# Patient Record
Sex: FEMALE | Race: WHITE | NOT HISPANIC OR LATINO | Employment: OTHER | URBAN - METROPOLITAN AREA
[De-identification: names, ages, dates, MRNs, and addresses within clinical notes are randomized per-mention and may not be internally consistent; named-entity substitution may affect disease eponyms.]

---

## 2017-01-21 PROBLEM — R31.9 URINARY TRACT INFECTION WITH HEMATURIA: Status: RESOLVED | Noted: 2017-01-20 | Resolved: 2017-01-21

## 2017-05-30 PROBLEM — D64.9 CHRONIC ANEMIA: Status: ACTIVE | Noted: 2017-05-30

## 2017-06-11 PROBLEM — R19.7 NAUSEA, VOMITING AND DIARRHEA: Status: ACTIVE | Noted: 2017-06-11

## 2017-08-31 PROBLEM — E86.0 DEHYDRATION: Status: ACTIVE | Noted: 2017-08-31

## 2017-09-16 PROBLEM — N39.0 UTI (URINARY TRACT INFECTION): Status: ACTIVE | Noted: 2017-09-16

## 2017-10-12 PROBLEM — E11.42 TYPE 2 DIABETES MELLITUS WITH DIABETIC POLYNEUROPATHY, WITHOUT LONG-TERM CURRENT USE OF INSULIN: Chronic | Status: ACTIVE | Noted: 2017-10-12

## 2018-01-11 PROBLEM — E11.22 CONTROLLED TYPE 2 DIABETES MELLITUS WITH STAGE 3 CHRONIC KIDNEY DISEASE, WITHOUT LONG-TERM CURRENT USE OF INSULIN: Status: ACTIVE | Noted: 2018-01-11

## 2022-10-02 PROBLEM — R78.81 BACTEREMIA: Status: ACTIVE | Noted: 2022-10-02

## 2022-12-05 PROBLEM — R00.1 SYMPTOMATIC BRADYCARDIA: Status: RESOLVED | Noted: 2022-09-29 | Resolved: 2022-12-05

## 2023-07-19 PROBLEM — A49.8 E COLI INFECTION: Status: ACTIVE | Noted: 2023-07-19

## 2023-08-27 PROBLEM — N18.30 TYPE 2 DIABETES MELLITUS WITH STAGE 3 CHRONIC KIDNEY DISEASE AND HYPERTENSION: Chronic | Status: ACTIVE | Noted: 2017-10-12

## 2023-08-27 PROBLEM — I48.91 ATRIAL FIBRILLATION: Status: ACTIVE | Noted: 2023-08-27

## 2023-09-09 PROBLEM — R07.9 CHEST PAIN: Status: ACTIVE | Noted: 2023-09-09

## 2023-12-12 PROCEDURE — G0180 MD CERTIFICATION HHA PATIENT: HCPCS | Mod: ,,, | Performed by: INTERNAL MEDICINE

## 2023-12-21 PROBLEM — J96.11 CHRONIC HYPOXEMIC RESPIRATORY FAILURE: Status: RESOLVED | Noted: 2023-12-09 | Resolved: 2023-12-21

## 2023-12-21 PROBLEM — J96.21 ACUTE ON CHRONIC HYPOXIC RESPIRATORY FAILURE: Status: RESOLVED | Noted: 2023-10-20 | Resolved: 2023-12-21

## 2023-12-21 PROBLEM — N30.00 ACUTE CYSTITIS WITHOUT HEMATURIA: Status: RESOLVED | Noted: 2017-09-16 | Resolved: 2023-12-21

## 2023-12-27 ENCOUNTER — HOSPITAL ENCOUNTER (INPATIENT)
Facility: HOSPITAL | Age: 88
LOS: 2 days | Discharge: HOME HEALTH | DRG: 178 | End: 2023-12-30
Attending: EMERGENCY MEDICINE | Admitting: STUDENT IN AN ORGANIZED HEALTH CARE EDUCATION/TRAINING PROGRAM
Payer: MEDICARE

## 2023-12-27 DIAGNOSIS — R10.13 EPIGASTRIC PAIN: ICD-10-CM

## 2023-12-27 DIAGNOSIS — R55 SYNCOPE: ICD-10-CM

## 2023-12-27 DIAGNOSIS — U07.1 COVID-19 VIRUS INFECTION: Primary | ICD-10-CM

## 2023-12-27 DIAGNOSIS — R07.9 CHEST PAIN: ICD-10-CM

## 2023-12-27 DIAGNOSIS — R29.898 WEAKNESS OF RIGHT LOWER EXTREMITY: ICD-10-CM

## 2023-12-27 DIAGNOSIS — I50.32 CHRONIC DIASTOLIC HEART FAILURE: ICD-10-CM

## 2023-12-27 LAB
ALBUMIN SERPL BCP-MCNC: 2.7 G/DL (ref 3.5–5.2)
ALP SERPL-CCNC: 49 U/L (ref 55–135)
ALT SERPL W/O P-5'-P-CCNC: 9 U/L (ref 10–44)
ANION GAP SERPL CALC-SCNC: 9 MMOL/L (ref 8–16)
AST SERPL-CCNC: 17 U/L (ref 10–40)
BACTERIA #/AREA URNS AUTO: ABNORMAL /HPF
BASOPHILS # BLD AUTO: 0.02 K/UL (ref 0–0.2)
BASOPHILS NFR BLD: 0.3 % (ref 0–1.9)
BILIRUB SERPL-MCNC: 0.6 MG/DL (ref 0.1–1)
BILIRUB UR QL STRIP: NEGATIVE
BNP SERPL-MCNC: 342 PG/ML (ref 0–99)
BUN SERPL-MCNC: 15 MG/DL (ref 10–30)
CALCIUM SERPL-MCNC: 8.8 MG/DL (ref 8.7–10.5)
CHLORIDE SERPL-SCNC: 107 MMOL/L (ref 95–110)
CLARITY UR REFRACT.AUTO: ABNORMAL
CO2 SERPL-SCNC: 22 MMOL/L (ref 23–29)
COLOR UR AUTO: YELLOW
CREAT SERPL-MCNC: 1.1 MG/DL (ref 0.5–1.4)
DIFFERENTIAL METHOD BLD: ABNORMAL
EOSINOPHIL # BLD AUTO: 0.1 K/UL (ref 0–0.5)
EOSINOPHIL NFR BLD: 1.7 % (ref 0–8)
ERYTHROCYTE [DISTWIDTH] IN BLOOD BY AUTOMATED COUNT: 15.4 % (ref 11.5–14.5)
EST. GFR  (NO RACE VARIABLE): 47.4 ML/MIN/1.73 M^2
GLUCOSE SERPL-MCNC: 151 MG/DL (ref 70–110)
GLUCOSE UR QL STRIP: NEGATIVE
HCT VFR BLD AUTO: 33.2 % (ref 37–48.5)
HCV AB SERPL QL IA: NORMAL
HGB BLD-MCNC: 10.9 G/DL (ref 12–16)
HGB UR QL STRIP: ABNORMAL
HYALINE CASTS UR QL AUTO: 0 /LPF
IMM GRANULOCYTES # BLD AUTO: 0.03 K/UL (ref 0–0.04)
IMM GRANULOCYTES NFR BLD AUTO: 0.5 % (ref 0–0.5)
INFLUENZA A, MOLECULAR: NEGATIVE
INFLUENZA B, MOLECULAR: NEGATIVE
KETONES UR QL STRIP: NEGATIVE
LEUKOCYTE ESTERASE UR QL STRIP: NEGATIVE
LYMPHOCYTES # BLD AUTO: 1.7 K/UL (ref 1–4.8)
LYMPHOCYTES NFR BLD: 26.7 % (ref 18–48)
MAGNESIUM SERPL-MCNC: 1.5 MG/DL (ref 1.6–2.6)
MCH RBC QN AUTO: 29.2 PG (ref 27–31)
MCHC RBC AUTO-ENTMCNC: 32.8 G/DL (ref 32–36)
MCV RBC AUTO: 89 FL (ref 82–98)
MICROSCOPIC COMMENT: ABNORMAL
MONOCYTES # BLD AUTO: 0.6 K/UL (ref 0.3–1)
MONOCYTES NFR BLD: 8.9 % (ref 4–15)
NEUTROPHILS # BLD AUTO: 4 K/UL (ref 1.8–7.7)
NEUTROPHILS NFR BLD: 61.9 % (ref 38–73)
NITRITE UR QL STRIP: NEGATIVE
NRBC BLD-RTO: 0 /100 WBC
PH UR STRIP: 6 [PH] (ref 5–8)
PLATELET # BLD AUTO: 159 K/UL (ref 150–450)
PMV BLD AUTO: 10 FL (ref 9.2–12.9)
POCT GLUCOSE: 67 MG/DL (ref 70–110)
POCT GLUCOSE: 87 MG/DL (ref 70–110)
POTASSIUM SERPL-SCNC: 3.9 MMOL/L (ref 3.5–5.1)
PROT SERPL-MCNC: 6.1 G/DL (ref 6–8.4)
PROT UR QL STRIP: ABNORMAL
RBC # BLD AUTO: 3.73 M/UL (ref 4–5.4)
RBC #/AREA URNS AUTO: 2 /HPF (ref 0–4)
SARS-COV-2 RDRP RESP QL NAA+PROBE: POSITIVE
SODIUM SERPL-SCNC: 138 MMOL/L (ref 136–145)
SP GR UR STRIP: 1.02 (ref 1–1.03)
SPECIMEN SOURCE: NORMAL
SQUAMOUS #/AREA URNS AUTO: 4 /HPF
TROPONIN I SERPL DL<=0.01 NG/ML-MCNC: 0.02 NG/ML (ref 0–0.03)
URN SPEC COLLECT METH UR: ABNORMAL
WBC # BLD AUTO: 6.52 K/UL (ref 3.9–12.7)
WBC #/AREA URNS AUTO: 7 /HPF (ref 0–5)

## 2023-12-27 PROCEDURE — 93010 ELECTROCARDIOGRAM REPORT: CPT | Mod: 76,,, | Performed by: INTERNAL MEDICINE

## 2023-12-27 PROCEDURE — XW033E5 INTRODUCTION OF REMDESIVIR ANTI-INFECTIVE INTO PERIPHERAL VEIN, PERCUTANEOUS APPROACH, NEW TECHNOLOGY GROUP 5: ICD-10-PCS | Performed by: EMERGENCY MEDICINE

## 2023-12-27 PROCEDURE — 96367 TX/PROPH/DG ADDL SEQ IV INF: CPT

## 2023-12-27 PROCEDURE — 82962 GLUCOSE BLOOD TEST: CPT

## 2023-12-27 PROCEDURE — 63600175 PHARM REV CODE 636 W HCPCS

## 2023-12-27 PROCEDURE — 81001 URINALYSIS AUTO W/SCOPE: CPT

## 2023-12-27 PROCEDURE — 99285 EMERGENCY DEPT VISIT HI MDM: CPT | Mod: 25

## 2023-12-27 PROCEDURE — 80053 COMPREHEN METABOLIC PANEL: CPT

## 2023-12-27 PROCEDURE — 25000003 PHARM REV CODE 250

## 2023-12-27 PROCEDURE — G0378 HOSPITAL OBSERVATION PER HR: HCPCS

## 2023-12-27 PROCEDURE — 84484 ASSAY OF TROPONIN QUANT: CPT

## 2023-12-27 PROCEDURE — 96365 THER/PROPH/DIAG IV INF INIT: CPT

## 2023-12-27 PROCEDURE — 85025 COMPLETE CBC W/AUTO DIFF WBC: CPT

## 2023-12-27 PROCEDURE — 83880 ASSAY OF NATRIURETIC PEPTIDE: CPT

## 2023-12-27 PROCEDURE — 93005 ELECTROCARDIOGRAM TRACING: CPT

## 2023-12-27 PROCEDURE — 83735 ASSAY OF MAGNESIUM: CPT

## 2023-12-27 PROCEDURE — 86803 HEPATITIS C AB TEST: CPT | Performed by: PHYSICIAN ASSISTANT

## 2023-12-27 PROCEDURE — 87502 INFLUENZA DNA AMP PROBE: CPT

## 2023-12-27 PROCEDURE — U0002 COVID-19 LAB TEST NON-CDC: HCPCS

## 2023-12-27 RX ORDER — QUETIAPINE FUMARATE 25 MG/1
75 TABLET, FILM COATED ORAL NIGHTLY
Status: DISCONTINUED | OUTPATIENT
Start: 2023-12-27 | End: 2023-12-30 | Stop reason: HOSPADM

## 2023-12-27 RX ORDER — GUAIFENESIN 100 MG/5ML
81 LIQUID (ML) ORAL DAILY
Status: DISCONTINUED | OUTPATIENT
Start: 2023-12-28 | End: 2023-12-30 | Stop reason: HOSPADM

## 2023-12-27 RX ORDER — IBUPROFEN 200 MG
16 TABLET ORAL
Status: DISCONTINUED | OUTPATIENT
Start: 2023-12-27 | End: 2023-12-30 | Stop reason: HOSPADM

## 2023-12-27 RX ORDER — POLYETHYLENE GLYCOL 3350 17 G/17G
17 POWDER, FOR SOLUTION ORAL DAILY
Status: DISCONTINUED | OUTPATIENT
Start: 2023-12-28 | End: 2023-12-30 | Stop reason: HOSPADM

## 2023-12-27 RX ORDER — GLUCAGON 1 MG
1 KIT INJECTION
Status: DISCONTINUED | OUTPATIENT
Start: 2023-12-27 | End: 2023-12-30 | Stop reason: HOSPADM

## 2023-12-27 RX ORDER — AMLODIPINE BESYLATE 10 MG/1
10 TABLET ORAL DAILY
Status: DISCONTINUED | OUTPATIENT
Start: 2023-12-28 | End: 2023-12-30 | Stop reason: HOSPADM

## 2023-12-27 RX ORDER — LEVOTHYROXINE SODIUM 75 UG/1
75 TABLET ORAL
Status: DISCONTINUED | OUTPATIENT
Start: 2023-12-28 | End: 2023-12-30 | Stop reason: HOSPADM

## 2023-12-27 RX ORDER — ATORVASTATIN CALCIUM 40 MG/1
80 TABLET, FILM COATED ORAL DAILY
Status: DISCONTINUED | OUTPATIENT
Start: 2023-12-28 | End: 2023-12-30 | Stop reason: HOSPADM

## 2023-12-27 RX ORDER — FUROSEMIDE 20 MG/1
40 TABLET ORAL DAILY
Status: DISCONTINUED | OUTPATIENT
Start: 2023-12-28 | End: 2023-12-30 | Stop reason: HOSPADM

## 2023-12-27 RX ORDER — INSULIN ASPART 100 [IU]/ML
0-5 INJECTION, SOLUTION INTRAVENOUS; SUBCUTANEOUS
Status: DISCONTINUED | OUTPATIENT
Start: 2023-12-27 | End: 2023-12-30 | Stop reason: HOSPADM

## 2023-12-27 RX ORDER — SODIUM CHLORIDE 0.9 % (FLUSH) 0.9 %
10 SYRINGE (ML) INJECTION EVERY 12 HOURS PRN
Status: DISCONTINUED | OUTPATIENT
Start: 2023-12-27 | End: 2023-12-30 | Stop reason: HOSPADM

## 2023-12-27 RX ORDER — IBUPROFEN 200 MG
24 TABLET ORAL
Status: DISCONTINUED | OUTPATIENT
Start: 2023-12-27 | End: 2023-12-30 | Stop reason: HOSPADM

## 2023-12-27 RX ORDER — IPRATROPIUM BROMIDE AND ALBUTEROL SULFATE 2.5; .5 MG/3ML; MG/3ML
3 SOLUTION RESPIRATORY (INHALATION) EVERY 6 HOURS PRN
Status: DISCONTINUED | OUTPATIENT
Start: 2023-12-27 | End: 2023-12-30 | Stop reason: HOSPADM

## 2023-12-27 RX ORDER — ONDANSETRON 4 MG/1
8 TABLET, ORALLY DISINTEGRATING ORAL EVERY 8 HOURS PRN
Status: DISCONTINUED | OUTPATIENT
Start: 2023-12-27 | End: 2023-12-30 | Stop reason: HOSPADM

## 2023-12-27 RX ORDER — CARVEDILOL 3.12 MG/1
6.25 TABLET ORAL 2 TIMES DAILY
Status: DISCONTINUED | OUTPATIENT
Start: 2023-12-27 | End: 2023-12-27

## 2023-12-27 RX ORDER — ENOXAPARIN SODIUM 100 MG/ML
40 INJECTION SUBCUTANEOUS EVERY 24 HOURS
Status: DISCONTINUED | OUTPATIENT
Start: 2023-12-27 | End: 2023-12-27

## 2023-12-27 RX ORDER — NALOXONE HCL 0.4 MG/ML
0.02 VIAL (ML) INJECTION
Status: DISCONTINUED | OUTPATIENT
Start: 2023-12-27 | End: 2023-12-30 | Stop reason: HOSPADM

## 2023-12-27 RX ORDER — MAGNESIUM SULFATE HEPTAHYDRATE 40 MG/ML
2 INJECTION, SOLUTION INTRAVENOUS ONCE
Status: COMPLETED | OUTPATIENT
Start: 2023-12-27 | End: 2023-12-28

## 2023-12-27 RX ORDER — GABAPENTIN 300 MG/1
300 CAPSULE ORAL 2 TIMES DAILY
Status: DISCONTINUED | OUTPATIENT
Start: 2023-12-27 | End: 2023-12-30 | Stop reason: HOSPADM

## 2023-12-27 RX ADMIN — MAGNESIUM SULFATE HEPTAHYDRATE 2 G: 40 INJECTION, SOLUTION INTRAVENOUS at 10:12

## 2023-12-27 RX ADMIN — APIXABAN 2.5 MG: 2.5 TABLET, FILM COATED ORAL at 10:12

## 2023-12-27 RX ADMIN — QUETIAPINE FUMARATE 75 MG: 25 TABLET ORAL at 10:12

## 2023-12-27 RX ADMIN — REMDESIVIR 200 MG: 100 INJECTION, POWDER, LYOPHILIZED, FOR SOLUTION INTRAVENOUS at 09:12

## 2023-12-27 RX ADMIN — GABAPENTIN 300 MG: 300 CAPSULE ORAL at 10:12

## 2023-12-27 NOTE — Clinical Note
Diagnosis: Syncope [206001]   Future Attending Provider: JARROD ROMANO [[id number]]   Admitting Provider:: JARROD ROMANO [[id number]]

## 2023-12-27 NOTE — ED PROVIDER NOTES
Encounter Date: 12/27/2023       History     Chief Complaint   Patient presents with    Episode of Unresponsiveness     Patient to ER  from home for unresponsive episode of toilet. Ems found pt 'chin to chest' with shallow respirations. 8 RR. EMS used BVM @ 10 L, pt became responsive and arrived to ER awake and alert. Dementia. On 2L NC. Denies pain      Quin Linn is a 89 y.o. female with a PMH of CHF, Alzheimer's, HLD, HTN, hyperthyroidism, CVA, pneumonia, T2DM presenting to [facility] Department via EMS following an episode of unresponsiveness.  Patient's family member found the patient on the toilet unresponsive, with her chin to her chest and shallow respirations.  EMS use bpm at 10 L on their arrival and patient became responsive.  On arrival to the ED she was awake on 2 L nasal cannula.  She denies any pain             The history is provided by the patient, medical records and the EMS personnel. No  was used.     Review of patient's allergies indicates:   Allergen Reactions    Tramadol Rash and Edema     Developed facial rash and edema.    Metformin Diarrhea     Past Medical History:   Diagnosis Date    Acute hypoxemic respiratory failure 9/2/2023    Acute on chronic diastolic congestive heart failure 10/19/2023    Alzheimer's disease     Anemia     Arthritis     lumbar    Back pain     Cancer     colon    Cataract     Colon cancer     Diabetes mellitus type II     Glaucoma     Hyperlipidemia     Hypertension     Hyperthyroidism     Hypothyroidism following radioiodine therapy     Pacemaker     Pneumonia     Pneumonia due to other staphylococcus     Renal manifestation of secondary diabetes mellitus     Squamous cell carcinoma     Stroke     TIA    Type 2 diabetes mellitus with stage 3 chronic kidney disease and hypertension 10/12/2017    Type 2 diabetes with peripheral circulatory disorder, controlled      Past Surgical History:   Procedure Laterality Date    CATARACT  EXTRACTION W/  INTRAOCULAR LENS IMPLANT Left 09/13/2017        CHOLECYSTECTOMY      COLON SURGERY  2001    Colon CA    EYE SURGERY      cataract removal left side    IMPLANTATION OF LEADLESS PACEMAKER N/A 10/21/2022    Procedure: [id number];  Surgeon: JOSE Burgos MD;  Location: [facility];  Service: Cardiology;  Laterality: N/A;  SB, NOLANA, MDT, ANES, EH,     squamous cell ca of face       Family History   Problem Relation Age of Onset    Heart disease Mother     Arthritis Mother     Kidney disease Father     Alzheimer's disease Sister     Hypertension Sister     Cancer Sister         ovarian    No Known Problems Daughter     Ankylosing spondylitis Daughter     Lupus Daughter     Kidney disease Daughter     Heart disease Daughter     Atrial fibrillation Daughter     Supraventricular tachycardia Daughter     Hypertension Son     Hyperlipidemia Son     Asthma Son     Cancer Paternal Uncle     Diabetes Maternal Grandmother     Amblyopia Neg Hx     Blindness Neg Hx     Cataracts Neg Hx     Glaucoma Neg Hx     Macular degeneration Neg Hx     Retinal detachment Neg Hx     Strabismus Neg Hx     Stroke Neg Hx     Thyroid disease Neg Hx      Social History     Tobacco Use    Smoking status: Never    Smokeless tobacco: Never    Tobacco comments:     smoked for about 4 years in her twenties (socially)   Substance Use Topics    Alcohol use: No    Drug use: No     Review of Systems    Physical Exam     Initial Vitals [12/27/23 1136]   BP Pulse Resp Temp SpO2   121/62 64 (!) 24 (!) 94.8 °F (34.9 °C) 99 %      MAP       --         Physical Exam    Nursing note and vitals reviewed.  Constitutional: She appears well-developed and well-nourished. No distress.   Tired appearing    HENT:   Mouth/Throat: Oropharynx is clear and moist.   3 cm hematoma to posterior occiput    Eyes: Pupils are equal, round, and reactive to light. No scleral icterus.   Neck: Neck supple.   Cardiovascular:  Normal  rate and regular rhythm.           Pulmonary/Chest: Breath sounds normal. No stridor. No respiratory distress.   Abdominal: Abdomen is soft. She exhibits no distension. There is no abdominal tenderness.   Musculoskeletal:         General: No edema.      Cervical back: Neck supple.     Neurological: She is alert. GCS score is 15. GCS eye subscore is 4. GCS verbal subscore is 5. GCS motor subscore is 6.   Skin: Skin is warm and dry. Capillary refill takes less than 2 seconds.         ED Course   Procedures  Labs Reviewed   CBC W/ AUTO DIFFERENTIAL - Abnormal; Notable for the following components:       Result Value    RBC 3.73 (*)     Hemoglobin 10.9 (*)     Hematocrit 33.2 (*)     RDW 15.4 (*)     All other components within normal limits    Narrative:     Release to patient->Immediate   COMPREHENSIVE METABOLIC PANEL - Abnormal; Notable for the following components:    CO2 22 (*)     Glucose 151 (*)     Albumin 2.7 (*)     Alkaline Phosphatase 49 (*)     ALT 9 (*)     eGFR 47.4 (*)     All other components within normal limits    Narrative:     Release to patient->Immediate   B-TYPE NATRIURETIC PEPTIDE - Abnormal; Notable for the following components:     (*)     All other components within normal limits    Narrative:     Release to patient->Immediate   MAGNESIUM - Abnormal; Notable for the following components:    Magnesium 1.5 (*)     All other components within normal limits    Narrative:     Release to patient->Immediate   URINALYSIS, REFLEX TO URINE CULTURE - Abnormal; Notable for the following components:    Appearance, UA Hazy (*)     Protein, UA 2+ (*)     Occult Blood UA 1+ (*)     All other components within normal limits    Narrative:     Specimen Source->Urine   SARS-COV-2 RNA AMPLIFICATION, QUAL - Abnormal; Notable for the following components:    SARS-CoV-2 RNA, Amplification, Qual Positive (*)     All other components within normal limits   URINALYSIS MICROSCOPIC - Abnormal; Notable for the  following components:    WBC, UA 7 (*)     All other components within normal limits    Narrative:     Specimen Source->Urine   POCT GLUCOSE - Abnormal; Notable for the following components:    POCT Glucose 67 (*)     All other components within normal limits   INFLUENZA A & B BY MOLECULAR   HEPATITIS C ANTIBODY    Narrative:     Release to patient->Immediate   TROPONIN I    Narrative:     Release to patient->Immediate   POCT GLUCOSE MONITORING CONTINUOUS     EKG Readings: (Independently Interpreted)   Initial Reading: No STEMI. Previous EKG: Compared with most recent EKG Heart Rate: 66. Conduction: LBBB.   NSR with sinus arrhythmia        Imaging Results              CT Head Without Contrast (Final result)  Result time 12/27/23 14:06:55      Final result by Kd Berman MD (12/27/23 14:06:55)                   Impression:      1. No acute intracranial findings.  2. Posterior scalp hematoma without definite skull fracture.      Electronically signed by: Kd Berman  Date:    12/27/2023  Time:    14:06               Narrative:    EXAMINATION:  CT HEAD WITHOUT CONTRAST    CLINICAL HISTORY:  Syncope, recurrent;    TECHNIQUE:  Low dose axial images were obtained through the head.  Coronal and sagittal reformations were also performed. Contrast was not administered.    COMPARISON:  CT head 12/08/2023.    FINDINGS:  Blood: No acute intracranial hemorrhage.    Parenchyma: No definite loss of gray-white differentiation to suggest acute or subacute transcortical infarct. Parenchymal volume loss, with notable atrophy of the mesial temporal lobe structures.  Nonspecific white matter hypoattenuation.    Ventricles/Extra-axial spaces: No abnormal extra-axial fluid collection. Basal cisterns are patent.    Vessels: Heavy atherosclerotic calcification.    Orbits: Status post left lens replacement.    Scalp: Left posterior scalp soft tissue contusion/hematoma.    Skull: There are no depressed skull fractures or  destructive bone lesions.    Sinuses and mastoids: Scattered paranasal sinus mucosal thickening and retention cysts.  Areas of frothy debris would be in keeping with acute mucosal inflammation, in the appropriate clinical context.    Other findings: None                                       X-Ray Chest AP Portable (Final result)  Result time 12/27/23 12:22:14      Final result by Jered Olson MD (12/27/23 12:22:14)                   Impression:      Suspected visual small left pleural effusion and probable left basilar atelectasis/infiltrate, noting improved aeration from 12/08/2023 radiograph.      Electronically signed by: Jered Olson MD  Date:    12/27/2023  Time:    12:22               Narrative:    EXAMINATION:  XR CHEST AP PORTABLE    CLINICAL HISTORY:  syncope;    TECHNIQUE:  Single frontal view of the chest was performed.    COMPARISON:  Chest radiograph 12/08/2023, CT thorax 09/04/2023    FINDINGS:  Monitoring leads overlie the chest.    Cardiomediastinal silhouette is midline and prominent with similar calcification and tortuosity of the aorta and enlarged heart.  Small device again projects over the left lung base.  Pulmonary vasculature and hilar contours are within normal limits.  Continued hazy opacification of the left lung base obscuring the diaphragm and costophrenic angle.  Interval improved aeration of the remaining lung fields.  No sizable pleural effusion or consolidation on the right.  No definite pneumothorax.  Mild biapical pleuroparenchymal scarring unchanged.  No acute osseous process seen.  PA and lateral views can be obtained.                                    X-Rays:   Independently Interpreted Readings:   Chest X-Ray: Normal heart size. small left pleural effusion   left basilar atelectasis/infiltrate     Medications   sodium chloride 0.9% flush 10 mL (has no administration in time range)   naloxone 0.4 mg/mL injection 0.02 mg (has no administration in time range)   glucose  chewable tablet 16 g (has no administration in time range)   glucose chewable tablet 24 g (has no administration in time range)   glucagon (human recombinant) injection 1 mg (has no administration in time range)   ondansetron disintegrating tablet 8 mg (has no administration in time range)   albuterol-ipratropium 2.5 mg-0.5 mg/3 mL nebulizer solution 3 mL (has no administration in time range)   insulin aspart U-100 pen 0-5 Units (has no administration in time range)   dextrose 10% bolus 125 mL 125 mL (has no administration in time range)   dextrose 10% bolus 250 mL 250 mL (has no administration in time range)   remdesivir 200 mg in sodium chloride 0.9% 250 mL infusion (200 mg Intravenous New Bag 12/27/23 2106)     Followed by   remdesivir 100 mg in sodium chloride 0.9% 250 mL infusion (has no administration in time range)   QUEtiapine tablet 75 mg (has no administration in time range)   amLODIPine tablet 10 mg (has no administration in time range)   apixaban tablet 2.5 mg (has no administration in time range)   aspirin chewable tablet 81 mg (has no administration in time range)   atorvastatin tablet 80 mg (has no administration in time range)   furosemide tablet 40 mg (has no administration in time range)   gabapentin capsule 300 mg (has no administration in time range)   levothyroxine tablet 75 mcg (has no administration in time range)   polyethylene glycol packet 17 g (has no administration in time range)   magnesium sulfate 2g in water 50mL IVPB (premix) (has no administration in time range)     Medical Decision Making  EKG without dangerous arrhythmia   Electrolytes normal   COVID +   CT head with posterior scalp hematoma, no intracranial findings      Normal trop    Given patient's age, co morbidities, COVID+, and unclear reason for syncope, will admit to hospital medicine for cardiac monitoring and further management.   Discussed findings and plan with family who expressed understanding and agreement.    I discussed admission with hospital medicine who agreed to admit the patient for further evaluation and management.       Amount and/or Complexity of Data Reviewed  Labs: ordered. Decision-making details documented in ED Course.  Radiology: ordered.    Risk  Prescription drug management.  Decision regarding hospitalization.               ED Course as of 12/27/23 2115   Wed Dec 27, 2023   1311 Hemoglobin(!): 10.9  At her baseline [GK]      ED Course User Index  [GK] Liliana Smith MD                           Clinical Impression:  Final diagnoses:  [R55] Syncope  [U07.1] COVID-19 virus infection (Primary)          ED Disposition Condition    Observation Stable                Latonia Franco MD  Resident  12/27/23 2115

## 2023-12-28 LAB
BASOPHILS # BLD AUTO: 0.02 K/UL (ref 0–0.2)
BASOPHILS NFR BLD: 0.4 % (ref 0–1.9)
CK SERPL-CCNC: 15 U/L (ref 20–180)
DIFFERENTIAL METHOD BLD: ABNORMAL
EOSINOPHIL # BLD AUTO: 0.1 K/UL (ref 0–0.5)
EOSINOPHIL NFR BLD: 3 % (ref 0–8)
ERYTHROCYTE [DISTWIDTH] IN BLOOD BY AUTOMATED COUNT: 16 % (ref 11.5–14.5)
HCT VFR BLD AUTO: 29.8 % (ref 37–48.5)
HGB BLD-MCNC: 9.9 G/DL (ref 12–16)
IMM GRANULOCYTES # BLD AUTO: 0.01 K/UL (ref 0–0.04)
IMM GRANULOCYTES NFR BLD AUTO: 0.2 % (ref 0–0.5)
LYMPHOCYTES # BLD AUTO: 2 K/UL (ref 1–4.8)
LYMPHOCYTES NFR BLD: 42.2 % (ref 18–48)
MCH RBC QN AUTO: 28.8 PG (ref 27–31)
MCHC RBC AUTO-ENTMCNC: 33.2 G/DL (ref 32–36)
MCV RBC AUTO: 87 FL (ref 82–98)
MONOCYTES # BLD AUTO: 0.4 K/UL (ref 0.3–1)
MONOCYTES NFR BLD: 7.8 % (ref 4–15)
NEUTROPHILS # BLD AUTO: 2.2 K/UL (ref 1.8–7.7)
NEUTROPHILS NFR BLD: 46.4 % (ref 38–73)
NRBC BLD-RTO: 0 /100 WBC
PLATELET # BLD AUTO: 173 K/UL (ref 150–450)
PMV BLD AUTO: 9.7 FL (ref 9.2–12.9)
POCT GLUCOSE: 61 MG/DL (ref 70–110)
POCT GLUCOSE: 71 MG/DL (ref 70–110)
POCT GLUCOSE: 80 MG/DL (ref 70–110)
RBC # BLD AUTO: 3.44 M/UL (ref 4–5.4)
TSH SERPL DL<=0.005 MIU/L-ACNC: 3.04 UIU/ML (ref 0.4–4)
WBC # BLD AUTO: 4.72 K/UL (ref 3.9–12.7)

## 2023-12-28 PROCEDURE — 97165 OT EVAL LOW COMPLEX 30 MIN: CPT

## 2023-12-28 PROCEDURE — 97110 THERAPEUTIC EXERCISES: CPT

## 2023-12-28 PROCEDURE — 36415 COLL VENOUS BLD VENIPUNCTURE: CPT

## 2023-12-28 PROCEDURE — 27000207 HC ISOLATION

## 2023-12-28 PROCEDURE — 85025 COMPLETE CBC W/AUTO DIFF WBC: CPT

## 2023-12-28 PROCEDURE — 97530 THERAPEUTIC ACTIVITIES: CPT

## 2023-12-28 PROCEDURE — 82550 ASSAY OF CK (CPK): CPT

## 2023-12-28 PROCEDURE — 63600175 PHARM REV CODE 636 W HCPCS: Mod: JZ,TB

## 2023-12-28 PROCEDURE — 25000003 PHARM REV CODE 250

## 2023-12-28 PROCEDURE — 21400001 HC TELEMETRY ROOM

## 2023-12-28 PROCEDURE — 83735 ASSAY OF MAGNESIUM: CPT

## 2023-12-28 PROCEDURE — 51798 US URINE CAPACITY MEASURE: CPT

## 2023-12-28 PROCEDURE — 84100 ASSAY OF PHOSPHORUS: CPT

## 2023-12-28 PROCEDURE — 84443 ASSAY THYROID STIM HORMONE: CPT

## 2023-12-28 PROCEDURE — 97162 PT EVAL MOD COMPLEX 30 MIN: CPT

## 2023-12-28 PROCEDURE — 80053 COMPREHEN METABOLIC PANEL: CPT

## 2023-12-28 PROCEDURE — 0T9B70Z DRAINAGE OF BLADDER WITH DRAINAGE DEVICE, VIA NATURAL OR ARTIFICIAL OPENING: ICD-10-PCS | Performed by: STUDENT IN AN ORGANIZED HEALTH CARE EDUCATION/TRAINING PROGRAM

## 2023-12-28 RX ORDER — CARVEDILOL 6.25 MG/1
6.25 TABLET ORAL 2 TIMES DAILY
Status: DISCONTINUED | OUTPATIENT
Start: 2023-12-28 | End: 2023-12-29

## 2023-12-28 RX ORDER — HYDROCODONE BITARTRATE AND ACETAMINOPHEN 5; 325 MG/1; MG/1
1 TABLET ORAL EVERY 8 HOURS PRN
Qty: 90 TABLET | Refills: 0
Start: 2023-12-28

## 2023-12-28 RX ADMIN — ASPIRIN 81 MG CHEWABLE TABLET 81 MG: 81 TABLET CHEWABLE at 11:12

## 2023-12-28 RX ADMIN — QUETIAPINE FUMARATE 75 MG: 25 TABLET ORAL at 08:12

## 2023-12-28 RX ADMIN — FUROSEMIDE 40 MG: 20 TABLET ORAL at 11:12

## 2023-12-28 RX ADMIN — GABAPENTIN 300 MG: 300 CAPSULE ORAL at 11:12

## 2023-12-28 RX ADMIN — POLYETHYLENE GLYCOL 3350 17 G: 17 POWDER, FOR SOLUTION ORAL at 11:12

## 2023-12-28 RX ADMIN — AMLODIPINE BESYLATE 10 MG: 10 TABLET ORAL at 11:12

## 2023-12-28 RX ADMIN — APIXABAN 2.5 MG: 2.5 TABLET, FILM COATED ORAL at 11:12

## 2023-12-28 RX ADMIN — APIXABAN 2.5 MG: 2.5 TABLET, FILM COATED ORAL at 08:12

## 2023-12-28 RX ADMIN — CARVEDILOL 6.25 MG: 6.25 TABLET, FILM COATED ORAL at 08:12

## 2023-12-28 RX ADMIN — REMDESIVIR 100 MG: 100 INJECTION, POWDER, LYOPHILIZED, FOR SOLUTION INTRAVENOUS at 12:12

## 2023-12-28 RX ADMIN — ATORVASTATIN CALCIUM 80 MG: 40 TABLET, FILM COATED ORAL at 11:12

## 2023-12-28 RX ADMIN — CARVEDILOL 6.25 MG: 6.25 TABLET, FILM COATED ORAL at 11:12

## 2023-12-28 RX ADMIN — GABAPENTIN 300 MG: 300 CAPSULE ORAL at 08:12

## 2023-12-28 NOTE — SUBJECTIVE & OBJECTIVE
Interval History: NAEON and no new complaints. Pt is breathing comfortably on room air. She denies being in any pain today. Carotid ultrasound was unrevealing.     Review of Systems   Constitutional:  Negative for chills and fever.   Respiratory:  Negative for shortness of breath.    Cardiovascular:  Negative for chest pain and leg swelling.   Gastrointestinal:  Negative for abdominal pain, diarrhea, nausea and vomiting.   Genitourinary:  Negative for dysuria.   Neurological:  Negative for light-headedness.     Objective:     Vital Signs (Most Recent):  Temp: 99 °F (37.2 °C) (12/28/23 0745)  Pulse: 62 (12/28/23 1109)  Resp: 17 (12/28/23 0745)  BP: (!) 177/75 (12/28/23 0745)  SpO2: 99 % (12/28/23 1100) Vital Signs (24h Range):  Temp:  [97.5 °F (36.4 °C)-99 °F (37.2 °C)] 99 °F (37.2 °C)  Pulse:  [54-74] 62  Resp:  [13-20] 17  SpO2:  [96 %-100 %] 99 %  BP: (132-197)/(60-88) 177/75     Weight: 65.8 kg (145 lb 1 oz)  Body mass index is 22.71 kg/m².    Intake/Output Summary (Last 24 hours) at 12/28/2023 1211  Last data filed at 12/28/2023 0634  Gross per 24 hour   Intake 250 ml   Output --   Net 250 ml         Physical Exam  Constitutional:       General: She is not in acute distress.  HENT:      Head: Normocephalic and atraumatic.      Mouth/Throat:      Mouth: Mucous membranes are moist.   Eyes:      General: No scleral icterus.     Extraocular Movements: Extraocular movements intact.   Cardiovascular:      Rate and Rhythm: Normal rate and regular rhythm.      Pulses: Normal pulses.      Heart sounds: Normal heart sounds. No murmur heard.  Pulmonary:      Effort: Pulmonary effort is normal. No respiratory distress.      Breath sounds: Normal breath sounds. No wheezing or rales.   Abdominal:      Palpations: Abdomen is soft.      Tenderness: There is no abdominal tenderness.   Musculoskeletal:      Right lower leg: No edema.      Left lower leg: No edema.   Skin:     General: Skin is warm and dry.      Coloration: Skin  is not jaundiced.   Neurological:      Mental Status: She is alert. Mental status is at baseline.   Psychiatric:         Mood and Affect: Mood normal.         Behavior: Behavior normal.             Significant Labs: All pertinent labs within the past 24 hours have been reviewed.  CBC:   Recent Labs   Lab 12/27/23  1206   WBC 6.52   HGB 10.9*   HCT 33.2*        CMP:   Recent Labs   Lab 12/27/23  1206      K 3.9      CO2 22*   *   BUN 15   CREATININE 1.1   CALCIUM 8.8   PROT 6.1   ALBUMIN 2.7*   BILITOT 0.6   ALKPHOS 49*   AST 17   ALT 9*   ANIONGAP 9       Significant Imaging: I have reviewed all pertinent imaging results/findings within the past 24 hours.

## 2023-12-28 NOTE — ASSESSMENT & PLAN NOTE
Pt tested positive in the ED. SpO2 was >95. CXR was without any new consolidation or interstitial prominence. She has been having mild URI sx since Sunday, but has remained afebrile and HDS.     -Remdesevir therapy as she has co morbidities that place her at risk of severe infection  -Monitor SpO2   -Telemetry   -Ariborne, contact, and droplet precautions

## 2023-12-28 NOTE — ASSESSMENT & PLAN NOTE
During her last admission, a interrogation of her pacemaker was attempted but it does not record for prolonged periods of time so little data was available. At that time, there was no record of any arhythmia.     -S/p pacemaker implantation

## 2023-12-28 NOTE — PROGRESS NOTES
[facility] - [facility] ([address] Medicine  Progress Note    Patient Name: Quin Linn  MRN: [id number]  Patient Class: IP- Inpatient   Admission Date: 12/27/2023  Length of Stay: 0 days  Attending Physician: Oksana Lynch MD  Primary Care Provider: Mary Ellen Nesbitt MD        Subjective:     Principal Problem:Syncope        HPI:  89 yo F with AD, hx of CVA, 2nd degree AV block s/p-ppm, pAF on eliquis, HFpEF, CKD3, HTN, DM, hypothyroidism who presents following syncopal episode. She was found unresponsive at home on the toilet after a loose BM. The pt's caregiver found her and immediately called for the pt's daughter. The daughter came and helped secure her mother then called 911. In total, the pt was unresponsive for roughly 20 minutes before coming back to. It took her a few moments, but she returned to her baseline. Of note, pt began having upper respiratory sx this past Sunday, but was never febrile or hypoxic at home.    Earlier this month, the pt was admitted for an almost identical scenario while using the bathroom. At that time, she was treated for UTI and the event was attributed to vasovagal response with autonomic dysfunction.    In the ED, she was afebrile and HDS. Her CT head did not show any acute findings, but CXR did show a small left sided pleural effusion that was present during her previous admission, otherwise no consolidation. BNP was mildly elevated at 342 but she does not appear to be fluid overloaded on exam. She was found to be covid positive but she was satting well on room air.     Overview/Hospital Course:  89 yo F with AD, hx of CVA, 2nd degree AV block s/p-ppm, pAF on eliquis, HFpEF, CKD3, HTN, DM, hypothyroidism who presents following syncopal episode. Her metabolic workup has been unrevealing. We are treating her Covid 19 with remdesivir; she remains stable on room air. Will initiate GOC conversation with the patient's daughter.    Interval History: SUN and  no new complaints. Pt is breathing comfortably on room air. She denies being in any pain today. Carotid ultrasound was unrevealing.     Review of Systems   Constitutional:  Negative for chills and fever.   Respiratory:  Negative for shortness of breath.    Cardiovascular:  Negative for chest pain and leg swelling.   Gastrointestinal:  Negative for abdominal pain, diarrhea, nausea and vomiting.   Genitourinary:  Negative for dysuria.   Neurological:  Negative for light-headedness.     Objective:     Vital Signs (Most Recent):  Temp: 99 °F (37.2 °C) (12/28/23 0745)  Pulse: 62 (12/28/23 1109)  Resp: 17 (12/28/23 0745)  BP: (!) 177/75 (12/28/23 0745)  SpO2: 99 % (12/28/23 1100) Vital Signs (24h Range):  Temp:  [97.5 °F (36.4 °C)-99 °F (37.2 °C)] 99 °F (37.2 °C)  Pulse:  [54-74] 62  Resp:  [13-20] 17  SpO2:  [96 %-100 %] 99 %  BP: (132-197)/(60-88) 177/75     Weight: 65.8 kg (145 lb 1 oz)  Body mass index is 22.71 kg/m².    Intake/Output Summary (Last 24 hours) at 12/28/2023 1211  Last data filed at 12/28/2023 0634  Gross per 24 hour   Intake 250 ml   Output --   Net 250 ml         Physical Exam  Constitutional:       General: She is not in acute distress.  HENT:      Head: Normocephalic and atraumatic.      Mouth/Throat:      Mouth: Mucous membranes are moist.   Eyes:      General: No scleral icterus.     Extraocular Movements: Extraocular movements intact.   Cardiovascular:      Rate and Rhythm: Normal rate and regular rhythm.      Pulses: Normal pulses.      Heart sounds: Normal heart sounds. No murmur heard.  Pulmonary:      Effort: Pulmonary effort is normal. No respiratory distress.      Breath sounds: Normal breath sounds. No wheezing or rales.   Abdominal:      Palpations: Abdomen is soft.      Tenderness: There is no abdominal tenderness.   Musculoskeletal:      Right lower leg: No edema.      Left lower leg: No edema.   Skin:     General: Skin is warm and dry.      Coloration: Skin is not jaundiced.    Neurological:      Mental Status: She is alert. Mental status is at baseline.   Psychiatric:         Mood and Affect: Mood normal.         Behavior: Behavior normal.             Significant Labs: All pertinent labs within the past 24 hours have been reviewed.  CBC:   Recent Labs   Lab 12/27/23  1206   WBC 6.52   HGB 10.9*   HCT 33.2*        CMP:   Recent Labs   Lab 12/27/23  1206      K 3.9      CO2 22*   *   BUN 15   CREATININE 1.1   CALCIUM 8.8   PROT 6.1   ALBUMIN 2.7*   BILITOT 0.6   ALKPHOS 49*   AST 17   ALT 9*   ANIONGAP 9       Significant Imaging: I have reviewed all pertinent imaging results/findings within the past 24 hours.    Assessment/Plan:      * Syncope  Pt syncopized while on the toilet after, from what the daughter reports, having loose stools. She remained unresponsive for roughly 20 minutes. EMS was called and she was brought in. This almost exact scenario occurred earlier this month and it was eventually attributed to autonomic dysfunction. CT head was unremarkable, CXR showed a small pleural effusion that has been present for some time, CBC and CMP were baseline for her, but she did test positive for covid-19.    -Telemetry  -Carotid US: No evidence of a hemodynamically significant carotid bifurcation stenosis   -Treat covid  -orthostatic vital signs; pt has refused wearing a BP cuff; will re-attempt this at a later time    COVID-19  Pt tested positive in the ED. SpO2 was >95. CXR was without any new consolidation or interstitial prominence. She has been having mild URI sx since Sunday, but has remained afebrile and HDS.     -Remdesevir therapy as she has co morbidities that place her at risk of severe infection  -Monitor SpO2   -Telemetry   -Ariborne, contact, and droplet precautions     Chronic diastolic heart failure  Echo    Result Date: 9/5/2023    Left Ventricle: The left ventricle is normal in size. Normal wall   thickness. Normal wall motion. There is normal  diastolic function.    Left Atrium: Left atrium is moderately dilated.    Right Ventricle: Normal right ventricular cavity size. Wall thickness   is normal. Right ventricle wall motion  is normal. Systolic function is   normal.    Right Atrium: Right atrium is mildly dilated.    Aortic Valve: There is mild aortic valve sclerosis.    Pulmonary Artery: No pulmonary hypertension. The estimated pulmonary   artery systolic pressure is 39 mmHg.    IVC/SVC: Elevated venous pressure at 15 mmHg.    Pericardium: There is a small circumferential effusion with   small-moderate posteriorly and apical- laterally. No indication of cardiac   tamponade. Evidence includes no respiratory transvalvular variation.   Bilateral pleural effusions.        Echo    Result Date: 9/2/2023    Left Ventricle: The left ventricle is mildly dilated. Increased wall   thickness. Normal wall motion. There is normal systolic function with a   visually estimated ejection fraction of 55 - 60%. Grade II diastolic   dysfunction.    Right Ventricle: Normal right ventricular cavity size. Right ventricle   wall motion  is normal. Systolic function is normal.    Left Atrium: Left atrium is severely dilated.    Right Atrium: Right atrium is dilated.    Mitral Valve: There is mild regurgitation.    IVC/SVC: Patient is ventilated, cannot use IVC diameter to estimate   right atrial pressure.    Pericardium: There is a small circumferential effusion. Prominent   echogenic material is adjacent or adherent to the visceral pericardium   which carries a broad differential including but not limited to   pericardial fat, fibrous material and malignancy. Clinical correlation is   advised. No convincing findings to suggest tamponade. Left pleural   effusion.    Aorta: Aortic root is normal in size measuring 3.34 cm. Ascending aorta   is mildly dilated measuring 3.81 cm.       -Continue home lasix   -Telemetry  -Maintain K>4 and Mag >2      Constipation  -bowel regimen        Paroxysmal A-fib  EKG was NSR on admission with HR of 66.    -Continue eliquis 2.5 mg BID  -Coreg 6.25 mg BID    Wenckebach second degree AV block  During her last admission, a interrogation of her pacemaker was attempted but it does not record for prolonged periods of time so little data was available. At that time, there was no record of any arhythmia.     -S/p pacemaker implantation         Chronic kidney disease, stage IV (severe)  -Daily CMP  -Avoid nephrotoxic agents     Diabetic polyneuropathy associated with type 2 diabetes mellitus  -Continue home gabapentin; will consider lowering her dose given age and dementia     Controlled type 2 diabetes mellitus with stage 3 chronic kidney disease, without long-term current use of insulin  Pt not currently taking any diabetic medications at home    -SSI  -POCT BG      Anemia of chronic disease    Lab Results   Component Value Date    HGB 10.9 (L) 12/27/2023    HCT 33.2 (L) 12/27/2023     Monitor serial CBC and transfuse if patient becomes hemodynamically unstable, symptomatic or H/H drops below 7/21.    Debility  -PT/OT consulted     Essential hypertension    Hypertension Medications               amLODIPine (NORVASC) 10 MG tablet Take 1 tablet (10 mg total) by mouth once daily.    carvediloL (COREG) 6.25 MG tablet Take 1 tablet (6.25 mg total) by mouth 2 (two) times daily.          -Restaritng BB  -Pt has had several elevated BP readings with SBPs in the 180s. We will continue to closely monitor and titrate meds as indicated     Late onset Alzheimer's disease with behavioral disturbance  Spoke with daughter at bedside on admission and states her mother was at baseline.    -Seroquel 75 mg QHS    Hyperlipidemia LDL goal <70  -continue home statin       Hypothyroidism following radioiodine therapy  -continue home synthroid        VTE Risk Mitigation (From admission, onward)           Ordered     apixaban tablet 2.5 mg  2 times daily         12/27/23 1823     IP  VTE HIGH RISK PATIENT  Once         12/27/23 1758     Place sequential compression device  Until discontinued         12/27/23 1758                    Discharge Planning   LILI: 12/29/2023     Code Status: DNR   Is the patient medically ready for discharge?:     Reason for patient still in hospital (select all that apply): Patient trending condition  Discharge Plan A: Home Health                  Frank Laws DO  Department of Hospital Medicine   [facility] - Intensive Care ([address] [place])

## 2023-12-28 NOTE — HOSPITAL COURSE
89 yo F with AD, hx of CVA, 2nd degree AV block s/p-ppm, pAF on eliquis, HFpEF, CKD3, HTN, DM, hypothyroidism who presents following syncopal episode. Her metabolic workup has been unrevealing, and no concerning arrhythmias were seen on telemetry aside from intermittent Afib which was previously known. We are treating her Covid 19 with remdesivir; she remains stable on room air. Adjusted her coreg to 3.125 mg BID and dc glipizide. Will discharge her with home health and requested a transfer bench for bath.     Physical Exam  Constitutional:       General: She is not in acute distress.  HENT:      Head: Normocephalic and atraumatic.      Mouth/Throat:      Mouth: Mucous membranes are moist.   Eyes:      General: No scleral icterus.     Extraocular Movements: Extraocular movements intact.   Cardiovascular:      Rate and Rhythm: Normal rate and regular rhythm.      Pulses: Normal pulses.      Heart sounds: Normal heart sounds. No murmur heard.  Pulmonary:      Effort: Pulmonary effort is normal. No respiratory distress.      Breath sounds: Normal breath sounds. No wheezing or rales.   Abdominal:      Palpations: Abdomen is soft.      Tenderness: There is no abdominal tenderness.   Musculoskeletal:      Right lower leg: No edema.      Left lower leg: No edema.   Skin:     General: Skin is warm and dry.      Coloration: Skin is not jaundiced.   Neurological:      Mental Status: She is alert. Mental status is at baseline.   Psychiatric:         Mood and Affect: Mood normal.         Behavior: Behavior normal.

## 2023-12-28 NOTE — H&P
[facility] Dept  Hospital Medicine  History & Physical    Patient Name: Quin Linn  MRN: [id number]  Patient Class: OP- Observation  Admission Date: 12/27/2023  Attending Physician: Oksana Lynch MD   Primary Care Provider: Mary Ellen Nesbitt MD         Patient information was obtained from patient, relative(s), past medical records, and ER records.     Subjective:     Principal Problem:Syncope    Chief Complaint:   Chief Complaint   Patient presents with    Episode of Unresponsiveness     Patient to ER  from home for unresponsive episode of toilet. Ems found pt 'chin to chest' with shallow respirations. 8 RR. EMS used BVM @ 10 L, pt became responsive and arrived to ER awake and alert. Dementia. On 2L NC. Denies pain         HPI: 89 yo F with AD, hx of CVA, 2nd degree AV block s/p-ppm, pAF on eliquis, HFpEF, CKD3, HTN, DM, hypothyroidism who presents following syncopal episode. She was found unresponsive at home on the toilet after a loose BM. The pt's caregiver found her and immediately called for the pt's daughter. The daughter came and helped secure her mother then called 911. In total, the pt was unresponsive for roughly 20 minutes before coming back to. It took her a few moments, but she returned to her baseline. Of note, pt began having upper respiratory sx this past Sunday, but was never febrile or hypoxic at home.    Earlier this month, the pt was admitted for an almost identical scenario while using the bathroom. At that time, she was treated for UTI and the event was attributed to vasovagal response with autonomic dysfunction.    In the ED, she was afebrile and HDS. Her CT head did not show any acute findings, but CXR did show a small left sided pleural effusion that was present during her previous admission, otherwise no consolidation. BNP was mildly elevated at 342 but she does not appear to be fluid overloaded on exam. She was found to be covid positive but she was satting well  on room air.     Past Medical History:   Diagnosis Date    Acute hypoxemic respiratory failure 9/2/2023    Acute on chronic diastolic congestive heart failure 10/19/2023    Alzheimer's disease     Anemia     Arthritis     lumbar    Back pain     Cancer     colon    Cataract     Colon cancer     Diabetes mellitus type II     Glaucoma     Hyperlipidemia     Hypertension     Hyperthyroidism     Hypothyroidism following radioiodine therapy     Pacemaker     Pneumonia     Pneumonia due to other staphylococcus     Renal manifestation of secondary diabetes mellitus     Squamous cell carcinoma     Stroke     TIA    Type 2 diabetes mellitus with stage 3 chronic kidney disease and hypertension 10/12/2017    Type 2 diabetes with peripheral circulatory disorder, controlled        Past Surgical History:   Procedure Laterality Date    CATARACT EXTRACTION W/  INTRAOCULAR LENS IMPLANT Left 09/13/2017        CHOLECYSTECTOMY      COLON SURGERY  2001    Colon CA    EYE SURGERY      cataract removal left side    IMPLANTATION OF LEADLESS PACEMAKER N/A 10/21/2022    Procedure: [id number];  Surgeon: JOSE Burgos MD;  Location: [facility];  Service: Cardiology;  Laterality: N/A;  SB, JOHNNA, MDT, ANES, EH,     squamous cell ca of face         Review of patient's allergies indicates:   Allergen Reactions    Tramadol Rash and Edema     Developed facial rash and edema.    Metformin Diarrhea       No current facility-administered medications on file prior to encounter.     Current Outpatient Medications on File Prior to Encounter   Medication Sig    ACCU-CHEK FASTCLIX LANCET DRUM Misc CHECK BLOOD GLUCOSE FOUR TIMES DAILY    ACCU-CHEK GUIDE TEST STRIPS Strp TEST BLOOD GLUCOSE FOUR TIMES DAILY OR AS DIRECTED    amLODIPine (NORVASC) 10 MG tablet Take 1 tablet (10 mg total) by mouth once daily.    aspirin (ECOTRIN) 81 MG EC tablet Take 1 tablet (81 mg total) by mouth once daily.    atorvastatin (LIPITOR)  80 MG tablet Take 1 tablet (80 mg total) by mouth once daily.    calcium-vitamin D3 (CALCIUM 500 + D) 500 mg-5 mcg (200 unit) per tablet Take 1 tablet by mouth 2 (two) times daily with meals.    carvediloL (COREG) 6.25 MG tablet Take 1 tablet (6.25 mg total) by mouth 2 (two) times daily.    gabapentin (NEURONTIN) 300 MG capsule Take 1 capsule (300 mg total) by mouth 2 (two) times daily.    glipiZIDE (GLUCOTROL) 5 MG tablet TAKE 1 TABLET BY MOUTH TWICE DAILY    HYDROcodone-acetaminophen (NORCO) 5-325 mg per tablet Take 1 tablet by mouth every 8 (eight) hours as needed for Pain.    levothyroxine (SYNTHROID) 75 MCG tablet Take 1 tablet (75 mcg total) by mouth before breakfast. Administer on an empty stomach at least 30 minutes before food or other medications.    polyethylene glycol (GLYCOLAX) 17 gram PwPk Take 17 g by mouth once daily.    QUEtiapine (SEROQUEL) 25 MG Tab Take 3 tablets (75 mg total) by mouth every evening.    senna-docusate 8.6-50 mg (PERICOLACE) 8.6-50 mg per tablet Take 1 tablet by mouth 2 (two) times daily as needed for Constipation.     Family History       Problem Relation (Age of Onset)    Alzheimer's disease Sister    Ankylosing spondylitis Daughter    Arthritis Mother    Asthma Son    Atrial fibrillation Daughter    Cancer Sister, Paternal Uncle    Diabetes Maternal Grandmother    Heart disease Mother, Daughter    Hyperlipidemia Son    Hypertension Sister, Son    Kidney disease Father, Daughter    Lupus Daughter    No Known Problems Daughter    Supraventricular tachycardia Daughter          Tobacco Use    Smoking status: Never    Smokeless tobacco: Never    Tobacco comments:     smoked for about 4 years in her twenties (socially)   Substance and Sexual Activity    Alcohol use: No    Drug use: No    Sexual activity: Never     Review of Systems   Constitutional:  Negative for chills and fever.   HENT:  Positive for rhinorrhea.    Eyes:  Negative for visual disturbance.   Respiratory:  Positive  for cough. Negative for shortness of breath and wheezing.    Cardiovascular:  Negative for chest pain and leg swelling.   Gastrointestinal:  Negative for abdominal pain, diarrhea, nausea and vomiting.   Genitourinary:  Negative for dysuria.   Neurological:  Negative for dizziness and headaches.     Objective:     Vital Signs (Most Recent):  Temp: 96.4 °F (35.8 °C) (12/27/23 1148)  Pulse: 66 (12/27/23 1532)  Resp: 20 (12/27/23 1532)  BP: (!) 180/75 (12/27/23 1532)  SpO2: 97 % (12/27/23 1532) Vital Signs (24h Range):  Temp:  [94.8 °F (34.9 °C)-96.4 °F (35.8 °C)] 96.4 °F (35.8 °C)  Pulse:  [64-66] 66  Resp:  [15-24] 20  SpO2:  [96 %-99 %] 97 %  BP: (121-180)/(59-75) 180/75        There is no height or weight on file to calculate BMI.     Physical Exam  Constitutional:       General: She is not in acute distress.  HENT:      Head: Normocephalic and atraumatic.      Mouth/Throat:      Mouth: Mucous membranes are dry.      Pharynx: Oropharynx is clear.   Eyes:      General: No scleral icterus.     Extraocular Movements: Extraocular movements intact.   Cardiovascular:      Rate and Rhythm: Normal rate and regular rhythm.      Pulses: Normal pulses.      Heart sounds: Normal heart sounds. No murmur heard.  Pulmonary:      Effort: Pulmonary effort is normal. No respiratory distress.      Breath sounds: Normal breath sounds. No wheezing, rhonchi or rales.   Abdominal:      General: There is no distension.      Palpations: Abdomen is soft.      Tenderness: There is no abdominal tenderness.   Musculoskeletal:      Right lower leg: No edema.      Left lower leg: No edema.   Lymphadenopathy:      Cervical: No cervical adenopathy.   Skin:     General: Skin is warm and dry.      Coloration: Skin is jaundiced.   Neurological:      General: No focal deficit present.      Mental Status: She is alert. Mental status is at baseline.   Psychiatric:         Mood and Affect: Mood normal.         Behavior: Behavior normal.                 Significant Labs: All pertinent labs within the past 24 hours have been reviewed.  CBC:   Recent Labs   Lab 12/27/23  1206   WBC 6.52   HGB 10.9*   HCT 33.2*        CMP:   Recent Labs   Lab 12/27/23  1206      K 3.9      CO2 22*   *   BUN 15   CREATININE 1.1   CALCIUM 8.8   PROT 6.1   ALBUMIN 2.7*   BILITOT 0.6   ALKPHOS 49*   AST 17   ALT 9*   ANIONGAP 9     Cardiac Markers:   Recent Labs   Lab 12/27/23  1206   *       Significant Imaging: I have reviewed all pertinent imaging results/findings within the past 24 hours.  Assessment/Plan:     * Syncope  Pt syncopized while on the toilet after, from what the daughter reports, having loose stools. She remained unresponsive for roughly 20 minutes. EMS was called and she was brought in. This almost exact scenario occurred earlier this month and it was eventually attributed to autonomic dysfunction. CT head was unremarkable, CXR showed a small pleural effusion that has been present for some time, CBC and CMP were baseline for her, but she did test positive for covid-19.    -Telemetry  -Carotid US  -Treat covid  -Holding beta blocker  -orthostatic vital signs    COVID-19  Pt tested positive in the ED. SpO2 was >95. CXR was without any new consolidation or interstitial prominence. She has been having mild URI sx since Sunday, but has remained afebrile and HDS.     -Remdesevir therapy as she has co morbidities that place her at risk of severe infection  -Monitor SpO2   -Telemetry   -Ariborne, contact, and droplet precautions     Chronic diastolic heart failure  Echo    Result Date: 9/5/2023    Left Ventricle: The left ventricle is normal in size. Normal wall   thickness. Normal wall motion. There is normal diastolic function.    Left Atrium: Left atrium is moderately dilated.    Right Ventricle: Normal right ventricular cavity size. Wall thickness   is normal. Right ventricle wall motion  is normal. Systolic function is   normal.    Right  Atrium: Right atrium is mildly dilated.    Aortic Valve: There is mild aortic valve sclerosis.    Pulmonary Artery: No pulmonary hypertension. The estimated pulmonary   artery systolic pressure is 39 mmHg.    IVC/SVC: Elevated venous pressure at 15 mmHg.    Pericardium: There is a small circumferential effusion with   small-moderate posteriorly and apical- laterally. No indication of cardiac   tamponade. Evidence includes no respiratory transvalvular variation.   Bilateral pleural effusions.        Echo    Result Date: 9/2/2023    Left Ventricle: The left ventricle is mildly dilated. Increased wall   thickness. Normal wall motion. There is normal systolic function with a   visually estimated ejection fraction of 55 - 60%. Grade II diastolic   dysfunction.    Right Ventricle: Normal right ventricular cavity size. Right ventricle   wall motion  is normal. Systolic function is normal.    Left Atrium: Left atrium is severely dilated.    Right Atrium: Right atrium is dilated.    Mitral Valve: There is mild regurgitation.    IVC/SVC: Patient is ventilated, cannot use IVC diameter to estimate   right atrial pressure.    Pericardium: There is a small circumferential effusion. Prominent   echogenic material is adjacent or adherent to the visceral pericardium   which carries a broad differential including but not limited to   pericardial fat, fibrous material and malignancy. Clinical correlation is   advised. No convincing findings to suggest tamponade. Left pleural   effusion.    Aorta: Aortic root is normal in size measuring 3.34 cm. Ascending aorta   is mildly dilated measuring 3.81 cm.       -Continue home lasix   -Telemetry  -Maintain K>4 and Mag >2      Constipation  -bowel regimen       Paroxysmal A-fib  EKG was NSR on admission with HR of 66.    -Continue eliquis 2.5 mg BID  -Holding BB in the setting of syncope, will resume if/when appropriate     Wenckebach second degree AV block  During her last admission, a  interrogation of her pacemaker was attempted but it does not record for prolonged periods of time so little data was available. At that time, there was no record of any arhythmia.     -S/p pacemaker implantation   -Holding beta blocker      Chronic kidney disease, stage IV (severe)  -Daily CMP  -Avoid nephrotoxic agents     Diabetic polyneuropathy associated with type 2 diabetes mellitus  -Continue home gabapentin     Controlled type 2 diabetes mellitus with stage 3 chronic kidney disease, without long-term current use of insulin  Pt not currently taking any diabetic medications at home    -SSI  -POCT BG      Anemia of chronic disease    Lab Results   Component Value Date    HGB 10.9 (L) 12/27/2023    HCT 33.2 (L) 12/27/2023     Monitor serial CBC and transfuse if patient becomes hemodynamically unstable, symptomatic or H/H drops below 7/21.    Debility  -PT/OT consulted     Essential hypertension    Hypertension Medications               amLODIPine (NORVASC) 10 MG tablet Take 1 tablet (10 mg total) by mouth once daily.    carvediloL (COREG) 6.25 MG tablet Take 1 tablet (6.25 mg total) by mouth 2 (two) times daily.          -Holding BB in the setting of syncope  -Pt has had several elevated BP readings with SBPs in the 180s. We will continue to closely monitor and titrate meds as indicated     Late onset Alzheimer's disease with behavioral disturbance  Spoke with daughter at bedside on admission and states her mother was at baseline.    -Seroquel 75 mg QHS    Hyperlipidemia LDL goal <70  -continue home statin       Hypothyroidism following radioiodine therapy  -continue home synthroid        VTE Risk Mitigation (From admission, onward)           Ordered     apixaban tablet 2.5 mg  2 times daily         12/27/23 1823     IP VTE HIGH RISK PATIENT  Once         12/27/23 1758     Place sequential compression device  Until discontinued         12/27/23 1758                           On 12/27/2023, patient should be  placed in hospital observation services under my care in collaboration with Dr. Lynch.         Frank Laws DO  Department of Hospital Medicine  [facility] - Emergency Dept

## 2023-12-28 NOTE — ASSESSMENT & PLAN NOTE
During her last admission, a interrogation of her pacemaker was attempted but it does not record for prolonged periods of time so little data was available. At that time, there was no record of any arhythmia.     -S/p pacemaker implantation   -Holding beta blocker

## 2023-12-28 NOTE — PLAN OF CARE
PT evaluation completed- see note for details. PT POC and goals established.    Problem: Physical Therapy  Goal: Physical Therapy Goal  Description: Goals to be met by: 24     Patient will increase functional independence with mobility by performin. Supine to sit with Stand-by Assistance  2. Rolling to Left and Right with Modified [place].  3. Sit to stand transfer with Stand-by Assistance  4. Bed to chair transfer with Contact Guard Assistance using LRAD  5. Gait  x 100 feet with Minimal Assistance using LRAD.   6. Ascend/descend 3 stair with Handrails Minimal Assistance using LRAD.   7. Lower extremity exercise program x30 reps per handout, with independence    Outcome: Ongoing, Progressing

## 2023-12-28 NOTE — PLAN OF CARE
[facility] ([place] [address])      HOME HEALTH ORDERS  FACE TO FACE ENCOUNTER    Patient Name: Quin Linn  YOB: 1932    PCP: Mary Ellen Nesbitt MD   PCP Address: [address] / [place] [address]  PCP Phone Number: [phone]  PCP Fax: [phone]    Encounter Date: 12/27/23    Admit to Home Health    Diagnoses:  Active Hospital Problems    Diagnosis  POA    *Syncope [R55]  Yes    COVID-19 [U07.1]  Yes    Chronic diastolic heart failure [I50.32]  Yes    Constipation [K59.00]  Yes    Paroxysmal A-fib [I48.0]  Yes    Wenckebach second degree AV block [I44.1]  Yes    Chronic kidney disease, stage IV (severe) [N18.4]  Yes    Diabetic polyneuropathy associated with type 2 diabetes mellitus [E11.42]  Yes    Controlled type 2 diabetes mellitus with stage 3 chronic kidney disease, without long-term current use of insulin [E11.22, N18.30]  Yes    Anemia of chronic disease [D63.8]  Yes    Debility [R53.81]  Yes    Late onset Alzheimer's disease with behavioral disturbance [G30.1, F02.818]  Yes     Chronic    Essential hypertension [I10]  Yes     Chronic    Hyperlipidemia LDL goal <70 [E78.5]  Yes     Chronic    Hypothyroidism following radioiodine therapy [E89.0]  Yes     Chronic      Resolved Hospital Problems   No resolved problems to display.       Follow Up Appointments:  Future Appointments   Date Time Provider Department Center   1/18/2024  1:00 PM Nehemias Mcgee MD [facility] NEUR[facility]       Allergies:  Review of patient's allergies indicates:   Allergen Reactions    Tramadol Rash and Edema     Developed facial rash and edema.    Metformin Diarrhea       Medications: Review discharge medications with patient and family and provide education.    Current Facility-Administered Medications   Medication Dose Route Frequency Provider Last Rate Last Admin    albuterol-ipratropium 2.5 mg-0.5 mg/3 mL nebulizer solution 3 mL  3 mL Nebulization Q6H PRN Amy Rao MD         amLODIPine tablet 10 mg  10 mg Oral Daily Tobal, Frank, DO   10 mg at 12/30/23 1015    apixaban tablet 2.5 mg  2.5 mg Oral BID Tobal, Frank, DO   2.5 mg at 12/30/23 1015    aspirin chewable tablet 81 mg  81 mg Oral Daily Tobal, Frank, DO   81 mg at 12/30/23 1015    atorvastatin tablet 80 mg  80 mg Oral Daily Tobal, Frank, DO   80 mg at 12/30/23 1015    carvediloL tablet 3.125 mg  3.125 mg Oral BID Oksana Lynch MD   3.125 mg at 12/30/23 1015    dextrose 10% bolus 125 mL 125 mL  12.5 g Intravenous PRN Amy Rao MD   Stopped at 12/29/23 1002    dextrose 10% bolus 250 mL 250 mL  25 g Intravenous PRN Amy Rao MD        furosemide tablet 40 mg  40 mg Oral Daily Tobal, Frank, DO   40 mg at 12/30/23 1015    gabapentin capsule 300 mg  300 mg Oral BID Tobal, Frank, DO   300 mg at 12/30/23 1015    glucagon (human recombinant) injection 1 mg  1 mg Intramuscular PRN Amy Rao MD        glucose chewable tablet 16 g  16 g Oral PRAmy Funk MD        glucose chewable tablet 24 g  24 g Oral PRN Amy Rao MD        insulin aspart U-100 pen 0-5 Units  0-5 Units Subcutaneous QID (AC + HS) PRAmy Funk MD        levothyroxine tablet 75 mcg  75 mcg Oral Before breakfast Tobal, Frank, DO   75 mcg at 12/30/23 0615    naloxone 0.4 mg/mL injection 0.02 mg  0.02 mg Intravenous PRAmy Funk MD        ondansetron disintegrating tablet 8 mg  8 mg Oral Q8H PRAmy Funk MD        polyethylene glycol packet 17 g  17 g Oral Daily Tobal, Frank, DO   17 g at 12/29/23 1001    QUEtiapine tablet 75 mg  75 mg Oral QHS Tobal, Frank, DO   75 mg at 12/29/23 7405    sodium chloride 0.9% flush 10 mL  10 mL Intravenous Q12H PRAmy Funk MD         Current Discharge Medication List        START taking these medications    Details   apixaban (ELIQUIS) 2.5 mg Tab Take 1 tablet (2.5 mg total) by mouth 2 (two) times daily.  Qty: 30 tablet, Refills: 0           CONTINUE these medications which have CHANGED    Details    carvediloL (COREG) 3.125 MG tablet Take 1 tablet (3.125 mg total) by mouth 2 (two) times daily.  Qty: 60 tablet, Refills: 3    Comments: .  Associated Diagnoses: Chronic diastolic heart failure      HYDROcodone-acetaminophen (NORCO) 5-325 mg per tablet Take 1 tablet by mouth every 8 (eight) hours as needed for Pain.  Qty: 90 tablet, Refills: 0    Comments: Quantity prescribed more than 7 day supply? Yes, quantity medically necessary           CONTINUE these medications which have NOT CHANGED    Details   ACCU-CHEK FASTCLIX LANCET DRUM Misc CHECK BLOOD GLUCOSE FOUR TIMES DAILY  Qty: 408 each, Refills: 3    Associated Diagnoses: Type 2 diabetes mellitus with stage 2 chronic kidney disease, without long-term current use of insulin      ACCU-CHEK GUIDE TEST STRIPS Strp TEST BLOOD GLUCOSE FOUR TIMES DAILY OR AS DIRECTED  Qty: 400 strip, Refills: 3    Associated Diagnoses: Type 2 diabetes mellitus with diabetic polyneuropathy, without long-term current use of insulin      amLODIPine (NORVASC) 10 MG tablet Take 1 tablet (10 mg total) by mouth once daily.  Qty: 90 tablet, Refills: 3    Comments: .      aspirin (ECOTRIN) 81 MG EC tablet Take 1 tablet (81 mg total) by mouth once daily.  Refills: 0    Comments: Hold until follow up with PCP      atorvastatin (LIPITOR) 80 MG tablet Take 1 tablet (80 mg total) by mouth once daily.  Qty: 90 tablet, Refills: 3      calcium-vitamin D3 (CALCIUM 500 + D) 500 mg-5 mcg (200 unit) per tablet Take 1 tablet by mouth 2 (two) times daily with meals.  Qty: 60 tablet, Refills: 11      gabapentin (NEURONTIN) 300 MG capsule Take 1 capsule (300 mg total) by mouth 2 (two) times daily.  Qty: 180 capsule, Refills: 3      levothyroxine (SYNTHROID) 75 MCG tablet Take 1 tablet (75 mcg total) by mouth before breakfast. Administer on an empty stomach at least 30 minutes before food or other medications.  Qty: 90 tablet, Refills: 2      polyethylene glycol (GLYCOLAX) 17 gram PwPk Take 17 g by mouth  once daily.  Refills: 0      QUEtiapine (SEROQUEL) 25 MG Tab Take 3 tablets (75 mg total) by mouth every evening.  Qty: 90 tablet, Refills: 11      senna-docusate 8.6-50 mg (PERICOLACE) 8.6-50 mg per tablet Take 1 tablet by mouth 2 (two) times daily as needed for Constipation.           STOP taking these medications       glipiZIDE (GLUCOTROL) 5 MG tablet Comments:   Reason for Stopping:                 I have seen and examined this patient within the last 30 days. My clinical findings that support the need for the home health skilled services and home bound status are the following:no   Weakness/numbness causing balance and gait disturbance due to Stroke making it taxing to leave home.     Diet:   cardiac diet    Labs:  Report Lab results to PCP.    Referrals/ Consults  Physical Therapy to evaluate and treat. Evaluate for home safety and equipment needs; Establish/upgrade home exercise program. Perform / instruct on therapeutic exercises, gait training, transfer training, and Range of Motion.  Occupational Therapy to evaluate and treat. Evaluate home environment for safety and equipment needs. Perform/Instruct on transfers, ADL training, ROM, and therapeutic exercises.  Aide to provide assistance with personal care, ADLs, and vital signs.    Activities:   activity as tolerated    Nursing:   Agency to admit patient within 24 hours of hospital discharge unless specified on physician order or at patient request    SN to complete comprehensive assessment including routine vital signs. Instruct on disease process and s/s of complications to report to MD. Review/verify medication list sent home with the patient at time of discharge  and instruct patient/caregiver as needed. Frequency may be adjusted depending on start of care date.     Skilled nurse to perform up to 3 visits PRN for symptoms related to diagnosis    Notify MD if SBP > 160 or < 90; DBP > 90 or < 50; HR > 120 or < 50; Temp > 101; O2 < 88%; Other:       Ok  to schedule additional visits based on staff availability and patient request on consecutive days within the home health episode.    When multiple disciplines ordered:    Start of Care occurs on Sunday - Wednesday schedule remaining discipline evaluations as ordered on separate consecutive days following the start of care.    Thursday SOC -schedule subsequent evaluations Friday and Monday the following week.     Friday - Saturday SOC - schedule subsequent discipline evaluations on consecutive days starting Monday of the following week.    For all post-discharge communication and subsequent orders please contact patient's primary care physician. If unable to reach primary care physician or do not receive response within 30 minutes, please contact PCP for clinical staff order clarification      Home Health Aide:  Nursing Daily, Physical Therapy Three times daily, Occupational Therapy Three times daily, and Home Health Aide Daily    Wound Care Orders  no    I certify that this patient is confined to her home and needs intermittent skilled nursing care, physical therapy, and occupational therapy.

## 2023-12-28 NOTE — ED NOTES
Bed: 01  Expected date:   Expected time:   Means of arrival:   Comments:  
Pt provided with orange juice.  
Telemetry Verification   Patient placed on Telemetry Box  Verified with War Room  Box # [id number]   Monitor Tech     Rate  69   Rhythm  Afib       
Statement Selected

## 2023-12-28 NOTE — ASSESSMENT & PLAN NOTE
Hypertension Medications               amLODIPine (NORVASC) 10 MG tablet Take 1 tablet (10 mg total) by mouth once daily.    carvediloL (COREG) 6.25 MG tablet Take 1 tablet (6.25 mg total) by mouth 2 (two) times daily.          -Holding BB in the setting of syncope  -Pt has had several elevated BP readings with SBPs in the 180s. We will continue to closely monitor and titrate meds as indicated

## 2023-12-28 NOTE — ASSESSMENT & PLAN NOTE
Pt syncopized while on the toilet after, from what the daughter reports, having loose stools. She remained unresponsive for roughly 20 minutes. EMS was called and she was brought in. This almost exact scenario occurred earlier this month and it was eventually attributed to autonomic dysfunction. CT head was unremarkable, CXR showed a small pleural effusion that has been present for some time, CBC and CMP were baseline for her, but she did test positive for covid-19.    -Telemetry  -Carotid US  -Treat covid  -Holding beta blocker  -orthostatic vital signs

## 2023-12-28 NOTE — PT/OT/SLP EVAL
Physical Therapy Co-Evaluation and Co-Treatment    Patient Name:  Quin Linn   MRN:  [id number]  Admit Date: 12/27/2023  Admitting Diagnosis:  Syncope   Length of Stay: 0 days  Recent Surgery: * No surgery found *      Recommendations:     Discharge Recommendations:    Moderate Intensity Therapy   Discharge Equipment Recommendations: walker, rolling, wheelchair, bedside commode  Barriers to discharge: Inaccessible home, Decreased caregiver support, and increased need for caregiver assistance    Plan:     During this hospitalization, patient to be seen 4 x/week to address the identified rehab impairments via gait training, therapeutic activities, therapeutic exercises, neuromuscular re-education and progress towards the established goals.  Plan of Care Expires:  01/28/24  Plan of Care Reviewed with: patient    Assessment:     Quin Linn is a 89 y.o. female admitted with a medical diagnosis of Syncope. Patient agreeable to therapy session. Patient oriented x2 during evaluation. Pt reports living alone and Ind with mobility/ADLs PTA. Pt presents with weakness, decreased endurance and activity tolerance resulting in decreased mobility and increased need for assistance this date. Patient requiring increased assistance for bed mobility, sit to stand transfers, and limited to taking side steps along EOB this session. Patient functioning below her baseline and is not safe to return home alone at this time. Patient would benefit from skilled therapy services to maximize safety and independence, increase activity tolerance, decrease fall risk, decrease caregiver burden, improve QOL, improve patient's functional mobility, and decrease risk of contractures and pressure sores. Patient currently demonstrates a need for moderate intensity therapy on a daily basis post acute secondary to a decline in functional status due to illness     Patient has a mobility limitation that significantly impairs their ability to participate in  one or more mobility related activities of daily living, including toileting. This deficit can be resolved by using a bedside commode. Patient demonstrates mobility limitations that will cause them to be confined to one room at home, and a bathroom will not be accessible. Using a bedside commode will greatly improve the patient's ability to participate in MRADLs.  Patient demonstrates a mobility limitation that significantly impairs their ability to participate in one or more mobility related activities of daily living. Patient's mobility limitation cannot be sufficiently resolved with the use of a cane, but can be sufficiently resolved with the use of a rolling walker.The use of a rolling walker will considerably improve their ability to participate in MRADLs. Patient will use the walker on a regular basis at home.   Patient has a mobility limitation that significantly impairs their ability to participate in one or more mobility related activities of daily living in customary locations in the home. The mobility limitation cannot be sufficiently resolved by the use of a cane or walker. The use of a manual wheelchair will greatly improve the patient's ability to participate in MRADLs. The patient will use the wheelchair on a regular basis at home. They have expressed their willingness to use a manual wheelchair in the home, and have a caregiver who is available and willing to assist with the wheelchair if needed.    Problem List: weakness, impaired endurance, impaired self care skills, impaired functional mobility, gait instability, impaired balance, impaired cognition, decreased coordination, decreased upper extremity function, decreased lower extremity function, decreased safety awareness.  Rehab Prognosis: Good; patient would benefit from acute skilled PT services to address these deficits and reach maximum level of function.      Goals:   Multidisciplinary Problems       Physical Therapy Goals          Problem:  "Physical Therapy    Goal Priority Disciplines Outcome Goal Variances Interventions   Physical Therapy Goal     PT, PT/OT Ongoing, Progressing     Description: Goals to be met by: 24     Patient will increase functional independence with mobility by performin. Supine to sit with Stand-by Assistance  2. Rolling to Left and Right with Modified Payne.  3. Sit to stand transfer with Stand-by Assistance  4. Bed to chair transfer with Contact Guard Assistance using LRAD  5. Gait  x 100 feet with Minimal Assistance using LRAD.   6. Ascend/descend 3 stair with Handrails Minimal Assistance using LRAD.   7. Lower extremity exercise program x30 reps per handout, with independence                         Subjective     RN notified prior to session. No one present upon PT entrance into room. Patient agreeable to PT evaluation.    Chief Complaint: "thanks for helping me"  Patient/Family Comments/goals: none stated  Pain/Comfort:  Pain Rating 1: 0/10  Pain Rating Post-Intervention 1: 0/10    Social History:  Residence: lives alone 1-story house with 3 MATILDE. Pt's bathroom has a tub shower. Pt reports her daughter lives next door and comes over everyday to help her.   Support available: family  Equipment Owned (not using): other (see comments) (pt reports no DME, will need to confirm)  Equipment Used: None  Prior level of function: independent (pt reports)      Patient reports they will have assistance from daughter upon discharge.    Objective:     Additional staff present: OT; OT for co-evaluation due to suspected patient need for two skilled therapists to appropriately assess patient's functional deficits as well as ensure patient safety, accommodate for limited activity tolerance, and provide appropriate, skilled assistance to maximize functional potential during evaluation.    Patient found supine with: telemetry, PureWick, pulse ox (continuous)     General Precautions: Standard, Cardiac fall, contact, " "airborne, droplet (COVD +)   Orthopedic Precautions:N/A   Braces: N/A   Body mass index is 22.71 kg/m².  Oxygen Device: Room Air  Vitals: BP (!) 177/75 (Patient Position: Lying)   Pulse 62   Temp 99 °F (37.2 °C) (Oral)   Resp 17   Ht 5' 7.01" (1.702 m)   Wt 65.8 kg (145 lb 1 oz)   LMP  (LMP Unknown)   SpO2 99%   BMI 22.71 kg/m²       Exams:    Cognition:  Alert, Distractible, and Cooperative  Command following: Follows two-step verbal commands  Communication: clear/fluent    Sensation:   Light touch sensation: Intact BLEs    Gross Motor Coordination: No deficits noted during functional mobility tasks     Edema/Skin Integrity: None noted; Visible skin intact    Postural examination/scapula alignment: Rounded shoulder, Head forward, and Slouched posture    Lower Extremity Range of Motion:  Right Lower Extremity: WFL  Left Lower Extremity: WFL    Lower Extremity Strength:  Right Lower Extremity: WFL except hip flexors 3/5   Left Lower Extremity: WFL except hip flexors 3/5       Functional Mobility:    Bed Mobility:  Scooting with maximal assistance and 2 persons  Supine > Sit with maximal assistance  Sit > Supine with maximal assistance and 2 persons    Transfers:   Sit <> Stand Transfer: Maximum Assistance and of 2 persons  x 2 trials from EOB with RW    Bed <> Chair: Not performed 2/2 pt safety    Gait:  Distance: 2 side steps along EOB   Assistance level: Moderate Assistance and of 2 persons  Assistive Device: rolling walker  Gait Deviation(s): unsteady gait, decreased step length, narrow base of support, decreased weight shift, decreased foot clearance, flexed posture, and decreased parrish  all lines remained intact throughout ambulation trial  Comments: Pt with limited clearance of BLE during side steps and required increased assistance to maintain stand 2/2 posterior lean. Pt reported dizziness after first stand attempt that resolved with seated rest break and dizziness after side steps so pt returned to " supine position.     Balance:  Sitting Balance  Static: contact guard assistance  Dynamic: minimum assistance  Comment: pt required increased assistance with dynamic balance as pt demo'd posterior lean with additional of dynamic tasks  Standing:  Static: moderate assistance and of 2 persons  Dynamic: moderate assistance and of 2 persons    Therapeutic exercise performed in the form of bed mobility, sitting balance, and transfers to increase patient's activity tolerance and postural control.     Outcome Measures:  AM-PAC 6 CLICK MOBILITY  Turning over in bed (including adjusting bedclothes, sheets and blankets)?: 3  Sitting down on and standing up from a chair with arms (e.g., wheelchair, bedside commode, etc.): 2  Moving from lying on back to sitting on the side of the bed?: 2  Moving to and from a bed to a chair (including a wheelchair)?: 2  Need to walk in hospital room?: 2  Climbing 3-5 steps with a railing?: 1  Basic Mobility Total Score: 12     Education:  Time provided for education, counseling and discussion of health disposition in regards to patient's current status  All questions answered within PT scope of practice and to patient's satisfaction  PT role in POC to address current functional deficits  Pt educated on proper body mechanics, safety techniques, and energy conservation with PT facilitation and cueing throughout session  Call nursing/pct to transfer to chair/use bathroom. Pt stated understanding.    Patient left supine with all lines intact and call button in reach.      History:     Past Medical History:   Diagnosis Date    Acute hypoxemic respiratory failure 9/2/2023    Acute on chronic diastolic congestive heart failure 10/19/2023    Alzheimer's disease     Anemia     Arthritis     lumbar    Back pain     Cancer     colon    Cataract     Colon cancer     Diabetes mellitus type II     Glaucoma     Hyperlipidemia     Hypertension     Hyperthyroidism     Hypothyroidism following radioiodine  therapy     Pacemaker     Pneumonia     Pneumonia due to other staphylococcus     Renal manifestation of secondary diabetes mellitus     Squamous cell carcinoma     Stroke     TIA    Type 2 diabetes mellitus with stage 3 chronic kidney disease and hypertension 10/12/2017    Type 2 diabetes with peripheral circulatory disorder, controlled        Past Surgical History:   Procedure Laterality Date    CATARACT EXTRACTION W/  INTRAOCULAR LENS IMPLANT Left 09/13/2017        CHOLECYSTECTOMY      COLON SURGERY  2001    Colon CA    EYE SURGERY      cataract removal left side    IMPLANTATION OF LEADLESS PACEMAKER N/A 10/21/2022    Procedure: [id number];  Surgeon: JOSE Burgos MD;  Location: [facility] EP LAB;  Service: Cardiology;  Laterality: N/A;  EMELYN, JOHNNA, MDT, ANES, EH,     squamous cell ca of face         Family History   Problem Relation Age of Onset    Heart disease Mother     Arthritis Mother     Kidney disease Father     Alzheimer's disease Sister     Hypertension Sister     Cancer Sister         ovarian    No Known Problems Daughter     Ankylosing spondylitis Daughter     Lupus Daughter     Kidney disease Daughter     Heart disease Daughter     Atrial fibrillation Daughter     Supraventricular tachycardia Daughter     Hypertension Son     Hyperlipidemia Son     Asthma Son     Cancer Paternal Uncle     Diabetes Maternal Grandmother     Amblyopia Neg Hx     Blindness Neg Hx     Cataracts Neg Hx     Glaucoma Neg Hx     Macular degeneration Neg Hx     Retinal detachment Neg Hx     Strabismus Neg Hx     Stroke Neg Hx     Thyroid disease Neg Hx        Social History     Socioeconomic History    Marital status:     Number of children: 4   Occupational History    Occupation: retired     Employer: RETIRED   Tobacco Use    Smoking status: Never    Smokeless tobacco: Never    Tobacco comments:     smoked for about 4 years in her twenties (socially)   Substance and Sexual Activity     Alcohol use: No    Drug use: No    Sexual activity: Never   Social History Narrative    Daughter - Janae Franklin - knitting, crocheting, sewing, reading     Social Determinants of Health     Financial Resource Strain: Low Risk  (12/28/2023)    Overall Financial Resource Strain (CARDIA)     Difficulty of Paying Living Expenses: Not hard at all   Food Insecurity: No Food Insecurity (12/28/2023)    Hunger Vital Sign     Worried About Running Out of Food in the Last Year: Never true     Ran Out of Food in the Last Year: Never true   Transportation Needs: No Transportation Needs (12/28/2023)    PRAPARE - Transportation     Lack of Transportation (Medical): No     Lack of Transportation (Non-Medical): No   Physical Activity: Inactive (12/28/2023)    Exercise Vital Sign     Days of Exercise per Week: 0 days     Minutes of Exercise per Session: 0 min   Stress: Patient Declined (12/28/2023)    [place] [facility] [employer] [facility] - Occupational Stress Questionnaire     Feeling of Stress : Patient declined   Recent Concern: Stress - Stress Concern Present (12/10/2023)    [place] [facility] [employer] [facility] - Occupational Stress Questionnaire     Feeling of Stress : To some extent   Social Connections: Socially Isolated (12/28/2023)    Social Connection and Isolation Panel [NHANES]     Frequency of Communication with Friends and Family: Never     Frequency of Social Gatherings with Friends and Family: More than three times a week     Attends Christianity Services: Never     Active Member of Clubs or Organizations: No     Attends Club or Organization Meetings: Never     Marital Status:    Housing Stability: Low Risk  (12/28/2023)    Housing Stability Vital Sign     Unable to Pay for Housing in the Last Year: No     Number of Places Lived in the Last Year: 1     Unstable Housing in the Last Year: No       Time Tracking:     PT Received On: 12/28/23  PT Start Time: 1118     PT Stop Time: 1135  PT Total Time  (min): 17 min     Billable Minutes: Evaluation 9 minutes and Therapeutic Exercise 8 minutes    12/28/2023

## 2023-12-28 NOTE — ASSESSMENT & PLAN NOTE
Echo    Result Date: 9/5/2023    Left Ventricle: The left ventricle is normal in size. Normal wall   thickness. Normal wall motion. There is normal diastolic function.    Left Atrium: Left atrium is moderately dilated.    Right Ventricle: Normal right ventricular cavity size. Wall thickness   is normal. Right ventricle wall motion  is normal. Systolic function is   normal.    Right Atrium: Right atrium is mildly dilated.    Aortic Valve: There is mild aortic valve sclerosis.    Pulmonary Artery: No pulmonary hypertension. The estimated pulmonary   artery systolic pressure is 39 mmHg.    IVC/SVC: Elevated venous pressure at 15 mmHg.    Pericardium: There is a small circumferential effusion with   small-moderate posteriorly and apical- laterally. No indication of cardiac   tamponade. Evidence includes no respiratory transvalvular variation.   Bilateral pleural effusions.        Echo    Result Date: 9/2/2023    Left Ventricle: The left ventricle is mildly dilated. Increased wall   thickness. Normal wall motion. There is normal systolic function with a   visually estimated ejection fraction of 55 - 60%. Grade II diastolic   dysfunction.    Right Ventricle: Normal right ventricular cavity size. Right ventricle   wall motion  is normal. Systolic function is normal.    Left Atrium: Left atrium is severely dilated.    Right Atrium: Right atrium is dilated.    Mitral Valve: There is mild regurgitation.    IVC/SVC: Patient is ventilated, cannot use IVC diameter to estimate   right atrial pressure.    Pericardium: There is a small circumferential effusion. Prominent   echogenic material is adjacent or adherent to the visceral pericardium   which carries a broad differential including but not limited to   pericardial fat, fibrous material and malignancy. Clinical correlation is   advised. No convincing findings to suggest tamponade. Left pleural   effusion.    Aorta: Aortic root is normal in size measuring 3.34 cm. Ascending  aorta   is mildly dilated measuring 3.81 cm.       -Continue home lasix   -Telemetry  -Maintain K>4 and Mag >2

## 2023-12-28 NOTE — ASSESSMENT & PLAN NOTE
Lab Results   Component Value Date    HGB 10.9 (L) 12/27/2023    HCT 33.2 (L) 12/27/2023     Monitor serial CBC and transfuse if patient becomes hemodynamically unstable, symptomatic or H/H drops below 7/21.

## 2023-12-28 NOTE — PLAN OF CARE
Problem: Occupational Therapy  Goal: Occupational Therapy Goal  Description: Goals to be met by: 01-09-24     Patient will increase functional independence with ADLs by performing:    UE Dressing with Minimal Assistance.  LE Dressing with Moderate Assistance.  Grooming while seated with Stand-by Assistance.  Toileting from bedside commode with Moderate Assistance for hygiene and clothing management.   Supine to sit with Minimal Assistance.  Stand pivot transfers with Minimal Assistance.  Toilet transfer to toilet with Minimal Assistance.  Pt. To Be Min A with HEP to improve level of endurance    Outcome: Ongoing, Progressing

## 2023-12-28 NOTE — ASSESSMENT & PLAN NOTE
Hypertension Medications               amLODIPine (NORVASC) 10 MG tablet Take 1 tablet (10 mg total) by mouth once daily.    carvediloL (COREG) 6.25 MG tablet Take 1 tablet (6.25 mg total) by mouth 2 (two) times daily.          -Restaritng BB  -Pt has had several elevated BP readings with SBPs in the 180s. We will continue to closely monitor and titrate meds as indicated

## 2023-12-28 NOTE — ASSESSMENT & PLAN NOTE
Spoke with daughter at bedside on admission and states her mother was at baseline.    -Seroquel 75 mg QHS

## 2023-12-28 NOTE — PLAN OF CARE
"New admit from ED with syncope. Pt. is disoriented to time and has been very agitated. Hypertensive and bradycardic. In & out catheter performed in ED due to urinary retention. Purewick in place with low output - bladder scan showed minimal urine in bladder. Pt. refused to drink water, take medication, and blood draw this AM. Pt. threatened "to kick me in the face" if I continued to bother her.    No adverse events noted during this shift.    Problem: Violence Risk or Actual  Goal: Anger and Impulse Control  Outcome: Ongoing, Progressing  Intervention: Minimize Safety Risk  Flowsheets (Taken 12/28/2023 0348)  Sensory Stimulation Regulation:   care clustered   lighting decreased   quiet environment promoted  Enhanced Safety Measures: bed alarm set     Problem: Adult Inpatient Plan of Care  Goal: Plan of Care Review  Outcome: Ongoing, Progressing  Flowsheets (Taken 12/28/2023 0348)  Plan of Care Reviewed With: patient  Goal: Patient-Specific Goal (Individualized)  Outcome: Ongoing, Progressing  Goal: Absence of Hospital-Acquired Illness or Injury  Outcome: Ongoing, Progressing  Intervention: Identify and Manage Fall Risk  Flowsheets (Taken 12/28/2023 0348)  Safety Promotion/Fall Prevention:   assistive device/personal item within reach   bed alarm set   Fall Risk reviewed with patient/family   medications reviewed   nonskid shoes/socks when out of bed   room near unit station   side rails raised x 3  Goal: Optimal Comfort and Wellbeing  Outcome: Ongoing, Progressing  Intervention: Provide Person-Centered Care  Flowsheets (Taken 12/28/2023 0348)  Trust Relationship/Rapport:   care explained   choices provided   thoughts/feelings acknowledged   questions answered   questions encouraged  Goal: Readiness for Transition of Care  Outcome: Ongoing, Progressing     Problem: Diabetes Comorbidity  Goal: Blood Glucose Level Within Targeted Range  Outcome: Ongoing, Progressing  Intervention: Monitor and Manage " Glycemia  Flowsheets (Taken 12/28/2023 0348)  Glycemic Management: blood glucose monitored     Problem: Skin Injury Risk Increased  Goal: Skin Health and Integrity  Outcome: Ongoing, Progressing  Intervention: Optimize Skin Protection  Flowsheets (Taken 12/28/2023 0348)  Pressure Reduction Techniques:   frequent weight shift encouraged   weight shift assistance provided  Pressure Reduction Devices:   heel offloading device utilized   positioning supports utilized  Head of Bed (HOB) Positioning: HOB at 20-30 degrees     Problem: Fall Injury Risk  Goal: Absence of Fall and Fall-Related Injury  Outcome: Ongoing, Progressing  Intervention: Identify and Manage Contributors  Flowsheets (Taken 12/28/2023 0348)  Self-Care Promotion: BADL personal objects within reach  Medication Review/Management: medications reviewed     Problem: Impaired Wound Healing  Goal: Optimal Wound Healing  Outcome: Ongoing, Progressing  Intervention: Promote Wound Healing  Flowsheets (Taken 12/28/2023 0348)  Sleep/Rest Enhancement:   awakenings minimized   noise level reduced   room darkened   regular sleep/rest pattern promoted

## 2023-12-28 NOTE — PT/OT/SLP EVAL
Occupational Therapy  Co- Evaluation/tx    Name: Quin Linn  MRN: [id number]  Admitting Diagnosis: Syncope/ covid +  Recent Surgery: * No surgery found *    Co-treatment performed due to patient's multiple deficits requiring two skilled therapists  to appropriately and safely assess patient's strength and endurance while facilitating functional tasks in addition to accommodating for patient's activity tolerance.     Recommendations:     Discharge Recommendations: Moderate Intensity Therapy  Discharge Equipment Recommendations:  walker, rolling, wheelchair, bedside commode  Barriers to discharge:  Decreased caregiver support, Other (Comment) (requires increased assist)    Assessment:     Quin Linn is a 89 y.o. female with a medical diagnosis of Syncope.  She presents with decreased ability to mobilize and perform self-care on this date. Pt. Required significant assist. Patient would benefit from continued OT services to maximize level of safety and independence with self-care tasks.   . Performance deficits affecting function: weakness, impaired endurance, impaired self care skills, impaired functional mobility, gait instability, decreased safety awareness, decreased lower extremity function, decreased upper extremity function, impaired cardiopulmonary response to activity.      Rehab Prognosis: Good; patient would benefit from acute skilled OT services to address these deficits and reach maximum level of function.       Plan:     Patient to be seen 4 x/week to address the above listed problems via self-care/home management, therapeutic activities, therapeutic exercises, neuromuscular re-education  Plan of Care Expires: 01/27/24  Plan of Care Reviewed with: patient    Subjective     Chief Complaint: pt. With no specific complaints on this date  Patient/Family Comments/goals: none stated    Occupational Profile: (questionable historian)  Living Environment: pt. Lives alone in  house with 3 steps to enter .  Daughter lives next door. Pt. Has a tub shower with no seat or grab bar.   Previous level of function: pt. Reports I with ADL tasks and mobility  Roles and Routines: caretaker of self, mother  Equipment Used at Home: none  Assistance upon Discharge: daughter lives next door     Pain/Comfort:  Pain Rating 1: 0/10  Pain Rating Post-Intervention 1: 0/10    Patients cultural, spiritual, Scientologist conflicts given the current situation: no    Objective:     Communicated with: nurse prior to session.  Patient found supine with telemetry, PureWick, pulse ox (continuous) upon OT entry to room.    General Precautions: Standard, fall, airborne, droplet, contact, hearing impaired  Orthopedic Precautions: N/A  Braces: N/A  Respiratory Status: Room air    Occupational Performance:    Bed Mobility:    Patient completed Scooting/Bridging with maximal assistance  Patient completed Supine to Sit with maximal assistance  Patient completed Sit to Supine with maximal assistance    Functional Mobility/Transfers:  Patient completed Sit <> Stand Transfer with maximal assistance  with  rolling walker  x 2 trials from EOB  Functional Mobility: Mod/max A x 2 to perform 2 side steps to HOB with use of RW    Activities of Daily Living:  Lower Body Dressing: total assistance to don shoes    Cognitive/Visual Perceptual:  Cognitive/Psychosocial Skills:     -       Oriented to: Person, Place, and Time   -       Follows Commands/attention:Follows one-step commands  -       Communication: clear/fluent  -       Memory: No Deficits noted  -       Safety awareness/insight to disability: impaired   -       Mood/Affect/Coping skills/emotional control: Appropriate to situation  Visual/Perceptual:      -not  tested    Physical Exam:  Balance: -       sit: CGA/Min A for dynamic; stand: poor  Postural examination/scapula alignment:    -       Rounded shoulders  -       Posterior pelvic tilt  Upper Extremity Range of Motion:     -       Right Upper Extremity:  AAROM WFL  -       Left Upper Extremity: AAROM WFL   Strength:    -       Right Upper Extremity: WFL  -       Left Upper Extremity: WFL    AMPAC 6 Click ADL:  AMPAC Total Score: 14    Treatment & Education:  Pt. Educated on role of OT and pOC      Patient left supine with all lines intact, call button in reach, and nurse notified    GOALS:   Multidisciplinary Problems       Occupational Therapy Goals          Problem: Occupational Therapy    Goal Priority Disciplines Outcome Interventions   Occupational Therapy Goal     OT, PT/OT Ongoing, Progressing    Description: Goals to be met by: 01-09-24     Patient will increase functional independence with ADLs by performing:    UE Dressing with Minimal Assistance.  LE Dressing with Moderate Assistance.  Grooming while seated with Stand-by Assistance.  Toileting from bedside commode with Moderate Assistance for hygiene and clothing management.   Supine to sit with Minimal Assistance.  Stand pivot transfers with Minimal Assistance.  Toilet transfer to toilet with Minimal Assistance.  Pt. To Be Min A with HEP to improve level of endurance                         History:     Past Medical History:   Diagnosis Date    Acute hypoxemic respiratory failure 9/2/2023    Acute on chronic diastolic congestive heart failure 10/19/2023    Alzheimer's disease     Anemia     Arthritis     lumbar    Back pain     Cancer     colon    Cataract     Colon cancer     Diabetes mellitus type II     Glaucoma     Hyperlipidemia     Hypertension     Hyperthyroidism     Hypothyroidism following radioiodine therapy     Pacemaker     Pneumonia     Pneumonia due to other staphylococcus     Renal manifestation of secondary diabetes mellitus     Squamous cell carcinoma     Stroke     TIA    Type 2 diabetes mellitus with stage 3 chronic kidney disease and hypertension 10/12/2017    Type 2 diabetes with peripheral circulatory disorder, controlled          Past Surgical History:   Procedure Laterality  Date    CATARACT EXTRACTION W/  INTRAOCULAR LENS IMPLANT Left 09/13/2017        CHOLECYSTECTOMY      COLON SURGERY  2001    Colon CA    EYE SURGERY      cataract removal left side    IMPLANTATION OF LEADLESS PACEMAKER N/A 10/21/2022    Procedure: [id number];  Surgeon: JOSE Burgos MD;  Location: [facility];  Service: Cardiology;  Laterality: N/A;  SB, JOHNNA, MDT, ANES, EH,     squamous cell ca of face         Time Tracking:     OT Date of Treatment: 12/28/23  OT Start Time: 1118  OT Stop Time: 1136  OT Total Time (min): 18 min    Billable Minutes:Evaluation 10  Therapeutic Activity 8    12/28/2023

## 2023-12-28 NOTE — ASSESSMENT & PLAN NOTE
Pt syncopized while on the toilet after, from what the daughter reports, having loose stools. She remained unresponsive for roughly 20 minutes. EMS was called and she was brought in. This almost exact scenario occurred earlier this month and it was eventually attributed to autonomic dysfunction. CT head was unremarkable, CXR showed a small pleural effusion that has been present for some time, CBC and CMP were baseline for her, but she did test positive for covid-19.    -Telemetry  -Carotid US: No evidence of a hemodynamically significant carotid bifurcation stenosis   -Treat covid  -orthostatic vital signs; pt has refused wearing a BP cuff; will re-attempt this at a later time

## 2023-12-28 NOTE — PLAN OF CARE
[facility] ([place] [address])  Initial Discharge Assessment       Primary Care Provider: Mary Ellen Nesbitt MD    Admission Diagnosis: Syncope [R55]  Chest pain [R07.9]  COVID-19 virus infection [U07.1]    Admission Date: 12/27/2023  Expected Discharge Date: 12/29/2023    Transition of Care Barriers: (P) None    Payor: [facility] MCARE / Plan: [employer] MEDICARE ADVANTAGE / Product Type: Medicare Advantage /     Extended Emergency Contact Information  Primary Emergency Contact: Janae Linn  Address: Unknown   [facility]  Home Phone: [phone]  Mobile Phone: [phone]  Relation: Daughter  Preferred language: English  Secondary Emergency Contact: BONIFACIOEvan   [place]  Mobile Phone: [phone]  Relation: Relative    Discharge Plan A: (P) Home Health  Discharge Plan B: (P) Home with family      [id number] - [place] - [facility] [place] [address] [facility] [place] [address] [place] [address]  Phone: [phone] Fax: [phone]      Initial Assessment (most recent)       Adult Discharge Assessment - 12/28/23 1052          Discharge Assessment    Assessment Type Discharge Planning Assessment (P)      Confirmed/corrected address, phone number and insurance Yes (P)      Confirmed Demographics Correct on Facesheet (P)      Source of Information family (P)      Does patient/caregiver understand observation status Yes (P)      Communicated LILI with patient/caregiver Date not available/Unable to determine (P)      Reason For Admission syncope (P)      People in Home child(jose), adult (P)      Facility Arrived From: [facility] (P)      Do you expect to return to your current living situation? Yes (P)      Do you have help at home or someone to help you manage your care at home? Yes (P)      Who are your caregiver(s) and their phone number(s)? Janae Linn dtr [phone] and paid CG's (P)      Prior to  hospitilization cognitive status: Unable to Assess (P)      Current cognitive status: Not Oriented to Person;Not Oriented to Place;Not Oriented to Time (P)      Walking or Climbing Stairs Difficulty yes (P)      Walking or Climbing Stairs ambulation difficulty, requires equipment (P)      Mobility Management Uses walker or w/c (P)      Dressing/Bathing Difficulty yes (P)      Dressing/Bathing bathing difficulty, assistance 1 person;dressing difficulty, assistance 1 person;bathing difficulty, requires equipment (P)      Dressing/Bathing Management Paid CG's (P)      Home Layout Able to live on 1st floor (P)      Equipment Currently Used at Home walker, rolling;wheelchair;bedside commode;bath bench (P)      Readmission within 30 days? Yes (P)      Do you currently have service(s) that help you manage your care at home? Yes (P)      Name and Contact number of agency [facility] (P)      Is the pt/caregiver preference to resume services with current agency Yes (P)      Do you take prescription medications? Yes (P)      Do you have prescription coverage? Yes (P)      Coverage [facility] (P)      Do you have any problems affording any of your prescribed medications? No (P)      Who is going to help you get home at discharge? Janae Linn dtr [phone] (P)      How do you get to doctors appointments? family or friend will provide (P)      Are you on dialysis? No (P)      Do you take coumadin? No (P)      Discharge Plan A Home Health (P)      Discharge Plan B Home with family (P)      DME Needed Upon Discharge  bath bench (P)      Discharge Plan discussed with: Adult children (P)      Transition of Care Barriers None (P)         Physical Activity    On average, how many days per week do you engage in moderate to strenuous exercise (like a brisk walk)? 0 days (P)      On average, how many minutes do you engage in exercise at this level? 0 min (P)         Financial Resource Strain    How hard is it for you to pay for the very basics like  food, housing, medical care, and heating? Not hard at all (P)         Housing Stability    In the last 12 months, was there a time when you were not able to pay the mortgage or rent on time? No (P)      In the last 12 months, how many places have you lived? 1 (P)      In the last 12 months, was there a time when you did not have a steady place to sleep or slept in a shelter (including now)? No (P)         Transportation Needs    In the past 12 months, has lack of transportation kept you from medical appointments or from getting medications? No (P)      In the past 12 months, has lack of transportation kept you from meetings, work, or from getting things needed for daily living? No (P)         Food Insecurity    Within the past 12 months, you worried that your food would run out before you got the money to buy more. Never true (P)      Within the past 12 months, the food you bought just didn't last and you didn't have money to get more. Never true (P)         Stress    Do you feel stress - tense, restless, nervous, or anxious, or unable to sleep at night because your mind is troubled all the time - these days? Patient declined (P)         Social Connections    In a typical week, how many times do you talk on the phone with family, friends, or neighbors? Never (P)      How often do you get together with friends or relatives? More than three times a week (P)      How often do you attend Restorationist or Jew services? Never (P)      Do you belong to any clubs or organizations such as Restorationist groups, unions, fraternal or athletic groups, or school groups? No (P)      How often do you attend meetings of the clubs or organizations you belong to? Never (P)      Are you , , , , never , or living with a partner?  (P)         Alcohol Use    Q1: How often do you have a drink containing alcohol? Never (P)      Q2: How many drinks containing alcohol do you have on a typical day when you  are drinking? Patient does not drink (P)      Q3: How often do you have six or more drinks on one occasion? Never (P)         OTHER    Name(s) of People in Home Janae Linn dtr [phone] and Janae's  (P)                  CM spoke with Janae Linn dtr [phone] via phone.  Pt lives in 1 story home with dtr and son in law.  Family will bring her home, and takes to MD appts.  30D readmission.  Pt gets HH with [facility] and has paid CG's to help with ADL's.  DME: walker, w/keshav, BSC, bath bench.  CG states pt needs a new bath bench.  Instructed that bath items not covered by insurance; CG states they were last time; CM contacted [employer].  No coumadin or HD.  Pt dependent for ADL's.  [facility]  CM explained OBS status; CG disagrees with this, wants a call from MD.  CM requested MD call CG.    11:21 AM  Per Imelda, pt ineligible for tub bench, not covered by insurance; the one received back in 2016 was OOP.    2:51 PM  Per MD, pt likely to be d/c'd tomorrow with HH.  CM called Meche with [facility], LVM that pt in hospital, likely to be d/c'd tomorrow.    YUVAL BurnsN, BS, RN, CCM

## 2023-12-28 NOTE — ASSESSMENT & PLAN NOTE
EKG was NSR on admission with HR of 66.    -Continue eliquis 2.5 mg BID  -Holding BB in the setting of syncope, will resume if/when appropriate

## 2023-12-28 NOTE — NURSING
Nurses Note -- 4 Eyes      12/28/2023   3:46 AM      Skin assessed during: Admit      [] No Altered Skin Integrity Present    []Prevention Measures Documented      [x] Yes- Altered Skin Integrity Present or Discovered   [x] LDA Added if Not in Epic (Describe Wound)   [x] New Altered Skin Integrity was Present on Admit and Documented in LDA   [] Wound Image Taken    Wound Care Consulted? No    Attending Nurse:  Angel Saunders RN     Second RN/Staff Member:   Talisha Lopez LPN

## 2023-12-28 NOTE — ASSESSMENT & PLAN NOTE
Echo    Result Date: 9/5/2023    Left Ventricle: The left ventricle is normal in size. Normal wall   thickness. Normal wall motion. There is normal diastolic function.    Left Atrium: Left atrium is moderately dilated.    Right Ventricle: Normal right ventricular cavity size. Wall thickness   is normal. Right ventricle wall motion  is normal. Systolic function is   normal.    Right Atrium: Right atrium is mildly dilated.    Aortic Valve: There is mild aortic valve sclerosis.    Pulmonary Artery: No pulmonary hypertension. The estimated pulmonary   artery systolic pressure is 39 mmHg.    IVC/SVC: Elevated venous pressure at 15 mmHg.    Pericardium: There is a small circumferential effusion with   small-moderate posteriorly and apical- laterally. No indication of cardiac   tamponade. Evidence includes no respiratory transvalvular variation.   Bilateral pleural effusions.        Echo    Result Date: 9/2/2023    Left Ventricle: The left ventricle is mildly dilated. Increased wall   thickness. Normal wall motion. There is normal systolic function with a   visually estimated ejection fraction of 55 - 60%. Grade II diastolic   dysfunction.    Right Ventricle: Normal right ventricular cavity size. Right ventricle   wall motion  is normal. Systolic function is normal.    Left Atrium: Left atrium is severely dilated.    Right Atrium: Right atrium is dilated.    Mitral Valve: There is mild regurgitation.    IVC/SVC: Patient is ventilated, cannot use IVC diameter to estimate   right atrial pressure.    Pericardium: There is a small circumferential effusion. Prominent   echogenic material is adjacent or adherent to the visceral pericardium   which carries a broad differential including but not limited to   pericardial fat, fibrous material and malignancy. Clinical correlation is   advised. No convincing findings to suggest tamponade. Left pleural   effusion.    Aorta: Aortic root is normal in size measuring 3.34  cm. Ascending aorta   is mildly dilated measuring 3.81 cm.       -Continue home lasix   -Telemetry  -Maintain K>4 and Mag >2

## 2023-12-28 NOTE — SUBJECTIVE & OBJECTIVE
Past Medical History:   Diagnosis Date    Acute hypoxemic respiratory failure 9/2/2023    Acute on chronic diastolic congestive heart failure 10/19/2023    Alzheimer's disease     Anemia     Arthritis     lumbar    Back pain     Cancer     colon    Cataract     Colon cancer     Diabetes mellitus type II     Glaucoma     Hyperlipidemia     Hypertension     Hyperthyroidism     Hypothyroidism following radioiodine therapy     Pacemaker     Pneumonia     Pneumonia due to other staphylococcus     Renal manifestation of secondary diabetes mellitus     Squamous cell carcinoma     Stroke     TIA    Type 2 diabetes mellitus with stage 3 chronic kidney disease and hypertension 10/12/2017    Type 2 diabetes with peripheral circulatory disorder, controlled        Past Surgical History:   Procedure Laterality Date    CATARACT EXTRACTION W/  INTRAOCULAR LENS IMPLANT Left 09/13/2017        CHOLECYSTECTOMY      COLON SURGERY  2001    Colon CA    EYE SURGERY      cataract removal left side    IMPLANTATION OF LEADLESS PACEMAKER N/A 10/21/2022    Procedure: [id number];  Surgeon: JOSE Burgos MD;  Location: [facility];  Service: Cardiology;  Laterality: N/A;  EMELYN, JOHNNA, MDT, ANES, EH,     squamous cell ca of face         Review of patient's allergies indicates:   Allergen Reactions    Tramadol Rash and Edema     Developed facial rash and edema.    Metformin Diarrhea       No current facility-administered medications on file prior to encounter.     Current Outpatient Medications on File Prior to Encounter   Medication Sig    ACCU-CHEK FASTCLIX LANCET DRUM Misc CHECK BLOOD GLUCOSE FOUR TIMES DAILY    ACCU-CHEK GUIDE TEST STRIPS Strp TEST BLOOD GLUCOSE FOUR TIMES DAILY OR AS DIRECTED    amLODIPine (NORVASC) 10 MG tablet Take 1 tablet (10 mg total) by mouth once daily.    aspirin (ECOTRIN) 81 MG EC tablet Take 1 tablet (81 mg total) by mouth once daily.    atorvastatin (LIPITOR) 80 MG tablet Take  1 tablet (80 mg total) by mouth once daily.    calcium-vitamin D3 (CALCIUM 500 + D) 500 mg-5 mcg (200 unit) per tablet Take 1 tablet by mouth 2 (two) times daily with meals.    carvediloL (COREG) 6.25 MG tablet Take 1 tablet (6.25 mg total) by mouth 2 (two) times daily.    gabapentin (NEURONTIN) 300 MG capsule Take 1 capsule (300 mg total) by mouth 2 (two) times daily.    glipiZIDE (GLUCOTROL) 5 MG tablet TAKE 1 TABLET BY MOUTH TWICE DAILY    HYDROcodone-acetaminophen (NORCO) 5-325 mg per tablet Take 1 tablet by mouth every 8 (eight) hours as needed for Pain.    levothyroxine (SYNTHROID) 75 MCG tablet Take 1 tablet (75 mcg total) by mouth before breakfast. Administer on an empty stomach at least 30 minutes before food or other medications.    polyethylene glycol (GLYCOLAX) 17 gram PwPk Take 17 g by mouth once daily.    QUEtiapine (SEROQUEL) 25 MG Tab Take 3 tablets (75 mg total) by mouth every evening.    senna-docusate 8.6-50 mg (PERICOLACE) 8.6-50 mg per tablet Take 1 tablet by mouth 2 (two) times daily as needed for Constipation.     Family History       Problem Relation (Age of Onset)    Alzheimer's disease Sister    Ankylosing spondylitis Daughter    Arthritis Mother    Asthma Son    Atrial fibrillation Daughter    Cancer Sister, Paternal Uncle    Diabetes Maternal Grandmother    Heart disease Mother, Daughter    Hyperlipidemia Son    Hypertension Sister, Son    Kidney disease Father, Daughter    Lupus Daughter    No Known Problems Daughter    Supraventricular tachycardia Daughter          Tobacco Use    Smoking status: Never    Smokeless tobacco: Never    Tobacco comments:     smoked for about 4 years in her twenties (socially)   Substance and Sexual Activity    Alcohol use: No    Drug use: No    Sexual activity: Never     Review of Systems   Constitutional:  Negative for chills and fever.   HENT:  Positive for rhinorrhea.    Eyes:  Negative for visual disturbance.   Respiratory:  Positive for cough. Negative  for shortness of breath and wheezing.    Cardiovascular:  Negative for chest pain and leg swelling.   Gastrointestinal:  Negative for abdominal pain, diarrhea, nausea and vomiting.   Genitourinary:  Negative for dysuria.   Neurological:  Negative for dizziness and headaches.     Objective:     Vital Signs (Most Recent):  Temp: 96.4 °F (35.8 °C) (12/27/23 1148)  Pulse: 66 (12/27/23 1532)  Resp: 20 (12/27/23 1532)  BP: (!) 180/75 (12/27/23 1532)  SpO2: 97 % (12/27/23 1532) Vital Signs (24h Range):  Temp:  [94.8 °F (34.9 °C)-96.4 °F (35.8 °C)] 96.4 °F (35.8 °C)  Pulse:  [64-66] 66  Resp:  [15-24] 20  SpO2:  [96 %-99 %] 97 %  BP: (121-180)/(59-75) 180/75        There is no height or weight on file to calculate BMI.     Physical Exam  Constitutional:       General: She is not in acute distress.  HENT:      Head: Normocephalic and atraumatic.      Mouth/Throat:      Mouth: Mucous membranes are dry.      Pharynx: Oropharynx is clear.   Eyes:      General: No scleral icterus.     Extraocular Movements: Extraocular movements intact.   Cardiovascular:      Rate and Rhythm: Normal rate and regular rhythm.      Pulses: Normal pulses.      Heart sounds: Normal heart sounds. No murmur heard.  Pulmonary:      Effort: Pulmonary effort is normal. No respiratory distress.      Breath sounds: Normal breath sounds. No wheezing, rhonchi or rales.   Abdominal:      General: There is no distension.      Palpations: Abdomen is soft.      Tenderness: There is no abdominal tenderness.   Musculoskeletal:      Right lower leg: No edema.      Left lower leg: No edema.   Lymphadenopathy:      Cervical: No cervical adenopathy.   Skin:     General: Skin is warm and dry.      Coloration: Skin is jaundiced.   Neurological:      General: No focal deficit present.      Mental Status: She is alert. Mental status is at baseline.   Psychiatric:         Mood and Affect: Mood normal.         Behavior: Behavior normal.                Significant Labs: All  pertinent labs within the past 24 hours have been reviewed.  CBC:   Recent Labs   Lab 12/27/23  1206   WBC 6.52   HGB 10.9*   HCT 33.2*        CMP:   Recent Labs   Lab 12/27/23  1206      K 3.9      CO2 22*   *   BUN 15   CREATININE 1.1   CALCIUM 8.8   PROT 6.1   ALBUMIN 2.7*   BILITOT 0.6   ALKPHOS 49*   AST 17   ALT 9*   ANIONGAP 9     Cardiac Markers:   Recent Labs   Lab 12/27/23  1206   *       Significant Imaging: I have reviewed all pertinent imaging results/findings within the past 24 hours.

## 2023-12-28 NOTE — HPI
89 yo F with AD, hx of CVA, 2nd degree AV block s/p-ppm, pAF on eliquis, HFpEF, CKD3, HTN, DM, hypothyroidism who presents following syncopal episode. She was found unresponsive at home on the toilet after a loose BM. The pt's caregiver found her and immediately called for the pt's daughter. The daughter came and helped secure her mother then called 911. In total, the pt was unresponsive for roughly 20 minutes before coming back to. It took her a few moments, but she returned to her baseline. Of note, pt began having upper respiratory sx this past Sunday, but was never febrile or hypoxic at home.    Earlier this month, the pt was admitted for an almost identical scenario while using the bathroom. At that time, she was treated for UTI and the event was attributed to vasovagal response with autonomic dysfunction.    In the ED, she was afebrile and HDS. Her CT head did not show any acute findings, but CXR did show a small left sided pleural effusion that was present during her previous admission, otherwise no consolidation. BNP was mildly elevated at 342 but she does not appear to be fluid overloaded on exam. She was found to be covid positive but she was satting well on room air.

## 2023-12-29 LAB
ALBUMIN SERPL BCP-MCNC: 2.4 G/DL (ref 3.5–5.2)
ALBUMIN SERPL BCP-MCNC: 2.5 G/DL (ref 3.5–5.2)
ALP SERPL-CCNC: 42 U/L (ref 55–135)
ALP SERPL-CCNC: 52 U/L (ref 55–135)
ALT SERPL W/O P-5'-P-CCNC: 11 U/L (ref 10–44)
ALT SERPL W/O P-5'-P-CCNC: 12 U/L (ref 10–44)
ANION GAP SERPL CALC-SCNC: 10 MMOL/L (ref 8–16)
ANION GAP SERPL CALC-SCNC: 12 MMOL/L (ref 8–16)
AST SERPL-CCNC: 19 U/L (ref 10–40)
AST SERPL-CCNC: 21 U/L (ref 10–40)
BASOPHILS # BLD AUTO: 0.03 K/UL (ref 0–0.2)
BASOPHILS NFR BLD: 0.5 % (ref 0–1.9)
BILIRUB SERPL-MCNC: 0.4 MG/DL (ref 0.1–1)
BILIRUB SERPL-MCNC: 0.4 MG/DL (ref 0.1–1)
BUN SERPL-MCNC: 22 MG/DL (ref 10–30)
BUN SERPL-MCNC: 23 MG/DL (ref 10–30)
CALCIUM SERPL-MCNC: 8.6 MG/DL (ref 8.7–10.5)
CALCIUM SERPL-MCNC: 9 MG/DL (ref 8.7–10.5)
CHLORIDE SERPL-SCNC: 106 MMOL/L (ref 95–110)
CHLORIDE SERPL-SCNC: 110 MMOL/L (ref 95–110)
CO2 SERPL-SCNC: 21 MMOL/L (ref 23–29)
CO2 SERPL-SCNC: 23 MMOL/L (ref 23–29)
CREAT SERPL-MCNC: 1.2 MG/DL (ref 0.5–1.4)
CREAT SERPL-MCNC: 1.2 MG/DL (ref 0.5–1.4)
DIFFERENTIAL METHOD BLD: ABNORMAL
EOSINOPHIL # BLD AUTO: 0.2 K/UL (ref 0–0.5)
EOSINOPHIL NFR BLD: 3.2 % (ref 0–8)
ERYTHROCYTE [DISTWIDTH] IN BLOOD BY AUTOMATED COUNT: 16 % (ref 11.5–14.5)
EST. GFR  (NO RACE VARIABLE): 42.7 ML/MIN/1.73 M^2
EST. GFR  (NO RACE VARIABLE): 42.7 ML/MIN/1.73 M^2
GLUCOSE SERPL-MCNC: 114 MG/DL (ref 70–110)
GLUCOSE SERPL-MCNC: 69 MG/DL (ref 70–110)
HCT VFR BLD AUTO: 34 % (ref 37–48.5)
HGB BLD-MCNC: 11.1 G/DL (ref 12–16)
IMM GRANULOCYTES # BLD AUTO: 0.02 K/UL (ref 0–0.04)
IMM GRANULOCYTES NFR BLD AUTO: 0.3 % (ref 0–0.5)
LYMPHOCYTES # BLD AUTO: 2.2 K/UL (ref 1–4.8)
LYMPHOCYTES NFR BLD: 35.4 % (ref 18–48)
MAGNESIUM SERPL-MCNC: 1.9 MG/DL (ref 1.6–2.6)
MAGNESIUM SERPL-MCNC: 2 MG/DL (ref 1.6–2.6)
MCH RBC QN AUTO: 28.5 PG (ref 27–31)
MCHC RBC AUTO-ENTMCNC: 32.6 G/DL (ref 32–36)
MCV RBC AUTO: 87 FL (ref 82–98)
MONOCYTES # BLD AUTO: 0.4 K/UL (ref 0.3–1)
MONOCYTES NFR BLD: 7 % (ref 4–15)
NEUTROPHILS # BLD AUTO: 3.4 K/UL (ref 1.8–7.7)
NEUTROPHILS NFR BLD: 53.6 % (ref 38–73)
NRBC BLD-RTO: 0 /100 WBC
PHOSPHATE SERPL-MCNC: 4.2 MG/DL (ref 2.7–4.5)
PHOSPHATE SERPL-MCNC: 4.8 MG/DL (ref 2.7–4.5)
PLATELET # BLD AUTO: 178 K/UL (ref 150–450)
PMV BLD AUTO: 9.8 FL (ref 9.2–12.9)
POCT GLUCOSE: 118 MG/DL (ref 70–110)
POCT GLUCOSE: 128 MG/DL (ref 70–110)
POCT GLUCOSE: 58 MG/DL (ref 70–110)
POCT GLUCOSE: 78 MG/DL (ref 70–110)
POCT GLUCOSE: 81 MG/DL (ref 70–110)
POCT GLUCOSE: 91 MG/DL (ref 70–110)
POTASSIUM SERPL-SCNC: 3.7 MMOL/L (ref 3.5–5.1)
POTASSIUM SERPL-SCNC: 4 MMOL/L (ref 3.5–5.1)
PROT SERPL-MCNC: 5.5 G/DL (ref 6–8.4)
PROT SERPL-MCNC: 6.1 G/DL (ref 6–8.4)
RBC # BLD AUTO: 3.89 M/UL (ref 4–5.4)
SODIUM SERPL-SCNC: 139 MMOL/L (ref 136–145)
SODIUM SERPL-SCNC: 143 MMOL/L (ref 136–145)
WBC # BLD AUTO: 6.28 K/UL (ref 3.9–12.7)

## 2023-12-29 PROCEDURE — 97116 GAIT TRAINING THERAPY: CPT | Mod: CQ

## 2023-12-29 PROCEDURE — 84100 ASSAY OF PHOSPHORUS: CPT

## 2023-12-29 PROCEDURE — 85025 COMPLETE CBC W/AUTO DIFF WBC: CPT

## 2023-12-29 PROCEDURE — 80053 COMPREHEN METABOLIC PANEL: CPT

## 2023-12-29 PROCEDURE — 25000003 PHARM REV CODE 250: Performed by: STUDENT IN AN ORGANIZED HEALTH CARE EDUCATION/TRAINING PROGRAM

## 2023-12-29 PROCEDURE — 97530 THERAPEUTIC ACTIVITIES: CPT | Mod: CO

## 2023-12-29 PROCEDURE — 97535 SELF CARE MNGMENT TRAINING: CPT | Mod: CO

## 2023-12-29 PROCEDURE — 97530 THERAPEUTIC ACTIVITIES: CPT | Mod: CQ

## 2023-12-29 PROCEDURE — 63600175 PHARM REV CODE 636 W HCPCS: Mod: JZ,TB

## 2023-12-29 PROCEDURE — 36415 COLL VENOUS BLD VENIPUNCTURE: CPT

## 2023-12-29 PROCEDURE — 21400001 HC TELEMETRY ROOM

## 2023-12-29 PROCEDURE — 25000003 PHARM REV CODE 250

## 2023-12-29 PROCEDURE — 27000207 HC ISOLATION

## 2023-12-29 PROCEDURE — 83735 ASSAY OF MAGNESIUM: CPT

## 2023-12-29 RX ORDER — CARVEDILOL 3.12 MG/1
3.12 TABLET ORAL 2 TIMES DAILY
Qty: 60 TABLET | Refills: 3 | Status: SHIPPED | OUTPATIENT
Start: 2023-12-29 | End: 2024-12-28

## 2023-12-29 RX ORDER — CARVEDILOL 3.12 MG/1
3.12 TABLET ORAL 2 TIMES DAILY
Status: DISCONTINUED | OUTPATIENT
Start: 2023-12-29 | End: 2023-12-30 | Stop reason: HOSPADM

## 2023-12-29 RX ADMIN — ATORVASTATIN CALCIUM 80 MG: 40 TABLET, FILM COATED ORAL at 10:12

## 2023-12-29 RX ADMIN — DEXTROSE MONOHYDRATE 125 ML: 100 INJECTION, SOLUTION INTRAVENOUS at 03:12

## 2023-12-29 RX ADMIN — DEXTROSE MONOHYDRATE 125 ML: 100 INJECTION, SOLUTION INTRAVENOUS at 09:12

## 2023-12-29 RX ADMIN — CARVEDILOL 3.12 MG: 3.12 TABLET, FILM COATED ORAL at 09:12

## 2023-12-29 RX ADMIN — CARVEDILOL 3.12 MG: 3.12 TABLET, FILM COATED ORAL at 10:12

## 2023-12-29 RX ADMIN — GABAPENTIN 300 MG: 300 CAPSULE ORAL at 09:12

## 2023-12-29 RX ADMIN — APIXABAN 2.5 MG: 2.5 TABLET, FILM COATED ORAL at 10:12

## 2023-12-29 RX ADMIN — POLYETHYLENE GLYCOL 3350 17 G: 17 POWDER, FOR SOLUTION ORAL at 10:12

## 2023-12-29 RX ADMIN — FUROSEMIDE 40 MG: 20 TABLET ORAL at 09:12

## 2023-12-29 RX ADMIN — ASPIRIN 81 MG CHEWABLE TABLET 81 MG: 81 TABLET CHEWABLE at 09:12

## 2023-12-29 RX ADMIN — QUETIAPINE FUMARATE 75 MG: 25 TABLET ORAL at 09:12

## 2023-12-29 RX ADMIN — REMDESIVIR 100 MG: 100 INJECTION, POWDER, LYOPHILIZED, FOR SOLUTION INTRAVENOUS at 10:12

## 2023-12-29 RX ADMIN — APIXABAN 2.5 MG: 2.5 TABLET, FILM COATED ORAL at 09:12

## 2023-12-29 RX ADMIN — AMLODIPINE BESYLATE 10 MG: 10 TABLET ORAL at 10:12

## 2023-12-29 NOTE — PLAN OF CARE
Problem: Adult Inpatient Plan of Care  Goal: Plan of Care Review  Outcome: Ongoing, Progressing  Goal: Readiness for Transition of Care  Outcome: Ongoing, Progressing     Problem: Diabetes Comorbidity  Goal: Blood Glucose Level Within Targeted Range  Outcome: Ongoing, Progressing     Problem: Skin Injury Risk Increased  Goal: Skin Health and Integrity  Outcome: Ongoing, Progressing

## 2023-12-29 NOTE — PT/OT/SLP PROGRESS
Occupational Therapy   Co-Treatment with PT  Co-treatment performed due to patient's multiple deficits requiring two skilled therapists to appropriately and safely assess patient's strength and endurance while facilitating functional tasks in addition to accommodating for patient's activity tolerance.     Name: Quin Linn  MRN: [id number]  Admitting Diagnosis:  Syncope       Recommendations:     Discharge Recommendations: Moderate Intensity Therapy  Discharge Equipment Recommendations:  walker, rolling, wheelchair, bedside commode  Barriers to discharge:       Assessment:     Quin Linn is a 89 y.o. female with a medical diagnosis of Syncope.  She presents with the following performance deficits affecting function: weakness, impaired functional mobility, gait instability, impaired endurance, impaired self care skills, decreased lower extremity function, decreased safety awareness, impaired balance, impaired cardiopulmonary response to activity, decreased upper extremity function, decreased coordination.     Pt agreeable to therapy and tolerated session well. Pt requiring signifcant assistance for transfers and ambulation. Pt performing bed mobility with increased time d/t weakness and pt being hard of hearing. Pt requires 2 person assistance for transfers, she performed 4 sit<>stand transfers. Pt able to take lateral steps with RW. Pt voiding with transfers and performing pericare with rolling while supine.     Rehab Prognosis:  Good; patient would benefit from acute skilled OT services to address these deficits and reach maximum level of function.       Plan:     Patient to be seen 4 x/week to address the above listed problems via self-care/home management, therapeutic activities, therapeutic exercises, neuromuscular re-education  Plan of Care Expires: 01/27/24  Plan of Care Reviewed with: patient    Subjective     Patient/Family Comments/goals: to improve function  Pain/Comfort:  Pain Rating 1: 0/10  Pain  Rating Post-Intervention 1: 0/10    Objective:     Communicated with: RN prior to session.  Patient found HOB elevated with telemetry, pulse ox (continuous), PureWick upon OT entry to room.  A client care conference was completed by the OTR and the THOMAS prior to treatment by the THOMAS to discuss the patient's POC and current status.    General Precautions: Standard, airborne, fall, contact, hearing impaired, droplet    Orthopedic Precautions:N/A  Braces: N/A  Respiratory Status: Room air     Occupational Performance:     Bed Mobility:    Patient completed Rolling/Turning to Left with  moderate assistance  Patient completed Rolling/Turning to Right with moderate assistance  Patient completed Scooting/Bridging with maximal assistance  Patient completed Supine to Sit with maximal assistance  Patient completed Sit to Supine with moderate assistance     Functional Mobility/Transfers:  Patient completed Sit <> Stand Transfer with moderate assistance and of 2 persons  with  rolling walker x 4 trials, B foot blocking   Functional Mobility: pt taking 3 side steps with Mod A x2 using RW. No LOB, mild fatigue noted. Pt voiding with transfers discouraging movement.    Activities of Daily Living:  Toileting: total assistance for pericare in supine while rolling R/L      [facility] 6 Click ADL: 14    Treatment & Education:  Pt educated on OT POC and frequency during hospital stay.   Pt educated on proper hand placement and techniques for RW mgmt to improve safety awareness.   Pt educated on importance of OOB activity to improve function and activity tolerance.  Addressed all patient questions/concerns within THOMAS scope of practice.     Patient left HOB elevated with all lines intact, call button in reach, and RN notified    GOALS:   Multidisciplinary Problems       Occupational Therapy Goals          Problem: Occupational Therapy    Goal Priority Disciplines Outcome Interventions   Occupational Therapy Goal     OT, PT/OT Ongoing,  Progressing    Description: Goals to be met by: 01-09-24     Patient will increase functional independence with ADLs by performing:    UE Dressing with Minimal Assistance.  LE Dressing with Moderate Assistance.  Grooming while seated with Stand-by Assistance.  Toileting from bedside commode with Moderate Assistance for hygiene and clothing management.   Supine to sit with Minimal Assistance.  Stand pivot transfers with Minimal Assistance.  Toilet transfer to toilet with Minimal Assistance.  Pt. To Be Min A with HEP to improve level of endurance                         Time Tracking:     OT Date of Treatment: 12/29/23  OT Start Time: 1056  OT Stop Time: 1128  OT Total Time (min): 32 min    Billable Minutes:Self Care/Home Management 15  Therapeutic Activity 17    OT/DANIEL: DANIEL     Number of DANIEL visits since last OT visit: 1 12/29/2023

## 2023-12-29 NOTE — DISCHARGE SUMMARY
[facility] - Int[facility] Care ([place] [address])  [facility] Medicine  Discharge Summary      Patient Name: Quin Linn  MRN: [id number]  APOLLO: [id number]  Patient Class: IP- Inpatient  Admission Date: 12/27/2023  Hospital Length of Stay: 1 days  Discharge Date and Time:  12/29/2023 11:42 AM  Attending Physician: Oksana Lynch MD   Discharging Provider: Amy Rao MD  Primary Care Provider: Mary Ellen Nesbitt MD  [facility] Medicine Team: [facility] Amy Rao MD  Primary Care Team: [facility]    HPI:   89 yo F with AD, hx of CVA, 2nd degree AV block s/p-ppm, pAF on eliquis, HFpEF, CKD3, HTN, DM, hypothyroidism who presents following syncopal episode. She was found unresponsive at home on the toilet after a loose BM. The pt's caregiver found her and immediately called for the pt's daughter. The daughter came and helped secure her mother then called 911. In total, the pt was unresponsive for roughly 20 minutes before coming back to. It took her a few moments, but she returned to her baseline. Of note, pt began having upper respiratory sx this past Sunday, but was never febrile or hypoxic at home.    Earlier this month, the pt was admitted for an almost identical scenario while using the bathroom. At that time, she was treated for UTI and the event was attributed to vasovagal response with autonomic dysfunction.    In the ED, she was afebrile and HDS. Her CT head did not show any acute findings, but CXR did show a small left sided pleural effusion that was present during her previous admission, otherwise no consolidation. BNP was mildly elevated at 342 but she does not appear to be fluid overloaded on exam. She was found to be covid positive but she was satting well on room air.     * No surgery found *      Hospital Course:   89 yo F with AD, hx of CVA, 2nd degree AV block s/p-ppm, pAF on eliquis, HFpEF, CKD3, HTN, DM, hypothyroidism who presents following syncopal episode. Her metabolic workup has been  unrevealing. We are treating her Covid 19 with remdesivir; she remains stable on room air. Adjusted her coreg to 3.125 mg BID and dc glipizide. Will discharge her with home health and requested a transfer bench for bath.     Physical Exam  Constitutional:       General: She is not in acute distress.  HENT:      Head: Normocephalic and atraumatic.      Mouth/Throat:      Mouth: Mucous membranes are moist.   Eyes:      General: No scleral icterus.     Extraocular Movements: Extraocular movements intact.   Cardiovascular:      Rate and Rhythm: Normal rate and regular rhythm.      Pulses: Normal pulses.      Heart sounds: Normal heart sounds. No murmur heard.  Pulmonary:      Effort: Pulmonary effort is normal. No respiratory distress.      Breath sounds: Normal breath sounds. No wheezing or rales.   Abdominal:      Palpations: Abdomen is soft.      Tenderness: There is no abdominal tenderness.   Musculoskeletal:      Right lower leg: No edema.      Left lower leg: No edema.   Skin:     General: Skin is warm and dry.      Coloration: Skin is not jaundiced.   Neurological:      Mental Status: She is alert. Mental status is at baseline.   Psychiatric:         Mood and Affect: Mood normal.         Behavior: Behavior normal.            Goals of Care Treatment Preferences:  Code Status: DNR    Living Will: Yes              Consults:     No new Assessment & Plan notes have been filed under this hospital service since the last note was generated.  Service: Hospital Medicine    Final Active Diagnoses:    Diagnosis Date Noted POA    PRINCIPAL PROBLEM:  Syncope [R55] 09/19/2015 Yes    COVID-19 [U07.1] 12/27/2023 Yes    Chronic diastolic heart failure [I50.32] 12/09/2023 Yes    Constipation [K59.00] 08/30/2023 Yes    Paroxysmal A-fib [I48.0] 08/27/2023 Yes    Wenckebach second degree AV block [I44.1] 10/02/2022 Yes    Chronic kidney disease, stage IV (severe) [N18.4] 01/10/2022 Yes    Diabetic polyneuropathy associated with type 2  "diabetes mellitus [E11.42] 02/15/2018 Yes    Controlled type 2 diabetes mellitus with stage 3 chronic kidney disease, without long-term current use of insulin [E11.22, N18.30] 01/11/2018 Yes    Anemia of chronic disease [D63.8] 05/30/2017 Yes    Debility [R53.81] 12/07/2016 Yes    Late onset Alzheimer's disease with behavioral disturbance [G30.1, F02.818]  Yes     Chronic    Essential hypertension [I10]  Yes     Chronic    Hyperlipidemia LDL goal <70 [E78.5] 06/22/2015 Yes     Chronic    Hypothyroidism following radioiodine therapy [E89.0] 09/26/2012 Yes     Chronic      Problems Resolved During this Admission:       Discharged Condition: stable    Disposition:     Follow Up:    Patient Instructions:      TRANSFER TUB BENCH FOR HOME USE     Order Specific Question Answer Comments   Type of Transfer Tub Bench: Unpadded    Height: 5' 7.01" (1.702 m)    Weight: 65.8 kg (145 lb 1 oz)    Does patient have medical equipment at home? none    Length of need (1-99 months): 99    Patient notified - Not covered by insurance considered a convenience item No    Discussed financial responsibility with responsible party No        Significant Diagnostic Studies: N/A    Pending Diagnostic Studies:       Procedure Component Value Units Date/Time    Comprehensive Metabolic Panel (CMP) [[id number]] Collected: 12/29/23 1041    Order Status: Sent Lab Status: In process Updated: 12/29/23 1122    Specimen: Blood     Narrative:      Collection has been rescheduled by NE1 at 12/29/2023 05:36 Reason:   Patient Refused notified nurse   Collection has been rescheduled by NE1 at 12/29/2023 05:41 Reason:   Nurse eagle    Magnesium [[id number]] Collected: 12/29/23 1041    Order Status: Sent Lab Status: In process Updated: 12/29/23 1122    Specimen: Blood     Narrative:      Collection has been rescheduled by NE1 at 12/29/2023 05:36 Reason:   Patient Refused notified nurse   Collection has been rescheduled by NE1 at 12/29/2023 05:41 Reason:   Nurse " eagle    Phosphorus [[id number]] Collected: 12/29/23 1041    Order Status: Sent Lab Status: In process Updated: 12/29/23 1122    Specimen: Blood     Narrative:      Collection has been rescheduled by NE1 at 12/29/2023 05:36 Reason:   Patient Refused notified nurse   Collection has been rescheduled by NE1 at 12/29/2023 05:41 Reason:   Nurse eagle           Medications:  Reconciled Home Medications:      Medication List        START taking these medications      apixaban 2.5 mg Tab  Commonly known as: ELIQUIS  Take 1 tablet (2.5 mg total) by mouth 2 (two) times daily.            CHANGE how you take these medications      carvediloL 3.125 MG tablet  Commonly known as: COREG  Take 1 tablet (3.125 mg total) by mouth 2 (two) times daily.  What changed:   medication strength  how much to take            CONTINUE taking these medications      ACCU-CHEK FASTCLIX LANCET DRUM Misc  Generic drug: lancets  CHECK BLOOD GLUCOSE FOUR TIMES DAILY     ACCU-CHEK GUIDE TEST STRIPS Strp  Generic drug: blood sugar diagnostic  TEST BLOOD GLUCOSE FOUR TIMES DAILY OR AS DIRECTED     amLODIPine 10 MG tablet  Commonly known as: NORVASC  Take 1 tablet (10 mg total) by mouth once daily.     aspirin 81 MG EC tablet  Commonly known as: ECOTRIN  Take 1 tablet (81 mg total) by mouth once daily.     atorvastatin 80 MG tablet  Commonly known as: LIPITOR  Take 1 tablet (80 mg total) by mouth once daily.     calcium-vitamin D3 500 mg-5 mcg (200 unit) per tablet  Commonly known as: CALCIUM 500 + D  Take 1 tablet by mouth 2 (two) times daily with meals.     gabapentin 300 MG capsule  Commonly known as: NEURONTIN  Take 1 capsule (300 mg total) by mouth 2 (two) times daily.     HYDROcodone-acetaminophen 5-325 mg per tablet  Commonly known as: NORCO  Take 1 tablet by mouth every 8 (eight) hours as needed for Pain.     levothyroxine 75 MCG tablet  Commonly known as: SYNTHROID  Take 1 tablet (75 mcg total) by mouth before breakfast. Administer on an empty  stomach at least 30 minutes before food or other medications.     polyethylene glycol 17 gram Pwpk  Commonly known as: GLYCOLAX  Take 17 g by mouth once daily.     QUEtiapine 25 MG Tab  Commonly known as: SEROQUEL  Take 3 tablets (75 mg total) by mouth every evening.     senna-docusate 8.6-50 mg 8.6-50 mg per tablet  Commonly known as: PERICOLACE  Take 1 tablet by mouth 2 (two) times daily as needed for Constipation.            STOP taking these medications      glipiZIDE 5 MG tablet  Commonly known as: GLUCOTROL              Indwelling Lines/Drains at time of discharge:   Lines/Drains/Airways       Drain  Duration             Female External Urinary Catheter w/ Suction 12/27/23 8300 1 day                    Time spent on the discharge of patient: 35 minutes         Amy Rao MD  Department of Hospital Medicine  [facility] - Intensive Care ([place]-16)

## 2023-12-29 NOTE — PLAN OF CARE
Pt. continues to be disoriented to time; she was disoriented to time and situation this AM. Delirium precautions are being followed. VS stable with occasional episodes of bradycardia. Bedtime blood glucose 71 mg/dL, after drinking orange juice it increased to 80 mg/dL. Midnight blood draw glucose was 69 mg/dL - 10% dextrose bolus given with increase to 78 mg/dL. Pt. became very agitated and combative this AM - she refused AM blood draw and morning medication. No adverse events noted during this shift.    Problem: Violence Risk or Actual  Goal: Anger and Impulse Control  Outcome: Ongoing, Progressing  Intervention: Promote Self-Control  Flowsheets (Taken 12/29/2023 0335)  Supportive Measures:   active listening utilized   verbalization of feelings encouraged  Environmental Support:   calm environment promoted   distractions minimized     Problem: Adult Inpatient Plan of Care  Goal: Plan of Care Review  Outcome: Ongoing, Progressing  Flowsheets (Taken 12/29/2023 0335)  Plan of Care Reviewed With: patient  Goal: Patient-Specific Goal (Individualized)  Outcome: Ongoing, Progressing  Goal: Absence of Hospital-Acquired Illness or Injury  Outcome: Ongoing, Progressing  Intervention: Prevent Skin Injury  Flowsheets (Taken 12/29/2023 0335)  Body Position:   position changed independently   left   side-lying  Goal: Optimal Comfort and Wellbeing  Outcome: Ongoing, Progressing  Goal: Readiness for Transition of Care  Outcome: Ongoing, Progressing     Problem: Diabetes Comorbidity  Goal: Blood Glucose Level Within Targeted Range  Outcome: Ongoing, Progressing  Intervention: Monitor and Manage Glycemia  Flowsheets (Taken 12/29/2023 0335)  Glycemic Management:   blood glucose monitored   carbohydrate replacement provided     Problem: Skin Injury Risk Increased  Goal: Skin Health and Integrity  Outcome: Ongoing, Progressing  Intervention: Optimize Skin Protection  Flowsheets (Taken 12/29/2023 0335)  Pressure Reduction Techniques:    frequent weight shift encouraged   heels elevated off bed     Problem: Fall Injury Risk  Goal: Absence of Fall and Fall-Related Injury  Outcome: Ongoing, Progressing  Intervention: Promote Injury-Free Environment  Flowsheets (Taken 12/29/2023 0335)  Safety Promotion/Fall Prevention:   assistive device/personal item within reach   bed alarm set   Fall Risk reviewed with patient/family   nonskid shoes/socks when out of bed   pulse ox   side rails raised x 3   room near unit station     Problem: Impaired Wound Healing  Goal: Optimal Wound Healing  Outcome: Ongoing, Progressing  Intervention: Promote Wound Healing  Flowsheets (Taken 12/29/2023 0335)  Sleep/Rest Enhancement:   awakenings minimized   noise level reduced   regular sleep/rest pattern promoted   room darkened

## 2023-12-29 NOTE — PT/OT/SLP PROGRESS
"Physical Therapy Treatment  Co Treatment with Occupational Therapy    Patient Name:  Quin Linn   MRN:  [id number]    Recommendations:     Discharge Recommendations: Moderate Intensity Therapy  Discharge Equipment Recommendations: wheelchair, walker, rolling, bedside commode  Barriers to discharge: Inaccessible home, Decreased caregiver support, and increased need for caregiver assistance     Assessment:     Quin Linn is a 89 y.o. female admitted with a medical diagnosis of Syncope.  She presents with the following impairments/functional limitations: weakness, impaired endurance, impaired self care skills, impaired functional mobility, gait instability, decreased safety awareness, decreased lower extremity function, decreased upper extremity function, impaired cardiopulmonary response to activity.    Pt agreeable to therapy at this time. Pt requires decreased assistance with bed mobility activities and taking increased steps at EOB. Further gait trials deferred 2/2 pt urinating upon standing. Pt making good progress towards therapy goals. Pt will continue to benefit from skilled therapy services.    Rehab Prognosis: Good; patient would benefit from acute skilled PT services to address these deficits and reach maximum level of function.    Recent Surgery: * No surgery found *      Plan:     During this hospitalization, patient to be seen 4 x/week to address the identified rehab impairments via gait training, therapeutic activities, therapeutic exercises, neuromuscular re-education and progress toward the following goals:    Plan of Care Expires:  01/28/24    Subjective     Chief Complaint: "I'm peeing"-during ambulation attempt.   Patient/Family Comments/goals: to try to walk  Pain/Comfort:  Pain Rating 1: 0/10      Objective:     Communicated with RN (Cathryn) prior to session.  Patient found HOB elevated with telemetry, PureWick, pulse ox (continuous) upon PTA entry to room.     General Precautions: Standard, " airborne, fall, droplet, contact, hearing impaired  Orthopedic Precautions: N/A  Braces: N/A  Respiratory Status: Room air     Functional Mobility:  Bed Mobility:     Rolling Right/Left: minimum assistance with use of bedrail   Scooting: anteriorly to EOB maximal assistance via tigist pads  Supine to Sit: maximal assistance for trunk/BLEs  Sit to Supine: moderate assistance for BLEs  Transfers:     Sit to Stand:  x4 trials from EOB moderate assistance x2 persons with rolling walker requiring BLE blocked.   Gait: Pt takes 3-4 R lateral steps at EOB with moderate assistance x2 persons and RW. Further trials deferred secondary to pt urinating during sit to stand trials.      AM-PAC 6 CLICK MOBILITY  Turning over in bed (including adjusting bedclothes, sheets and blankets)?: 3  Sitting down on and standing up from a chair with arms (e.g., wheelchair, bedside commode, etc.): 2  Moving from lying on back to sitting on the side of the bed?: 2  Moving to and from a bed to a chair (including a wheelchair)?: 2  Need to walk in hospital room?: 2  Climbing 3-5 steps with a railing?: 1  Basic Mobility Total Score: 12       Treatment & Education:  Patient provided with daily orientation and goals of this PT session.       Co tx performed with OT due to patient need for 2 skilled therapist to ensure patient and staff safety and to accommondate for activity tolerance.      Patient left HOB elevated with all lines intact, call button in reach, bed alarm on, and RN notified.    GOALS:   Multidisciplinary Problems       Physical Therapy Goals          Problem: Physical Therapy    Goal Priority Disciplines Outcome Goal Variances Interventions   Physical Therapy Goal     PT, PT/OT Ongoing, Progressing     Description: Goals to be met by: 24     Patient will increase functional independence with mobility by performin. Supine to sit with Stand-by Assistance  2. Rolling to Left and Right with Modified [place].  3. Sit to  stand transfer with Stand-by Assistance  4. Bed to chair transfer with Contact Guard Assistance using LRAD  5. Gait  x 100 feet with Minimal Assistance using LRAD.   6. Ascend/descend 3 stair with Handrails Minimal Assistance using LRAD.   7. Lower extremity exercise program x30 reps per handout, with independence                         Time Tracking:     PT Received On: 12/29/23  PT Start Time: 1056     PT Stop Time: 1128  PT Total Time (min): 32 min     Billable Minutes: Gait Training 17 and Therapeutic Activity 15    Treatment Type: Treatment  PT/PTA: PTA     Number of PTA visits since last PT visit: 1 12/29/2023

## 2023-12-29 NOTE — PLAN OF CARE
ABHI spoke with CG Janae Herce [phone], instructed in dc process.  She states her  will be by to  pt around 2:30-3.  Instructed that Transfer Tub Bench not covered by insurance; she states they will just purchase one from [employer].    ABHI spoke with Mary Anne from [facility], who states she is on their list to be seen Thursday d/t the holiday; they will move her up if possible.    1:58 PM  Per nurse Lockett, pt has needed max assist for transferring while in hospital.  CM called CG, who states her  is able to assist with transfer.    2:47 PM  CM called CG back, stressed that nurse is recommending stretcher transport.  CG declined, states her  has always been able to transfer her before.  Instructed that if he's unable to do this, bring pt back in and have other transport arranged.  CG v/u, states that she will tell her .    YUVAL BurnsN, BS, RN, CCM

## 2023-12-29 NOTE — CARE UPDATE
Cancelling discharge. When family arrived to  patient, she was more lethargic than usual, sleepy, slow to wake up. Of note, she is experiencing some hospital delirium and was agitated all night last night then worked with PT this morning. Ate breakfast well and was at mental baseline this morning.  Dr. Gabriel assessing patient to ensure no focal neuro deficits, but vitals have remained stable.  Per Dr. Andrews's assessment, no focal neuro deficits, patient back at mental baseline: sleepy but arousable and answering questions.  Suspect ongoing hospital delirium and expected lethargy from COVID in 89yo patient.   Per family request, will observe through the night and reassess for discharge in the AM. Family is undergoing serious hardship with daughter herself recovering from major surgery and son-in-law providing care for the both of them. Plan for discharge tomorrow.

## 2023-12-30 VITALS
HEART RATE: 78 BPM | HEIGHT: 67 IN | TEMPERATURE: 98 F | BODY MASS INDEX: 22.77 KG/M2 | RESPIRATION RATE: 18 BRPM | WEIGHT: 145.06 LBS | DIASTOLIC BLOOD PRESSURE: 55 MMHG | SYSTOLIC BLOOD PRESSURE: 125 MMHG | OXYGEN SATURATION: 100 %

## 2023-12-30 LAB
ALBUMIN SERPL BCP-MCNC: 2.4 G/DL (ref 3.5–5.2)
ALP SERPL-CCNC: 50 U/L (ref 55–135)
ALT SERPL W/O P-5'-P-CCNC: 10 U/L (ref 10–44)
ANION GAP SERPL CALC-SCNC: 12 MMOL/L (ref 8–16)
AST SERPL-CCNC: 18 U/L (ref 10–40)
BASOPHILS # BLD AUTO: 0.04 K/UL (ref 0–0.2)
BASOPHILS NFR BLD: 0.7 % (ref 0–1.9)
BILIRUB SERPL-MCNC: 0.4 MG/DL (ref 0.1–1)
BUN SERPL-MCNC: 30 MG/DL (ref 10–30)
CALCIUM SERPL-MCNC: 8.7 MG/DL (ref 8.7–10.5)
CHLORIDE SERPL-SCNC: 107 MMOL/L (ref 95–110)
CO2 SERPL-SCNC: 19 MMOL/L (ref 23–29)
CREAT SERPL-MCNC: 1.4 MG/DL (ref 0.5–1.4)
DIFFERENTIAL METHOD BLD: ABNORMAL
EOSINOPHIL # BLD AUTO: 0.2 K/UL (ref 0–0.5)
EOSINOPHIL NFR BLD: 3.5 % (ref 0–8)
ERYTHROCYTE [DISTWIDTH] IN BLOOD BY AUTOMATED COUNT: 16.5 % (ref 11.5–14.5)
EST. GFR  (NO RACE VARIABLE): 35.5 ML/MIN/1.73 M^2
GLUCOSE SERPL-MCNC: 66 MG/DL (ref 70–110)
HCT VFR BLD AUTO: 35.6 % (ref 37–48.5)
HGB BLD-MCNC: 11.1 G/DL (ref 12–16)
IMM GRANULOCYTES # BLD AUTO: 0.02 K/UL (ref 0–0.04)
IMM GRANULOCYTES NFR BLD AUTO: 0.3 % (ref 0–0.5)
LYMPHOCYTES # BLD AUTO: 2.2 K/UL (ref 1–4.8)
LYMPHOCYTES NFR BLD: 36.5 % (ref 18–48)
MAGNESIUM SERPL-MCNC: 1.9 MG/DL (ref 1.6–2.6)
MCH RBC QN AUTO: 29.4 PG (ref 27–31)
MCHC RBC AUTO-ENTMCNC: 31.2 G/DL (ref 32–36)
MCV RBC AUTO: 94 FL (ref 82–98)
MONOCYTES # BLD AUTO: 0.4 K/UL (ref 0.3–1)
MONOCYTES NFR BLD: 7.2 % (ref 4–15)
NEUTROPHILS # BLD AUTO: 3.1 K/UL (ref 1.8–7.7)
NEUTROPHILS NFR BLD: 51.8 % (ref 38–73)
NRBC BLD-RTO: 0 /100 WBC
PHOSPHATE SERPL-MCNC: 4.9 MG/DL (ref 2.7–4.5)
PLATELET # BLD AUTO: 193 K/UL (ref 150–450)
PMV BLD AUTO: 10.5 FL (ref 9.2–12.9)
POCT GLUCOSE: 117 MG/DL (ref 70–110)
POCT GLUCOSE: 80 MG/DL (ref 70–110)
POTASSIUM SERPL-SCNC: 3.8 MMOL/L (ref 3.5–5.1)
PROT SERPL-MCNC: 5.9 G/DL (ref 6–8.4)
RBC # BLD AUTO: 3.77 M/UL (ref 4–5.4)
SODIUM SERPL-SCNC: 138 MMOL/L (ref 136–145)
WBC # BLD AUTO: 5.94 K/UL (ref 3.9–12.7)

## 2023-12-30 PROCEDURE — 84100 ASSAY OF PHOSPHORUS: CPT

## 2023-12-30 PROCEDURE — 83735 ASSAY OF MAGNESIUM: CPT

## 2023-12-30 PROCEDURE — 25000003 PHARM REV CODE 250

## 2023-12-30 PROCEDURE — 36415 COLL VENOUS BLD VENIPUNCTURE: CPT

## 2023-12-30 PROCEDURE — 63600175 PHARM REV CODE 636 W HCPCS

## 2023-12-30 PROCEDURE — 80053 COMPREHEN METABOLIC PANEL: CPT

## 2023-12-30 PROCEDURE — 94761 N-INVAS EAR/PLS OXIMETRY MLT: CPT

## 2023-12-30 PROCEDURE — 85025 COMPLETE CBC W/AUTO DIFF WBC: CPT

## 2023-12-30 PROCEDURE — 25000003 PHARM REV CODE 250: Performed by: STUDENT IN AN ORGANIZED HEALTH CARE EDUCATION/TRAINING PROGRAM

## 2023-12-30 RX ADMIN — ATORVASTATIN CALCIUM 80 MG: 40 TABLET, FILM COATED ORAL at 10:12

## 2023-12-30 RX ADMIN — CARVEDILOL 3.12 MG: 3.12 TABLET, FILM COATED ORAL at 10:12

## 2023-12-30 RX ADMIN — LEVOTHYROXINE SODIUM 75 MCG: 75 TABLET ORAL at 06:12

## 2023-12-30 RX ADMIN — APIXABAN 2.5 MG: 2.5 TABLET, FILM COATED ORAL at 10:12

## 2023-12-30 RX ADMIN — AMLODIPINE BESYLATE 10 MG: 10 TABLET ORAL at 10:12

## 2023-12-30 RX ADMIN — ASPIRIN 81 MG CHEWABLE TABLET 81 MG: 81 TABLET CHEWABLE at 10:12

## 2023-12-30 RX ADMIN — FUROSEMIDE 40 MG: 20 TABLET ORAL at 10:12

## 2023-12-30 RX ADMIN — GABAPENTIN 300 MG: 300 CAPSULE ORAL at 10:12

## 2023-12-30 RX ADMIN — SODIUM CHLORIDE, POTASSIUM CHLORIDE, SODIUM LACTATE AND CALCIUM CHLORIDE 500 ML: 600; 310; 30; 20 INJECTION, SOLUTION INTRAVENOUS at 10:12

## 2023-12-30 NOTE — NURSING
Pt refusing to allow staff to check bed pad for incontinence care. Pt educated on the importance of allowing to check for urine/bowel movement for skin protection but still refuses.

## 2023-12-30 NOTE — PLAN OF CARE
12/30/23 1431   Post-Acute Status   Post-Acute Authorization Home Health   Home Health Status Referrals Sent   Coverage [employer] Medicare   Discharge Plan   Discharge Plan A Home Health   Discharge Plan B Home with family     Pt. Information sent via [facility] to [facility] as pt. is already established and planning to resume care with [facility]. SW spoke with pt's daughter, Janae Linn via phone regarding home health services.     Discharge Plan A and Plan B have been determined by review of patient's clinical status, future medical and therapeutic needs, and coverage/benefits for post-acute care in coordination with multidisciplinary team members.     Bryant Hood LMSW

## 2023-12-30 NOTE — PLAN OF CARE
[facility] ([facility])  Discharge Final Note    Primary Care Provider: Mary Ellen Nesbitt MD    Expected Discharge Date: 12/30/2023    Final Discharge Note (most recent)       Final Note - 12/30/23 1511          Final Note    Assessment Type Final Discharge Note (P)      Anticipated Discharge Disposition Home-Health Care Svc (P)      Hospital Resources/Appts/Education Provided Provided patient/caregiver with written discharge plan information;Provided education on problems/symptoms using teachback;Appointments scheduled and added to AVS (P)         Post-Acute Status    Post-Acute Authorization Home Health (P)      Home Health Status Set-up Complete/Auth obtained (P)    [facility] (Resume)    Coverage [employer] Medicare (P)      Discharge Delays Personal Transportation (P)                      Important Message from Medicare  Important Message from Medicare regarding Discharge Appeal Rights: Given to patient/caregiver, Signed/date by patient/caregiver, Explained to patient/caregiver, Other (comments) (verbal: spoke with daughter/Janae)     Date IMM was signed: 12/29/23  Time IMM was signed: 1410    Pt. discharging home with family. Family providing transportation. Pt to resume home healthcare with [facility]. SW noting transfer tub bench is an out-of-pocket expense. Pt. declining transfer tub bench per representative, Jacque GRIFFITH with [facility].     Bryant Hood LMSW

## 2023-12-30 NOTE — NURSING
DC instructions provided to patient and son-in-law at this time. Both voice understanding and voice no questions or concerns. IV to Right Upper Arm removed at this time, site clear, catheter intact. Patient wheeled down via wheelchair to private auto with personal belongings and prescriptions with son-in-law at side.

## 2023-12-30 NOTE — DISCHARGE SUMMARY
[facility] ([place] [address])  [facility] Medicine  Discharge Summary      Patient Name: Quin Linn  MRN: [id number]  APOLLO: [id number]  Patient Class: IP- Inpatient  Admission Date: 12/27/2023  Hospital Length of Stay: 2 days  Discharge Date and Time:  12/30/2023 11:43 AM  Attending Physician: Fred Ramos DO   Discharging Provider: Jaiden Taylor DO  Primary Care Provider: Mary Ellen Nesbitt MD  [facility] Medicine Team: [facility] HOSP MED 5 Jaiden Taylor DO  Primary Care Team: [facility] 5    HPI:   89 yo F with AD, hx of CVA, 2nd degree AV block s/p-ppm, pAF on eliquis, HFpEF, CKD3, HTN, DM, hypothyroidism who presents following syncopal episode. She was found unresponsive at home on the toilet after a loose BM. The pt's caregiver found her and immediately called for the pt's daughter. The daughter came and helped secure her mother then called 911. In total, the pt was unresponsive for roughly 20 minutes before coming back to. It took her a few moments, but she returned to her baseline. Of note, pt began having upper respiratory sx this past Sunday, but was never febrile or hypoxic at home.    Earlier this month, the pt was admitted for an almost identical scenario while using the bathroom. At that time, she was treated for UTI and the event was attributed to vasovagal response with autonomic dysfunction.    In the ED, she was afebrile and HDS. Her CT head did not show any acute findings, but CXR did show a small left sided pleural effusion that was present during her previous admission, otherwise no consolidation. BNP was mildly elevated at 342 but she does not appear to be fluid overloaded on exam. She was found to be covid positive but she was satting well on room air.     * No surgery found *      Hospital Course:   89 yo F with AD, hx of CVA, 2nd degree AV block s/p-ppm, pAF on eliquis, HFpEF, CKD3, HTN, DM, hypothyroidism who presents following syncopal episode. Her metabolic workup has been  unrevealing, and no concerning arrhythmias were seen on telemetry aside from intermittent Afib which was previously known. We are treating her Covid 19 with remdesivir; she remains stable on room air. Adjusted her coreg to 3.125 mg BID and dc glipizide. Will discharge her with home health and requested a transfer bench for bath.     Physical Exam  Constitutional:       General: She is not in acute distress.  HENT:      Head: Normocephalic and atraumatic.      Mouth/Throat:      Mouth: Mucous membranes are moist.   Eyes:      General: No scleral icterus.     Extraocular Movements: Extraocular movements intact.   Cardiovascular:      Rate and Rhythm: Normal rate and regular rhythm.      Pulses: Normal pulses.      Heart sounds: Normal heart sounds. No murmur heard.  Pulmonary:      Effort: Pulmonary effort is normal. No respiratory distress.      Breath sounds: Normal breath sounds. No wheezing or rales.   Abdominal:      Palpations: Abdomen is soft.      Tenderness: There is no abdominal tenderness.   Musculoskeletal:      Right lower leg: No edema.      Left lower leg: No edema.   Skin:     General: Skin is warm and dry.      Coloration: Skin is not jaundiced.   Neurological:      Mental Status: She is alert. Mental status is at baseline.   Psychiatric:         Mood and Affect: Mood normal.         Behavior: Behavior normal.            Goals of Care Treatment Preferences:  Code Status: DNR    Living Will: Yes              Consults:     No new Assessment & Plan notes have been filed under this hospital service since the last note was generated.  Service: Hospital Medicine    Final Active Diagnoses:    Diagnosis Date Noted POA    PRINCIPAL PROBLEM:  Syncope [R55] 09/19/2015 Yes    COVID-19 [U07.1] 12/27/2023 Yes    Chronic diastolic heart failure [I50.32] 12/09/2023 Yes    Constipation [K59.00] 08/30/2023 Yes    Paroxysmal A-fib [I48.0] 08/27/2023 Yes    Wenckebach second degree AV block [I44.1] 10/02/2022 Yes     "Chronic kidney disease, stage IV (severe) [N18.4] 01/10/2022 Yes    Diabetic polyneuropathy associated with type 2 diabetes mellitus [E11.42] 02/15/2018 Yes    Controlled type 2 diabetes mellitus with stage 3 chronic kidney disease, without long-term current use of insulin [E11.22, N18.30] 01/11/2018 Yes    Anemia of chronic disease [D63.8] 05/30/2017 Yes    Debility [R53.81] 12/07/2016 Yes    Late onset Alzheimer's disease with behavioral disturbance [G30.1, F02.818]  Yes     Chronic    Essential hypertension [I10]  Yes     Chronic    Hyperlipidemia LDL goal <70 [E78.5] 06/22/2015 Yes     Chronic    Hypothyroidism following radioiodine therapy [E89.0] 09/26/2012 Yes     Chronic      Problems Resolved During this Admission:       Discharged Condition: stable    [place] [facility]    Follow Up:    Patient Instructions:      TRANSFER TUB BENCH FOR HOME USE     Order Specific Question Answer Comments   Type of Transfer Tub Bench: Unpadded    Height: 5' 7.01" (1.702 m)    Weight: 65.8 kg (145 lb 1 oz)    Does patient have medical equipment at home? none    Length of need (1-99 months): 99    Patient notified - Not covered by insurance considered a convenience item No    Discussed financial responsibility with responsible party No      Ambulatory referral/consult to [facility]   Standing Status: Future   Referral Priority: Routine Referral Type: Consultation   Referral Reason: Specialty Services Required   Number of Visits Requested: 1       Significant Diagnostic Studies: N/A    Pending Diagnostic Studies:       None           Medications:  Reconciled Home Medications:      Medication List        START taking these medications      apixaban 2.5 mg Tab  Commonly known as: ELIQUIS  Take 1 tablet (2.5 mg total) by mouth 2 (two) times daily.            CHANGE how you take these medications      carvediloL 3.125 MG tablet  Commonly known as: COREG  Take 1 tablet (3.125 mg " total) by mouth 2 (two) times daily.  What changed:   medication strength  how much to take            CONTINUE taking these medications      ACCU-CHEK FASTCLIX LANCET DRUM Misc  Generic drug: lancets  CHECK BLOOD GLUCOSE FOUR TIMES DAILY     ACCU-CHEK GUIDE TEST STRIPS Strp  Generic drug: blood sugar diagnostic  TEST BLOOD GLUCOSE FOUR TIMES DAILY OR AS DIRECTED     amLODIPine 10 MG tablet  Commonly known as: NORVASC  Take 1 tablet (10 mg total) by mouth once daily.     aspirin 81 MG EC tablet  Commonly known as: ECOTRIN  Take 1 tablet (81 mg total) by mouth once daily.     atorvastatin 80 MG tablet  Commonly known as: LIPITOR  Take 1 tablet (80 mg total) by mouth once daily.     calcium-vitamin D3 500 mg-5 mcg (200 unit) per tablet  Commonly known as: CALCIUM 500 + D  Take 1 tablet by mouth 2 (two) times daily with meals.     gabapentin 300 MG capsule  Commonly known as: NEURONTIN  Take 1 capsule (300 mg total) by mouth 2 (two) times daily.     HYDROcodone-acetaminophen 5-325 mg per tablet  Commonly known as: NORCO  Take 1 tablet by mouth every 8 (eight) hours as needed for Pain.     levothyroxine 75 MCG tablet  Commonly known as: SYNTHROID  Take 1 tablet (75 mcg total) by mouth before breakfast. Administer on an empty stomach at least 30 minutes before food or other medications.     polyethylene glycol 17 gram Pwpk  Commonly known as: GLYCOLAX  Take 17 g by mouth once daily.     QUEtiapine 25 MG Tab  Commonly known as: SEROQUEL  Take 3 tablets (75 mg total) by mouth every evening.     senna-docusate 8.6-50 mg 8.6-50 mg per tablet  Commonly known as: PERICOLACE  Take 1 tablet by mouth 2 (two) times daily as needed for Constipation.            STOP taking these medications      glipiZIDE 5 MG tablet  Commonly known as: GLUCOTROL              Indwelling Lines/Drains at time of discharge:   Lines/Drains/Airways       Drain  Duration             Female External Urinary Catheter w/ Suction 12/27/23 2330 2 days                     Time spent on the discharge of patient: 35 minutes         Jaiden Taylor DO  Department of Hospital Medicine  [facility] - Intensive Care ([place])

## 2024-01-04 ENCOUNTER — PATIENT OUTREACH (OUTPATIENT)
Dept: ADMINISTRATIVE | Facility: CLINIC | Age: 89
End: 2024-01-04
Payer: MEDICARE

## 2024-01-04 NOTE — PROGRESS NOTES
Patient is also taking Glipizide PRN if BS is over 200       C3 nurse spoke with Quin Linn ( daughter)  for a TCC post hospital discharge follow up call. The patient has a scheduled HOSFU appointment with Tianna RAO on 01/15/2024 @ [facility].    Message sent to PCP staff

## 2024-01-04 NOTE — PROGRESS NOTES
C3 nurse attempted to contact Quin Linn  for a TCC post hospital discharge follow up call. No answer. Left voicemail with callback information. The patient does not have a scheduled HOSFU appointment. Message sent to PCP staff for assistance with scheduling visit with patient bravo 5-7 days post discharge date 12/30/2023.

## 2024-01-12 ENCOUNTER — DOCUMENT SCAN (OUTPATIENT)
Dept: HOME HEALTH SERVICES | Facility: HOSPITAL | Age: 89
End: 2024-01-12
Payer: MEDICARE

## 2024-01-19 ENCOUNTER — EXTERNAL HOME HEALTH (OUTPATIENT)
Dept: HOME HEALTH SERVICES | Facility: HOSPITAL | Age: 89
End: 2024-01-19
Payer: MEDICARE

## 2024-01-22 ENCOUNTER — EXTERNAL HOME HEALTH (OUTPATIENT)
Dept: HOME HEALTH SERVICES | Facility: HOSPITAL | Age: 89
End: 2024-01-22
Payer: MEDICARE

## 2024-01-25 PROCEDURE — 27000221 HC OXYGEN, UP TO 24 HOURS

## 2024-01-25 PROCEDURE — 93010 ELECTROCARDIOGRAM REPORT: CPT | Mod: ,,, | Performed by: INTERNAL MEDICINE

## 2024-01-25 PROCEDURE — 94761 N-INVAS EAR/PLS OXIMETRY MLT: CPT

## 2024-01-25 PROCEDURE — 93005 ELECTROCARDIOGRAM TRACING: CPT

## 2024-01-26 ENCOUNTER — HOSPITAL ENCOUNTER (INPATIENT)
Facility: HOSPITAL | Age: 89
LOS: 3 days | Discharge: HOME HEALTH | DRG: 092 | End: 2024-01-29
Attending: EMERGENCY MEDICINE | Admitting: HOSPITALIST
Payer: MEDICARE

## 2024-01-26 ENCOUNTER — DOCUMENTATION ONLY (OUTPATIENT)
Dept: CARDIOLOGY | Facility: HOSPITAL | Age: 89
End: 2024-01-26
Payer: MEDICARE

## 2024-01-26 DIAGNOSIS — R55 SYNCOPE: ICD-10-CM

## 2024-01-26 DIAGNOSIS — R29.90 EPISODE OF TRANSIENT NEUROLOGIC SYMPTOMS: Primary | ICD-10-CM

## 2024-01-26 DIAGNOSIS — G30.1 LATE ONSET ALZHEIMER'S DISEASE WITH BEHAVIORAL DISTURBANCE: Chronic | ICD-10-CM

## 2024-01-26 DIAGNOSIS — R07.9 CHEST PAIN: ICD-10-CM

## 2024-01-26 DIAGNOSIS — F02.818 LATE ONSET ALZHEIMER'S DISEASE WITH BEHAVIORAL DISTURBANCE: Chronic | ICD-10-CM

## 2024-01-26 DIAGNOSIS — I44.1 AV BLOCK, 2ND DEGREE: ICD-10-CM

## 2024-01-26 DIAGNOSIS — R40.20 LOSS OF CONSCIOUSNESS: ICD-10-CM

## 2024-01-26 LAB
ALBUMIN SERPL BCP-MCNC: 3.6 G/DL (ref 3.5–5.2)
ALP SERPL-CCNC: 88 U/L (ref 55–135)
ALT SERPL W/O P-5'-P-CCNC: 17 U/L (ref 10–44)
ANION GAP SERPL CALC-SCNC: 12 MMOL/L (ref 8–16)
AST SERPL-CCNC: 22 U/L (ref 10–40)
BACTERIA #/AREA URNS AUTO: ABNORMAL /HPF
BASOPHILS # BLD AUTO: 0.03 K/UL (ref 0–0.2)
BASOPHILS NFR BLD: 0.3 % (ref 0–1.9)
BILIRUB SERPL-MCNC: 0.8 MG/DL (ref 0.1–1)
BILIRUB UR QL STRIP: NEGATIVE
BUN SERPL-MCNC: 34 MG/DL (ref 10–30)
CALCIUM SERPL-MCNC: 10 MG/DL (ref 8.7–10.5)
CHLORIDE SERPL-SCNC: 106 MMOL/L (ref 95–110)
CLARITY UR REFRACT.AUTO: ABNORMAL
CO2 SERPL-SCNC: 23 MMOL/L (ref 23–29)
COLOR UR AUTO: YELLOW
CREAT SERPL-MCNC: 1.5 MG/DL (ref 0.5–1.4)
DIFFERENTIAL METHOD BLD: ABNORMAL
EOSINOPHIL # BLD AUTO: 0 K/UL (ref 0–0.5)
EOSINOPHIL NFR BLD: 0.2 % (ref 0–8)
ERYTHROCYTE [DISTWIDTH] IN BLOOD BY AUTOMATED COUNT: 16.1 % (ref 11.5–14.5)
EST. GFR  (NO RACE VARIABLE): 32.5 ML/MIN/1.73 M^2
GLUCOSE SERPL-MCNC: 171 MG/DL (ref 70–110)
GLUCOSE UR QL STRIP: NEGATIVE
HCT VFR BLD AUTO: 39.6 % (ref 37–48.5)
HGB BLD-MCNC: 13.2 G/DL (ref 12–16)
HGB UR QL STRIP: ABNORMAL
HIV 1+2 AB+HIV1 P24 AG SERPL QL IA: NORMAL
HYALINE CASTS UR QL AUTO: 9 /LPF
IMM GRANULOCYTES # BLD AUTO: 0.04 K/UL (ref 0–0.04)
IMM GRANULOCYTES NFR BLD AUTO: 0.3 % (ref 0–0.5)
INFLUENZA A, MOLECULAR: NEGATIVE
INFLUENZA B, MOLECULAR: NEGATIVE
KETONES UR QL STRIP: NEGATIVE
LEUKOCYTE ESTERASE UR QL STRIP: ABNORMAL
LYMPHOCYTES # BLD AUTO: 0.7 K/UL (ref 1–4.8)
LYMPHOCYTES NFR BLD: 5.9 % (ref 18–48)
MAGNESIUM SERPL-MCNC: 1.9 MG/DL (ref 1.6–2.6)
MCH RBC QN AUTO: 29.7 PG (ref 27–31)
MCHC RBC AUTO-ENTMCNC: 33.3 G/DL (ref 32–36)
MCV RBC AUTO: 89 FL (ref 82–98)
MICROSCOPIC COMMENT: ABNORMAL
MONOCYTES # BLD AUTO: 0.4 K/UL (ref 0.3–1)
MONOCYTES NFR BLD: 2.9 % (ref 4–15)
NEUTROPHILS # BLD AUTO: 10.8 K/UL (ref 1.8–7.7)
NEUTROPHILS NFR BLD: 90.4 % (ref 38–73)
NITRITE UR QL STRIP: POSITIVE
NRBC BLD-RTO: 0 /100 WBC
PH UR STRIP: 6 [PH] (ref 5–8)
PLATELET # BLD AUTO: 260 K/UL (ref 150–450)
PMV BLD AUTO: 10.5 FL (ref 9.2–12.9)
POCT GLUCOSE: 119 MG/DL (ref 70–110)
POCT GLUCOSE: 173 MG/DL (ref 70–110)
POTASSIUM SERPL-SCNC: 4 MMOL/L (ref 3.5–5.1)
PROCALCITONIN SERPL IA-MCNC: 0.08 NG/ML
PROT SERPL-MCNC: 7.8 G/DL (ref 6–8.4)
PROT UR QL STRIP: ABNORMAL
RBC # BLD AUTO: 4.45 M/UL (ref 4–5.4)
RBC #/AREA URNS AUTO: 2 /HPF (ref 0–4)
SARS-COV-2 RDRP RESP QL NAA+PROBE: NEGATIVE
SODIUM SERPL-SCNC: 141 MMOL/L (ref 136–145)
SP GR UR STRIP: 1.01 (ref 1–1.03)
SPECIMEN SOURCE: NORMAL
SQUAMOUS #/AREA URNS AUTO: 1 /HPF
TROPONIN I SERPL DL<=0.01 NG/ML-MCNC: 0.02 NG/ML (ref 0–0.03)
TSH SERPL DL<=0.005 MIU/L-ACNC: 3.96 UIU/ML (ref 0.4–4)
URN SPEC COLLECT METH UR: ABNORMAL
WBC # BLD AUTO: 11.99 K/UL (ref 3.9–12.7)
WBC #/AREA URNS AUTO: 69 /HPF (ref 0–5)
WBC CLUMPS UR QL AUTO: ABNORMAL

## 2024-01-26 PROCEDURE — 80053 COMPREHEN METABOLIC PANEL: CPT | Performed by: EMERGENCY MEDICINE

## 2024-01-26 PROCEDURE — 63600175 PHARM REV CODE 636 W HCPCS

## 2024-01-26 PROCEDURE — 63600175 PHARM REV CODE 636 W HCPCS: Performed by: HOSPITALIST

## 2024-01-26 PROCEDURE — 27000207 HC ISOLATION

## 2024-01-26 PROCEDURE — 94761 N-INVAS EAR/PLS OXIMETRY MLT: CPT

## 2024-01-26 PROCEDURE — 84145 PROCALCITONIN (PCT): CPT | Performed by: PHYSICIAN ASSISTANT

## 2024-01-26 PROCEDURE — 81001 URINALYSIS AUTO W/SCOPE: CPT | Performed by: EMERGENCY MEDICINE

## 2024-01-26 PROCEDURE — 87088 URINE BACTERIA CULTURE: CPT | Performed by: EMERGENCY MEDICINE

## 2024-01-26 PROCEDURE — 85025 COMPLETE CBC W/AUTO DIFF WBC: CPT | Performed by: EMERGENCY MEDICINE

## 2024-01-26 PROCEDURE — 25000003 PHARM REV CODE 250: Performed by: HOSPITALIST

## 2024-01-26 PROCEDURE — 87389 HIV-1 AG W/HIV-1&-2 AB AG IA: CPT | Performed by: PHYSICIAN ASSISTANT

## 2024-01-26 PROCEDURE — 96360 HYDRATION IV INFUSION INIT: CPT

## 2024-01-26 PROCEDURE — 25000003 PHARM REV CODE 250

## 2024-01-26 PROCEDURE — 87186 SC STD MICRODIL/AGAR DIL: CPT | Performed by: EMERGENCY MEDICINE

## 2024-01-26 PROCEDURE — 87502 INFLUENZA DNA AMP PROBE: CPT

## 2024-01-26 PROCEDURE — 84443 ASSAY THYROID STIM HORMONE: CPT | Performed by: EMERGENCY MEDICINE

## 2024-01-26 PROCEDURE — 87086 URINE CULTURE/COLONY COUNT: CPT | Performed by: EMERGENCY MEDICINE

## 2024-01-26 PROCEDURE — 83735 ASSAY OF MAGNESIUM: CPT | Performed by: EMERGENCY MEDICINE

## 2024-01-26 PROCEDURE — U0002 COVID-19 LAB TEST NON-CDC: HCPCS

## 2024-01-26 PROCEDURE — 21400001 HC TELEMETRY ROOM

## 2024-01-26 PROCEDURE — 84484 ASSAY OF TROPONIN QUANT: CPT | Performed by: EMERGENCY MEDICINE

## 2024-01-26 PROCEDURE — 11000001 HC ACUTE MED/SURG PRIVATE ROOM

## 2024-01-26 PROCEDURE — 99285 EMERGENCY DEPT VISIT HI MDM: CPT | Mod: 25

## 2024-01-26 PROCEDURE — 87077 CULTURE AEROBIC IDENTIFY: CPT | Performed by: EMERGENCY MEDICINE

## 2024-01-26 RX ORDER — GLUCAGON 1 MG
1 KIT INJECTION
Status: DISCONTINUED | OUTPATIENT
Start: 2024-01-26 | End: 2024-01-29 | Stop reason: HOSPADM

## 2024-01-26 RX ORDER — CARVEDILOL 3.12 MG/1
3.12 TABLET ORAL 2 TIMES DAILY
Status: DISCONTINUED | OUTPATIENT
Start: 2024-01-26 | End: 2024-01-29 | Stop reason: HOSPADM

## 2024-01-26 RX ORDER — ACETAMINOPHEN 325 MG/1
650 TABLET ORAL EVERY 4 HOURS PRN
Status: DISCONTINUED | OUTPATIENT
Start: 2024-01-26 | End: 2024-01-29 | Stop reason: HOSPADM

## 2024-01-26 RX ORDER — ACETAMINOPHEN 325 MG/1
650 TABLET ORAL EVERY 8 HOURS PRN
Status: DISCONTINUED | OUTPATIENT
Start: 2024-01-26 | End: 2024-01-29 | Stop reason: HOSPADM

## 2024-01-26 RX ORDER — ATORVASTATIN CALCIUM 40 MG/1
80 TABLET, FILM COATED ORAL DAILY
Status: DISCONTINUED | OUTPATIENT
Start: 2024-01-27 | End: 2024-01-29 | Stop reason: HOSPADM

## 2024-01-26 RX ORDER — ALUMINUM HYDROXIDE, MAGNESIUM HYDROXIDE, AND SIMETHICONE 1200; 120; 1200 MG/30ML; MG/30ML; MG/30ML
30 SUSPENSION ORAL 4 TIMES DAILY PRN
Status: DISCONTINUED | OUTPATIENT
Start: 2024-01-26 | End: 2024-01-29 | Stop reason: HOSPADM

## 2024-01-26 RX ORDER — TALC
6 POWDER (GRAM) TOPICAL NIGHTLY PRN
Status: DISCONTINUED | OUTPATIENT
Start: 2024-01-26 | End: 2024-01-29 | Stop reason: HOSPADM

## 2024-01-26 RX ORDER — AMLODIPINE BESYLATE 10 MG/1
10 TABLET ORAL DAILY
Status: DISCONTINUED | OUTPATIENT
Start: 2024-01-27 | End: 2024-01-29 | Stop reason: HOSPADM

## 2024-01-26 RX ORDER — CIPROFLOXACIN 2 MG/ML
400 INJECTION, SOLUTION INTRAVENOUS
Status: DISCONTINUED | OUTPATIENT
Start: 2024-01-26 | End: 2024-01-26

## 2024-01-26 RX ORDER — IBUPROFEN 200 MG
16 TABLET ORAL
Status: DISCONTINUED | OUTPATIENT
Start: 2024-01-26 | End: 2024-01-26

## 2024-01-26 RX ORDER — DEXTROSE 40 %
16 GEL (GRAM) ORAL
Status: DISCONTINUED | OUTPATIENT
Start: 2024-01-26 | End: 2024-01-29 | Stop reason: HOSPADM

## 2024-01-26 RX ORDER — HYDROCODONE BITARTRATE AND ACETAMINOPHEN 5; 325 MG/1; MG/1
1 TABLET ORAL EVERY 8 HOURS PRN
Status: DISCONTINUED | OUTPATIENT
Start: 2024-01-26 | End: 2024-01-29 | Stop reason: HOSPADM

## 2024-01-26 RX ORDER — GABAPENTIN 300 MG/1
300 CAPSULE ORAL 2 TIMES DAILY
Status: DISCONTINUED | OUTPATIENT
Start: 2024-01-26 | End: 2024-01-29 | Stop reason: HOSPADM

## 2024-01-26 RX ORDER — ONDANSETRON 8 MG/1
8 TABLET, ORALLY DISINTEGRATING ORAL EVERY 8 HOURS PRN
Status: DISCONTINUED | OUTPATIENT
Start: 2024-01-26 | End: 2024-01-29 | Stop reason: HOSPADM

## 2024-01-26 RX ORDER — IBUPROFEN 200 MG
24 TABLET ORAL
Status: DISCONTINUED | OUTPATIENT
Start: 2024-01-26 | End: 2024-01-26

## 2024-01-26 RX ORDER — ONDANSETRON HYDROCHLORIDE 2 MG/ML
4 INJECTION, SOLUTION INTRAVENOUS EVERY 8 HOURS PRN
Status: DISCONTINUED | OUTPATIENT
Start: 2024-01-26 | End: 2024-01-29 | Stop reason: HOSPADM

## 2024-01-26 RX ORDER — IPRATROPIUM BROMIDE AND ALBUTEROL SULFATE 2.5; .5 MG/3ML; MG/3ML
3 SOLUTION RESPIRATORY (INHALATION) EVERY 4 HOURS PRN
Status: DISCONTINUED | OUTPATIENT
Start: 2024-01-26 | End: 2024-01-29 | Stop reason: HOSPADM

## 2024-01-26 RX ORDER — NALOXONE HCL 0.4 MG/ML
0.02 VIAL (ML) INJECTION
Status: DISCONTINUED | OUTPATIENT
Start: 2024-01-26 | End: 2024-01-29 | Stop reason: HOSPADM

## 2024-01-26 RX ORDER — ASPIRIN 81 MG/1
81 TABLET ORAL DAILY
Status: DISCONTINUED | OUTPATIENT
Start: 2024-01-27 | End: 2024-01-29 | Stop reason: HOSPADM

## 2024-01-26 RX ORDER — DEXTROSE 40 %
24 GEL (GRAM) ORAL
Status: DISCONTINUED | OUTPATIENT
Start: 2024-01-26 | End: 2024-01-29 | Stop reason: HOSPADM

## 2024-01-26 RX ORDER — INSULIN ASPART 100 [IU]/ML
0-5 INJECTION, SOLUTION INTRAVENOUS; SUBCUTANEOUS
Status: DISCONTINUED | OUTPATIENT
Start: 2024-01-26 | End: 2024-01-29 | Stop reason: HOSPADM

## 2024-01-26 RX ORDER — SODIUM CHLORIDE 0.9 % (FLUSH) 0.9 %
10 SYRINGE (ML) INJECTION EVERY 12 HOURS PRN
Status: DISCONTINUED | OUTPATIENT
Start: 2024-01-26 | End: 2024-01-29 | Stop reason: HOSPADM

## 2024-01-26 RX ORDER — QUETIAPINE FUMARATE 25 MG/1
75 TABLET, FILM COATED ORAL NIGHTLY
Status: DISCONTINUED | OUTPATIENT
Start: 2024-01-26 | End: 2024-01-29 | Stop reason: HOSPADM

## 2024-01-26 RX ORDER — SENNOSIDES 8.6 MG/1
8.6 TABLET ORAL 2 TIMES DAILY
Status: DISCONTINUED | OUTPATIENT
Start: 2024-01-26 | End: 2024-01-27

## 2024-01-26 RX ADMIN — CEFEPIME 1 G: 1 INJECTION, POWDER, FOR SOLUTION INTRAMUSCULAR; INTRAVENOUS at 09:01

## 2024-01-26 RX ADMIN — SENNOSIDES 8.6 MG: 8.6 TABLET, FILM COATED ORAL at 09:01

## 2024-01-26 RX ADMIN — APIXABAN 2.5 MG: 2.5 TABLET, FILM COATED ORAL at 08:01

## 2024-01-26 RX ADMIN — CARVEDILOL 3.12 MG: 3.12 TABLET, FILM COATED ORAL at 08:01

## 2024-01-26 RX ADMIN — SODIUM CHLORIDE, POTASSIUM CHLORIDE, SODIUM LACTATE AND CALCIUM CHLORIDE 500 ML: 600; 310; 30; 20 INJECTION, SOLUTION INTRAVENOUS at 03:01

## 2024-01-26 RX ADMIN — SENNOSIDES 8.6 MG: 8.6 TABLET, FILM COATED ORAL at 08:01

## 2024-01-26 RX ADMIN — QUETIAPINE FUMARATE 75 MG: 25 TABLET ORAL at 08:01

## 2024-01-26 RX ADMIN — GABAPENTIN 300 MG: 300 CAPSULE ORAL at 08:01

## 2024-01-26 RX ADMIN — CEFEPIME 1 G: 1 INJECTION, POWDER, FOR SOLUTION INTRAMUSCULAR; INTRAVENOUS at 08:01

## 2024-01-26 RX ADMIN — SODIUM CHLORIDE, POTASSIUM CHLORIDE, SODIUM LACTATE AND CALCIUM CHLORIDE 500 ML: 600; 310; 30; 20 INJECTION, SOLUTION INTRAVENOUS at 09:01

## 2024-01-26 NOTE — ASSESSMENT & PLAN NOTE
Patient's anemia is currently controlled. Has not received any PRBCs to date. Etiology likely d/t chronic disease due to Chronic Kidney Disease/ESRD  Current CBC reviewed-   Lab Results   Component Value Date    HGB 13.2 01/26/2024    HCT 39.6 01/26/2024     Monitor serial CBC and transfuse if patient becomes hemodynamically unstable, symptomatic or H/H drops below 7/21.

## 2024-01-26 NOTE — ASSESSMENT & PLAN NOTE
Chronic, controlled. Latest blood pressure and vitals reviewed-     Temp:  [97.5 °F (36.4 °C)-97.8 °F (36.6 °C)]   Pulse:  [60-95]   Resp:  [16-18]   BP: (107-165)/()   SpO2:  [95 %-97 %] .   Home meds for hypertension were reviewed and noted below.   Hypertension Medications               amLODIPine (NORVASC) 10 MG tablet Take 1 tablet (10 mg total) by mouth once daily.    carvediloL (COREG) 3.125 MG tablet Take 1 tablet (3.125 mg total) by mouth 2 (two) times daily.            While in the hospital, will manage blood pressure as follows; Continue home antihypertensive regimen    Will utilize p.r.n. blood pressure medication only if patient's blood pressure greater than 180/110 and she develops symptoms such as worsening chest pain or shortness of breath.

## 2024-01-26 NOTE — ASSESSMENT & PLAN NOTE
"- 30-45min of "starting off" and non-responsiveness per daughter   - denies head trauma, CT negative  - EKG unremarkable  - recent admission for syncope, workup unremarkable at that time  - infectious workup notable for UTI  - TTE and pacemaker interrogation ordered  - EEG ordered   - monitor on tele    "

## 2024-01-26 NOTE — ED TRIAGE NOTES
"Quin Linn, a 89 y.o. female presents to the ED w/ complaint of LOC. Pt brought in by EMS. Per family pt was helped to restroom and started having large watery BM on self. Once on the toilet pt "passed out" for around 20-30 min per family at bedside. Pt poor historian, hx of alzheimers. Denies hitting head or falling. Pt awake alert and able to follow commands. Pt arrives to room covered in feces.     Triage note:  Chief Complaint   Patient presents with    Loss of Consciousness     Pt arrives via EMS from home. Pt had syncopal episode while on the toilet. Per EMS, pt was out for about 30 minutes. Pt was slumped on toilet, no fall, no head trauma.      Review of patient's allergies indicates:   Allergen Reactions    Tramadol Rash and Edema     Developed facial rash and edema.    Metformin Diarrhea     Past Medical History:   Diagnosis Date    Acute hypoxemic respiratory failure 9/2/2023    Acute on chronic diastolic congestive heart failure 10/19/2023    Alzheimer's disease     Anemia     Arthritis     lumbar    Back pain     Cancer     colon    Cataract     Colon cancer     Diabetes mellitus type II     Glaucoma     Hyperlipidemia     Hypertension     Hyperthyroidism     Hypothyroidism following radioiodine therapy     Pacemaker     Pneumonia     Pneumonia due to other staphylococcus     Renal manifestation of secondary diabetes mellitus     Squamous cell carcinoma     Stroke     TIA    Type 2 diabetes mellitus with stage 3 chronic kidney disease and hypertension 10/12/2017    Type 2 diabetes with peripheral circulatory disorder, controlled        "

## 2024-01-26 NOTE — ED PROVIDER NOTES
Encounter Date: 1/25/2024       History     Chief Complaint   Patient presents with    Loss of Consciousness     Pt arrives via EMS from home. Pt had syncopal episode while on the toilet. Per EMS, pt was out for about 30 minutes. Pt was slumped on toilet, no fall, no head trauma.      The history is provided by a relative.     Ms. Linn is a 89F with Alzheimer dementia, AVB s/p pacemaker, pAF on Eliquis, HFpEF, CKD3, HTN, DM2, hypothyroidism who presents s/p syncopal episode.  Her daughter states that she was sitting watching TV in her otherwise baseline state of health earlier this evening when she had an episode of diarrhea on herself.  Her  subsequently rushed her to the toilet, after which she had a syncopal episode that lasted approximately 30-45 minutes.  During this episode, she could not be aroused to verbal or physical stimulation. Her  subsequently called EMS, and while in transit to the hospital she regained consciousness.  She did not fall at any point during the syncopal episode and did not hit her head.  She takes Eliquis.  Although at baseline the patient has a somewhat altered mental status, the patient's daughter believes that she is currently more altered and less responsive than she was prior to the syncopal episode. The patient denies any symptoms/pain, however history/validity of this is limited due to the patient's known Alzheimer's.  Of note, the patient was recently discharged from the hospital last month due to a similar syncopal episode and was subsequently found to have COVID during her admission.    Review of patient's allergies indicates:   Allergen Reactions    Tramadol Rash and Edema     Developed facial rash and edema.    Metformin Diarrhea     Past Medical History:   Diagnosis Date    Acute hypoxemic respiratory failure 9/2/2023    Acute on chronic diastolic congestive heart failure 10/19/2023    Alzheimer's disease     Anemia     Arthritis     lumbar    Back pain      Cancer     colon    Cataract     Colon cancer     Diabetes mellitus type II     Glaucoma     Hyperlipidemia     Hypertension     Hyperthyroidism     Hypothyroidism following radioiodine therapy     Pacemaker     Pneumonia     Pneumonia due to other staphylococcus     Renal manifestation of secondary diabetes mellitus     Squamous cell carcinoma     Stroke     TIA    Type 2 diabetes mellitus with stage 3 chronic kidney disease and hypertension 10/12/2017    Type 2 diabetes with peripheral circulatory disorder, controlled      Past Surgical History:   Procedure Laterality Date    CATARACT EXTRACTION W/  INTRAOCULAR LENS IMPLANT Left 09/13/2017        CHOLECYSTECTOMY      COLON SURGERY  2001    Colon CA    EYE SURGERY      cataract removal left side    IMPLANTATION OF LEADLESS PACEMAKER N/A 10/21/2022    Procedure: [id number];  Surgeon: JOSE Burgos MD;  Location: [facility] EP LAB;  Service: Cardiology;  Laterality: N/A;  SB, JOHNNA, MDT, ANES, EH,     squamous cell ca of face       Family History   Problem Relation Age of Onset    Heart disease Mother     Arthritis Mother     Kidney disease Father     Alzheimer's disease Sister     Hypertension Sister     Cancer Sister         ovarian    No Known Problems Daughter     Ankylosing spondylitis Daughter     Lupus Daughter     Kidney disease Daughter     Heart disease Daughter     Atrial fibrillation Daughter     Supraventricular tachycardia Daughter     Hypertension Son     Hyperlipidemia Son     Asthma Son     Cancer Paternal Uncle     Diabetes Maternal Grandmother     Amblyopia Neg Hx     Blindness Neg Hx     Cataracts Neg Hx     Glaucoma Neg Hx     Macular degeneration Neg Hx     Retinal detachment Neg Hx     Strabismus Neg Hx     Stroke Neg Hx     Thyroid disease Neg Hx      Social History     Tobacco Use    Smoking status: Never    Smokeless tobacco: Never    Tobacco comments:     smoked for about 4 years in her twenties (socially)    Substance Use Topics    Alcohol use: No    Drug use: No       Physical Exam     Initial Vitals [01/25/24 2244]   BP Pulse Resp Temp SpO2   (!) 107/56 60 16 97.5 °F (36.4 °C) 97 %      MAP       --         Physical Exam    Nursing note and vitals reviewed.  Constitutional: She appears well-developed. No distress.   HENT:   Head: Normocephalic and atraumatic.   Mouth/Throat: Mucous membranes are normal.   Oropharynx noted to be dry   Eyes: EOM are normal. Pupils are equal, round, and reactive to light.   Neck:   Normal range of motion.  Cardiovascular:  Normal rate, regular rhythm and normal heart sounds.           No murmur heard.  Pulmonary/Chest: Breath sounds normal. No respiratory distress.   Abdominal: Abdomen is soft. She exhibits no distension. There is no abdominal tenderness. There is no rebound and no guarding.   Musculoskeletal:         General: No edema.      Cervical back: Normal range of motion.     Neurological: She is alert and oriented to person, place, and time.   Skin: Skin is warm.   Psychiatric:   Limited ability to assess as the patient has Alzheimer's         ED Course   Procedures  Labs Reviewed   CBC W/ AUTO DIFFERENTIAL - Abnormal; Notable for the following components:       Result Value    RDW 16.1 (*)     Gran # (ANC) 10.8 (*)     Lymph # 0.7 (*)     Gran % 90.4 (*)     Lymph % 5.9 (*)     Mono % 2.9 (*)     All other components within normal limits   COMPREHENSIVE METABOLIC PANEL - Abnormal; Notable for the following components:    Glucose 171 (*)     BUN 34 (*)     Creatinine 1.5 (*)     eGFR 32.5 (*)     All other components within normal limits   URINALYSIS, REFLEX TO URINE CULTURE - Abnormal; Notable for the following components:    Appearance, UA Hazy (*)     Protein, UA 2+ (*)     Occult Blood UA 1+ (*)     Nitrite, UA Positive (*)     Leukocytes, UA 3+ (*)     All other components within normal limits    Narrative:     Specimen Source->Urine   URINALYSIS MICROSCOPIC - Abnormal;  Notable for the following components:    WBC, UA 69 (*)     WBC Clumps, UA Few (*)     Bacteria Many (*)     Hyaline Casts, UA 9 (*)     All other components within normal limits    Narrative:     Specimen Source->Urine   INFLUENZA A & B BY MOLECULAR   CULTURE, URINE   HIV 1 / 2 ANTIBODY    Narrative:     Release to patient->Immediate   TROPONIN I   TSH   MAGNESIUM   SARS-COV-2 RNA AMPLIFICATION, QUAL   PROCALCITONIN   PROCALCITONIN     EKG Readings: (Independently Interpreted)   Patient noted to be in normal sinus rhythm, P waves before every QRS, QRS complex is wide, QT segment is within normal limits, there is a right bundle branch block, no STEMI.       Imaging Results              CT Head Without Contrast (Final result)  Result time 01/26/24 05:53:24      Final result by Alberto Curtis MD (01/26/24 05:53:24)                   Impression:      No acute intracranial hemorrhage or displaced calvarial fracture.    No evidence of recent major vascular territory infarct.    Electronically signed by resident: Blake Mohr  Date:    01/26/2024  Time:    03:53    Electronically signed by: Alberto Curtis  Date:    01/26/2024  Time:    05:53               Narrative:    EXAMINATION:  CT HEAD WITHOUT CONTRAST    CLINICAL HISTORY:  Syncope, recurrent;    TECHNIQUE:  Low dose axial CT images obtained throughout the head without intravenous contrast. Sagittal and coronal reconstructions were performed.    COMPARISON:  CT head 12/27/2023    FINDINGS:  Intracranial compartment:    Generalized cerebral volume loss.    Pre-existing prominence of the ventricles, cisterns, fissures, and sulci, thought to be in portion to the degree of pre-existing brain parenchymal volume loss.    No extra-axial blood or fluid collections.    Patchy and confluent supratentorial white matter hypoattenuation compatible with chronic microvascular ischemic change.  No parenchymal mass effect, hemorrhage, edema or recent major vascular distribution  infarct.    Skull/extracranial contents (limited evaluation): Extensive skull base atherosclerotic calcification.  No displaced calvarial fracture.  Mastoid air cells are essentially clear.  Mild paranasal sinus mucosal thickening.  Small of aerated secretions in the left sphenoid sinus.                                       X-Ray Chest AP Portable (In process)                   X-Rays:   Independently Interpreted Readings:   Other Readings:  Chest x-ray significant for nonspecific hyper densities that may be suggestive of either an infectious process, congestion or simply unremarkable.    Medications   lactated ringers bolus 500 mL (0 mLs Intravenous Stopped 1/26/24 8001)     Medical Decision Making  Ms. Coppola is a 89-year-old female who presents s/p syncopal episode for approximately 30-45 minutes with now an altered level of mental status from baseline.  Differential diagnosis includes but is not limited to urinary tract infection, ACS, arrhythmia, electrolyte abnormality, intracranial process, infectious process, viral URI, pneumonia, vasovagal, orthostatic hypotension.  Upon my initial evaluation of the patient, she is hemodynamically stable and able to respond to commands/A&O x3.  However, given that her daughter states that her mentation is worse than her normal baseline, we will perform an altered mental status workup including a urinalysis, CBC, CMP, chest x-ray, EKG, troponin, and a head CT.  Patient appears dehydrated on my physical exam and therefore we will give half a L of fluids.    CBC/CMP/EKG/troponin all shown to be unremarkable.  Patient is signed out pending CT head/chest x-ray read, urinalysis and COVID/influenza results.  Anticipate admission given the length of time the syncopal episode occurred.        Amount and/or Complexity of Data Reviewed  External Data Reviewed: notes.     Details: Previous medical records, specifically from her most recent hospital admission on 12/2023 was reviewed  to better understand the patient's current and past medical history.  Labs: ordered.  Radiology: ordered.    Risk  OTC drugs.  Prescription drug management.  Decision regarding hospitalization.              Attending Attestation:   Physician Attestation Statement for Resident:  As the supervising MD   Physician Attestation Statement: I have personally seen and examined this patient.   I agree with the above history.  -:   As the supervising MD I agree with the above PE.     As the supervising MD I agree with the above treatment, course, plan, and disposition.    I have reviewed and agree with the residents interpretation of the following: lab data and EKG.  I have reviewed the following: old records at this facility.                                       Clinical Impression:  Final diagnoses:  [R55] Syncope  [R40.20] Loss of consciousness          ED Disposition Condition    Observation Stable                Elian Burton MD  Resident  01/26/24 0700       Shira Salter MD  01/27/24 1146

## 2024-01-26 NOTE — ASSESSMENT & PLAN NOTE
Creatine stable for now. BMP reviewed- noted Estimated Creatinine Clearance: 21.5 mL/min (A) (based on SCr of 1.5 mg/dL (H)). according to latest data. Based on current GFR, CKD stage is stage 3 - GFR 30-59.  Monitor UOP and serial BMP and adjust therapy as needed. Renally dose meds. Avoid nephrotoxic medications and procedures.

## 2024-01-26 NOTE — PROGRESS NOTES
Cardiac device interrogation and/or reprogramming completed by industry representative, Maria Dolores with [employer]; please refer to report located in the Media tab.

## 2024-01-26 NOTE — ASSESSMENT & PLAN NOTE
Patient with dementia with likely etiology of alzheimer's dementia. Dementia is moderate. The patient does not have signs of behavioral disturbance. Home dementia medications are Held or Continued: held and replaced with melatonin PRN.. Continue non-pharmacologic interventions to prevent delirium (No VS between 11PM-5AM, activity during day, opening blinds, providing glasses/hearing aids, and up in chair during daytime). Will avoid narcotics and benzos unless absolutely necessary. PRN anti-psychotics are prescribed to avoid self harm behaviors.    - mental status alerted from baseline per daughter  - hold home quietapine

## 2024-01-26 NOTE — SUBJECTIVE & OBJECTIVE
Past Medical History:   Diagnosis Date    Acute hypoxemic respiratory failure 9/2/2023    Acute on chronic diastolic congestive heart failure 10/19/2023    Alzheimer's disease     Anemia     Arthritis     lumbar    Back pain     Cancer     colon    Cataract     Colon cancer     Diabetes mellitus type II     Glaucoma     Hyperlipidemia     Hypertension     Hyperthyroidism     Hypothyroidism following radioiodine therapy     Pacemaker     Pneumonia     Pneumonia due to other staphylococcus     Renal manifestation of secondary diabetes mellitus     Squamous cell carcinoma     Stroke     TIA    Type 2 diabetes mellitus with stage 3 chronic kidney disease and hypertension 10/12/2017    Type 2 diabetes with peripheral circulatory disorder, controlled        Past Surgical History:   Procedure Laterality Date    CATARACT EXTRACTION W/  INTRAOCULAR LENS IMPLANT Left 09/13/2017        CHOLECYSTECTOMY      COLON SURGERY  2001    Colon CA    EYE SURGERY      cataract removal left side    IMPLANTATION OF LEADLESS PACEMAKER N/A 10/21/2022    Procedure: [id number];  Surgeon: JOSE Burgos MD;  Location: [facility];  Service: Cardiology;  Laterality: N/A;  SB, JOHNNA, MDT, ANES, EH,     squamous cell ca of face         Review of patient's allergies indicates:   Allergen Reactions    Tramadol Rash and Edema     Developed facial rash and edema.    Metformin Diarrhea       No current facility-administered medications on file prior to encounter.     Current Outpatient Medications on File Prior to Encounter   Medication Sig    ACCU-CHEK FASTCLIX LANCET DRUM Misc CHECK BLOOD GLUCOSE FOUR TIMES DAILY    ACCU-CHEK GUIDE TEST STRIPS Strp TEST BLOOD GLUCOSE FOUR TIMES DAILY OR AS DIRECTED    amLODIPine (NORVASC) 10 MG tablet Take 1 tablet (10 mg total) by mouth once daily.    apixaban (ELIQUIS) 2.5 mg Tab Take 1 tablet (2.5 mg total) by mouth 2 (two) times daily.    aspirin (ECOTRIN) 81 MG EC tablet  Take 1 tablet (81 mg total) by mouth once daily.    atorvastatin (LIPITOR) 80 MG tablet Take 1 tablet (80 mg total) by mouth once daily.    calcium-vitamin D3 (CALCIUM 500 + D) 500 mg-5 mcg (200 unit) per tablet Take 1 tablet by mouth 2 (two) times daily with meals.    carvediloL (COREG) 3.125 MG tablet Take 1 tablet (3.125 mg total) by mouth 2 (two) times daily.    gabapentin (NEURONTIN) 300 MG capsule Take 1 capsule (300 mg total) by mouth 2 (two) times daily.    HYDROcodone-acetaminophen (NORCO) 5-325 mg per tablet Take 1 tablet by mouth every 8 (eight) hours as needed for Pain.    levothyroxine (SYNTHROID) 75 MCG tablet Take 1 tablet (75 mcg total) by mouth before breakfast. Administer on an empty stomach at least 30 minutes before food or other medications.    polyethylene glycol (GLYCOLAX) 17 gram PwPk Take 17 g by mouth once daily.    QUEtiapine (SEROQUEL) 25 MG Tab Take 3 tablets (75 mg total) by mouth every evening.    senna-docusate 8.6-50 mg (PERICOLACE) 8.6-50 mg per tablet Take 1 tablet by mouth 2 (two) times daily as needed for Constipation.     Family History       Problem Relation (Age of Onset)    Alzheimer's disease Sister    Ankylosing spondylitis Daughter    Arthritis Mother    Asthma Son    Atrial fibrillation Daughter    Cancer Sister, Paternal Uncle    Diabetes Maternal Grandmother    Heart disease Mother, Daughter    Hyperlipidemia Son    Hypertension Sister, Son    Kidney disease Father, Daughter    Lupus Daughter    No Known Problems Daughter    Supraventricular tachycardia Daughter          Tobacco Use    Smoking status: Never    Smokeless tobacco: Never    Tobacco comments:     smoked for about 4 years in her twenties (socially)   Substance and Sexual Activity    Alcohol use: No    Drug use: No    Sexual activity: Never     Review of Systems   Unable to perform ROS: Dementia     Objective:     Vital Signs (Most Recent):  Temp: 97.8 °F (36.6 °C) (01/26/24 0732)  Pulse: 73 (01/26/24  "0732)  Resp: 18 (01/26/24 0732)  BP: (!) 161/72 (01/26/24 0732)  SpO2: 96 % (01/26/24 0732) Vital Signs (24h Range):  Temp:  [97.5 °F (36.4 °C)-97.8 °F (36.6 °C)] 97.8 °F (36.6 °C)  Pulse:  [60-95] 73  Resp:  [16-18] 18  SpO2:  [95 %-97 %] 96 %  BP: (107-165)/() 161/72     Weight: 68 kg (150 lb)  Body mass index is 24.96 kg/m².     Physical Exam  Vitals and nursing note reviewed.   Constitutional:       General: She is not in acute distress.     Appearance: She is well-developed.   HENT:      Head: Normocephalic and atraumatic.   Eyes:      Extraocular Movements: Extraocular movements intact.   Cardiovascular:      Rate and Rhythm: Normal rate and regular rhythm.      Heart sounds: Normal heart sounds.   Pulmonary:      Effort: Pulmonary effort is normal. No respiratory distress.      Comments: Anterior chest CTA  Abdominal:      General: There is no distension.      Tenderness: There is no abdominal tenderness. There is no right CVA tenderness or left CVA tenderness.   Musculoskeletal:         General: No swelling or tenderness. Normal range of motion.   Skin:     General: Skin is warm and dry.      Findings: No rash.   Neurological:      Mental Status: She is alert. She is disoriented.      Comments: Oriented to self and location but not time or situation. altered from baseline per daughter report   Psychiatric:      Comments: Unable to obtain d/t dementia                Significant Labs: All pertinent labs within the past 24 hours have been reviewed.  BMP:   Recent Labs   Lab 01/26/24  0311   *      K 4.0      CO2 23   BUN 34*   CREATININE 1.5*   CALCIUM 10.0   MG 1.9     CBC:   Recent Labs   Lab 01/26/24  0311   WBC 11.99   HGB 13.2   HCT 39.6        Urine Culture: No results for input(s): "LABURIN" in the last 48 hours.  Urine Studies:   Recent Labs   Lab 01/26/24  0550   COLORU Yellow   APPEARANCEUA Hazy*   PHUR 6.0   SPECGRAV 1.015   PROTEINUA 2+*   GLUCUA Negative   KETONESU " Negative   BILIRUBINUA Negative   OCCULTUA 1+*   NITRITE Positive*   LEUKOCYTESUR 3+*   RBCUA 2   WBCUA 69*   BACTERIA Many*   SQUAMEPITHEL 1   HYALINECASTS 9*       Significant Imaging: I have reviewed all pertinent imaging results/findings within the past 24 hours.

## 2024-01-26 NOTE — ED NOTES
I assumed care of this patient at this time. Report received from MARY Sung. Pt is resting comfortably in ED stretcher w/ lights off. Pt is AAOx2 (disoriented to time and situation). RR is even, unlabored, and spontaneous. Skin is warm, dry and intact + 97% oxygen saturation on room air at this time. Pt refusing to remain on continuous cardiac monitoring. Pt denies pain or needs at this time. Bed low and locked; side rails up x2; call light within reach.

## 2024-01-26 NOTE — ASSESSMENT & PLAN NOTE
- intermittent in nature   - stool studies pending   - prn antidiarrheals as indicated  - consider GI consult if symptoms worsen

## 2024-01-26 NOTE — HPI
"Quin Linn is a 89 y.o. female with Alzheimer's disease, HTN, HLD, T2DM, CKD stage 3, chronic back pain, hypothyroidism being admitted to hospital medicine for management of syncope and acute cystitis. Pt is poor historian, history provider per ED HPI and daughter via phone. Patient's daughter reports she had a sudden episode of uncontrolled, watery diarrhea last night. She was moved to the bathroom but was not having a bowel movement when she had an episode of "starting off" and non-responsiveness lasting 30-45 minutes. Reports EMS arrives and was unable to arouse patient to verbal or tactile stimuli, she eventually began responding en route to the hospital. She reports familiarity of symptoms that started over the past 2 months, no notable associated symptoms, however the episodes have gotten longer in duration. No alleviating or aggravating factors. She is compliant with all outpatient medications.    Of note recent admission for syncope in December. Workup was completed and unremarkable at that time.       In ED: mildly hypotensive, vitals otherwise stable. CBC unremarkable. CMP with slight SPARKLE, creatinine 1.5 (baseline 1.2). troponin, TSH and procal WNL. UA infectious with 69 WBC and many bacteria, no antibiotics started. CT head negative for acute intracranial abnormalities. CXR negative for effusion or pneumonia. Flu/COVID/RSV negative. Admitted to the care of medicine for further management.   "

## 2024-01-26 NOTE — ASSESSMENT & PLAN NOTE
Patient with Paroxysmal (<7 days) atrial fibrillation which is controlled currently with Beta Blocker and Calcium Channel Blocker. Patient is currently in sinus rhythm.[id number] Score: 4. Anticoagulation indicated. Anticoagulation done with Eliquis .    - normal sinus rhythm currently  - cont home eliquis

## 2024-01-26 NOTE — ASSESSMENT & PLAN NOTE
- history of G2DD  - appears euvolemic vs dry   - off CHF pathway   - repeat TTE pending   - continue home carvedilol

## 2024-01-26 NOTE — ASSESSMENT & PLAN NOTE
"Patient's FSGs are controlled on current medication regimen.  Last A1c reviewed-   Lab Results   Component Value Date    HGBA1C 5.5 10/19/2023     Most recent fingerstick glucose reviewed- No results for input(s): "POCTGLUCOSE" in the last 24 hours.  Current correctional scale  Low  Maintain anti-hyperglycemic dose as follows-   Antihyperglycemics (From admission, onward)      Start     Stop Route Frequency Ordered    01/26/24 0900  insulin aspart U-100 pen 0-5 Units         -- SubQ Before meals & nightly PRN 01/26/24 0815          - Hold Oral hypoglycemics while patient is in the hospital.  - sliding scale insulin aspart  "

## 2024-01-26 NOTE — ASSESSMENT & PLAN NOTE
- Patient's FSGs are controlled on current hypoglycemics.   - Last A1c reviewed-   Lab Results   Component Value Date    HGBA1C 5.5 10/19/2023     - Most recent fingerstick glucose reviewed-   Recent Labs   Lab 01/26/24  1000   POCTGLUCOSE 173*     - Current correctional scale Low  Maintain anti-hyperglycemic dose as follows-   Antihyperglycemics (From admission, onward)      Start     Stop Route Frequency Ordered    01/26/24 0900  insulin aspart U-100 pen 0-5 Units         -- SubQ Before meals & nightly PRN 01/26/24 0815

## 2024-01-26 NOTE — PROGRESS NOTES
Pharmacist Renal Dose Adjustment Note    Quin Linn is a 89 y.o. female being treated with the medication Ciprofloxacin.    Patient Data:    Vital Signs (Most Recent):  Temp: 97.8 °F (36.6 °C) (01/26/24 0732)  Pulse: 73 (01/26/24 0732)  Resp: 18 (01/26/24 0732)  BP: (!) 161/72 (01/26/24 0732)  SpO2: 96 % (01/26/24 0732) Vital Signs (72h Range):  Temp:  [97.5 °F (36.4 °C)-97.8 °F (36.6 °C)]   Pulse:  [60-95]   Resp:  [16-18]   BP: (107-165)/()   SpO2:  [95 %-97 %]      Recent Labs   Lab 01/26/24 0311   CREATININE 1.5*     Serum creatinine: 1.5 mg/dL (H) 01/26/24 0311  Estimated creatinine clearance: 21.5 mL/min (A)    Medication:Ciprofloxacin 400 mg IV q12hrs will be changed to 400 mg IV q24hrs for crcl < 30 mL/min.    Pharmacist's Name: Antonieta Castaneda  Pharmacist's Extension: [phone]

## 2024-01-26 NOTE — Clinical Note
Diagnosis: Syncope [206001]   Future Attending Provider: DAYNE JOHNS [[id number]]   Special Needs:: No Special Needs [1]

## 2024-01-26 NOTE — ASSESSMENT & PLAN NOTE
CKD (chronic kidney disease), stage IV   Lab Results   Component Value Date    CREATININE 1.5 (H) 01/26/2024   - baseline ~1.2  - suspect dehydration   -  gentle IV rehydration, LR @ 100 mL/Hr  - monitor with daily BMP

## 2024-01-26 NOTE — ASSESSMENT & PLAN NOTE
- 69 WBCs , many bacteria and 9 hyaline casts   - history of MRO UTI   - empiric treatment with IV cefepime while cultures pending

## 2024-01-26 NOTE — H&P
"[facility] - Emergency Dept  [facility] Medicine  History & Physical    Patient Name: Quin Linn  MRN: [id number]  Patient Class: IP- Inpatient  Admission Date: 1/26/2024  Attending Physician: Rosita Galarza MD   Primary Care Provider: Mary Ellen Nesbitt MD         Patient information was obtained from relative(s), past medical records, and ER records.     Subjective:     Principal Problem:Episode of transient neurologic symptoms    Chief Complaint:   Chief Complaint   Patient presents with    Loss of Consciousness     Pt arrives via EMS from home. Pt had syncopal episode while on the toilet. Per EMS, pt was out for about 30 minutes. Pt was slumped on toilet, no fall, no head trauma.         HPI: Quin Linn is a 89 y.o. female with Alzheimer's disease, HTN, HLD, T2DM, CKD stage 3, chronic back pain, hypothyroidism being admitted to hospital medicine for management of syncope and acute cystitis. Pt is poor historian, history provider per ED HPI and daughter via phone. Patient's daughter reports she had a sudden episode of uncontrolled, watery diarrhea last night. She was moved to the bathroom but was not having a bowel movement when she had an episode of "starting off" and non-responsiveness lasting 30-45 minutes. Reports EMS arrives and was unable to arouse patient to verbal or tactile stimuli, she eventually began responding en route to the hospital. She reports familiarity of symptoms that started over the past 2 months, no notable associated symptoms, however the episodes have gotten longer in duration. No alleviating or aggravating factors. She is compliant with all outpatient medications.    Of note recent admission for syncope in December. Workup was completed and unremarkable at that time.       In ED: mildly hypotensive, vitals otherwise stable. CBC unremarkable. CMP with slight SPARKLE, creatinine 1.5 (baseline 1.2). troponin, TSH and procal WNL. UA infectious with 69 WBC and many bacteria, no antibiotics " started. CT head negative for acute intracranial abnormalities. CXR negative for effusion or pneumonia. Flu/COVID/RSV negative. Admitted to the care of medicine for further management.     Past Medical History:   Diagnosis Date    Acute hypoxemic respiratory failure 9/2/2023    Acute on chronic diastolic congestive heart failure 10/19/2023    Alzheimer's disease     Anemia     Arthritis     lumbar    Back pain     Cancer     colon    Cataract     Colon cancer     Diabetes mellitus type II     Glaucoma     Hyperlipidemia     Hypertension     Hyperthyroidism     Hypothyroidism following radioiodine therapy     Pacemaker     Pneumonia     Pneumonia due to other staphylococcus     Renal manifestation of secondary diabetes mellitus     Squamous cell carcinoma     Stroke     TIA    Type 2 diabetes mellitus with stage 3 chronic kidney disease and hypertension 10/12/2017    Type 2 diabetes with peripheral circulatory disorder, controlled        Past Surgical History:   Procedure Laterality Date    CATARACT EXTRACTION W/  INTRAOCULAR LENS IMPLANT Left 09/13/2017        CHOLECYSTECTOMY      COLON SURGERY  2001    Colon CA    EYE SURGERY      cataract removal left side    IMPLANTATION OF LEADLESS PACEMAKER N/A 10/21/2022    Procedure: [id number];  Surgeon: JOSE Burgos MD;  Location: [facility] EP LAB;  Service: Cardiology;  Laterality: N/A;  EMELYN, JOHNNA, MDT, ANES, EH,     squamous cell ca of face         Review of patient's allergies indicates:   Allergen Reactions    Tramadol Rash and Edema     Developed facial rash and edema.    Metformin Diarrhea       No current facility-administered medications on file prior to encounter.     Current Outpatient Medications on File Prior to Encounter   Medication Sig    ACCU-CHEK FASTCLIX LANCET DRUM Misc CHECK BLOOD GLUCOSE FOUR TIMES DAILY    ACCU-CHEK GUIDE TEST STRIPS Strp TEST BLOOD GLUCOSE FOUR TIMES DAILY OR AS DIRECTED    amLODIPine (NORVASC) 10  MG tablet Take 1 tablet (10 mg total) by mouth once daily.    apixaban (ELIQUIS) 2.5 mg Tab Take 1 tablet (2.5 mg total) by mouth 2 (two) times daily.    aspirin (ECOTRIN) 81 MG EC tablet Take 1 tablet (81 mg total) by mouth once daily.    atorvastatin (LIPITOR) 80 MG tablet Take 1 tablet (80 mg total) by mouth once daily.    calcium-vitamin D3 (CALCIUM 500 + D) 500 mg-5 mcg (200 unit) per tablet Take 1 tablet by mouth 2 (two) times daily with meals.    carvediloL (COREG) 3.125 MG tablet Take 1 tablet (3.125 mg total) by mouth 2 (two) times daily.    gabapentin (NEURONTIN) 300 MG capsule Take 1 capsule (300 mg total) by mouth 2 (two) times daily.    HYDROcodone-acetaminophen (NORCO) 5-325 mg per tablet Take 1 tablet by mouth every 8 (eight) hours as needed for Pain.    levothyroxine (SYNTHROID) 75 MCG tablet Take 1 tablet (75 mcg total) by mouth before breakfast. Administer on an empty stomach at least 30 minutes before food or other medications.    polyethylene glycol (GLYCOLAX) 17 gram PwPk Take 17 g by mouth once daily.    QUEtiapine (SEROQUEL) 25 MG Tab Take 3 tablets (75 mg total) by mouth every evening.    senna-docusate 8.6-50 mg (PERICOLACE) 8.6-50 mg per tablet Take 1 tablet by mouth 2 (two) times daily as needed for Constipation.     Family History       Problem Relation (Age of Onset)    Alzheimer's disease Sister    Ankylosing spondylitis Daughter    Arthritis Mother    Asthma Son    Atrial fibrillation Daughter    Cancer Sister, Paternal Uncle    Diabetes Maternal Grandmother    Heart disease Mother, Daughter    Hyperlipidemia Son    Hypertension Sister, Son    Kidney disease Father, Daughter    Lupus Daughter    No Known Problems Daughter    Supraventricular tachycardia Daughter          Tobacco Use    Smoking status: Never    Smokeless tobacco: Never    Tobacco comments:     smoked for about 4 years in her twenties (socially)   Substance and Sexual Activity    Alcohol use: No    Drug use: No    Sexual  "activity: Never     Review of Systems   Unable to perform ROS: Dementia     Objective:     Vital Signs (Most Recent):  Temp: 97.8 °F (36.6 °C) (01/26/24 0732)  Pulse: 73 (01/26/24 0732)  Resp: 18 (01/26/24 0732)  BP: (!) 161/72 (01/26/24 0732)  SpO2: 96 % (01/26/24 0732) Vital Signs (24h Range):  Temp:  [97.5 °F (36.4 °C)-97.8 °F (36.6 °C)] 97.8 °F (36.6 °C)  Pulse:  [60-95] 73  Resp:  [16-18] 18  SpO2:  [95 %-97 %] 96 %  BP: (107-165)/() 161/72     Weight: 68 kg (150 lb)  Body mass index is 24.96 kg/m².     Physical Exam  Vitals and nursing note reviewed.   Constitutional:       General: She is not in acute distress.     Appearance: She is well-developed.   HENT:      Head: Normocephalic and atraumatic.   Eyes:      Extraocular Movements: Extraocular movements intact.   Cardiovascular:      Rate and Rhythm: Normal rate and regular rhythm.      Heart sounds: Normal heart sounds.   Pulmonary:      Effort: Pulmonary effort is normal. No respiratory distress.      Comments: Anterior chest CTA  Abdominal:      General: There is no distension.      Tenderness: There is no abdominal tenderness. There is no right CVA tenderness or left CVA tenderness.   Musculoskeletal:         General: No swelling or tenderness. Normal range of motion.   Skin:     General: Skin is warm and dry.      Findings: No rash.   Neurological:      Mental Status: She is alert. She is disoriented.      Comments: Oriented to self and location but not time or situation. altered from baseline per daughter report   Psychiatric:      Comments: Unable to obtain d/t dementia                Significant Labs: All pertinent labs within the past 24 hours have been reviewed.  BMP:   Recent Labs   Lab 01/26/24 0311   *      K 4.0      CO2 23   BUN 34*   CREATININE 1.5*   CALCIUM 10.0   MG 1.9     CBC:   Recent Labs   Lab 01/26/24 0311   WBC 11.99   HGB 13.2   HCT 39.6        Urine Culture: No results for input(s): "LABURIN" in " "the last 48 hours.  Urine Studies:   Recent Labs   Lab 01/26/24  0550   COLORU Yellow   APPEARANCEUA Hazy*   PHUR 6.0   SPECGRAV 1.015   PROTEINUA 2+*   GLUCUA Negative   KETONESU Negative   BILIRUBINUA Negative   OCCULTUA 1+*   NITRITE Positive*   LEUKOCYTESUR 3+*   RBCUA 2   WBCUA 69*   BACTERIA Many*   SQUAMEPITHEL 1   HYALINECASTS 9*       Significant Imaging: I have reviewed all pertinent imaging results/findings within the past 24 hours.  Assessment/Plan:     * Episode of transient neurologic symptoms  - 30-45min of "starting off" and non-responsiveness per daughter   - denies head trauma, CT negative  - EKG unremarkable  - recent admission for syncope, workup unremarkable at that time  - infectious workup notable for UTI  - TTE and pacemaker interrogation ordered  - EEG ordered   - monitor on tele      Acute cystitis without hematuria  - 69 WBCs , many bacteria and 9 hyaline casts   - history of MRO UTI   - empiric treatment with IV cefepime while cultures pending     Chronic diastolic heart failure  - history of G2DD  - appears euvolemic vs dry   - off CHF pathway   - repeat TTE pending   - continue home carvedilol     History of CVA (cerebrovascular accident)  - cont home ASA, atorvastatin, eliquis    Acute renal failure superimposed on chronic kidney disease  CKD (chronic kidney disease), stage IV   Lab Results   Component Value Date    CREATININE 1.5 (H) 01/26/2024   - baseline ~1.2  - suspect dehydration   -  gentle IV rehydration, LR @ 100 mL/Hr  - monitor with daily BMP     Paroxysmal A-fib  Patient with Paroxysmal (<7 days) atrial fibrillation which is controlled currently with Beta Blocker and Calcium Channel Blocker. Patient is currently in sinus rhythm.[id number] Score: 4. Anticoagulation indicated. Anticoagulation done with Eliquis .    - normal sinus rhythm currently  - cont home eliquis    Wenckebach second degree AV block  - s/p pacemaker  - stable, controlled      Chronic diarrhea  - " intermittent in nature   - stool studies pending   - prn antidiarrheals as indicated  - consider GI consult if symptoms worsen     Controlled type 2 diabetes mellitus with stage 3 chronic kidney disease, without long-term current use of insulin  - Patient's FSGs are controlled on current hypoglycemics.   - Last A1c reviewed-   Lab Results   Component Value Date    HGBA1C 5.5 10/19/2023     - Most recent fingerstick glucose reviewed-   Recent Labs   Lab 01/26/24  1000   POCTGLUCOSE 173*     - Current correctional scale Low  Maintain anti-hyperglycemic dose as follows-   Antihyperglycemics (From admission, onward)      Start     Stop Route Frequency Ordered    01/26/24 0900  insulin aspart U-100 pen 0-5 Units         -- SubQ Before meals & nightly PRN 01/26/24 0815            Anemia of chronic disease  Patient's anemia is currently controlled. Has not received any PRBCs to date. Etiology likely d/t chronic disease due to Chronic Kidney Disease/ESRD  Current CBC reviewed-   Lab Results   Component Value Date    HGB 13.2 01/26/2024    HCT 39.6 01/26/2024     Monitor serial CBC and transfuse if patient becomes hemodynamically unstable, symptomatic or H/H drops below 7/21.    Debility  - chronic  - fall and delirium precautions  - lives with family at home     Late onset Alzheimer's disease with behavioral disturbance  Patient with dementia with likely etiology of alzheimer's dementia. Dementia is moderate. The patient does not have signs of behavioral disturbance. Home dementia medications are Held or Continued: held and replaced with melatonin PRN.. Continue non-pharmacologic interventions to prevent delirium (No VS between 11PM-5AM, activity during day, opening blinds, providing glasses/hearing aids, and up in chair during daytime). Will avoid narcotics and benzos unless absolutely necessary. PRN anti-psychotics are prescribed to avoid self harm behaviors.    - mental status alerted from baseline per daughter  - hold  home quietapine    Hyperlipidemia LDL goal <70  - cont home atorvastatin    Hypertension  Chronic, controlled. Latest blood pressure and vitals reviewed-     Temp:  [97.5 °F (36.4 °C)-97.8 °F (36.6 °C)]   Pulse:  [60-95]   Resp:  [16-18]   BP: (107-165)/()   SpO2:  [95 %-97 %] .   Home meds for hypertension were reviewed and noted below.   Hypertension Medications               amLODIPine (NORVASC) 10 MG tablet Take 1 tablet (10 mg total) by mouth once daily.    carvediloL (COREG) 3.125 MG tablet Take 1 tablet (3.125 mg total) by mouth 2 (two) times daily.            While in the hospital, will manage blood pressure as follows; Continue home antihypertensive regimen    Will utilize p.r.n. blood pressure medication only if patient's blood pressure greater than 180/110 and she develops symptoms such as worsening chest pain or shortness of breath.    Lumbar spondylosis  - chronic, stable  - cont home gabapentin and norco PRN      Chronic low back pain  Chronic narcotic use  - stable  - cont home gabapentin and norco  - fall precautions     Hearing loss  - chronic  - delirium precautions    Hypothyroidism following radioiodine therapy  - controlled with synthroid  - TSH 3.96  - T4 ordered  - cont home synthroid        VTE Risk Mitigation (From admission, onward)           Ordered     apixaban tablet 2.5 mg  2 times daily         01/26/24 1615     Reason for No Pharmacological VTE Prophylaxis  Once        Question:  Reasons:  Answer:  Already adequately anticoagulated on oral Anticoagulants    01/26/24 0815     IP VTE HIGH RISK PATIENT  Once         01/26/24 0815     Place sequential compression device  Until discontinued         01/26/24 0815                       Tangela Aguilar PA-C  Department of Hospital Medicine  [facility] - Emergency Dept

## 2024-01-27 ENCOUNTER — DOCUMENTATION ONLY (OUTPATIENT)
Dept: NEUROLOGY | Facility: HOSPITAL | Age: 89
End: 2024-01-27
Payer: MEDICARE

## 2024-01-27 ENCOUNTER — DOCUMENTATION ONLY (OUTPATIENT)
Dept: NEUROLOGY | Facility: CLINIC | Age: 89
End: 2024-01-27
Payer: MEDICARE

## 2024-01-27 LAB
ANION GAP SERPL CALC-SCNC: 9 MMOL/L (ref 8–16)
BASOPHILS # BLD AUTO: 0.04 K/UL (ref 0–0.2)
BASOPHILS NFR BLD: 0.7 % (ref 0–1.9)
BUN SERPL-MCNC: 24 MG/DL (ref 10–30)
CALCIUM SERPL-MCNC: 9 MG/DL (ref 8.7–10.5)
CHLORIDE SERPL-SCNC: 110 MMOL/L (ref 95–110)
CO2 SERPL-SCNC: 20 MMOL/L (ref 23–29)
CREAT SERPL-MCNC: 1.3 MG/DL (ref 0.5–1.4)
DIFFERENTIAL METHOD BLD: ABNORMAL
EOSINOPHIL # BLD AUTO: 0.1 K/UL (ref 0–0.5)
EOSINOPHIL NFR BLD: 2 % (ref 0–8)
ERYTHROCYTE [DISTWIDTH] IN BLOOD BY AUTOMATED COUNT: 16.5 % (ref 11.5–14.5)
EST. GFR  (NO RACE VARIABLE): 38.6 ML/MIN/1.73 M^2
GLUCOSE SERPL-MCNC: 88 MG/DL (ref 70–110)
GLUCOSE SERPL-MCNC: 98 MG/DL (ref 70–110)
HCT VFR BLD AUTO: 32.3 % (ref 37–48.5)
HGB BLD-MCNC: 11.3 G/DL (ref 12–16)
IMM GRANULOCYTES # BLD AUTO: 0.09 K/UL (ref 0–0.04)
IMM GRANULOCYTES NFR BLD AUTO: 1.5 % (ref 0–0.5)
LYMPHOCYTES # BLD AUTO: 2 K/UL (ref 1–4.8)
LYMPHOCYTES NFR BLD: 33.8 % (ref 18–48)
MAGNESIUM SERPL-MCNC: 1.6 MG/DL (ref 1.6–2.6)
MCH RBC QN AUTO: 29.7 PG (ref 27–31)
MCHC RBC AUTO-ENTMCNC: 35 G/DL (ref 32–36)
MCV RBC AUTO: 85 FL (ref 82–98)
MONOCYTES # BLD AUTO: 0.6 K/UL (ref 0.3–1)
MONOCYTES NFR BLD: 9.7 % (ref 4–15)
NEUTROPHILS # BLD AUTO: 3.1 K/UL (ref 1.8–7.7)
NEUTROPHILS NFR BLD: 52.3 % (ref 38–73)
NRBC BLD-RTO: 0 /100 WBC
PHOSPHATE SERPL-MCNC: 3.4 MG/DL (ref 2.7–4.5)
PLATELET # BLD AUTO: 189 K/UL (ref 150–450)
PMV BLD AUTO: 10.2 FL (ref 9.2–12.9)
POCT GLUCOSE: 113 MG/DL (ref 70–110)
POCT GLUCOSE: 93 MG/DL (ref 70–110)
POCT GLUCOSE: 98 MG/DL (ref 70–110)
POCT GLUCOSE: 99 MG/DL (ref 70–110)
POTASSIUM SERPL-SCNC: 3.9 MMOL/L (ref 3.5–5.1)
RBC # BLD AUTO: 3.81 M/UL (ref 4–5.4)
SODIUM SERPL-SCNC: 139 MMOL/L (ref 136–145)
T3 SERPL-MCNC: 46 NG/DL (ref 60–180)
T4 SERPL-MCNC: 6.7 UG/DL (ref 4.5–11.5)
WBC # BLD AUTO: 5.98 K/UL (ref 3.9–12.7)
WBC #/AREA STL HPF: NORMAL /[HPF]

## 2024-01-27 PROCEDURE — 87338 HPYLORI STOOL AG IA: CPT

## 2024-01-27 PROCEDURE — 27000207 HC ISOLATION

## 2024-01-27 PROCEDURE — 21400001 HC TELEMETRY ROOM

## 2024-01-27 PROCEDURE — 84100 ASSAY OF PHOSPHORUS: CPT

## 2024-01-27 PROCEDURE — 87329 GIARDIA AG IA: CPT

## 2024-01-27 PROCEDURE — 87449 NOS EACH ORGANISM AG IA: CPT

## 2024-01-27 PROCEDURE — 87425 ROTAVIRUS AG IA: CPT

## 2024-01-27 PROCEDURE — 63600175 PHARM REV CODE 636 W HCPCS: Performed by: HOSPITALIST

## 2024-01-27 PROCEDURE — 84436 ASSAY OF TOTAL THYROXINE: CPT

## 2024-01-27 PROCEDURE — 95718 EEG PHYS/QHP 2-12 HR W/VEEG: CPT | Mod: ,,, | Performed by: PSYCHIATRY & NEUROLOGY

## 2024-01-27 PROCEDURE — 25000003 PHARM REV CODE 250: Performed by: HOSPITALIST

## 2024-01-27 PROCEDURE — 36415 COLL VENOUS BLD VENIPUNCTURE: CPT

## 2024-01-27 PROCEDURE — 83993 ASSAY FOR CALPROTECTIN FECAL: CPT

## 2024-01-27 PROCEDURE — 87046 STOOL CULTR AEROBIC BACT EA: CPT

## 2024-01-27 PROCEDURE — 87045 FECES CULTURE AEROBIC BACT: CPT

## 2024-01-27 PROCEDURE — 25000003 PHARM REV CODE 250

## 2024-01-27 PROCEDURE — 83735 ASSAY OF MAGNESIUM: CPT

## 2024-01-27 PROCEDURE — 87427 SHIGA-LIKE TOXIN AG IA: CPT | Mod: 59

## 2024-01-27 PROCEDURE — 89055 LEUKOCYTE ASSESSMENT FECAL: CPT

## 2024-01-27 PROCEDURE — 95700 EEG CONT REC W/VID EEG TECH: CPT

## 2024-01-27 PROCEDURE — 85025 COMPLETE CBC W/AUTO DIFF WBC: CPT

## 2024-01-27 PROCEDURE — 84480 ASSAY TRIIODOTHYRONINE (T3): CPT

## 2024-01-27 PROCEDURE — 95711 VEEG 2-12 HR UNMONITORED: CPT

## 2024-01-27 PROCEDURE — 80048 BASIC METABOLIC PNL TOTAL CA: CPT

## 2024-01-27 RX ADMIN — LEVOTHYROXINE SODIUM 75 MCG: 25 TABLET ORAL at 06:01

## 2024-01-27 RX ADMIN — GABAPENTIN 300 MG: 300 CAPSULE ORAL at 09:01

## 2024-01-27 RX ADMIN — APIXABAN 2.5 MG: 2.5 TABLET, FILM COATED ORAL at 09:01

## 2024-01-27 RX ADMIN — CEFEPIME 1 G: 1 INJECTION, POWDER, FOR SOLUTION INTRAMUSCULAR; INTRAVENOUS at 09:01

## 2024-01-27 RX ADMIN — GABAPENTIN 300 MG: 300 CAPSULE ORAL at 08:01

## 2024-01-27 RX ADMIN — CARVEDILOL 3.12 MG: 3.12 TABLET, FILM COATED ORAL at 09:01

## 2024-01-27 RX ADMIN — AMLODIPINE BESYLATE 10 MG: 10 TABLET ORAL at 09:01

## 2024-01-27 RX ADMIN — SENNOSIDES 8.6 MG: 8.6 TABLET, FILM COATED ORAL at 08:01

## 2024-01-27 RX ADMIN — ASPIRIN 81 MG: 81 TABLET, COATED ORAL at 09:01

## 2024-01-27 RX ADMIN — ATORVASTATIN CALCIUM 80 MG: 40 TABLET, FILM COATED ORAL at 08:01

## 2024-01-27 RX ADMIN — QUETIAPINE FUMARATE 75 MG: 25 TABLET ORAL at 09:01

## 2024-01-27 NOTE — PROGRESS NOTES
EEG Hook up  AM Check Electrodes had to be fixed.Yes    Skin Integrity: Normal   No signs of skin breakdown seen during hook-p  [facility]   01/27/2024 10:03 AM

## 2024-01-27 NOTE — PROGRESS NOTES
EEG Disconnect   Electrodes had to be fixed.No      Patient pulled wires off     Skin Integrity: Normal     Umesh Yap   01/27/2024 12:30 PM

## 2024-01-27 NOTE — ASSESSMENT & PLAN NOTE
"- 30-45min of "starting off" and non-responsiveness per daughter   - denies head trauma, CT negative  - EKG unremarkable  - recent admission for syncope, workup unremarkable at that time  - infectious workup notable for UTI  - TTE and pacemaker interrogation ordered  - EEG ordered: mild generalized non-specific cerebral dysfunction with no electrographic seizures or indications of seizure tendency.   - monitor on tele  "

## 2024-01-27 NOTE — SUBJECTIVE & OBJECTIVE
Interval History: NAEON. AFVSS. Urine culture growing GNR, continue cefepime.  Patient evaluated at bedside, oriented to self and location not time or situation. Purewick at bedside noted to contain what appears to be watery stools.     Review of Systems   Unable to perform ROS: Dementia     Objective:     Vital Signs (Most Recent):  Temp: 97.7 °F (36.5 °C) (01/27/24 1115)  Pulse: 61 (01/27/24 1546)  Resp: 18 (01/27/24 1115)  BP: (!) 195/79 (01/27/24 1115)  SpO2: 99 % (01/27/24 1115) Vital Signs (24h Range):  Temp:  [97.1 °F (36.2 °C)-98.2 °F (36.8 °C)] 97.7 °F (36.5 °C)  Pulse:  [59-82] 61  Resp:  [14-18] 18  SpO2:  [96 %-100 %] 99 %  BP: (110-195)/(63-93) 195/79     Weight: 68 kg (150 lb)  Body mass index is 24.96 kg/m².    Intake/Output Summary (Last 24 hours) at 1/27/2024 1549  Last data filed at 1/27/2024 0511  Gross per 24 hour   Intake --   Output 400 ml   Net -400 ml         Physical Exam  Vitals and nursing note reviewed.   Constitutional:       General: She is not in acute distress.     Appearance: She is well-developed.   HENT:      Head: Normocephalic and atraumatic.   Eyes:      Extraocular Movements: Extraocular movements intact.   Cardiovascular:      Rate and Rhythm: Normal rate and regular rhythm.      Heart sounds: Normal heart sounds.   Pulmonary:      Effort: Pulmonary effort is normal. No respiratory distress.      Comments: Anterior chest CTA  Abdominal:      General: There is no distension.      Tenderness: There is no abdominal tenderness. There is no right CVA tenderness or left CVA tenderness.   Musculoskeletal:         General: No swelling or tenderness. Normal range of motion.   Skin:     General: Skin is warm and dry.      Findings: No rash.   Neurological:      Mental Status: She is alert. She is disoriented.      Comments: Oriented to self and location but not time or situation. altered from baseline per daughter report   Psychiatric:      Comments: Unable to obtain d/t dementia        Significant Labs: All pertinent labs within the past 24 hours have been reviewed.    Significant Imaging: I have reviewed all pertinent imaging results/findings within the past 24 hours.

## 2024-01-27 NOTE — ASSESSMENT & PLAN NOTE
Patient with dementia with likely etiology of alzheimer's dementia. Dementia is moderate. The patient does not have signs of behavioral disturbance. Home dementia medications are Held or Continued: held and replaced with melatonin PRN.. Continue non-pharmacologic interventions to prevent delirium (No VS between 11PM-5AM, activity during day, opening blinds, providing glasses/hearing aids, and up in chair during daytime). Will avoid narcotics and benzos unless absolutely necessary.

## 2024-01-27 NOTE — ASSESSMENT & PLAN NOTE
CKD (chronic kidney disease), stage IV   Lab Results   Component Value Date    CREATININE 1.3 01/27/2024   - 1.5>> 1.3; baseline ~1.2  - suspect dehydration   -  gentle IV rehydration, LR @ 100 mL/Hr x 10 hours with improvement  - monitor with daily BMP

## 2024-01-27 NOTE — ASSESSMENT & PLAN NOTE
Patient with Paroxysmal (<7 days) atrial fibrillation which is controlled currently with Beta Blocker and Calcium Channel Blocker. Patient is currently in sinus rhythm.[id number] Score: 4. Anticoagulation indicated. Anticoagulation done with Eliquis .    - normal sinus rhythm currently  - cont home eliquis

## 2024-01-27 NOTE — HOSPITAL COURSE
Quin Linn was admitted to hospital medicine for management of transient neurological episodes. EEG done, revealed mild generalized non-specific cerebral dysfunction with no electrographic seizures or indications of seizure tendency. Urinalysis infectious, started on IV cefepime due to history of resistant organisms. Urine culture growing pan-sensitive klebsiella, completed three days of antibiotics while inpatient. Noted to have profuse watery diarrhea prior to and during admission, stool studies collected and remained negative. Given referral to outpatient neurology for further workup. Home health orders updated prior to discharge.    On day of discharge patient's vital signs were stable and patient appeared clinically ready for discharge. Hospital course, discharge plan and return precautions discussed - patient expressed understanding. All questions answered at that time.

## 2024-01-27 NOTE — ASSESSMENT & PLAN NOTE
Chronic, controlled. Latest blood pressure and vitals reviewed-     Temp:  [97.1 °F (36.2 °C)-98.2 °F (36.8 °C)]   Pulse:  [59-82]   Resp:  [14-18]   BP: (110-195)/(63-93)   SpO2:  [96 %-100 %] .   Home meds for hypertension were reviewed and noted below.   Hypertension Medications               amLODIPine (NORVASC) 10 MG tablet Take 1 tablet (10 mg total) by mouth once daily.    carvediloL (COREG) 3.125 MG tablet Take 1 tablet (3.125 mg total) by mouth 2 (two) times daily.            While in the hospital, will manage blood pressure as follows; Continue home antihypertensive regimen    Will utilize p.r.n. blood pressure medication only if patient's blood pressure greater than 180/110 and she develops symptoms such as worsening chest pain or shortness of breath.

## 2024-01-27 NOTE — ASSESSMENT & PLAN NOTE
- Patient's FSGs are controlled on current hypoglycemics.   - Last A1c reviewed-   Lab Results   Component Value Date    HGBA1C 5.5 10/19/2023     - Most recent fingerstick glucose reviewed-   Recent Labs   Lab 01/26/24 2044 01/27/24  0920 01/27/24  1153   POCTGLUCOSE 119* 113* 99       - Current correctional scale Low  Maintain anti-hyperglycemic dose as follows-   Antihyperglycemics (From admission, onward)    Start     Stop Route Frequency Ordered    01/26/24 0900  insulin aspart U-100 pen 0-5 Units         -- SubQ Before meals & nightly PRN 01/26/24 0815

## 2024-01-27 NOTE — ED NOTES
Assumed care and report received - patient admitted with Hosp Med 2/2 possible syncopal episode and acute cystitis. Pending admit bed assignment. Patient denies new complaints at this time - resting comfortably in ED stretcher. Plan of care discussed with patient - verbalized understanding.

## 2024-01-27 NOTE — PROCEDURES
EEG REPORT      Quin Linn  [id number]  1/7/1932    DATE OF SERVICE: 1/27/2024         METHODOLOGY      Extended electroencephalographic recording is made while the patient is ambulatory and continuing normal daily activities.  Electrodes are placed according to the International 10-20 placement system and included T1 and T2 electrode placement.  Twenty four (24) channels of digital signal (sampling rate of 512/sec) was simultaneously recorded from the scalp including EKG and eye monitors.  Recording band pass was 0.1 to 100 hz and all data was stored digitally on the recorder.  The patient is instructed to press an event button when clinical symptoms occur and write the symptoms into a diary. Activation procedures which include photic stimulation, hyperventilation and instructing patients to perform simple task are done in selected patients.        The EEG is displayed on a monitor screen and can be reformatted into different montages for evaluation.  The entire recoding is submitted for computer assisted analysis to detect spike and electrographic seizure activity.  The entire recording is visually reviewed and the times identified by computer analysis as being spikes or seizures are reviewed again.  Compresses spectral analysis (CSA) is also performed on the activity recorded from each individual channel.  This is displayed as a power display of frequencies from 0 to 30 Hz over time.   The CSA analysis is done and displayed continuously.  This is reviewed for asymmetries in power between homologous areas of the scalp and for presence of changes in power which canbe seen when seizures occur.  Sections of suspected abnormalities on the CSA is then compared with the original EEG recording.  .     [employer] software was also utilized in the review of this study.  This software suite analyzes the EEG recording in multiple domains.  Coherence and rhythmicity is computed to identify EEG sections which may contain  organized seizures.  Each channel undergoes analysis to detect presence of spike and sharp waves which have special and morphological characteristic of epileptic activity.  The routine EEG recording is converted from spacial into frequency domain.  This is then displayed comparing homologous areas to identify areas of significant asymmetry.  Algorithm to identify non-cortically generated artifact is used to separate eye movement, EMG and other artifact from the EEG     Recording Times    A total of 2:37:11 hours of EEG was recorded.      EEG FINDINGS:  Background activity:   The background rhythm was characterized by partially organized theta activity with absent posterior dominant alpha rhythm.   Symmetry and continuity: the background was continuous and symmetric     Sleep:   No sleep transients although there is cycling of the background.    Activation procedures:   NA    Abnormal activity:   No epileptiform discharges, periodic discharges, lateralized rhythmic delta activity or electrographic seizures were seen.    IMPRESSION:   Abnormal EEG due to mild generalized non-specific cerebral dysfunction with no electrographic seizures or indications of seizure tendency.      Manuel Morris MD  Neurology-Epilepsy.  [facility].

## 2024-01-27 NOTE — PLAN OF CARE
Problem: Adult Inpatient Plan of Care  Goal: Plan of Care Review  Outcome: Ongoing, Progressing  Flowsheets (Taken 1/27/2024 0509)  Plan of Care Reviewed With:   patient   daughter     Problem: Fluid and Electrolyte Imbalance (Acute Kidney Injury/Impairment)  Goal: Fluid and Electrolyte Balance  Outcome: Ongoing, Progressing  Intervention: Monitor and Manage Fluid and Electrolyte Balance  Flowsheets (Taken 1/27/2024 0509)  Fluid/Electrolyte Management: fluids provided     Problem: Fall Injury Risk  Goal: Absence of Fall and Fall-Related Injury  Outcome: Ongoing, Progressing  Intervention: Identify and Manage Contributors  Flowsheets (Taken 1/27/2024 0509)  Self-Care Promotion: BADL personal objects within reach     Problem: Skin Injury Risk Increased  Goal: Skin Health and Integrity  Outcome: Ongoing, Progressing  Intervention: Optimize Skin Protection  Flowsheets (Taken 1/27/2024 0509)  Pressure Reduction Techniques:   frequent weight shift encouraged   weight shift assistance provided  Head of Bed (HOB) Positioning: HOB at 30-45 degrees     Problem: Infection  Goal: Absence of Infection Signs and Symptoms  Outcome: Ongoing, Progressing  Intervention: Prevent or Manage Infection  Flowsheets (Taken 1/27/2024 0509)  Isolation Precautions:   precautions maintained   contact

## 2024-01-27 NOTE — ASSESSMENT & PLAN NOTE
Patient's anemia is currently controlled. Has not received any PRBCs to date. Etiology likely d/t chronic disease due to Chronic Kidney Disease/ESRD  Current CBC reviewed-   Lab Results   Component Value Date    HGB 11.3 (L) 01/27/2024    HCT 32.3 (L) 01/27/2024     Monitor serial CBC and transfuse if patient becomes hemodynamically unstable, symptomatic or H/H drops below 7/21.

## 2024-01-27 NOTE — ED NOTES
Patient report given to receiving RN Gael. Pending telemetry monitor then patient to floor with all personal belongings.

## 2024-01-27 NOTE — PROGRESS NOTES
"[facility] ([place] [address])  [facility] Medicine  Progress Note    Patient Name: Quin Linn  MRN: [id number]  Patient Class: IP- Inpatient   Admission Date: 1/26/2024  Length of Stay: 1 days  Attending Physician: Rosita Galarza MD  Primary Care Provider: Mary Ellen Nesbitt MD        Subjective:     Principal Problem:Episode of transient neurologic symptoms        HPI:  Quin Linn is a 89 y.o. female with Alzheimer's disease, HTN, HLD, T2DM, CKD stage 3, chronic back pain, hypothyroidism being admitted to hospital medicine for management of syncope and acute cystitis. Pt is poor historian, history provider per ED HPI and daughter via phone. Patient's daughter reports she had a sudden episode of uncontrolled, watery diarrhea last night. She was moved to the bathroom but was not having a bowel movement when she had an episode of "starting off" and non-responsiveness lasting 30-45 minutes. Reports EMS arrives and was unable to arouse patient to verbal or tactile stimuli, she eventually began responding en route to the hospital. She reports familiarity of symptoms that started over the past 2 months, no notable associated symptoms, however the episodes have gotten longer in duration. No alleviating or aggravating factors. She is compliant with all outpatient medications.    Of note recent admission for syncope in December. Workup was completed and unremarkable at that time.       In ED: mildly hypotensive, vitals otherwise stable. CBC unremarkable. CMP with slight SPARKLE, creatinine 1.5 (baseline 1.2). troponin, TSH and procal WNL. UA infectious with 69 WBC and many bacteria, no antibiotics started. CT head negative for acute intracranial abnormalities. CXR negative for effusion or pneumonia. Flu/COVID/RSV negative. Admitted to the care of medicine for further management.     Overview/Hospital Course:  Quin Linn was admitted to hospital medicine for management of transient neurological episodes. EEG done, " revealed mild generalized non-specific cerebral dysfunction with no electrographic seizures or indications of seizure tendency. Urinalysis infectious, started on IV cefepime due to history of resistant organisms while urine cultures pending. Noted to have profuse watery diarrhea prior to and during admission, stool studies collected.     Interval History: NAEON. AFVSS. Urine culture growing GNR, continue cefepime.  Patient evaluated at bedside, oriented to self and location not time or situation. Purewick at bedside noted to contain what appears to be watery stools.     Review of Systems   Unable to perform ROS: Dementia     Objective:     Vital Signs (Most Recent):  Temp: 97.7 °F (36.5 °C) (01/27/24 1115)  Pulse: 61 (01/27/24 1546)  Resp: 18 (01/27/24 1115)  BP: (!) 195/79 (01/27/24 1115)  SpO2: 99 % (01/27/24 1115) Vital Signs (24h Range):  Temp:  [97.1 °F (36.2 °C)-98.2 °F (36.8 °C)] 97.7 °F (36.5 °C)  Pulse:  [59-82] 61  Resp:  [14-18] 18  SpO2:  [96 %-100 %] 99 %  BP: (110-195)/(63-93) 195/79     Weight: 68 kg (150 lb)  Body mass index is 24.96 kg/m².    Intake/Output Summary (Last 24 hours) at 1/27/2024 1549  Last data filed at 1/27/2024 0511  Gross per 24 hour   Intake --   Output 400 ml   Net -400 ml         Physical Exam  Vitals and nursing note reviewed.   Constitutional:       General: She is not in acute distress.     Appearance: She is well-developed.   HENT:      Head: Normocephalic and atraumatic.   Eyes:      Extraocular Movements: Extraocular movements intact.   Cardiovascular:      Rate and Rhythm: Normal rate and regular rhythm.      Heart sounds: Normal heart sounds.   Pulmonary:      Effort: Pulmonary effort is normal. No respiratory distress.      Comments: Anterior chest CTA  Abdominal:      General: There is no distension.      Tenderness: There is no abdominal tenderness. There is no right CVA tenderness or left CVA tenderness.   Musculoskeletal:         General: No swelling or tenderness.  "Normal range of motion.   Skin:     General: Skin is warm and dry.      Findings: No rash.   Neurological:      Mental Status: She is alert. She is disoriented.      Comments: Oriented to self and location but not time or situation. altered from baseline per daughter report   Psychiatric:      Comments: Unable to obtain d/t dementia       Significant Labs: All pertinent labs within the past 24 hours have been reviewed.    Significant Imaging: I have reviewed all pertinent imaging results/findings within the past 24 hours.    Assessment/Plan:      * Episode of transient neurologic symptoms  - 30-45min of "starting off" and non-responsiveness per daughter   - denies head trauma, CT negative  - EKG unremarkable  - recent admission for syncope, workup unremarkable at that time  - infectious workup notable for UTI  - TTE and pacemaker interrogation ordered  - EEG ordered: mild generalized non-specific cerebral dysfunction with no electrographic seizures or indications of seizure tendency.   - monitor on tele    Acute renal failure superimposed on chronic kidney disease  CKD (chronic kidney disease), stage IV   Lab Results   Component Value Date    CREATININE 1.3 01/27/2024   - 1.5>> 1.3; baseline ~1.2  - suspect dehydration   -  gentle IV rehydration, LR @ 100 mL/Hr x 10 hours with improvement  - monitor with daily BMP     Acute cystitis without hematuria  - 69 WBCs , many bacteria and 9 hyaline casts   - history of MRO UTI   - empiric treatment with IV cefepime while cultures pending   Microbiology Results (last 7 days)       Procedure Component Value Units Date/Time    Clostridium difficile EIA [[id number]]     Order Status: No result Specimen: Stool     Urine culture [[id number]]  (Abnormal) Collected: 01/26/24 0550    Order Status: Completed Specimen: Urine Updated: 01/27/24 0929     Urine Culture, Routine GRAM NEGATIVE LYDIA  > 100,000 cfu/ml  Identification and susceptibility pending      Narrative:      Specimen " Source->Urine    Clostridium difficile EIA [[id number]]     Order Status: Canceled Specimen: Stool     Stool culture [[id number]]     Order Status: No result Specimen: Stool     Influenza A & B by Molecular [[id number]] Collected: 01/26/24 0505    Order Status: Completed Specimen: Nasopharyngeal Swab Updated: 01/26/24 0605     Influenza A, Molecular NEGATIVE     Influenza B, Molecular NEGATIVE     Flu A & B Source NP            Chronic diastolic heart failure  - history of G2DD  - appears euvolemic vs dry   - off CHF pathway   - repeat TTE pending   - continue home carvedilol     History of CVA (cerebrovascular accident)  - cont home ASA, atorvastatin, eliquis    Paroxysmal A-fib  Patient with Paroxysmal (<7 days) atrial fibrillation which is controlled currently with Beta Blocker and Calcium Channel Blocker. Patient is currently in sinus rhythm.[id number] Score: 4. Anticoagulation indicated. Anticoagulation done with Eliquis .    - normal sinus rhythm currently  - cont home eliquis    [place] second degree AV block  - s/p pacemaker  - stable, controlled    Chronic diarrhea  - intermittent in nature   - stool studies pending   - prn antidiarrheals as indicated    Controlled type 2 diabetes mellitus with stage 3 chronic kidney disease, without long-term current use of insulin  - Patient's FSGs are controlled on current hypoglycemics.   - Last A1c reviewed-   Lab Results   Component Value Date    HGBA1C 5.5 10/19/2023     - Most recent fingerstick glucose reviewed-   Recent Labs   Lab 01/26/24  2044 01/27/24  0920 01/27/24  1153   POCTGLUCOSE 119* 113* 99       - Current correctional scale Low  Maintain anti-hyperglycemic dose as follows-   Antihyperglycemics (From admission, onward)      Start     Stop Route Frequency Ordered    01/26/24 0900  insulin aspart U-100 pen 0-5 Units         -- SubQ Before meals & nightly PRN 01/26/24 0815            Anemia of chronic disease  Patient's anemia is currently controlled.  Has not received any PRBCs to date. Etiology likely d/t chronic disease due to Chronic Kidney Disease/ESRD  Current CBC reviewed-   Lab Results   Component Value Date    HGB 11.3 (L) 01/27/2024    HCT 32.3 (L) 01/27/2024     Monitor serial CBC and transfuse if patient becomes hemodynamically unstable, symptomatic or H/H drops below 7/21.    Debility  - chronic  - fall and delirium precautions  - lives with family at home     Late onset Alzheimer's disease with behavioral disturbance  Patient with dementia with likely etiology of alzheimer's dementia. Dementia is moderate. The patient does not have signs of behavioral disturbance. Home dementia medications are Held or Continued: held and replaced with melatonin PRN.. Continue non-pharmacologic interventions to prevent delirium (No VS between 11PM-5AM, activity during day, opening blinds, providing glasses/hearing aids, and up in chair during daytime). Will avoid narcotics and benzos unless absolutely necessary.     Hyperlipidemia LDL goal <70  - cont home atorvastatin    Hypertension  Chronic, controlled. Latest blood pressure and vitals reviewed-     Temp:  [97.1 °F (36.2 °C)-98.2 °F (36.8 °C)]   Pulse:  [59-82]   Resp:  [14-18]   BP: (110-195)/(63-93)   SpO2:  [96 %-100 %] .   Home meds for hypertension were reviewed and noted below.   Hypertension Medications               amLODIPine (NORVASC) 10 MG tablet Take 1 tablet (10 mg total) by mouth once daily.    carvediloL (COREG) 3.125 MG tablet Take 1 tablet (3.125 mg total) by mouth 2 (two) times daily.            While in the hospital, will manage blood pressure as follows; Continue home antihypertensive regimen    Will utilize p.r.n. blood pressure medication only if patient's blood pressure greater than 180/110 and she develops symptoms such as worsening chest pain or shortness of breath.    Lumbar spondylosis  - chronic, stable  - cont home gabapentin and norco PRN    Chronic low back pain  Chronic narcotic  use  - stable  - cont home gabapentin and norco  - fall precautions     Hearing loss  - chronic  - delirium precautions    Hypothyroidism following radioiodine therapy  - controlled with synthroid  Lab Results   Component Value Date    TSH 3.962 01/26/2024    [id number] 46 (L) 01/27/2024    L2WDPUD 6.7 01/27/2024    FREET4 0.91 10/21/2022   - cont home synthroid        VTE Risk Mitigation (From admission, onward)           Ordered     apixaban tablet 2.5 mg  2 times daily         01/26/24 1615     Reason for No Pharmacological VTE Prophylaxis  Once        Question:  Reasons:  Answer:  Already adequately anticoagulated on oral Anticoagulants    01/26/24 0815     IP VTE HIGH RISK PATIENT  Once         01/26/24 0815     Place sequential compression device  Until discontinued         01/26/24 0815                    Discharge Planning   LILI: 1/28/2024     Code Status: Full Code   Is the patient medically ready for discharge?: No    Reason for patient still in hospital (select all that apply): Patient trending condition and Laboratory test           Tangela Aguilar PA-C  Department of Hospital Medicine   [facility] - Med Surg ([place])

## 2024-01-27 NOTE — PROGRESS NOTES
Pharmacist Renal Dose Adjustment Note    Quin Linn is a 89 y.o. female being treated with the medication Cefepime    Patient Data:    Vital Signs (Most Recent):  Temp: 97.1 °F (36.2 °C) (01/27/24 0755)  Pulse: 66 (01/27/24 0756)  Resp: 17 (01/27/24 0755)  BP: 110/68 (01/27/24 0755)  SpO2: 100 % (01/27/24 0755) Vital Signs (72h Range):  Temp:  [97.1 °F (36.2 °C)-98.2 °F (36.8 °C)]   Pulse:  [59-95]   Resp:  [14-18]   BP: (107-182)/()   SpO2:  [95 %-100 %]      Recent Labs   Lab 01/26/24  0311 01/27/24  0453   CREATININE 1.5* 1.3     Serum creatinine: 1.3 mg/dL 01/27/24 0453  Estimated creatinine clearance: 24.8 mL/min    Medication:cefepime dose: 1g frequency q12h will be changed to medication:cefepime dose:1g frequency:q24h    Pharmacist's Name: Rosita Peterson  Pharmacist's Extension: [phone]

## 2024-01-27 NOTE — ASSESSMENT & PLAN NOTE
- 69 WBCs , many bacteria and 9 hyaline casts   - history of MRO UTI   - empiric treatment with IV cefepime while cultures pending   Microbiology Results (last 7 days)       Procedure Component Value Units Date/Time    Clostridium difficile EIA [[id number]]     Order Status: No result Specimen: Stool     Urine culture [[id number]]  (Abnormal) Collected: 01/26/24 0550    Order Status: Completed Specimen: Urine Updated: 01/27/24 0929     Urine Culture, Routine GRAM NEGATIVE LYDIA  > 100,000 cfu/ml  Identification and susceptibility pending      Narrative:      Specimen Source->Urine    Clostridium difficile EIA [[id number]]     Order Status: Canceled Specimen: Stool     Stool culture [[id number]]     Order Status: No result Specimen: Stool     Influenza A & B by Molecular [[id number]] Collected: 01/26/24 0505    Order Status: Completed Specimen: Nasopharyngeal Swab Updated: 01/26/24 0605     Influenza A, Molecular NEGATIVE     Influenza B, Molecular NEGATIVE     Flu A & B Source NP           Clinical updates uploaded via [employer] to [employer].     KARYN Hinojosa, [facility]  Social Work   BOD:#[id number]

## 2024-01-27 NOTE — ASSESSMENT & PLAN NOTE
- controlled with synthroid  Lab Results   Component Value Date    TSH 3.962 01/26/2024    Y9GQIFN 46 (L) 01/27/2024    M1FYPBJ 6.7 01/27/2024    FREET4 0.91 10/21/2022   - cont home synthroid

## 2024-01-28 LAB
ANION GAP SERPL CALC-SCNC: 8 MMOL/L (ref 8–16)
BASOPHILS # BLD AUTO: 0.02 K/UL (ref 0–0.2)
BASOPHILS NFR BLD: 0.4 % (ref 0–1.9)
BUN SERPL-MCNC: 26 MG/DL (ref 10–30)
CALCIUM SERPL-MCNC: 9 MG/DL (ref 8.7–10.5)
CHLORIDE SERPL-SCNC: 110 MMOL/L (ref 95–110)
CO2 SERPL-SCNC: 20 MMOL/L (ref 23–29)
CREAT SERPL-MCNC: 1.3 MG/DL (ref 0.5–1.4)
CRYPTOSP AG STL QL IA: NEGATIVE
DIFFERENTIAL METHOD BLD: ABNORMAL
EOSINOPHIL # BLD AUTO: 0.2 K/UL (ref 0–0.5)
EOSINOPHIL NFR BLD: 4.3 % (ref 0–8)
ERYTHROCYTE [DISTWIDTH] IN BLOOD BY AUTOMATED COUNT: 16.6 % (ref 11.5–14.5)
EST. GFR  (NO RACE VARIABLE): 38.6 ML/MIN/1.73 M^2
G LAMBLIA AG STL QL IA: NEGATIVE
GLUCOSE SERPL-MCNC: 141 MG/DL (ref 70–110)
GLUCOSE SERPL-MCNC: 141 MG/DL (ref 70–110)
GLUCOSE SERPL-MCNC: 91 MG/DL (ref 70–110)
HCT VFR BLD AUTO: 33.8 % (ref 37–48.5)
HGB BLD-MCNC: 10.8 G/DL (ref 12–16)
IMM GRANULOCYTES # BLD AUTO: 0.01 K/UL (ref 0–0.04)
IMM GRANULOCYTES NFR BLD AUTO: 0.2 % (ref 0–0.5)
LYMPHOCYTES # BLD AUTO: 1.7 K/UL (ref 1–4.8)
LYMPHOCYTES NFR BLD: 31.8 % (ref 18–48)
MAGNESIUM SERPL-MCNC: 1.5 MG/DL (ref 1.6–2.6)
MCH RBC QN AUTO: 29 PG (ref 27–31)
MCHC RBC AUTO-ENTMCNC: 32 G/DL (ref 32–36)
MCV RBC AUTO: 91 FL (ref 82–98)
MONOCYTES # BLD AUTO: 0.5 K/UL (ref 0.3–1)
MONOCYTES NFR BLD: 9.8 % (ref 4–15)
NEUTROPHILS # BLD AUTO: 2.8 K/UL (ref 1.8–7.7)
NEUTROPHILS NFR BLD: 53.5 % (ref 38–73)
NRBC BLD-RTO: 0 /100 WBC
PHOSPHATE SERPL-MCNC: 3.5 MG/DL (ref 2.7–4.5)
PLATELET # BLD AUTO: 172 K/UL (ref 150–450)
PMV BLD AUTO: 9.7 FL (ref 9.2–12.9)
POCT GLUCOSE: 105 MG/DL (ref 70–110)
POCT GLUCOSE: 147 MG/DL (ref 70–110)
POCT GLUCOSE: 83 MG/DL (ref 70–110)
POCT GLUCOSE: 86 MG/DL (ref 70–110)
POTASSIUM SERPL-SCNC: 3.8 MMOL/L (ref 3.5–5.1)
RBC # BLD AUTO: 3.73 M/UL (ref 4–5.4)
SODIUM SERPL-SCNC: 138 MMOL/L (ref 136–145)
WBC # BLD AUTO: 5.29 K/UL (ref 3.9–12.7)

## 2024-01-28 PROCEDURE — 63600175 PHARM REV CODE 636 W HCPCS: Performed by: HOSPITALIST

## 2024-01-28 PROCEDURE — 85025 COMPLETE CBC W/AUTO DIFF WBC: CPT

## 2024-01-28 PROCEDURE — 25000003 PHARM REV CODE 250: Performed by: HOSPITALIST

## 2024-01-28 PROCEDURE — 21400001 HC TELEMETRY ROOM

## 2024-01-28 PROCEDURE — 27000207 HC ISOLATION

## 2024-01-28 PROCEDURE — 80048 BASIC METABOLIC PNL TOTAL CA: CPT

## 2024-01-28 PROCEDURE — 83735 ASSAY OF MAGNESIUM: CPT

## 2024-01-28 PROCEDURE — 36415 COLL VENOUS BLD VENIPUNCTURE: CPT

## 2024-01-28 PROCEDURE — 25000003 PHARM REV CODE 250

## 2024-01-28 PROCEDURE — 84100 ASSAY OF PHOSPHORUS: CPT

## 2024-01-28 RX ORDER — SODIUM CHLORIDE 9 MG/ML
INJECTION, SOLUTION INTRAVENOUS CONTINUOUS
Status: ACTIVE | OUTPATIENT
Start: 2024-01-28 | End: 2024-01-28

## 2024-01-28 RX ADMIN — GABAPENTIN 300 MG: 300 CAPSULE ORAL at 09:01

## 2024-01-28 RX ADMIN — QUETIAPINE FUMARATE 75 MG: 25 TABLET ORAL at 08:01

## 2024-01-28 RX ADMIN — ATORVASTATIN CALCIUM 80 MG: 40 TABLET, FILM COATED ORAL at 09:01

## 2024-01-28 RX ADMIN — APIXABAN 2.5 MG: 2.5 TABLET, FILM COATED ORAL at 09:01

## 2024-01-28 RX ADMIN — APIXABAN 2.5 MG: 2.5 TABLET, FILM COATED ORAL at 08:01

## 2024-01-28 RX ADMIN — GABAPENTIN 300 MG: 300 CAPSULE ORAL at 08:01

## 2024-01-28 RX ADMIN — CARVEDILOL 3.12 MG: 3.12 TABLET, FILM COATED ORAL at 09:01

## 2024-01-28 RX ADMIN — SODIUM CHLORIDE: 9 INJECTION, SOLUTION INTRAVENOUS at 05:01

## 2024-01-28 RX ADMIN — CEFEPIME 1 G: 1 INJECTION, POWDER, FOR SOLUTION INTRAMUSCULAR; INTRAVENOUS at 09:01

## 2024-01-28 RX ADMIN — ASPIRIN 81 MG: 81 TABLET, COATED ORAL at 09:01

## 2024-01-28 RX ADMIN — CARVEDILOL 3.12 MG: 3.12 TABLET, FILM COATED ORAL at 08:01

## 2024-01-28 NOTE — PLAN OF CARE
Problem: Adult Inpatient Plan of Care  Goal: Plan of Care Review  Outcome: Ongoing, Progressing  Flowsheets (Taken 1/28/2024 0456)  Plan of Care Reviewed With: patient  Goal: Patient-Specific Goal (Individualized)  Outcome: Ongoing, Progressing  Goal: Absence of Hospital-Acquired Illness or Injury  Outcome: Ongoing, Progressing  Intervention: Identify and Manage Fall Risk  Flowsheets (Taken 1/28/2024 0456)  Safety Promotion/Fall Prevention:   bed alarm set   side rails raised x 3  Intervention: Prevent Skin Injury  Flowsheets (Taken 1/28/2024 0456)  Body Position: right  Skin Protection: skin-to-skin areas padded  Goal: Optimal Comfort and Wellbeing  Outcome: Ongoing, Progressing  Goal: Readiness for Transition of Care  Outcome: Ongoing, Progressing     Problem: Diabetes Comorbidity  Goal: Blood Glucose Level Within Targeted Range  Outcome: Ongoing, Progressing  Intervention: Monitor and Manage Glycemia  Flowsheets (Taken 1/28/2024 0456)  Glycemic Management: blood glucose monitored     Problem: Fluid and Electrolyte Imbalance (Acute Kidney Injury/Impairment)  Goal: Fluid and Electrolyte Balance  Outcome: Ongoing, Progressing  Intervention: Monitor and Manage Fluid and Electrolyte Balance  Flowsheets (Taken 1/28/2024 0456)  Fluid/Electrolyte Management: fluids provided     Problem: Oral Intake Inadequate (Acute Kidney Injury/Impairment)  Goal: Optimal Nutrition Intake  Outcome: Ongoing, Progressing  Intervention: Promote and Optimize Nutrition  Flowsheets (Taken 1/28/2024 0456)  Oral Nutrition Promotion:   social interaction promoted   safe use of adaptive equipment encouraged     Problem: Renal Function Impairment (Acute Kidney Injury/Impairment)  Goal: Effective Renal Function  Outcome: Ongoing, Progressing  Intervention: Monitor and Support Renal Function  Flowsheets (Taken 1/28/2024 0456)  Stabilization Measures: legs elevated  Medication Review/Management: medications reviewed     Problem: Fall Injury  Risk  Goal: Absence of Fall and Fall-Related Injury  Outcome: Ongoing, Progressing  Intervention: Identify and Manage Contributors  Flowsheets (Taken 1/28/2024 0456)  Self-Care Promotion: BADL personal objects within reach  Medication Review/Management: medications reviewed  Intervention: Promote Injury-Free Environment  Flowsheets (Taken 1/28/2024 0456)  Safety Promotion/Fall Prevention:   bed alarm set   side rails raised x 3     Problem: Skin Injury Risk Increased  Goal: Skin Health and Integrity  Outcome: Ongoing, Progressing  Intervention: Optimize Skin Protection  Flowsheets (Taken 1/28/2024 0456)  Pressure Reduction Techniques:   frequent weight shift encouraged   sit time limited to 2 hours  Pressure Reduction Devices: positioning supports utilized  Skin Protection: skin-to-skin areas padded  Head of Bed (HOB) Positioning: HOB at 30-45 degrees  Intervention: Promote and Optimize Oral Intake  Flowsheets (Taken 1/28/2024 0456)  Oral Nutrition Promotion:   social interaction promoted   safe use of adaptive equipment encouraged     Problem: Infection  Goal: Absence of Infection Signs and Symptoms  Outcome: Ongoing, Progressing

## 2024-01-28 NOTE — ASSESSMENT & PLAN NOTE
Patient's anemia is currently controlled. Has not received any PRBCs to date. Etiology likely d/t chronic disease due to Chronic Kidney Disease/ESRD  Current CBC reviewed-   Lab Results   Component Value Date    HGB 10.8 (L) 01/28/2024    HCT 33.8 (L) 01/28/2024     Monitor serial CBC and transfuse if patient becomes hemodynamically unstable, symptomatic or H/H drops below 7/21.

## 2024-01-28 NOTE — PROGRESS NOTES
"[facility] - [facility] ([place] [address])  [facility] Medicine  Progress Note    Patient Name: Quin Linn  MRN: [id number]  Patient Class: IP- Inpatient   Admission Date: 1/26/2024  Length of Stay: 2 days  Attending Physician: Rosita Galarza MD  Primary Care Provider: Mary Ellen Nesbitt MD        Subjective:     Principal Problem:Episode of transient neurologic symptoms        HPI:  Quin Linn is a 89 y.o. female with Alzheimer's disease, HTN, HLD, T2DM, CKD stage 3, chronic back pain, hypothyroidism being admitted to hospital medicine for management of syncope and acute cystitis. Pt is poor historian, history provider per ED HPI and daughter via phone. Patient's daughter reports she had a sudden episode of uncontrolled, watery diarrhea last night. She was moved to the bathroom but was not having a bowel movement when she had an episode of "starting off" and non-responsiveness lasting 30-45 minutes. Reports EMS arrives and was unable to arouse patient to verbal or tactile stimuli, she eventually began responding en route to the hospital. She reports familiarity of symptoms that started over the past 2 months, no notable associated symptoms, however the episodes have gotten longer in duration. No alleviating or aggravating factors. She is compliant with all outpatient medications.    Of note recent admission for syncope in December. Workup was completed and unremarkable at that time.       In ED: mildly hypotensive, vitals otherwise stable. CBC unremarkable. CMP with slight SPARKLE, creatinine 1.5 (baseline 1.2). troponin, TSH and procal WNL. UA infectious with 69 WBC and many bacteria, no antibiotics started. CT head negative for acute intracranial abnormalities. CXR negative for effusion or pneumonia. Flu/COVID/RSV negative. Admitted to the care of medicine for further management.     Overview/Hospital Course:  Quin Linn was admitted to hospital medicine for management of transient neurological episodes. EEG done, " revealed mild generalized non-specific cerebral dysfunction with no electrographic seizures or indications of seizure tendency. Urinalysis infectious, started on IV cefepime due to history of resistant organisms. Urine culture growing pan-sensitive klebsiella, completed three days of antibiotics while inpatient. Noted to have profuse watery diarrhea prior to and during admission, stool studies collected and remained negative.     Interval History:  NAEON. AFVSS.  Patient evaluated at bedside. Sleeping but easily woken. Oriented to person and place, baseline. Denies pain. Ucx growing pan sensitive klebsiella, will complete 3 days of antibiotics inpatient. Referral to outpatient neurology made for further workup up these episodes.  Plan as above explained to daughter Janae in detail via phone call.     Review of Systems   Unable to perform ROS: Dementia     Objective:     Vital Signs (Most Recent):  Temp: 97.8 °F (36.6 °C) (01/28/24 1215)  Pulse: (!) 53 (01/28/24 1535)  Resp: 18 (01/28/24 1215)  BP: 118/60 (01/28/24 1215)  SpO2: 100 % (01/28/24 1215) Vital Signs (24h Range):  Temp:  [97.2 °F (36.2 °C)-98.6 °F (37 °C)] 97.8 °F (36.6 °C)  Pulse:  [53-89] 53  Resp:  [16-18] 18  SpO2:  [96 %-100 %] 100 %  BP: (103-135)/(52-61) 118/60     Weight: 68 kg (150 lb)  Body mass index is 24.96 kg/m².    Intake/Output Summary (Last 24 hours) at 1/28/2024 1554  Last data filed at 1/28/2024 1440  Gross per 24 hour   Intake 200 ml   Output --   Net 200 ml         Physical Exam  Vitals and nursing note reviewed.   Constitutional:       General: She is not in acute distress.     Appearance: She is well-developed.   HENT:      Head: Normocephalic and atraumatic.   Eyes:      Extraocular Movements: Extraocular movements intact.   Cardiovascular:      Rate and Rhythm: Normal rate and regular rhythm.      Heart sounds: Normal heart sounds.   Pulmonary:      Effort: Pulmonary effort is normal. No respiratory distress.      Comments:  "Anterior chest CTA  Abdominal:      General: There is no distension.      Tenderness: There is no abdominal tenderness. There is no right CVA tenderness or left CVA tenderness.   Musculoskeletal:         General: No swelling or tenderness. Normal range of motion.   Skin:     General: Skin is warm and dry.      Findings: No rash.   Neurological:      Mental Status: She is alert. Mental status is at baseline. She is disoriented.      Comments: Oriented to self and location only      Significant Labs: All pertinent labs within the past 24 hours have been reviewed.    Significant Imaging: I have reviewed all pertinent imaging results/findings within the past 24 hours.    Assessment/Plan:      * Episode of transient neurologic symptoms  - 30-45min of "staring off" and non-responsiveness per daughter   - denies head trauma, CT negative  - EKG unremarkable  - recent admission for syncope, workup unremarkable at that time  - infectious workup notable for UTI   - klebsiella, s/p 3 days of IV abx   - TTE and pacemaker interrogation ordered  - EEG ordered: mild generalized non-specific cerebral dysfunction with no electrographic seizures or indications of seizure tendency.   - monitor on tele    Acute renal failure superimposed on chronic kidney disease  CKD (chronic kidney disease), stage IV   Lab Results   Component Value Date    CREATININE 1.3 01/28/2024   - 1.5>> 1.3; baseline ~1.2  - suspect dehydration   -  gentle IV rehydration, LR @ 100 mL/Hr x 10 hours with improvement  - monitor with daily BMP     Acute cystitis without hematuria  - 69 WBCs , many bacteria and 9 hyaline casts   - history of MRO UTI   - empiric treatment with IV cefepime while cultures pending   Microbiology Results (last 7 days)       Procedure Component Value Units Date/Time    Urine culture [[id number]]  (Abnormal)  (Susceptibility) Collected: 01/26/24 0513    Order Status: Completed Specimen: Urine Updated: 01/28/24 1317     Urine Culture, Routine " KLEBSIELLA PNEUMONIAE  > 100,000 cfu/ml      Narrative:      Specimen Source->Urine    Stool culture [[id number]] Collected: 01/27/24 1431    Order Status: Completed Specimen: Stool Updated: 01/28/24 1128     Stool Culture Nothing significant to date    Narrative:      add on test stool culture per dr yeison mcdonald  01/27/2024  20:34     E. coli 0157 antigen [[id number]] Collected: 01/27/24 1431    Order Status: No result Specimen: Stool Updated: 01/27/24 2322    Stool culture [[id number]]     Order Status: Completed Specimen: Stool     Clostridium difficile EIA [[id number]]     Order Status: Canceled Specimen: Stool     Clostridium difficile EIA [[id number]]     Order Status: Canceled Specimen: Stool     Stool culture [[id number]]     Order Status: Canceled Specimen: Stool     Influenza A & B by Molecular [[id number]] Collected: 01/26/24 0505    Order Status: Completed Specimen: Nasopharyngeal Swab Updated: 01/26/24 0605     Influenza A, Molecular NEGATIVE     Influenza B, Molecular NEGATIVE     Flu A & B Source NP            Chronic diastolic heart failure  - history of G2DD  - appears euvolemic vs dry   - off CHF pathway   - repeat TTE pending   - continue home carvedilol     History of CVA (cerebrovascular accident)  - cont home ASA, atorvastatin, eliquis    Paroxysmal A-fib  Patient with Paroxysmal (<7 days) atrial fibrillation which is controlled currently with Beta Blocker and Calcium Channel Blocker. Patient is currently in sinus rhythm.UACCW1DWHv Score: 4. Anticoagulation indicated. Anticoagulation done with Eliquis .    - normal sinus rhythm currently  - cont home eliquis    Argelia second degree AV block  - s/p pacemaker  - stable, controlled    Chronic diarrhea  - intermittent in nature   - stool studies pending   - prn antidiarrheals as indicated    Controlled type 2 diabetes mellitus with stage 3 chronic kidney disease, without long-term current use of insulin  - Patient's FSGs are controlled on  current hypoglycemics.   - Last A1c reviewed-   Lab Results   Component Value Date    HGBA1C 5.5 10/19/2023     - Most recent fingerstick glucose reviewed-   Recent Labs   Lab 01/27/24  1747 01/27/24 2035 01/28/24  0821 01/28/24  1242   POCTGLUCOSE 98 93 83 86       - Current correctional scale Low  Maintain anti-hyperglycemic dose as follows-   Antihyperglycemics (From admission, onward)      Start     Stop Route Frequency Ordered    01/26/24 0900  insulin aspart U-100 pen 0-5 Units         -- SubQ Before meals & nightly PRN 01/26/24 0815            Anemia of chronic disease  Patient's anemia is currently controlled. Has not received any PRBCs to date. Etiology likely d/t chronic disease due to Chronic Kidney Disease/ESRD  Current CBC reviewed-   Lab Results   Component Value Date    HGB 10.8 (L) 01/28/2024    HCT 33.8 (L) 01/28/2024     Monitor serial CBC and transfuse if patient becomes hemodynamically unstable, symptomatic or H/H drops below 7/21.    Debility  - chronic  - fall and delirium precautions  - lives with family at home     Late onset Alzheimer's disease with behavioral disturbance  Patient with dementia with likely etiology of alzheimer's dementia. Dementia is moderate. The patient does not have signs of behavioral disturbance. Home dementia medications are Held or Continued: held and replaced with melatonin PRN.. Continue non-pharmacologic interventions to prevent delirium (No VS between 11PM-5AM, activity during day, opening blinds, providing glasses/hearing aids, and up in chair during daytime).     Hyperlipidemia LDL goal <70  - cont home atorvastatin    Hypertension  Chronic, controlled. Latest blood pressure and vitals reviewed-     Temp:  [97.2 °F (36.2 °C)-98.6 °F (37 °C)]   Pulse:  [53-89]   Resp:  [16-18]   BP: (103-135)/(52-61)   SpO2:  [96 %-100 %] .   Home meds for hypertension were reviewed and noted below.   Hypertension Medications               amLODIPine (NORVASC) 10 MG tablet Take  1 tablet (10 mg total) by mouth once daily.    carvediloL (COREG) 3.125 MG tablet Take 1 tablet (3.125 mg total) by mouth 2 (two) times daily.            While in the hospital, will manage blood pressure as follows; Continue home antihypertensive regimen    Will utilize p.r.n. blood pressure medication only if patient's blood pressure greater than 180/110 and she develops symptoms such as worsening chest pain or shortness of breath.    Lumbar spondylosis  - chronic, stable  - cont home gabapentin and norco PRN    Chronic low back pain  Chronic narcotic use  - stable  - cont home gabapentin and norco  - fall precautions     Hearing loss  - chronic  - delirium precautions    Hypothyroidism following radioiodine therapy  - controlled with synthroid  Lab Results   Component Value Date    TSH 3.962 01/26/2024    Y9MCVJC 46 (L) 01/27/2024    H4RMYSZ 6.7 01/27/2024    FREET4 0.91 10/21/2022   - cont home synthroid        VTE Risk Mitigation (From admission, onward)           Ordered     apixaban tablet 2.5 mg  2 times daily         01/26/24 1615     Reason for No Pharmacological VTE Prophylaxis  Once        Question:  Reasons:  Answer:  Already adequately anticoagulated on oral Anticoagulants    01/26/24 0815     IP VTE HIGH RISK PATIENT  Once         01/26/24 0815     Place sequential compression device  Until discontinued         01/26/24 0815                    Discharge Planning   LILI: 1/29/2024     Code Status: Full Code   Is the patient medically ready for discharge?: No    Reason for patient still in hospital (select all that apply): Patient trending condition, Laboratory test, and Treatment         Tangela Aguilar PA-C  Department of Hospital Medicine   [facility] - Med Surg ([place]-[facility])

## 2024-01-28 NOTE — ASSESSMENT & PLAN NOTE
Patient with dementia with likely etiology of alzheimer's dementia. Dementia is moderate. The patient does not have signs of behavioral disturbance. Home dementia medications are Held or Continued: held and replaced with melatonin PRN.. Continue non-pharmacologic interventions to prevent delirium (No VS between 11PM-5AM, activity during day, opening blinds, providing glasses/hearing aids, and up in chair during daytime).

## 2024-01-28 NOTE — ASSESSMENT & PLAN NOTE
CKD (chronic kidney disease), stage IV   Lab Results   Component Value Date    CREATININE 1.3 01/28/2024   - 1.5>> 1.3; baseline ~1.2  - suspect dehydration   -  gentle IV rehydration, LR @ 100 mL/Hr x 10 hours with improvement  - monitor with daily BMP

## 2024-01-28 NOTE — ASSESSMENT & PLAN NOTE
Left message for patient at      Telephone Information:   Mobile [phone]    to schedule procedure.  Patient to return call to Gillian JULIANSade [phone]   [place] [facility] [place] follow if available. Awaiting call back from Soo      RE: Preop EGD date of surgery 7/23/2020   Received: Yesterday   Message Contents   SYLVIA Sanabria RN; Kyleigh Arizmendi and July,     We normally place bariatric orders which the EgD is included.   Gillian just schedules the EGD with Dr De La Torre for 15 minutes, MAC and often she uses Wednesday afternoon slots at [facility] or Monday afternoons [facility] (which is closed) or [facility] which he could do the EGD at.   He does the EGD so when he does his bariatric surgeries he knows what their anatomy is prior to doing the bariatric surgery.     Hopefully this helps.     Thanks   Az    Previous Messages      ----- Message -----   From: July Erickson RN   Sent: 7/2/2020   1:04 PM CDT   To: Jennyfer Guo RN   Subject: RE: Preop EGD date of surgery 7/23/2020           Ernst Bernardo,   I am not sure what Kyleigh needs for this. I asked Missy and she said to ask you....   Pacheco   ----- Message -----   From: Kyleigh Son   Sent: 7/2/2020  12:34 PM CDT   To: July Erickson RN, Preadmit Message Pool    Subject: RE: Preop EGD date of surgery 7/23/2020                Chronic, controlled. Latest blood pressure and vitals reviewed-     Temp:  [97.2 °F (36.2 °C)-98.6 °F (37 °C)]   Pulse:  [53-89]   Resp:  [16-18]   BP: (103-135)/(52-61)   SpO2:  [96 %-100 %] .   Home meds for hypertension were reviewed and noted below.   Hypertension Medications               amLODIPine (NORVASC) 10 MG tablet Take 1 tablet (10 mg total) by mouth once daily.    carvediloL (COREG) 3.125 MG tablet Take 1 tablet (3.125 mg total) by mouth 2 (two) times daily.            While in the hospital, will manage blood pressure as follows; Continue home antihypertensive regimen    Will utilize p.r.n. blood pressure medication only if patient's blood pressure greater than 180/110 and she develops symptoms such as worsening chest pain or shortness of breath.

## 2024-01-28 NOTE — ASSESSMENT & PLAN NOTE
Patient with Paroxysmal (<7 days) atrial fibrillation which is controlled currently with Beta Blocker and Calcium Channel Blocker. Patient is currently in sinus rhythm.EFDPG1AGGg Score: 4. Anticoagulation indicated. Anticoagulation done with Eliquis .    - normal sinus rhythm currently  - cont home eliquis

## 2024-01-28 NOTE — ASSESSMENT & PLAN NOTE
- Patient's FSGs are controlled on current hypoglycemics.   - Last A1c reviewed-   Lab Results   Component Value Date    HGBA1C 5.5 10/19/2023     - Most recent fingerstick glucose reviewed-   Recent Labs   Lab 01/27/24  1747 01/27/24 2035 01/28/24  0821 01/28/24  1242   POCTGLUCOSE 98 93 83 86       - Current correctional scale Low  Maintain anti-hyperglycemic dose as follows-   Antihyperglycemics (From admission, onward)    Start     Stop Route Frequency Ordered    01/26/24 0900  insulin aspart U-100 pen 0-5 Units         -- SubQ Before meals & nightly PRN 01/26/24 0815

## 2024-01-28 NOTE — ASSESSMENT & PLAN NOTE
"- 30-45min of "staring off" and non-responsiveness per daughter   - denies head trauma, CT negative  - EKG unremarkable  - recent admission for syncope, workup unremarkable at that time  - infectious workup notable for UTI   - klebsiella, s/p 3 days of IV abx   - TTE and pacemaker interrogation ordered  - EEG ordered: mild generalized non-specific cerebral dysfunction with no electrographic seizures or indications of seizure tendency.   - monitor on tele  "

## 2024-01-28 NOTE — SUBJECTIVE & OBJECTIVE
Interval History:  NAEON. AFVSS.  Patient evaluated at bedside. Sleeping but easily woken. Oriented to person and place, baseline. Denies pain. Ucx growing pan sensitive klebsiella, will complete 3 days of antibiotics inpatient. Referral to outpatient neurology made for further workup up these episodes.  Plan as above explained to daughter Janae in detail via phone call.     Review of Systems   Unable to perform ROS: Dementia     Objective:     Vital Signs (Most Recent):  Temp: 97.8 °F (36.6 °C) (01/28/24 1215)  Pulse: (!) 53 (01/28/24 1535)  Resp: 18 (01/28/24 1215)  BP: 118/60 (01/28/24 1215)  SpO2: 100 % (01/28/24 1215) Vital Signs (24h Range):  Temp:  [97.2 °F (36.2 °C)-98.6 °F (37 °C)] 97.8 °F (36.6 °C)  Pulse:  [53-89] 53  Resp:  [16-18] 18  SpO2:  [96 %-100 %] 100 %  BP: (103-135)/(52-61) 118/60     Weight: 68 kg (150 lb)  Body mass index is 24.96 kg/m².    Intake/Output Summary (Last 24 hours) at 1/28/2024 1554  Last data filed at 1/28/2024 1440  Gross per 24 hour   Intake 200 ml   Output --   Net 200 ml         Physical Exam  Vitals and nursing note reviewed.   Constitutional:       General: She is not in acute distress.     Appearance: She is well-developed.   HENT:      Head: Normocephalic and atraumatic.   Eyes:      Extraocular Movements: Extraocular movements intact.   Cardiovascular:      Rate and Rhythm: Normal rate and regular rhythm.      Heart sounds: Normal heart sounds.   Pulmonary:      Effort: Pulmonary effort is normal. No respiratory distress.      Comments: Anterior chest CTA  Abdominal:      General: There is no distension.      Tenderness: There is no abdominal tenderness. There is no right CVA tenderness or left CVA tenderness.   Musculoskeletal:         General: No swelling or tenderness. Normal range of motion.   Skin:     General: Skin is warm and dry.      Findings: No rash.   Neurological:      Mental Status: She is alert. Mental status is at baseline. She is disoriented.       Comments: Oriented to self and location only      Significant Labs: All pertinent labs within the past 24 hours have been reviewed.    Significant Imaging: I have reviewed all pertinent imaging results/findings within the past 24 hours.

## 2024-01-28 NOTE — ASSESSMENT & PLAN NOTE
- controlled with synthroid  Lab Results   Component Value Date    TSH 3.962 01/26/2024    [id number] 46 (L) 01/27/2024    R7HUWUM 6.7 01/27/2024    FREET4 0.91 10/21/2022   - cont home synthroid

## 2024-01-28 NOTE — ASSESSMENT & PLAN NOTE
- 69 WBCs , many bacteria and 9 hyaline casts   - history of MRO UTI   - empiric treatment with IV cefepime while cultures pending   Microbiology Results (last 7 days)       Procedure Component Value Units Date/Time    Urine culture [[id number]]  (Abnormal)  (Susceptibility) Collected: 01/26/24 0550    Order Status: Completed Specimen: Urine Updated: 01/28/24 1317     Urine Culture, Routine KLEBSIELLA PNEUMONIAE  > 100,000 cfu/ml      Narrative:      Specimen Source->Urine    Stool culture [[id number]] Collected: 01/27/24 1431    Order Status: Completed Specimen: Stool Updated: 01/28/24 1128     Stool Culture Nothing significant to date    Narrative:      add on test stool culture per dr yeison mcdonald  01/27/2024  20:34     E. coli 0157 antigen [[id number]] Collected: 01/27/24 1431    Order Status: No result Specimen: Stool Updated: 01/27/24 2322    Stool culture [[id number]]     Order Status: Completed Specimen: Stool     Clostridium difficile EIA [[id number]]     Order Status: Canceled Specimen: Stool     Clostridium difficile EIA [[id number]]     Order Status: Canceled Specimen: Stool     Stool culture [[id number]]     Order Status: Canceled Specimen: Stool     Influenza A & B by Molecular [[id number]] Collected: 01/26/24 0505    Order Status: Completed Specimen: Nasopharyngeal Swab Updated: 01/26/24 0605     Influenza A, Molecular NEGATIVE     Influenza B, Molecular NEGATIVE     Flu A & B Source NP

## 2024-01-29 VITALS
TEMPERATURE: 98 F | HEIGHT: 65 IN | OXYGEN SATURATION: 100 % | RESPIRATION RATE: 17 BRPM | BODY MASS INDEX: 24.99 KG/M2 | SYSTOLIC BLOOD PRESSURE: 110 MMHG | DIASTOLIC BLOOD PRESSURE: 62 MMHG | HEART RATE: 78 BPM | WEIGHT: 150 LBS

## 2024-01-29 LAB
ASCENDING AORTA: 3.41 CM
AV INDEX (PROSTH): 1.04
AV MEAN GRADIENT: 2 MMHG
AV PEAK GRADIENT: 4 MMHG
AV VALVE AREA BY VELOCITY RATIO: 3.14 CM²
AV VALVE AREA: 3.21 CM²
AV VELOCITY RATIO: 1.02
BACTERIA STL CULT: NORMAL
BACTERIA UR CULT: ABNORMAL
BSA FOR ECHO PROCEDURE: 1.77 M2
CV ECHO LV RWT: 0.47 CM
DOP CALC AO PEAK VEL: 0.98 M/S
DOP CALC AO VTI: 16.48 CM
DOP CALC LVOT AREA: 3.1 CM2
DOP CALC LVOT DIAMETER: 1.98 CM
DOP CALC LVOT PEAK VEL: 1 M/S
DOP CALC LVOT STROKE VOLUME: 52.84 CM3
DOP CALCLVOT PEAK VEL VTI: 17.17 CM
E COLI SXT1 STL QL IA: NEGATIVE
E COLI SXT2 STL QL IA: NEGATIVE
E WAVE DECELERATION TIME: 309.81 MSEC
E/A RATIO: 0.9
E/E' RATIO: 15.6 M/S
ECHO LV POSTERIOR WALL: 1.1 CM (ref 0.6–1.1)
EJECTION FRACTION: 58 %
FRACTIONAL SHORTENING: 35 % (ref 28–44)
INTERVENTRICULAR SEPTUM: 0.88 CM (ref 0.6–1.1)
LEFT ATRIUM SIZE: 3.12 CM
LEFT INTERNAL DIMENSION IN SYSTOLE: 3.06 CM (ref 2.1–4)
LEFT VENTRICLE DIASTOLIC VOLUME INDEX: 57.79 ML/M2
LEFT VENTRICLE DIASTOLIC VOLUME: 101.14 ML
LEFT VENTRICLE MASS INDEX: 92 G/M2
LEFT VENTRICLE SYSTOLIC VOLUME INDEX: 21 ML/M2
LEFT VENTRICLE SYSTOLIC VOLUME: 36.82 ML
LEFT VENTRICULAR INTERNAL DIMENSION IN DIASTOLE: 4.68 CM (ref 3.5–6)
LEFT VENTRICULAR MASS: 161.1 G
LV LATERAL E/E' RATIO: 13 M/S
LV SEPTAL E/E' RATIO: 19.5 M/S
MV PEAK A VEL: 0.87 M/S
MV PEAK E VEL: 0.78 M/S
MV STENOSIS PRESSURE HALF TIME: 89.84 MS
MV VALVE AREA P 1/2 METHOD: 2.45 CM2
PISA TR MAX VEL: 2.62 M/S
POCT GLUCOSE: 112 MG/DL (ref 70–110)
POCT GLUCOSE: 124 MG/DL (ref 70–110)
POCT GLUCOSE: 128 MG/DL (ref 70–110)
RA PRESSURE ESTIMATED: 8 MMHG
RV AG STL QL IA.RAPID: NEGATIVE
RV TB RVSP: 11 MMHG
SINUS: 2.6 CM
STJ: 3.28 CM
TDI LATERAL: 0.06 M/S
TDI SEPTAL: 0.04 M/S
TDI: 0.05 M/S
TR MAX PG: 27 MMHG
TRICUSPID ANNULAR PLANE SYSTOLIC EXCURSION: 2.36 CM
TV REST PULMONARY ARTERY PRESSURE: 35 MMHG
Z-SCORE OF LEFT VENTRICULAR DIMENSION IN END DIASTOLE: -0.34
Z-SCORE OF LEFT VENTRICULAR DIMENSION IN END SYSTOLE: 0.17

## 2024-01-29 PROCEDURE — 25000003 PHARM REV CODE 250

## 2024-01-29 RX ADMIN — CARVEDILOL 3.12 MG: 3.12 TABLET, FILM COATED ORAL at 09:01

## 2024-01-29 RX ADMIN — ATORVASTATIN CALCIUM 80 MG: 40 TABLET, FILM COATED ORAL at 09:01

## 2024-01-29 RX ADMIN — ASPIRIN 81 MG: 81 TABLET, COATED ORAL at 09:01

## 2024-01-29 RX ADMIN — APIXABAN 2.5 MG: 2.5 TABLET, FILM COATED ORAL at 09:01

## 2024-01-29 RX ADMIN — AMLODIPINE BESYLATE 10 MG: 10 TABLET ORAL at 09:01

## 2024-01-29 RX ADMIN — GABAPENTIN 300 MG: 300 CAPSULE ORAL at 09:01

## 2024-01-29 NOTE — PLAN OF CARE
SW completed assessment w/ daughter Janae. Per daughter, patient is current w/ [facility] and preference is to resume services w/ provider upon dc. SW notified [facility] of orders and patient's LILI of Today.    SW will continue to follow.                STELLA Burgos, LMSW  [facility]  Case Management  Ext. [phone]

## 2024-01-29 NOTE — PLAN OF CARE
[facility] - [facility] Surg ([facility])  Initial Discharge Assessment       Primary Care Provider: Mary Ellen Nesbitt MD    Admission Diagnosis: Loss of consciousness [R40.20]  Syncope [R55]  AV block, 2nd degree [I44.1]  Chest pain [R07.9]    Admission Date: 1/26/2024  Expected Discharge Date: 1/29/2024    Transition of Care Barriers: (P) None    Payor: [facility] MANAGED MCARE / Plan: [employer] MEDICARE ADVANTAGE / Product Type: Medicare Advantage /     Extended Emergency Contact Information  Primary Emergency Contact: Janae Linn  Address: Unknown   [facility]  Home Phone: [phone]  Mobile Phone: [phone]  Relation: Daughter  Preferred language: English  Secondary Emergency Contact: Evan VALDOVINOS   [place]  Mobile Phone: [phone]  Relation: Relative    Discharge Plan A: (P) Home Health  Discharge Plan B: (P) Home with family      [id number]  [place] [facility] [address] [facility] [address]  [facility] [place] [place] [address]  Phone: [phone] Fax: [phone]      Initial Assessment (most recent)       Adult Discharge Assessment - 01/29/24 1142          Discharge Assessment    Assessment Type Discharge Planning Assessment (P)      Confirmed/corrected address, phone number and insurance Yes (P)      Confirmed Demographics Correct on Facesheet (P)      Source of Information family (P)      If unable to respond/provide information was family/caregiver contacted? Yes (P)      Contact Name/Number Janae Linn (Daughter) [phone] (P)      Communicated LILI with patient/caregiver Yes (P)      People in Home child(jose), adult (P)      Do you expect to return to your current living situation? Yes (P)      Do you have help at home or someone to help you manage your care at home? Yes (P)      Who are your caregiver(s) and their phone number(s)? Janae Linn (Daughter) [phone] (P)      Current cognitive status: Not  Oriented to Time;Not Oriented to Place (P)      Walking or Climbing Stairs Difficulty yes (P)      Walking or Climbing Stairs ambulation difficulty, requires equipment;ambulation difficulty, assistance 1 person (P)      Mobility Management walker, wc (P)      Dressing/Bathing Difficulty yes (P)      Dressing/Bathing bathing difficulty, assistance 1 person (P)      Dressing/Bathing Management Private pay CNA - daily (P)      Do you have any problems with: Needs other help;Errands/Grocery (P)      Specify other help Private pay CNA, HH & daughter assist as needed (P)      Home Accessibility wheelchair accessible (P)      Home Layout Able to live on 1st floor (P)      Equipment Currently Used at Home wheelchair;bath bench;walker, rolling (P)      Readmission within 30 days? Yes (P)      Patient currently being followed by outpatient case management? No (P)      Do you currently have service(s) that help you manage your care at home? Yes (P)      Name and Contact number of agency [facility]. 3x wkly & CNA daily (private pay) (P)      Is the pt/caregiver preference to resume services with current agency Yes (P)      Do you take prescription medications? Yes (P)      Do you have prescription coverage? Yes (P)      Coverage [facility] MEDICARE ADVANTAGE - (P)      Do you have any problems affording any of your prescribed medications? No (P)      Is the patient taking medications as prescribed? yes (P)      Who is going to help you get home at discharge? [facility] MEDICARE ADVANTAGE - (P)      How do you get to doctors appointments? family or friend will provide (P)      Are you on dialysis? No (P)      Do you take coumadin? No (P)      Discharge Plan A Home Health (P)      Discharge Plan B Home with family (P)      DME Needed Upon Discharge  none (P)      Discharge Plan discussed with: Adult children (P)      Transition of Care  Barriers None (P)         Physical Activity    On average, how many days per week do you engage in moderate to strenuous exercise (like a brisk walk)? 0 days (P)      On average, how many minutes do you engage in exercise at this level? 0 min (P)         Financial Resource Strain    How hard is it for you to pay for the very basics like food, housing, medical care, and heating? Not very hard (P)         Housing Stability    In the last 12 months, was there a time when you were not able to pay the mortgage or rent on time? No (P)      In the last 12 months, how many places have you lived? 1 (P)      In the last 12 months, was there a time when you did not have a steady place to sleep or slept in a shelter (including now)? No (P)         Transportation Needs    In the past 12 months, has lack of transportation kept you from medical appointments or from getting medications? No (P)      In the past 12 months, has lack of transportation kept you from meetings, work, or from getting things needed for daily living? No (P)         Food Insecurity    Within the past 12 months, you worried that your food would run out before you got the money to buy more. Never true (P)      Within the past 12 months, the food you bought just didn't last and you didn't have money to get more. Never true (P)         Social Connections    In a typical week, how many times do you talk on the phone with family, friends, or neighbors? More than three times a week (P)      How often do you get together with friends or relatives? More than three times a week (P)      How often do you attend Spiritism or Bahai services? Never (P)      Do you belong to any clubs or organizations such as Spiritism groups, unions, fraternal or athletic groups, or school groups? No (P)      Are you , , , , never , or living with a partner?  (P)         Alcohol Use    Q1: How often do you have a drink containing alcohol? Never  (P)      Q2: How many drinks containing alcohol do you have on a typical day when you are drinking? Patient does not drink (P)      Q3: How often do you have six or more drinks on one occasion? Never (P)         OTHER    Name(s) of People in Home Janae Linn (Daughter) [phone] and son-n-law (P)                  Discharge Plan A and Plan B have been determined by review of patient's clinical status, future medical and therapeutic needs, and coverage/benefits for post-acute care in coordination with multidisciplinary team members.                         STELLA Burgos, LMSW  [facility]  Case Management  Ext. [phone]

## 2024-01-29 NOTE — PLAN OF CARE
Problem: Adult Inpatient Plan of Care  Goal: Plan of Care Review  Outcome: Met  Flowsheets (Taken 1/29/2024 1881)  Plan of Care Reviewed With:   patient   family  Goal: Patient-Specific Goal (Individualized)  Outcome: Met  Goal: Absence of Hospital-Acquired Illness or Injury  Outcome: Met  Goal: Optimal Comfort and Wellbeing  Outcome: Met  Goal: Readiness for Transition of Care  Outcome: Met  Pt is AAOX3, pt/family educated on discharge meds and follow up appointment, pt and family voices understanding. Pt transported via wheelchair accompanied by family.

## 2024-01-29 NOTE — PLAN OF CARE
Problem: Adult Inpatient Plan of Care  Goal: Plan of Care Review  Outcome: Ongoing, Progressing  Flowsheets (Taken 1/29/2024 0127)  Plan of Care Reviewed With: patient  Goal: Patient-Specific Goal (Individualized)  Outcome: Ongoing, Progressing  Goal: Optimal Comfort and Wellbeing  Outcome: Ongoing, Progressing  Intervention: Monitor Pain and Promote Comfort  Flowsheets (Taken 1/29/2024 0127)  Pain Management Interventions:   around-the-clock dosing utilized   quiet environment facilitated   relaxation techniques promoted   position adjusted  Intervention: Provide Person-Centered Care  Flowsheets (Taken 1/29/2024 0127)  Trust Relationship/Rapport:   care explained   emotional support provided     Problem: Diabetes Comorbidity  Goal: Blood Glucose Level Within Targeted Range  Outcome: Ongoing, Progressing  Intervention: Monitor and Manage Glycemia  Flowsheets (Taken 1/29/2024 0127)  Glycemic Management:   blood glucose monitored   supplemental insulin given     Problem: Fluid and Electrolyte Imbalance (Acute Kidney Injury/Impairment)  Goal: Fluid and Electrolyte Balance  Outcome: Ongoing, Progressing  Intervention: Monitor and Manage Fluid and Electrolyte Balance  Flowsheets (Taken 1/29/2024 0127)  Fluid/Electrolyte Management: fluids provided     Problem: Oral Intake Inadequate (Acute Kidney Injury/Impairment)  Goal: Optimal Nutrition Intake  Outcome: Ongoing, Progressing     Problem: Renal Function Impairment (Acute Kidney Injury/Impairment)  Goal: Effective Renal Function  Outcome: Ongoing, Progressing     Problem: Fall Injury Risk  Goal: Absence of Fall and Fall-Related Injury  Outcome: Ongoing, Progressing     Problem: Skin Injury Risk Increased  Goal: Skin Health and Integrity  Outcome: Ongoing, Progressing     Problem: Infection  Goal: Absence of Infection Signs and Symptoms  Outcome: Ongoing, Progressing

## 2024-01-29 NOTE — PLAN OF CARE
[facility] - Med Surg ([place] [address])    HOME HEALTH ORDERS  FACE TO FACE ENCOUNTER    Patient Name: Quin Linn  YOB: 1932    PCP: Mary Ellen Nesbitt MD   PCP Address: [address] / [place] [address]  PCP Phone Number: [phone]  PCP Fax: [phone]    Encounter Date: 1/25/24    Admit to Home Health    Diagnoses:  Active Hospital Problems    Diagnosis  POA    *Episode of transient neurologic symptoms [R29.90]  Yes    Acute renal failure superimposed on chronic kidney disease [N17.9, N18.9]  Yes     Priority: 2     Acute cystitis without hematuria [N30.00]  Yes     Priority: 2     Chronic diastolic heart failure [I50.32]  Yes    History of CVA (cerebrovascular accident) [Z86.73]  Not Applicable    Paroxysmal A-fib [I48.0]  Yes    CKD (chronic kidney disease), stage IV [N18.4]  Yes    Pacemaker [Z95.0]  Yes    Wenckebach second degree AV block [I44.1]  Yes    Chronic diarrhea [K52.9]  Yes    Chronic narcotic use [F11.90]  Yes    Controlled type 2 diabetes mellitus with stage 3 chronic kidney disease, without long-term current use of insulin [E11.22, N18.30]  Yes    Anemia of chronic disease [D63.8]  Yes    Debility [R53.81]  Yes    Late onset Alzheimer's disease with behavioral disturbance [G30.1, F02.818]  Yes     Chronic    Hyperlipidemia LDL goal <70 [E78.5]  Yes     Chronic    Hypertension [I10]  Yes    Chronic low back pain [M54.50, G89.29]  Yes    Lumbar spondylosis [M47.816]  Yes    Hypothyroidism following radioiodine therapy [E89.0]  Yes     Chronic    Hearing loss [H91.90]  Yes     Chronic      Resolved Hospital Problems   No resolved problems to display.       Follow Up Appointments:  No future appointments.    Allergies:  Review of patient's allergies indicates:   Allergen Reactions    Tramadol Rash and Edema     Developed facial rash and edema.    Metformin Diarrhea       Medications: Review discharge medications with patient and family and provide education.    Current  Facility-Administered Medications   Medication Dose Route Frequency Provider Last Rate Last Admin    acetaminophen tablet 650 mg  650 mg Oral Q4H PRN Tangela Aguilar PA-C        acetaminophen tablet 650 mg  650 mg Oral Q8H PRN Tangela Aguilar PA-C        albuterol-ipratropium 2.5 mg-0.5 mg/3 mL nebulizer solution 3 mL  3 mL Nebulization Q4H PRN Tangela Aguilar PA-C        aluminum-magnesium hydroxide-simethicone 200-200-20 mg/5 mL suspension 30 mL  30 mL Oral QID PRN Tangela Aguilar PA-C        amLODIPine tablet 10 mg  10 mg Oral Daily Tangela Aguilar PA-C   10 mg at 01/29/24 0938    apixaban tablet 2.5 mg  2.5 mg Oral BID Tangela Aguilar PA-C   2.5 mg at 01/29/24 0939    aspirin EC tablet 81 mg  81 mg Oral Daily Tangela Aguilar PA-C   81 mg at 01/29/24 0938    atorvastatin tablet 80 mg  80 mg Oral Daily Tangela Aguilar PA-C   80 mg at 01/29/24 0938    carvediloL tablet 3.125 mg  3.125 mg Oral BID Tangela Aguilar PA-C   3.125 mg at 01/29/24 0938    dextrose 10% bolus 125 mL 125 mL  12.5 g Intravenous PRN Tangela Aguilar PA-C        dextrose 10% bolus 250 mL 250 mL  25 g Intravenous PRN Tangela Aguilar PA-C        dextrose 40 % gel 16,000 mg  16 g Oral PRN Rosita Galarza MD        dextrose 40 % gel 24,000 mg  24 g Oral PRN Rosita Galarza MD        gabapentin capsule 300 mg  300 mg Oral BID Tangela Aguilar PA-C   300 mg at 01/29/24 0938    glucagon (human recombinant) injection 1 mg  1 mg Intramuscular PRN Tangela Aguilar PA-C        HYDROcodone-acetaminophen 5-325 mg per tablet 1 tablet  1 tablet Oral Q8H PRN Tangela Aguilar PA-C        insulin aspart U-100 pen 0-5 Units  0-5 Units Subcutaneous QID (AC + HS) PRN Tangela Aguilar PA-C        levothyroxine tablet 75 mcg  75 mcg Oral Before breakfast Tangela Aguilar PA-C   75 mcg at 01/27/24 0604    melatonin tablet 6 mg  6 mg Oral Nightly PRN Tangela Aguilar PA-C        naloxone 0.4 mg/mL  injection 0.02 mg  0.02 mg Intravenous PRN Tangela Aguilar PA-C        ondansetron disintegrating tablet 8 mg  8 mg Oral Q8H PRN Tangela Aguilar PA-C        ondansetron injection 4 mg  4 mg Intravenous Q8H PRN Tangela Aguilar PA-C        QUEtiapine tablet 75 mg  75 mg Oral QHS Tangela Aguilar PA-C   75 mg at 01/28/24 2049    sodium chloride 0.9% flush 10 mL  10 mL Intravenous Q12H PRN Tangela Aguilar PA-C         Current Discharge Medication List        CONTINUE these medications which have NOT CHANGED    Details   ACCU-CHEK FASTCLIX LANCET DRUM Misc CHECK BLOOD GLUCOSE FOUR TIMES DAILY  Qty: 408 each, Refills: 3    Associated Diagnoses: Type 2 diabetes mellitus with stage 2 chronic kidney disease, without long-term current use of insulin      ACCU-CHEK GUIDE TEST STRIPS Strp TEST BLOOD GLUCOSE FOUR TIMES DAILY OR AS DIRECTED  Qty: 400 strip, Refills: 3    Associated Diagnoses: Type 2 diabetes mellitus with diabetic polyneuropathy, without long-term current use of insulin      amLODIPine (NORVASC) 10 MG tablet Take 1 tablet (10 mg total) by mouth once daily.  Qty: 90 tablet, Refills: 3    Comments: .      apixaban (ELIQUIS) 2.5 mg Tab Take 1 tablet (2.5 mg total) by mouth 2 (two) times daily.  Qty: 30 tablet, Refills: 0      aspirin (ECOTRIN) 81 MG EC tablet Take 1 tablet (81 mg total) by mouth once daily.  Refills: 0    Comments: Hold until follow up with PCP      atorvastatin (LIPITOR) 80 MG tablet Take 1 tablet (80 mg total) by mouth once daily.  Qty: 90 tablet, Refills: 3      calcium-vitamin D3 (CALCIUM 500 + D) 500 mg-5 mcg (200 unit) per tablet Take 1 tablet by mouth 2 (two) times daily with meals.  Qty: 60 tablet, Refills: 11      carvediloL (COREG) 3.125 MG tablet Take 1 tablet (3.125 mg total) by mouth 2 (two) times daily.  Qty: 60 tablet, Refills: 3    Comments: .  Associated Diagnoses: Chronic diastolic heart failure      gabapentin (NEURONTIN) 300 MG capsule Take 1 capsule (300 mg  total) by mouth 2 (two) times daily.  Qty: 180 capsule, Refills: 3      HYDROcodone-acetaminophen (NORCO) 5-325 mg per tablet Take 1 tablet by mouth every 8 (eight) hours as needed for Pain.  Qty: 90 tablet, Refills: 0    Comments: Quantity prescribed more than 7 day supply? Yes, quantity medically necessary      levothyroxine (SYNTHROID) 75 MCG tablet Take 1 tablet (75 mcg total) by mouth before breakfast. Administer on an empty stomach at least 30 minutes before food or other medications.  Qty: 90 tablet, Refills: 2      polyethylene glycol (GLYCOLAX) 17 gram PwPk Take 17 g by mouth once daily.  Refills: 0      QUEtiapine (SEROQUEL) 25 MG Tab Take 3 tablets (75 mg total) by mouth every evening.  Qty: 90 tablet, Refills: 11      senna-docusate 8.6-50 mg (PERICOLACE) 8.6-50 mg per tablet Take 1 tablet by mouth 2 (two) times daily as needed for Constipation.               I have seen and examined this patient within the last 30 days. My clinical findings that support the need for the home health skilled services and home bound status are the following:no   Weakness/numbness causing balance and gait disturbance due to Weakness/Debility and Alzheimer's making it taxing to leave home.     Diet:   cardiac diet and soft diet      Referrals/ Consults  Physical Therapy to evaluate and treat. Evaluate for home safety and equipment needs; Establish/upgrade home exercise program. Perform / instruct on therapeutic exercises, gait training, transfer training, and Range of Motion.  Occupational Therapy to evaluate and treat. Evaluate home environment for safety and equipment needs. Perform/Instruct on transfers, ADL training, ROM, and therapeutic exercises.  Speech Therapy  to evaluate and treat for  Language, Swallowing, and Cognition.   to evaluate for community resources/long-range planning.  Aide to provide assistance with personal care, ADLs, and vital signs.    Activities:   activity as tolerated    Nursing:    Agency to admit patient within 24 hours of hospital discharge unless specified on physician order or at patient request    SN to complete comprehensive assessment including routine vital signs. Instruct on disease process and s/s of complications to report to MD. Review/verify medication list sent home with the patient at time of discharge  and instruct patient/caregiver as needed. Frequency may be adjusted depending on start of care date.     Skilled nurse to perform up to 3 visits PRN for symptoms related to diagnosis    Notify MD if SBP > 160 or < 90; DBP > 90 or < 50; HR > 120 or < 50; Temp > 101; O2 < 88%; Other:       Ok to schedule additional visits based on staff availability and patient request on consecutive days within the home health episode.    When multiple disciplines ordered:    Start of Care occurs on Sunday - Wednesday schedule remaining discipline evaluations as ordered on separate consecutive days following the start of care.    Thursday SOC -schedule subsequent evaluations Friday and Monday the following week.     Friday - Saturday SOC - schedule subsequent discipline evaluations on consecutive days starting Monday of the following week.    For all post-discharge communication and subsequent orders please contact patient's primary care physician. If unable to reach primary care physician or do not receive response within 30 minutes, please contact PCP for clinical staff order clarification    Miscellaneous   Routine Skin for Bedridden Patients: Instruct patient/caregiver to apply moisture barrier cream to all skin folds and wet areas in perineal area daily and after baths and all bowel movements.    Home Health Aide:  Nursing Three times weekly, Physical Therapy Three times weekly, Occupational Therapy Three times weekly, Speech Language Pathology Three times weekly, Medical Social Work Three times weekly, and Home Health Aide Three times weekly    Wound Care Orders  no    I certify that this  patient is confined to her home and needs intermittent skilled nursing care, physical therapy, speech therapy, and occupational therapy.      Tangela Aguilar PA-C  Hospital Medicine [facility] [address]

## 2024-01-30 NOTE — DISCHARGE SUMMARY
"[facility] - [facility] ([place] [address])  [facility] Medicine  Discharge Summary      Patient Name: Quin Linn  MRN: [id number]  APOLLO: [id number]  Patient Class: IP- Inpatient  Admission Date: 1/26/2024  Hospital Length of Stay: 3 days  Discharge Date and Time: 1/29/2024  5:47 PM  Attending Physician: Amy att. providers found   Discharging Provider: Tangela Aguilar PA-C  Primary Care Provider: Mary Ellen Nesbitt MD  [facility] Medicine Team: [facility] MED F Tangela Aguilar PA-C  Primary Care Team: [facility] MED F    HPI:   Quin Linn is a 89 y.o. female with Alzheimer's disease, HTN, HLD, T2DM, CKD stage 3, chronic back pain, hypothyroidism being admitted to hospital medicine for management of syncope and acute cystitis. Pt is poor historian, history provider per ED HPI and daughter via phone. Patient's daughter reports she had a sudden episode of uncontrolled, watery diarrhea last night. She was moved to the bathroom but was not having a bowel movement when she had an episode of "starting off" and non-responsiveness lasting 30-45 minutes. Reports EMS arrives and was unable to arouse patient to verbal or tactile stimuli, she eventually began responding en route to the hospital. She reports familiarity of symptoms that started over the past 2 months, no notable associated symptoms, however the episodes have gotten longer in duration. No alleviating or aggravating factors. She is compliant with all outpatient medications.    Of note recent admission for syncope in December. Workup was completed and unremarkable at that time.       In ED: mildly hypotensive, vitals otherwise stable. CBC unremarkable. CMP with slight SPARKLE, creatinine 1.5 (baseline 1.2). troponin, TSH and procal WNL. UA infectious with 69 WBC and many bacteria, no antibiotics started. CT head negative for acute intracranial abnormalities. CXR negative for effusion or pneumonia. Flu/COVID/RSV negative. Admitted to the care of medicine for further management. " Alert Team  Full consult not done; pt has been too somnolent and is now being moved to inpt medical floor.  I have included here the information I gathered from her chart while waiting to see her.  I have entered the IP Consult to Psychiatry so pt's SA can be addressed upstairs.  We had the PAR update her facesheet with emergency contacts.        CHIEF COMPLAINT/PRESENTING ISSUE    Chief Complaint   Patient presents with   • Suicidal Ideation   • Drug Overdose     estimated seroquel 800 mg and fluoxetine 350 mg   • Laceration     bilateral wrist - superficial wounds, no bleeding.    • ALOC      Suicide attempt by OD; self harm/gesturing with wrist wounds.  BIBA from home at 10am.  Per EMS, pt texted friend at 6am she was going to OD.  Combative on scene, medicated PTA.  Poison control recommended 4hr observation and labs to medically clear.  Pt unable to assessed upon arrival d/t AMS; disoriented.        Of note, from chart review:  First encounters in this EMR from 2004; pt was 2.  General medical encounters noted til 2018; no psych hx or meds.  2018- depression noted in PMH and Celexa in home meds.  2019: home meds include buspar, seroquel, lamictal, ritalin.  PMH noted to also include ADD and Anxiety.       Past Current    Suicidal Thoughts: []  [x]    Suicidal Plans: []  [x]    Suicidal Intent: []  [x]    Suicide Attempts: []  [x]    Self-Injury []  []      For any boxes checked above, provide detail: SA today.      Collateral information: past EMR  Source:  [] Significant other present in person:   [] Significant other by telephone  [] [facility]   [] [facility] Nursing Staff  [x] [facility]     CLINICAL IMPRESSIONS:  Primary:  SA  Secondary:  Depression/Anxiety       IDENTIFIED NEEDS/PLAN:  [Trigger DISPOSITION list for any items marked]    [x]  Imminent safety risk - self [] Imminent safety risk - others   []  Acute substance withdrawal []  Psychosis/Impaired reality testing   [x]       * No surgery found *      Hospital Course:   Quin Linn was admitted to hospital medicine for management of transient neurological episodes. EEG done, revealed mild generalized non-specific cerebral dysfunction with no electrographic seizures or indications of seizure tendency. Urinalysis infectious, started on IV cefepime due to history of resistant organisms. Urine culture growing pan-sensitive klebsiella, completed three days of antibiotics while inpatient. Noted to have profuse watery diarrhea prior to and during admission, stool studies collected and remained negative. Given referral to outpatient neurology for further workup. Home health orders updated prior to discharge.    On day of discharge patient's vital signs were stable and patient appeared clinically ready for discharge. Hospital course, discharge plan and return precautions discussed - patient expressed understanding. All questions answered at that time.      Goals of Care Treatment Preferences:  Code Status: Full Code    Living Will: Yes              Consults:     No new Assessment & Plan notes have been filed under this hospital service since the last note was generated.  Service: Hospital Medicine    Final Active Diagnoses:    Diagnosis Date Noted POA    PRINCIPAL PROBLEM:  Episode of transient neurologic symptoms [R29.90] 09/19/2015 Yes    Acute renal failure superimposed on chronic kidney disease [N17.9, N18.9] 09/10/2023 Yes    Acute cystitis without hematuria [N30.00] 09/16/2017 Yes    Chronic diastolic heart failure [I50.32] 12/09/2023 Yes    History of CVA (cerebrovascular accident) [Z86.73] 10/19/2023 Not Applicable    Paroxysmal A-fib [I48.0] 08/27/2023 Yes    CKD (chronic kidney disease), stage IV [N18.4] 07/16/2023 Yes    Pacemaker [Z95.0] 10/28/2022 Yes    Wenckebach second degree AV block [I44.1] 10/02/2022 Yes    Chronic diarrhea [K52.9] 03/11/2019 Yes    Chronic narcotic use [F11.90] 11/14/2018 Yes    Controlled type 2  Mood/anxiety []  Substance use/Addictive behavior   []  Maladaptive behaviro []  Parent/child conflict   []  Family/Couples conflict []  Biomedical   []  Housing []  Financial   []   Legal  Occupational/Educational   []  Domestic violence []  Other:     Recommended Plan of Care:  Actively being addressed by Legal Hold and [facility] and 1:1 Observation   While pt scores Moderate on [place] screening, I advised she should have a 1:1 sitter d/t having SA today.                   diabetes mellitus with stage 3 chronic kidney disease, without long-term current use of insulin [E11.22, N18.30] 01/11/2018 Yes    Anemia of chronic disease [D63.8] 05/30/2017 Yes    Syncope [R55] 01/20/2017 Yes    Debility [R53.81] 12/07/2016 Yes    Late onset Alzheimer's disease with behavioral disturbance [G30.1, F02.818]  Yes     Chronic    Hyperlipidemia LDL goal <70 [E78.5] 06/22/2015 Yes     Chronic    Hypertension [I10] 04/03/2013 Yes    Chronic low back pain [M54.50, G89.29] 02/18/2013 Yes    Lumbar spondylosis [M47.816] 02/18/2013 Yes    Hypothyroidism following radioiodine therapy [E89.0] 09/26/2012 Yes     Chronic    Hearing loss [H91.90] 09/26/2012 Yes     Chronic      Problems Resolved During this Admission:       Discharged Condition: stable    Disposition: Home-Health Care [facility]    Follow Up:   Follow-up Information       Mary Ellen Nesbitt MD Follow up.    Specialty: Internal Medicine  Contact information:  [facility] [address]  [phone]                           Patient Instructions:      Ambulatory referral/consult to Neurology   Standing Status: Future   Referral Priority: Urgent Referral Type: Consultation   Referral Reason: Specialty Services Required   Referred to Provider: NAHUM SCHRADER Requested Specialty: Neurology   Number of Visits Requested: 1     Diet Cardiac     Notify your health care provider if you experience any of the following:  temperature >100.4     Notify your health care provider if you experience any of the following:  persistent nausea and vomiting or diarrhea     Notify your health care provider if you experience any of the following:  redness, tenderness, or signs of infection (pain, swelling, redness, odor or green/yellow discharge around incision site)     Notify your health care provider if you experience any of the following:  difficulty breathing or increased cough     Notify your health care provider if you experience any of the following:   severe persistent headache     Notify your health care provider if you experience any of the following:  worsening rash     Notify your health care provider if you experience any of the following:  persistent dizziness, light-headedness, or visual disturbances     Notify your health care provider if you experience any of the following:  increased confusion or weakness     Activity as tolerated       Significant Diagnostic Studies:   Lab Results   Component Value Date    WBC 5.29 01/28/2024    HGB 10.8 (L) 01/28/2024    HCT 33.8 (L) 01/28/2024    MCV 91 01/28/2024     01/28/2024       BMP  Lab Results   Component Value Date     01/28/2024    K 3.8 01/28/2024     01/28/2024    CO2 20 (L) 01/28/2024    BUN 26 01/28/2024    CREATININE 1.3 01/28/2024    CALCIUM 9.0 01/28/2024    ANIONGAP 8 01/28/2024    EGFRNORACEVR 38.6 (A) 01/28/2024     Microbiology Results (last 7 days)       Procedure Component Value Units Date/Time    Urine culture [[id number]]  (Abnormal)  (Susceptibility) Collected: 01/26/24 0550    Order Status: Completed Specimen: Urine Updated: 01/29/24 1023     Urine Culture, Routine KLEBSIELLA PNEUMONIAE  > 100,000 cfu/ml      Narrative:      Specimen Source->Urine    Stool culture [[id number]] Collected: 01/27/24 1431    Order Status: Completed Specimen: Stool Updated: 01/29/24 1002     Stool Culture No Salmonella,Shigella,Vibrio,Campylobacter,Yersinia isolated.    Narrative:      add on test stool culture per dr yeison mcdonald  01/27/2024  20:34     E. coli 0157 antigen [[id number]] Collected: 01/27/24 1431    Order Status: Completed Specimen: Stool Updated: 01/29/24 0333     Shiga Toxin 1 E.coli Negative     Shiga Toxin 2 E.coli Negative    Narrative:      add on test stool culture per dr yeison mcdonald  01/27/2024  20:34     Stool culture [[id number]]     Order Status: Completed Specimen: Stool     Clostridium difficile EIA [[id number]]     Order Status: Canceled Specimen: Stool      Clostridium difficile EIA [[id number]]     Order Status: Canceled Specimen: Stool     Stool culture [[id number]]     Order Status: Canceled Specimen: Stool     Influenza A & B by Molecular [[id number]] Collected: 01/26/24 0505    Order Status: Completed Specimen: Nasopharyngeal Swab Updated: 01/26/24 0605     Influenza A, Molecular NEGATIVE     Influenza B, Molecular NEGATIVE     Flu A & B Source NP          Imaging Results              CT Head Without Contrast (Final result)  Result time 01/26/24 05:53:24      Final result by Alberto Curtis MD (01/26/24 05:53:24)                   Impression:      No acute intracranial hemorrhage or displaced calvarial fracture.    No evidence of recent major vascular territory infarct.    Electronically signed by resident: Blake Mohr  Date:    01/26/2024  Time:    03:53    Electronically signed by: Alberto Curtis  Date:    01/26/2024  Time:    05:53               Narrative:    EXAMINATION:  CT HEAD WITHOUT CONTRAST    CLINICAL HISTORY:  Syncope, recurrent;    TECHNIQUE:  Low dose axial CT images obtained throughout the head without intravenous contrast. Sagittal and coronal reconstructions were performed.    COMPARISON:  CT head 12/27/2023    FINDINGS:  Intracranial compartment:    Generalized cerebral volume loss.    Pre-existing prominence of the ventricles, cisterns, fissures, and sulci, thought to be in portion to the degree of pre-existing brain parenchymal volume loss.    No extra-axial blood or fluid collections.    Patchy and confluent supratentorial white matter hypoattenuation compatible with chronic microvascular ischemic change.  No parenchymal mass effect, hemorrhage, edema or recent major vascular distribution infarct.    Skull/extracranial contents (limited evaluation): Extensive skull base atherosclerotic calcification.  No displaced calvarial fracture.  Mastoid air cells are essentially clear.  Mild paranasal sinus mucosal thickening.  Small of aerated  secretions in the left sphenoid sinus.                                       X-Ray Chest AP Portable (Final result)  Result time 01/26/24 07:12:51      Final result by Alberto Curtis MD (01/26/24 07:12:51)                   Impression:      Probable CHF exacerbation with pulmonary venous hypertension and mild interstitial pulmonary edema.  Correlate clinically.    Indwelling electronic device, possibly a leadless intracardiac pacemaker.  Correlate clinically.    Scattered cervical, intrathoracic, and abdominal atherosclerotic calcifications.      Electronically signed by: Alberto Curtis  Date:    01/26/2024  Time:    07:12               Narrative:    EXAMINATION:  XR CHEST AP PORTABLE    CLINICAL HISTORY:  Chest Pain;    TECHNIQUE:  Single frontal view of the chest was performed.    COMPARISON:  Portable chest x-ray 12/02/2023    FINDINGS:  Increased central pulmonary vascular congestion and mild interstitial pulmonary changes.    Pre-existing borderline enlargement of the cardiopericardial silhouette.  An electronic device projecting over the inferior aspect of the left side of the heart possibly represents a leadless intracardiac pacemaker.    Left hemidiaphragm remains indistinct, a finding which may be associated with atelectasis, consolidation, or other abnormality in the adjacent left lung base; left pleural fluid not excluded.    No discrete pneumothorax.    Midline thoracic trachea.    Pre-existing aortic arch atherosclerotic calcification.    Scattered degenerative changes in the visualized skeleton    Bilateral cervical soft tissue calcifications, likely carotid atherosclerotic calcification.    Linear calcification projecting to the left of the upper lumbar spine is possibly vascular.                                      Pending Diagnostic Studies:       Procedure Component Value Units Date/Time    Calprotectin, Stool [[id number]] Collected: 01/27/24 1431    Order Status: Sent Lab Status: In process  Updated: 01/27/24 1755    Specimen: Stool     H. pylori antigen, stool [[id number]] Collected: 01/27/24 1431    Order Status: Sent Lab Status: In process Updated: 01/27/24 1754    Specimen: Stool            Medications:  Reconciled Home Medications:      Medication List        CONTINUE taking these medications      ACCU-CHEK FASTCLIX LANCET DRUM Misc  Generic drug: lancets  CHECK BLOOD GLUCOSE FOUR TIMES DAILY     ACCU-CHEK GUIDE TEST STRIPS Strp  Generic drug: blood sugar diagnostic  TEST BLOOD GLUCOSE FOUR TIMES DAILY OR AS DIRECTED     amLODIPine 10 MG tablet  Commonly known as: NORVASC  Take 1 tablet (10 mg total) by mouth once daily.     apixaban 2.5 mg Tab  Commonly known as: ELIQUIS  Take 1 tablet (2.5 mg total) by mouth 2 (two) times daily.     aspirin 81 MG EC tablet  Commonly known as: ECOTRIN  Take 1 tablet (81 mg total) by mouth once daily.     atorvastatin 80 MG tablet  Commonly known as: LIPITOR  Take 1 tablet (80 mg total) by mouth once daily.     calcium-vitamin D3 500 mg-5 mcg (200 unit) per tablet  Commonly known as: CALCIUM 500 + D  Take 1 tablet by mouth 2 (two) times daily with meals.     carvediloL 3.125 MG tablet  Commonly known as: COREG  Take 1 tablet (3.125 mg total) by mouth 2 (two) times daily.     gabapentin 300 MG capsule  Commonly known as: NEURONTIN  Take 1 capsule (300 mg total) by mouth 2 (two) times daily.     HYDROcodone-acetaminophen 5-325 mg per tablet  Commonly known as: NORCO  Take 1 tablet by mouth every 8 (eight) hours as needed for Pain.     levothyroxine 75 MCG tablet  Commonly known as: SYNTHROID  Take 1 tablet (75 mcg total) by mouth before breakfast. Administer on an empty stomach at least 30 minutes before food or other medications.     polyethylene glycol 17 gram Pwpk  Commonly known as: GLYCOLAX  Take 17 g by mouth once daily.     QUEtiapine 25 MG Tab  Commonly known as: SEROQUEL  Take 3 tablets (75 mg total) by mouth every evening.     senna-docusate 8.6-50 mg  8.6-50 mg per tablet  Commonly known as: PERICOLACE  Take 1 tablet by mouth 2 (two) times daily as needed for Constipation.              Indwelling Lines/Drains at time of discharge:   Lines/Drains/Airways       None                   Time spent on the discharge of patient: 36 minutes         Tangela Aguilar PA-C  Department of Hospital Medicine  [facility] ([place])

## 2024-01-30 NOTE — PLAN OF CARE
[facility] ([facility])  Discharge Final Note    Primary Care Provider: Mary Ellen Nesbitt MD    Expected Discharge Date: 1/29/2024    Final Discharge Note (most recent)       Final Note - 01/30/24 1004          Final Note    Assessment Type Final Discharge Note (P)      Anticipated Discharge Disposition Home-Health Care Svc (P)    [place]    What phone number can be called within the next 1-3 days to see how you are doing after discharge? [phone] (P)         Post-Acute Status    Post-Acute Authorization Home Health (P)      Home Health Status Set-up Complete/Auth obtained (P)      Coverage [facility] MANAGED [facility] (P)                      Important Message from Medicare  Important Message from Medicare regarding Discharge Appeal Rights: Given to patient/caregiver, Explained to patient/caregiver, Signed/date by patient/caregiver, Other (comments) (verbal;: spoke with Janae/daughter)     Date IMM was signed: 01/29/24  Time IMM was signed: 3405    Contact Info       Mary Ellen Nesbitt MD   Specialty: Internal Medicine   Relationship: PCP - General    [address]  [facility] [address]   Phone: [phone]       Next Steps: Follow up          Patient dc w/ [facility].                  Hollis Haji MSW, LMSW  [facility]  Case Management  Ext. [phone]

## 2024-02-02 LAB — CALPROTECTIN STL-MCNT: <27.1 MCG/G

## 2024-02-03 ENCOUNTER — DOCUMENT SCAN (OUTPATIENT)
Dept: HOME HEALTH SERVICES | Facility: HOSPITAL | Age: 89
End: 2024-02-03
Payer: MEDICARE

## 2024-02-06 ENCOUNTER — DOCUMENT SCAN (OUTPATIENT)
Dept: HOME HEALTH SERVICES | Facility: HOSPITAL | Age: 89
End: 2024-02-06

## 2024-02-06 ENCOUNTER — DOCUMENT SCAN (OUTPATIENT)
Dept: HOME HEALTH SERVICES | Facility: HOSPITAL | Age: 89
End: 2024-02-06
Payer: MEDICARE

## 2024-02-06 LAB — H PYLORI AG STL QL IA: NOT DETECTED

## 2024-02-07 ENCOUNTER — TELEPHONE (OUTPATIENT)
Dept: NEUROLOGY | Facility: CLINIC | Age: 89
End: 2024-02-07
Payer: MEDICARE

## 2024-02-07 NOTE — TELEPHONE ENCOUNTER
----- Message from Joanne Shultz MA sent at 2/6/2024  4:17 PM CST -----  Regarding: FW: hospital f/u appt; virtual canceled on 1/18/2024  Contact: Janae (daughter) @ [phone]    ----- Message -----  From: Miriam Guaman  Sent: 2/6/2024   3:54 PM CST  To: [facility]  Subject: hospital f/u appt; virtual canceled on 1/18/#    CallerJanae (daughter) is calling in again to get pt r/s'd for canceled virtual appt that was on 1/18/2024. Caller states that pt has been hospitalized 4 times in the last 2 months.  Caller is only wanting pt to see Dr. Mcgee. Caller is asking for a call back soon to schedule. Thanks.     Reason appt not schedule:    Patient's DX:   R29.90 (ICD-10-CM) - Episode of transient neurologic symptoms  G30.1,F02.818 (ICD-10-CM) - Late onset Alzheimer's disease with behavioral disturbance    Patient requesting call back or [place] msg: call back.

## 2024-02-10 PROCEDURE — G0179 MD RECERTIFICATION HHA PT: HCPCS | Mod: ,,, | Performed by: INTERNAL MEDICINE

## 2024-02-20 RX ORDER — ATORVASTATIN CALCIUM 80 MG/1
80 TABLET, FILM COATED ORAL
Qty: 90 TABLET | Refills: 3 | Status: SHIPPED | OUTPATIENT
Start: 2024-02-20

## 2024-02-20 NOTE — TELEPHONE ENCOUNTER
Care Due:                  Date            Visit Type   Department     Provider  --------------------------------------------------------------------------------                                EP -                              PRIMARY      [facility] INTERNAL  Last Visit: 12-      CARE ([facility])   MEDICINE       SHEILA SCHAEFFER  Next Visit: None Scheduled  None         None Found                                                            Last  Test          Frequency    Reason                     Performed    Due Date  --------------------------------------------------------------------------------    Lipid Panel.  12 months..  atorvastatin.............  Not Found    Overdue    [employer] Embedded Care Due Messages. Reference number: [id number].   2/20/2024 8:07:28 AM CST

## 2024-02-21 RX ORDER — AMLODIPINE BESYLATE 10 MG/1
10 TABLET ORAL DAILY
Qty: 90 TABLET | Refills: 0 | Status: ON HOLD | OUTPATIENT
Start: 2024-02-21 | End: 2024-05-14

## 2024-02-21 NOTE — TELEPHONE ENCOUNTER
Provider Staff:  Action required. This patient has received emergency care.     Please schedule patient for a follow up appointment.     Thanks!  [facility]     Appointments      Date Provider   Last Visit   12/21/2023 Mary Ellen Nesbitt MD   Next Visit   Visit date not found Mary Ellen Nesbitt MD

## 2024-02-21 NOTE — TELEPHONE ENCOUNTER
Refill Decision Note   Quin Linn  is requesting a refill authorization.  Brief Assessment and Rationale for Refill:  Approve     Medication Therapy Plan: No FOVS      Comments:     Note composed:11:05 AM 02/21/2024

## 2024-02-21 NOTE — TELEPHONE ENCOUNTER
----- Message from Mary Anne Araiza sent at 2/20/2024  3:49 PM CST -----  Contact: Maritza BRYANT   Maritza BRYANT is calling in regards to pt having a fall on 02/16/2024. Pt has no injuries.

## 2024-02-21 NOTE — TELEPHONE ENCOUNTER
----- Message from Halle Covarrubias sent at 2/21/2024  8:47 AM CST -----  Contact: [phone]  Requesting an RX refill or new RX.  Is this a refill or new RX: refill  RX name and strength (copy/paste from chart):  amLODIPine (NORVASC) 10 MG tablet  Is this a 30 day or 90 day RX: 90 day  Pharmacy name and phone # (copy/paste from chart):    [id number] - [place] - [facility] [address]  [address]  [facility] [place] [address]  Phone: [phone] Fax: [phone]  The doctors have asked that we provide their patients with the following 2 reminders -- prescription refills can take up to 72 hours, and a friendly reminder that in the future you can use your [facility] account to request refills: pharmacy

## 2024-02-21 NOTE — TELEPHONE ENCOUNTER
No care due was identified.  Health [facility] Embedded Care Due Messages. Reference number: [id number].   2/21/2024 10:36:11 AM CST

## 2024-03-03 NOTE — TELEPHONE ENCOUNTER
No care due was identified.  Health [facility] Embedded Care Due Messages. Reference number: [id number].   3/03/2024 3:11:49 PM CST

## 2024-03-04 RX ORDER — LEVOTHYROXINE SODIUM 75 UG/1
75 TABLET ORAL
Qty: 90 TABLET | Refills: 3 | Status: ON HOLD | OUTPATIENT
Start: 2024-03-04 | End: 2024-05-14

## 2024-03-04 NOTE — TELEPHONE ENCOUNTER
Provider Staff:  Action required. This patient has received emergency care.     Please schedule patient for a follow up appointment.     Thanks!  [facility]     Appointments      Date Provider   Last Visit   12/21/2023 Mary Ellen Nesbitt MD   Next Visit   Visit date not found Mary Ellen Nesbitt MD      Refill Decision Note   Quin Linn  is requesting a refill authorization.  Brief Assessment and Rationale for Refill:  Approve     Medication Therapy Plan:  LOV w/PCP 12/21/23. 2 episodes of ED-to-Hosp, on 12.27.2023 and 1.26.2024 for neurological syncope suspected related to UTI/dehydration. Follow-up appointment today with Neurology. Thyroid medication therapy unchanged throughout.      Comments:     Note composed:10:39 AM 03/04/2024

## 2024-03-05 ENCOUNTER — TELEPHONE (OUTPATIENT)
Dept: NEUROLOGY | Facility: CLINIC | Age: 89
End: 2024-03-05
Payer: MEDICARE

## 2024-03-05 NOTE — TELEPHONE ENCOUNTER
----- Message from Joanne Shultz MA sent at 3/4/2024  4:27 PM CST -----  Regarding: FW: Appt Access  Contact: [phone]    ----- Message -----  From: Anuradha Vaz  Sent: 3/4/2024  11:02 AM CST  To: [facility] Staff  Subject: Appt Access                                      Pt's daughter Janae is calling stating that she would like to get pt appt scheduled for 3/8 in office changed to a virtual appt if possible. She states if it is unable to be changed to a virtual appt she would like to get her mother rescheduled. Please give pt's daughter a call back.

## 2024-03-06 ENCOUNTER — DOCUMENT SCAN (OUTPATIENT)
Dept: HOME HEALTH SERVICES | Facility: HOSPITAL | Age: 89
End: 2024-03-06
Payer: MEDICARE

## 2024-03-07 ENCOUNTER — DOCUMENT SCAN (OUTPATIENT)
Dept: HOME HEALTH SERVICES | Facility: HOSPITAL | Age: 89
End: 2024-03-07
Payer: MEDICARE

## 2024-03-07 ENCOUNTER — DOCUMENT SCAN (OUTPATIENT)
Dept: HOME HEALTH SERVICES | Facility: HOSPITAL | Age: 89
End: 2024-03-07

## 2024-03-08 ENCOUNTER — DOCUMENT SCAN (OUTPATIENT)
Dept: HOME HEALTH SERVICES | Facility: HOSPITAL | Age: 89
End: 2024-03-08
Payer: MEDICARE

## 2024-04-08 ENCOUNTER — TELEPHONE (OUTPATIENT)
Dept: INTERNAL MEDICINE | Facility: CLINIC | Age: 89
End: 2024-04-08

## 2024-04-09 ENCOUNTER — EXTERNAL HOME HEALTH (OUTPATIENT)
Dept: HOME HEALTH SERVICES | Facility: HOSPITAL | Age: 89
End: 2024-04-09
Payer: MEDICARE

## 2024-04-10 ENCOUNTER — TELEPHONE (OUTPATIENT)
Dept: NEUROLOGY | Facility: CLINIC | Age: 89
End: 2024-04-10
Payer: MEDICARE

## 2024-04-10 PROCEDURE — G0179 MD RECERTIFICATION HHA PT: HCPCS | Mod: ,,, | Performed by: INTERNAL MEDICINE

## 2024-04-10 NOTE — TELEPHONE ENCOUNTER
----- Message from Taylor Arias sent at 4/10/2024  2:47 PM CDT -----  Regarding: appointment  Contact: @ [phone]  Pt called in regards to her appointment due to weather her internet is not working and would like for the appointment to be audio  .....Please call and adv @ [phone]

## 2024-04-10 NOTE — TELEPHONE ENCOUNTER
----- Message from Heather Maya sent at 4/10/2024  3:45 PM CDT -----  Regarding: pt advice  Contact: [phone]  Pt calling in regards to confirming appt 4/11/24. Pls call

## 2024-04-11 ENCOUNTER — OFFICE VISIT (OUTPATIENT)
Dept: NEUROLOGY | Facility: CLINIC | Age: 89
End: 2024-04-11
Payer: MEDICARE

## 2024-04-11 DIAGNOSIS — R29.90 EPISODE OF TRANSIENT NEUROLOGIC SYMPTOMS: ICD-10-CM

## 2024-04-11 DIAGNOSIS — G30.1 LATE ONSET ALZHEIMER'S DISEASE WITH BEHAVIORAL DISTURBANCE: Chronic | ICD-10-CM

## 2024-04-11 DIAGNOSIS — R55 SYNCOPE, VASOVAGAL: ICD-10-CM

## 2024-04-11 DIAGNOSIS — F02.818 LATE ONSET ALZHEIMER'S DISEASE WITH BEHAVIORAL DISTURBANCE: Chronic | ICD-10-CM

## 2024-04-11 PROCEDURE — 1157F ADVNC CARE PLAN IN RCRD: CPT | Mod: CPTII,95,, | Performed by: PSYCHIATRY & NEUROLOGY

## 2024-04-11 PROCEDURE — 3288F FALL RISK ASSESSMENT DOCD: CPT | Mod: CPTII,95,, | Performed by: PSYCHIATRY & NEUROLOGY

## 2024-04-11 PROCEDURE — 1100F PTFALLS ASSESS-DOCD GE2>/YR: CPT | Mod: CPTII,95,, | Performed by: PSYCHIATRY & NEUROLOGY

## 2024-04-11 PROCEDURE — 1159F MED LIST DOCD IN RCRD: CPT | Mod: CPTII,95,, | Performed by: PSYCHIATRY & NEUROLOGY

## 2024-04-11 PROCEDURE — 99214 OFFICE O/P EST MOD 30 MIN: CPT | Mod: 95,,, | Performed by: PSYCHIATRY & NEUROLOGY

## 2024-04-11 RX ORDER — LEVETIRACETAM 250 MG/1
250 TABLET ORAL 2 TIMES DAILY
Qty: 60 TABLET | Refills: 11 | Status: ON HOLD | OUTPATIENT
Start: 2024-04-11 | End: 2024-05-14

## 2024-04-11 NOTE — TELEPHONE ENCOUNTER
Ty mcmillan is all set for this afternoon virtual appt. She had mistakenly thought the appt is 4/10/24.

## 2024-04-11 NOTE — PROGRESS NOTES
Neurology Telemedicine Note     Name: Quin Linn  MRN: [id number]   CSN: [id number]      Date: 04/11/2024    The patient location is: Home  The chief complaint leading to consultation is: f/u dementia  Visit type: Virtual visit with synchronous audio and video    TOTAL TIME SPENT: 21 min    Each patient to whom I provide medical services by telemedicine is:  (1) informed of the relationship between the physician and patient and the respective role of any other health care provider with respect to management of the patient; and (2) notified that they may decline to receive medical services by telemedicine and may withdraw from such care at any time.    History of Present Illness (HPI): daughter Janae dykes  - has had COVID twice, second one brought PNA, then went SNF  - daughter had 70+ days in the hospital, breast swelling/anasarca, underwent double mastectomy  - patient spent weeks at a poor SNF  - lost 30 lbs  - now having syncope that is going down for 30+ minutes  - EEG outpatient unrevealing  - these events have her unable to aroused, perhaps eye deviation, incontinent at baseline; since January she has had 4-5 total,       Nonmotor/Premotor ROS: as per HPI, and all other systems are negative    Past Medical History: The patient  has a past medical history of Acute hypoxemic respiratory failure (9/2/2023), Acute on chronic diastolic congestive heart failure (10/19/2023), Alzheimer's disease, Anemia, Arthritis, Back pain, Cancer, Cataract, Colon cancer, Diabetes mellitus type II, Glaucoma, Hyperlipidemia, Hypertension, Hyperthyroidism, Hypothyroidism following radioiodine therapy, Pacemaker, Pneumonia, Pneumonia due to other staphylococcus, Renal manifestation of secondary diabetes mellitus, Squamous cell carcinoma, Stroke, Type 2 diabetes mellitus with stage 3 chronic kidney disease and hypertension (10/12/2017), and Type 2 diabetes with peripheral circulatory disorder, controlled.    Social History: The  patient  reports that she has never smoked. She has never used smokeless tobacco. She reports that she does not drink alcohol and does not use drugs.    Family History: Their family history includes Alzheimer's disease in her sister; Ankylosing spondylitis in her daughter; Arthritis in her mother; Asthma in her son; Atrial fibrillation in her daughter; Cancer in her paternal uncle and sister; Diabetes in her maternal grandmother; Heart disease in her daughter and mother; Hyperlipidemia in her son; Hypertension in her sister and son; Kidney disease in her daughter and father; Lupus in her daughter; No Known Problems in her daughter; Supraventricular tachycardia in her daughter.    Allergies: Tramadol and Metformin     Meds:   Current Outpatient Medications on File Prior to Visit   Medication Sig Dispense Refill    ACCU-CHEK FASTCLIX LANCET DRUM Misc CHECK BLOOD GLUCOSE FOUR TIMES DAILY 408 each 3    ACCU-CHEK GUIDE TEST STRIPS Strp TEST BLOOD GLUCOSE FOUR TIMES DAILY OR AS DIRECTED 400 strip 3    amLODIPine (NORVASC) 10 MG tablet Take 1 tablet (10 mg total) by mouth once daily. 90 tablet 0    apixaban (ELIQUIS) 2.5 mg Tab Take 1 tablet (2.5 mg total) by mouth 2 (two) times daily. 60 tablet 5    atorvastatin (LIPITOR) 80 MG tablet TAKE 1 TABLET(80 MG) BY MOUTH EVERY DAY 90 tablet 3    calcium-vitamin D3 (CALCIUM 500 + D) 500 mg-5 mcg (200 unit) per tablet Take 1 tablet by mouth 2 (two) times daily with meals. 60 tablet 11    carvediloL (COREG) 3.125 MG tablet Take 1 tablet (3.125 mg total) by mouth 2 (two) times daily. 60 tablet 3    gabapentin (NEURONTIN) 300 MG capsule Take 1 capsule (300 mg total) by mouth 2 (two) times daily. 180 capsule 3    HYDROcodone-acetaminophen (NORCO) 5-325 mg per tablet Take 1 tablet by mouth every 8 (eight) hours as needed for Pain. 90 tablet 0    levothyroxine (SYNTHROID) 75 MCG tablet Take 1 tablet (75 mcg total) by mouth before breakfast. Administer on an empty stomach at least 30  minutes before food or other medications. 90 tablet 3    polyethylene glycol (GLYCOLAX) 17 gram PwPk Take 17 g by mouth once daily.  0    QUEtiapine (SEROQUEL) 25 MG Tab Take 3 tablets (75 mg total) by mouth every evening. 90 tablet 11    senna-docusate 8.6-50 mg (PERICOLACE) 8.6-50 mg per tablet Take 1 tablet by mouth 2 (two) times daily as needed for Constipation.      glipiZIDE (GLUCOTROL) 2.5 MG tablet Take 1 tablet by mouth 2 (two) times daily. (Patient not taking: Reported on 4/11/2024)      [DISCONTINUED] aspirin (ECOTRIN) 81 MG EC tablet Take 1 tablet (81 mg total) by mouth once daily. (Patient not taking: Reported on 4/11/2024)  0     No current facility-administered medications on file prior to visit.       Exam:  Vital Signs deferred with home visit    Constitutional  Well-developed, well-nourished, appears stated age   Eyes  No scleral icterus   ENT  Moist oral mucosa   Cardiovascular  No lower extremity edema    Respiratory  No labored breathing    Skin  No rashes   Hematologic  No bruising   Psychiatric  Normal mood and affect   Other  GI/ deferred    Neurological     * Mental status  2010, ? Month or season; can recall who is running for President ([employer]), makes  no dysarthria; no aphasia; normal recent and remote memory; follows commands   * Cranial nerves     - CN II  Pupils midposition and symmetric   - CN III, IV, VI  Extraocular movements full, no nystagmus visualized   - CN V  Unable to test   - CN VII  Face strong and symmetric bilaterally    - CN VIII  Hearing grossly intact with conversation    - CN IX, X  Palate raises midline and symmetric    - CN XI  Strong shoulder shrug B    - CN XII  Tongue appears midline    * Motor  Normal bulk by appearance, no drift    * Sensory  Not tested objectively, no changes described by the patient   * Deep tendon reflexes  Not tested   * Coord/Movemt/Gait Mild hypophonic speech.  No facial masking.  No B bradykinesia.  No tremor with rest,  posture, kinesis, or intention.   No chorea, athetosis, dystonia, myoclonus, or tics.   No motor impersistence.  Gait is deferred for safety.       Medical Record Review:  - Lab Results:  No results displayed because visit has over 200 results.          Diagnoses:                                                                               1) Alzheimer's disease.  Progressive.  2) Enhanced physiologic tremor.  Stable and unchanged.  3) Lumbar spinal stenosis - contributing to back pain and gait.  4) New seizure vs syncope?  Workup unrevealing but clinically suspicious for ictal events.     Medical Decision Making:   - trial of low dose keppra 250 BID for possible seizure  - home care as needed  - close f/u            Nehemias Mcgee MD, MPH  Division of Movement and Memory Disorders  [facility]  [phone]

## 2024-04-29 PROBLEM — N18.9 ACUTE RENAL FAILURE SUPERIMPOSED ON CHRONIC KIDNEY DISEASE: Status: RESOLVED | Noted: 2023-09-10 | Resolved: 2024-04-29

## 2024-04-29 PROBLEM — N17.9 ACUTE RENAL FAILURE SUPERIMPOSED ON CHRONIC KIDNEY DISEASE: Status: RESOLVED | Noted: 2023-09-10 | Resolved: 2024-04-29

## 2024-04-30 ENCOUNTER — DOCUMENT SCAN (OUTPATIENT)
Dept: HOME HEALTH SERVICES | Facility: HOSPITAL | Age: 89
End: 2024-04-30
Payer: MEDICARE

## 2024-05-03 ENCOUNTER — DOCUMENT SCAN (OUTPATIENT)
Dept: HOME HEALTH SERVICES | Facility: HOSPITAL | Age: 89
End: 2024-05-03
Payer: MEDICARE

## 2024-05-05 ENCOUNTER — HOSPITAL ENCOUNTER (INPATIENT)
Facility: HOSPITAL | Age: 89
LOS: 9 days | Discharge: NURSING, REHAB OR LONG-TERM CARE | DRG: 551 | End: 2024-05-15
Attending: STUDENT IN AN ORGANIZED HEALTH CARE EDUCATION/TRAINING PROGRAM | Admitting: HOSPITALIST
Payer: MEDICARE
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
## 2024-05-05 DIAGNOSIS — S12.301A CLOSED NONDISPLACED FRACTURE OF FOURTH CERVICAL VERTEBRA, UNSPECIFIED FRACTURE MORPHOLOGY, INITIAL ENCOUNTER: Primary | ICD-10-CM

## 2024-05-05 DIAGNOSIS — R07.9 CHEST PAIN: ICD-10-CM

## 2024-05-05 DIAGNOSIS — I50.32 CHRONIC DIASTOLIC HEART FAILURE: ICD-10-CM

## 2024-05-05 DIAGNOSIS — S01.412A LACERATION OF LEFT CHEEK, INITIAL ENCOUNTER: ICD-10-CM

## 2024-05-05 LAB
ANION GAP SERPL CALC-SCNC: 10 MMOL/L (ref 8–16)
BASOPHILS # BLD AUTO: 0.03 K/UL (ref 0–0.2)
BASOPHILS NFR BLD: 0.3 % (ref 0–1.9)
BUN SERPL-MCNC: 24 MG/DL (ref 10–30)
CALCIUM SERPL-MCNC: 9.7 MG/DL (ref 8.7–10.5)
CHLORIDE SERPL-SCNC: 109 MMOL/L (ref 95–110)
CO2 SERPL-SCNC: 23 MMOL/L (ref 23–29)
CREAT SERPL-MCNC: 1.1 MG/DL (ref 0.5–1.4)
DIFFERENTIAL METHOD BLD: ABNORMAL
EOSINOPHIL # BLD AUTO: 0.2 K/UL (ref 0–0.5)
EOSINOPHIL NFR BLD: 2.1 % (ref 0–8)
ERYTHROCYTE [DISTWIDTH] IN BLOOD BY AUTOMATED COUNT: 12.9 % (ref 11.5–14.5)
EST. GFR  (NO RACE VARIABLE): 47.4 ML/MIN/1.73 M^2
GLUCOSE SERPL-MCNC: 148 MG/DL (ref 70–110)
HCT VFR BLD AUTO: 41.4 % (ref 37–48.5)
HGB BLD-MCNC: 14 G/DL (ref 12–16)
IMM GRANULOCYTES # BLD AUTO: 0.06 K/UL (ref 0–0.04)
IMM GRANULOCYTES NFR BLD AUTO: 0.7 % (ref 0–0.5)
LYMPHOCYTES # BLD AUTO: 1.8 K/UL (ref 1–4.8)
LYMPHOCYTES NFR BLD: 19.8 % (ref 18–48)
MCH RBC QN AUTO: 30.7 PG (ref 27–31)
MCHC RBC AUTO-ENTMCNC: 33.8 G/DL (ref 32–36)
MCV RBC AUTO: 91 FL (ref 82–98)
MONOCYTES # BLD AUTO: 0.4 K/UL (ref 0.3–1)
MONOCYTES NFR BLD: 4.4 % (ref 4–15)
NEUTROPHILS # BLD AUTO: 6.6 K/UL (ref 1.8–7.7)
NEUTROPHILS NFR BLD: 72.7 % (ref 38–73)
NRBC BLD-RTO: 0 /100 WBC
PLATELET # BLD AUTO: 201 K/UL (ref 150–450)
PMV BLD AUTO: 10.3 FL (ref 9.2–12.9)
POCT GLUCOSE: 163 MG/DL (ref 70–110)
POTASSIUM SERPL-SCNC: 4.6 MMOL/L (ref 3.5–5.1)
RBC # BLD AUTO: 4.56 M/UL (ref 4–5.4)
SODIUM SERPL-SCNC: 142 MMOL/L (ref 136–145)
WBC # BLD AUTO: 9.13 K/UL (ref 3.9–12.7)

## 2024-05-05 PROCEDURE — 96372 THER/PROPH/DIAG INJ SC/IM: CPT | Performed by: FAMILY MEDICINE

## 2024-05-05 PROCEDURE — 63600175 PHARM REV CODE 636 W HCPCS: Performed by: FAMILY MEDICINE

## 2024-05-05 PROCEDURE — G0378 HOSPITAL OBSERVATION PER HR: HCPCS

## 2024-05-05 PROCEDURE — 63600175 PHARM REV CODE 636 W HCPCS: Performed by: STUDENT IN AN ORGANIZED HEALTH CARE EDUCATION/TRAINING PROGRAM

## 2024-05-05 PROCEDURE — 94761 N-INVAS EAR/PLS OXIMETRY MLT: CPT

## 2024-05-05 PROCEDURE — 80048 BASIC METABOLIC PNL TOTAL CA: CPT | Performed by: STUDENT IN AN ORGANIZED HEALTH CARE EDUCATION/TRAINING PROGRAM

## 2024-05-05 PROCEDURE — 90715 TDAP VACCINE 7 YRS/> IM: CPT | Performed by: STUDENT IN AN ORGANIZED HEALTH CARE EDUCATION/TRAINING PROGRAM

## 2024-05-05 PROCEDURE — 25000003 PHARM REV CODE 250: Performed by: FAMILY MEDICINE

## 2024-05-05 PROCEDURE — 93010 ELECTROCARDIOGRAM REPORT: CPT | Mod: ,,, | Performed by: INTERNAL MEDICINE

## 2024-05-05 PROCEDURE — 90471 IMMUNIZATION ADMIN: CPT | Performed by: STUDENT IN AN ORGANIZED HEALTH CARE EDUCATION/TRAINING PROGRAM

## 2024-05-05 PROCEDURE — 85025 COMPLETE CBC W/AUTO DIFF WBC: CPT | Performed by: STUDENT IN AN ORGANIZED HEALTH CARE EDUCATION/TRAINING PROGRAM

## 2024-05-05 PROCEDURE — 3E0234Z INTRODUCTION OF SERUM, TOXOID AND VACCINE INTO MUSCLE, PERCUTANEOUS APPROACH: ICD-10-PCS | Performed by: STUDENT IN AN ORGANIZED HEALTH CARE EDUCATION/TRAINING PROGRAM

## 2024-05-05 PROCEDURE — 93005 ELECTROCARDIOGRAM TRACING: CPT

## 2024-05-05 PROCEDURE — 99285 EMERGENCY DEPT VISIT HI MDM: CPT | Mod: 25

## 2024-05-05 RX ORDER — POLYETHYLENE GLYCOL 3350 17 G/17G
17 POWDER, FOR SOLUTION ORAL DAILY
Status: DISCONTINUED | OUTPATIENT
Start: 2024-05-06 | End: 2024-05-10

## 2024-05-05 RX ORDER — NALOXONE HCL 0.4 MG/ML
0.02 VIAL (ML) INJECTION
Status: DISCONTINUED | OUTPATIENT
Start: 2024-05-05 | End: 2024-05-15 | Stop reason: HOSPADM

## 2024-05-05 RX ORDER — ALBUTEROL SULFATE 90 UG/1
2 AEROSOL, METERED RESPIRATORY (INHALATION) EVERY 6 HOURS PRN
Status: DISCONTINUED | OUTPATIENT
Start: 2024-05-05 | End: 2024-05-15 | Stop reason: HOSPADM

## 2024-05-05 RX ORDER — QUETIAPINE FUMARATE 25 MG/1
75 TABLET, FILM COATED ORAL NIGHTLY
Status: DISCONTINUED | OUTPATIENT
Start: 2024-05-05 | End: 2024-05-10

## 2024-05-05 RX ORDER — LEVOTHYROXINE SODIUM 75 UG/1
75 TABLET ORAL
Status: DISCONTINUED | OUTPATIENT
Start: 2024-05-06 | End: 2024-05-10

## 2024-05-05 RX ORDER — GLUCAGON 1 MG
1 KIT INJECTION
Status: DISCONTINUED | OUTPATIENT
Start: 2024-05-05 | End: 2024-05-15 | Stop reason: HOSPADM

## 2024-05-05 RX ORDER — TALC
6 POWDER (GRAM) TOPICAL NIGHTLY PRN
Status: DISCONTINUED | OUTPATIENT
Start: 2024-05-05 | End: 2024-05-10

## 2024-05-05 RX ORDER — GABAPENTIN 300 MG/1
300 CAPSULE ORAL 2 TIMES DAILY
Status: DISCONTINUED | OUTPATIENT
Start: 2024-05-05 | End: 2024-05-08

## 2024-05-05 RX ORDER — HEPARIN SODIUM 5000 [USP'U]/ML
5000 INJECTION, SOLUTION INTRAVENOUS; SUBCUTANEOUS EVERY 8 HOURS
Status: DISCONTINUED | OUTPATIENT
Start: 2024-05-05 | End: 2024-05-07

## 2024-05-05 RX ORDER — IBUPROFEN 200 MG
24 TABLET ORAL
Status: DISCONTINUED | OUTPATIENT
Start: 2024-05-05 | End: 2024-05-15 | Stop reason: HOSPADM

## 2024-05-05 RX ORDER — SODIUM CHLORIDE 0.9 % (FLUSH) 0.9 %
10 SYRINGE (ML) INJECTION EVERY 12 HOURS PRN
Status: DISCONTINUED | OUTPATIENT
Start: 2024-05-05 | End: 2024-05-15 | Stop reason: HOSPADM

## 2024-05-05 RX ORDER — MORPHINE SULFATE 4 MG/ML
4 INJECTION, SOLUTION INTRAMUSCULAR; INTRAVENOUS EVERY 4 HOURS PRN
Status: DISCONTINUED | OUTPATIENT
Start: 2024-05-05 | End: 2024-05-07

## 2024-05-05 RX ORDER — ACETAMINOPHEN 325 MG/1
650 TABLET ORAL EVERY 4 HOURS PRN
Status: DISCONTINUED | OUTPATIENT
Start: 2024-05-05 | End: 2024-05-06

## 2024-05-05 RX ORDER — AMOXICILLIN 250 MG
1 CAPSULE ORAL 2 TIMES DAILY PRN
Status: DISCONTINUED | OUTPATIENT
Start: 2024-05-05 | End: 2024-05-10

## 2024-05-05 RX ORDER — LEVETIRACETAM 250 MG/1
250 TABLET ORAL 2 TIMES DAILY
Status: DISCONTINUED | OUTPATIENT
Start: 2024-05-05 | End: 2024-05-07

## 2024-05-05 RX ORDER — CARVEDILOL 3.12 MG/1
3.12 TABLET ORAL 2 TIMES DAILY
Status: DISCONTINUED | OUTPATIENT
Start: 2024-05-05 | End: 2024-05-08

## 2024-05-05 RX ORDER — IBUPROFEN 200 MG
16 TABLET ORAL
Status: DISCONTINUED | OUTPATIENT
Start: 2024-05-05 | End: 2024-05-15 | Stop reason: HOSPADM

## 2024-05-05 RX ORDER — ATORVASTATIN CALCIUM 40 MG/1
80 TABLET, FILM COATED ORAL DAILY
Status: DISCONTINUED | OUTPATIENT
Start: 2024-05-06 | End: 2024-05-10

## 2024-05-05 RX ORDER — ALUMINUM HYDROXIDE, MAGNESIUM HYDROXIDE, AND SIMETHICONE 1200; 120; 1200 MG/30ML; MG/30ML; MG/30ML
30 SUSPENSION ORAL 4 TIMES DAILY PRN
Status: DISCONTINUED | OUTPATIENT
Start: 2024-05-05 | End: 2024-05-10

## 2024-05-05 RX ORDER — INSULIN ASPART 100 [IU]/ML
0-5 INJECTION, SOLUTION INTRAVENOUS; SUBCUTANEOUS
Status: DISCONTINUED | OUTPATIENT
Start: 2024-05-05 | End: 2024-05-06

## 2024-05-05 RX ORDER — AMLODIPINE BESYLATE 10 MG/1
10 TABLET ORAL DAILY
Status: DISCONTINUED | OUTPATIENT
Start: 2024-05-06 | End: 2024-05-10

## 2024-05-05 RX ORDER — OXYCODONE HYDROCHLORIDE 5 MG/1
5 TABLET ORAL EVERY 6 HOURS PRN
Status: DISCONTINUED | OUTPATIENT
Start: 2024-05-05 | End: 2024-05-06

## 2024-05-05 RX ORDER — ONDANSETRON HYDROCHLORIDE 2 MG/ML
4 INJECTION, SOLUTION INTRAVENOUS EVERY 8 HOURS PRN
Status: DISCONTINUED | OUTPATIENT
Start: 2024-05-05 | End: 2024-05-15 | Stop reason: HOSPADM

## 2024-05-05 RX ADMIN — CARVEDILOL 3.12 MG: 3.12 TABLET, FILM COATED ORAL at 09:05

## 2024-05-05 RX ADMIN — ACETAMINOPHEN 650 MG: 325 TABLET ORAL at 09:05

## 2024-05-05 RX ADMIN — GABAPENTIN 300 MG: 300 CAPSULE ORAL at 09:05

## 2024-05-05 RX ADMIN — HEPARIN SODIUM 5000 UNITS: 5000 INJECTION INTRAVENOUS; SUBCUTANEOUS at 10:05

## 2024-05-05 RX ADMIN — TETANUS TOXOID, REDUCED DIPHTHERIA TOXOID AND ACELLULAR PERTUSSIS VACCINE, ADSORBED 0.5 ML: 5; 2.5; 8; 8; 2.5 SUSPENSION INTRAMUSCULAR at 03:05

## 2024-05-05 RX ADMIN — LEVETIRACETAM 250 MG: 250 TABLET, FILM COATED ORAL at 10:05

## 2024-05-05 RX ADMIN — QUETIAPINE FUMARATE 75 MG: 25 TABLET ORAL at 09:05

## 2024-05-05 NOTE — ED PROVIDER NOTES
Encounter Date: 5/5/2024       History     Chief Complaint   Patient presents with    Fall     Fall forward out of chair. Hitting face on floor. Skin tear to L cheek on Eliquis. Denies LOC      HPI    89 y/o F PMH CHF, Alzheimer's, DM, HTN who presents to the ED via EMS for evaluation of a fall.  History from caregiver who is bedside.  Reportedly patient had a fall forwards out of recliner, she hit her head on the ground and had a bleeding laceration.  Not witnessed by caregiver.  Patient has dementia and cannot give much hx, states she slipped.  Limited hx, but pt denies any LOC, CP, dyspnea, N/V/D, headache, or neck pain.      Review of patient's allergies indicates:   Allergen Reactions    Tramadol Rash and Edema     Developed facial rash and edema.    Metformin Diarrhea     Past Medical History:   Diagnosis Date    Acute hypoxemic respiratory failure 9/2/2023    Acute on chronic diastolic congestive heart failure 10/19/2023    Alzheimer's disease     Anemia     Arthritis     lumbar    Back pain     Cancer     colon    Cataract     Colon cancer     Diabetes mellitus type II     Glaucoma     Hyperlipidemia     Hypertension     Hyperthyroidism     Hypothyroidism following radioiodine therapy     Pacemaker     Pneumonia     Pneumonia due to other staphylococcus     Renal manifestation of secondary diabetes mellitus     Squamous cell carcinoma     Stroke     TIA    Type 2 diabetes mellitus with stage 3 chronic kidney disease and hypertension 10/12/2017    Type 2 diabetes with peripheral circulatory disorder, controlled      Past Surgical History:   Procedure Laterality Date    CATARACT EXTRACTION W/  INTRAOCULAR LENS IMPLANT Left 09/13/2017        CHOLECYSTECTOMY      COLON SURGERY  2001    Colon CA    EYE SURGERY      cataract removal left side    IMPLANTATION OF LEADLESS PACEMAKER N/A 10/21/2022    Procedure: [id number];  Surgeon: JOSE Burgos MD;  Location: [facility];   Service: Cardiology;  Laterality: N/A;  JOHNNA DUNAWAY, MDT, ANES, EH,     squamous cell ca of face       Family History   Problem Relation Name Age of Onset    Heart disease Mother      Arthritis Mother      Kidney disease Father      Alzheimer's disease Sister      Hypertension Sister      Cancer Sister          ovarian    No Known Problems Daughter Khalida     Ankylosing spondylitis Daughter Elizabeth     Lupus Daughter Elizabeth     Kidney disease Daughter Janae     Heart disease Daughter Janae     Atrial fibrillation Daughter Janae     Supraventricular tachycardia Daughter Janae     Hypertension Son Mayito     Hyperlipidemia Son Mayito     Asthma Son Mayito     Cancer Paternal Uncle      Diabetes Maternal Grandmother      Amblyopia Neg Hx      Blindness Neg Hx      Cataracts Neg Hx      Glaucoma Neg Hx      Macular degeneration Neg Hx      Retinal detachment Neg Hx      Strabismus Neg Hx      Stroke Neg Hx      Thyroid disease Neg Hx       Social History     Tobacco Use    Smoking status: Never    Smokeless tobacco: Never    Tobacco comments:     smoked for about 4 years in her twenties (socially)   Substance Use Topics    Alcohol use: No    Drug use: No     Review of Systems   Respiratory:  Negative for cough.    Cardiovascular:  Negative for chest pain.   Gastrointestinal:  Negative for nausea.   Skin:  Positive for wound.       Physical Exam     Initial Vitals [05/05/24 1227]   BP Pulse Resp Temp SpO2   138/78 84 18 97.9 °F (36.6 °C) 100 %      MAP       --         Physical Exam    Nursing note and vitals reviewed.  Constitutional: No distress.   HENT:   Skin tear of the left cheek, bleeding controlled   Neck: Neck supple.   No step offs   Cardiovascular:  Normal rate and regular rhythm.           Pulmonary/Chest: Breath sounds normal. No respiratory distress.   Abdominal: Abdomen is soft. There is no abdominal tenderness.   Musculoskeletal:         General: No edema.      Cervical back: Neck supple.      Neurological: She is alert.   Moves all extremities slowly,   Psychiatric:   Cooperative, hard of hearing         ED Course   Procedures  Labs Reviewed   CBC W/ AUTO DIFFERENTIAL - Abnormal; Notable for the following components:       Result Value    Immature Granulocytes 0.7 (*)     Immature Grans (Abs) 0.06 (*)     All other components within normal limits   BASIC METABOLIC PANEL - Abnormal; Notable for the following components:    Glucose 148 (*)     eGFR 47.4 (*)     All other components within normal limits          Imaging Results              X-Ray Cervical Spine lateral supine and lateral upright (In process)                      CT Head Without Contrast (Final result)  Result time 05/05/24 14:38:28      Final result by Mayito Crook MD (05/05/24 14:38:28)                   Impression:      No acute intracranial hemorrhage/injury.      Electronically signed by: Mayito Crook  Date:    05/05/2024  Time:    14:38               Narrative:    EXAMINATION:  CT HEAD WITHOUT CONTRAST    CLINICAL HISTORY:  Facial trauma, blunt;    TECHNIQUE:  Low dose axial images were obtained through the head.  Coronal and sagittal reformations were also performed. Contrast was not administered.    COMPARISON:  01/26/2024, 06/26/2020    FINDINGS:  Similar volume loss.  No evidence of overt hydrocephalus.  No mass effect intracranial hemorrhage or parenchymal contusion.  No acute territorial infarct.    The calvarium is intact.  Prominent left frontal scalp hematoma.                                       CT Cervical Spine Without Contrast (Final result)  Result time 05/05/24 15:01:30      Final result by Mayito Crook MD (05/05/24 15:01:30)                   Impression:      Nondisplaced fractures involving the bilateral C4 lamina and left C5 lamina extending to the inferior articular process.  No definite widening of the facet joints.  Very mild anterolisthesis at C4-5 may be degenerative in nature.  Mild  prevertebral edema within the mid cervical region.    4 mm right apical lung nodule nonspecific.  In a low risk patient, no follow-up indicated, in a high risk patient, optional CT at 1 year.      Electronically signed by: Mayito Crook  Date:    05/05/2024  Time:    15:01               Narrative:    EXAMINATION:  CT CERVICAL SPINE WITHOUT CONTRAST    CLINICAL HISTORY:  Neck trauma (Age >= 65y);    TECHNIQUE:  Low dose axial images, sagittal and coronal reformations were performed though the cervical spine.  Contrast was not administered.    COMPARISON:  None    FINDINGS:  There are nondisplaced fracture is identified involving the bilateral C4 lamina and left C5 lamina extending to the inferior articular process.  No definite widening of the facet joints. Very mild anterolisthesis at C4-5 may be degenerative in nature. Mild prevertebral soft tissue within the mid cervical region is also noted.    Evaluation of the degenerative changes demonstrates prominent facet hypertrophy on the left at particularly C3-4 and C4-5 resulting in mild to moderate foraminal stenosis.  No there is also ligamentum flavum ossification noted on the left at C4-5.  No evidence to suggest overt cord impingement within the limitations of CT technique.    Extensive atherosclerotic vascular calcifications are noted at the carotid bifurcations left greater than right.    4 mm right lung apical nodule nonspecific.    These findings were communicated to Dr. Krishnan at 14:53 on 05/05/2024                                       CT Maxillofacial Without Contrast (Final result)  Result time 05/05/24 14:44:48      Final result by Mayito Crook MD (05/05/24 14:44:48)                   Impression:      Limited evaluation of the mandible due to motion.  No evidence of acute facial fracture.    Moderate particularly left-sided sinus mucosal thickening.      Electronically signed by: Mayito Crook  Date:    05/05/2024  Time:    14:44                Narrative:    EXAMINATION:  CT MAXILLOFACIAL WITHOUT CONTRAST    CLINICAL HISTORY:  Facial trauma, blunt;    TECHNIQUE:  Low dose axial images, sagittal and coronal reformations were obtained through the face.  Contrast was not administered.    3D reformats were created on an independent workstation to evaluate the osseous structures.    COMPARISON:  CT head 12/08/2023    FINDINGS:  Motion artifact limits evaluation of the mandible.  No definite acute facial fracture identified.    There is prominent fluid/mucosal thickening scattered throughout particularly the left-sided paranasal sinuses.  The visualized mastoid air cells are clear per    No posttraumatic acute injury involving the globes or retro bulbar spaces.    Prominent atherosclerotic vascular calcifications are noted at the skull base and carotid bifurcations.                                       X-Ray Pelvis Routine AP (Final result)  Result time 05/05/24 13:37:56      Final result by Kd Berman MD (05/05/24 13:37:56)                   Impression:      Limited exam.  Noting this, no convincing evidence of acute displaced fracture or dislocation.      Electronically signed by: Kd Berman  Date:    05/05/2024  Time:    13:37               Narrative:    EXAMINATION:  XR PELVIS ROUTINE AP    CLINICAL HISTORY:  fall;    TECHNIQUE:  AP view of the pelvis was performed.    COMPARISON:  Pelvic radiograph 11/19/2016.    FINDINGS:  Examination compromised by suboptimal patient positioning, suspected osseous demineralization, overlying bowel gas and stool, and patient body habitus.    Noting this, no convincing evidence of acute displaced fracture or dislocation is appreciated.  Bilateral hip DJD, moderate to advanced on the left and moderate on the right.  Degenerative changes additionally noted involving the spine, sacroiliac joints, pubic symphysis.    Phleboliths project within the pelvis.  Atherosclerotic calcifications are seen.  No definite  radiopaque foreign body.  Monitoring leads seen.                                       X-Ray Knee 3 View Bilateral (Final result)  Result time 05/05/24 13:35:32      Final result by Kd Berman MD (05/05/24 13:35:32)                   Impression:      No convincing evidence of acute displaced fracture or dislocation.      Electronically signed by: Kd Berman  Date:    05/05/2024  Time:    13:35               Narrative:    EXAMINATION:  XR KNEE 3 VIEW BILATERAL    CLINICAL HISTORY:  Unspecified fall, initial encounter    TECHNIQUE:  AP, lateral, and Merchant views of both knees were performed.    COMPARISON:  Left knee radiograph 06/13/2022.  Bilateral knee radiograph 08/15/2019.    FINDINGS:  Right knee: No definite evidence of acute fracture or dislocation.  Triquetral DJD.  Question chondrocalcinosis of the meniscus.  No large joint effusion.  No evidence of radiopaque foreign body.  Atherosclerotic calcifications.  Enthesopathic change at the patella.    Left knee: No definite evidence of acute fracture or dislocation.  Tricompartmental DJD.  Question chondrocalcinosis in this case.  No large joint effusion.  Atherosclerotic calcifications.  No radiopaque foreign body.  Enthesopathic change at the patella.                                       Medications   Tdap (BOOSTRIX) vaccine injection 0.5 mL (0.5 mLs Intramuscular Given 5/5/24 1521)     Medical Decision Making  Amount and/or Complexity of Data Reviewed  Labs: ordered. Decision-making details documented in ED Course.  Radiology: ordered and independent interpretation performed. Decision-making details documented in ED Course.  ECG/medicine tests: ordered and independent interpretation performed. Decision-making details documented in ED Course.    Risk  Prescription drug management.                     EKG:  Rate 68  NSR  LBBB  No STEMI  Unchanged compared to prior                 89 y/o F here with a fall.  VSS in ED, moving all ext, at baseline,  dementia hx.  EKG no STEMI.  Normal WBC, BMP delayed as it hemolyzed twice; once resulted it was normal.  Lac on cheek a skin tear that cannot be repaired, Tdap updated. Wound dressed.  XR knee and pelvis with no acute findings.  CTB and CT C Spine showing acute C4/C5 fractures, nondisplaced. C-collar was placed.  Spoke with NSGY, they rec MRI and XR C spine, and will see patient.  Spoke with cards to adjust pacemaker for MRI.  Admitted to , daughter bedside and updated on plan. She is agreeable, all questions answered.    Critical care time of 31 minutes.    Clinical Impression:  Final diagnoses:  [W19.XXXA] Fall  [S12.301A] Closed nondisplaced fracture of fourth cervical vertebra, unspecified fracture morphology, initial encounter (Primary)  [S01.412A] Laceration of left cheek, initial encounter          ED Disposition Condition    Observation                 Jagdish Krishnan MD  05/05/24 [id number]       Jagdish Krishnan MD  06/07/24 [id number]

## 2024-05-05 NOTE — CARE UPDATE
Patient has a Medtronic Pacemaker. Device was set to MRI mode which was turned back to original settings    Mode VDD with Lower rate 50 BPM.        Genia Armando MD  Cardiovascular Disease, PGY IV  [facility]

## 2024-05-05 NOTE — CARE UPDATE
Patient has a Medtronic Pacemaker going for MRI scan.     Device set at VDD with Lower rate 50 BPM.     Switched device to MRI mode with VOO and rate set at 70 BPM    Please call Cardiology once MRI is performed to place patients device back to prior settings.       Genia Armando MD  Cardiovascular Disease, PGY IV  [facility]

## 2024-05-05 NOTE — Clinical Note
Diagnosis: Closed nondisplaced fracture of fourth cervical vertebra, unspecified fracture morphology, initial encounter [[id number]]   Future Attending Provider: CLINTON BERNARDO [[id number]]

## 2024-05-05 NOTE — ED TRIAGE NOTES
Triage note:  Chief Complaint   Patient presents with    Fall     Fall forward out of chair. Hitting face on floor. Skin tear to L cheek on Eliquis. Denies LOC      Review of patient's allergies indicates:   Allergen Reactions    Tramadol Rash and Edema     Developed facial rash and edema.    Metformin Diarrhea     Past Medical History:   Diagnosis Date    Acute hypoxemic respiratory failure 9/2/2023    Acute on chronic diastolic congestive heart failure 10/19/2023    Alzheimer's disease     Anemia     Arthritis     lumbar    Back pain     Cancer     colon    Cataract     Colon cancer     Diabetes mellitus type II     Glaucoma     Hyperlipidemia     Hypertension     Hyperthyroidism     Hypothyroidism following radioiodine therapy     Pacemaker     Pneumonia     Pneumonia due to other staphylococcus     Renal manifestation of secondary diabetes mellitus     Squamous cell carcinoma     Stroke     TIA    Type 2 diabetes mellitus with stage 3 chronic kidney disease and hypertension 10/12/2017    Type 2 diabetes with peripheral circulatory disorder, controlled

## 2024-05-05 NOTE — ASSESSMENT & PLAN NOTE
89 y/o F PMH CHF, Alzheimer's, DM, HTN who presents to the ED via EMS for evaluation of a fall with CT Csp demonstrating bilateral C4 lamina fracture and L C5 lamina fracture with extension into IAP. Reportedly fell forwards out of recliner, hit head, not witnessed. Reportedly patient had a fall forwards out of recliner, she hit her head on the ground and had a bleeding laceration. Limited hx given dementia, but pt denies any LOC, CP, dyspnea, N/V/D, headache, new weakness/numbness or neck pain. Baseline incontinence    Imaging:  CTH 5/5: no acute process  CT Csp 5/5: bilateral C4 lamina, L C5 lamina extending to IAP fractures   MRI Csp 5/5: No significant canal stenosis   Upright / supine Csp in collar: no dynamic instability    Plan:  Admitted HM; q4h nc/vs  All labs and significant diagnostics reviewed  No acute neurosurgical intervention  C collar at all times until clinic follow up in 2 months  Will coordinate outpatient clinic follow up at this time with repeat CT Csp  Okay for diet/SQH  Pain control

## 2024-05-05 NOTE — SUBJECTIVE & OBJECTIVE
(Not in a hospital admission)      Review of patient's allergies indicates:   Allergen Reactions    Tramadol Rash and Edema     Developed facial rash and edema.    Metformin Diarrhea       Past Medical History:   Diagnosis Date    Acute hypoxemic respiratory failure 9/2/2023    Acute on chronic diastolic congestive heart failure 10/19/2023    Alzheimer's disease     Anemia     Arthritis     lumbar    Back pain     Cancer     colon    Cataract     Colon cancer     Diabetes mellitus type II     Glaucoma     Hyperlipidemia     Hypertension     Hyperthyroidism     Hypothyroidism following radioiodine therapy     Pacemaker     Pneumonia     Pneumonia due to other staphylococcus     Renal manifestation of secondary diabetes mellitus     Squamous cell carcinoma     Stroke     TIA    Type 2 diabetes mellitus with stage 3 chronic kidney disease and hypertension 10/12/2017    Type 2 diabetes with peripheral circulatory disorder, controlled      Past Surgical History:   Procedure Laterality Date    CATARACT EXTRACTION W/  INTRAOCULAR LENS IMPLANT Left 09/13/2017        CHOLECYSTECTOMY      COLON SURGERY  2001    Colon CA    EYE SURGERY      cataract removal left side    IMPLANTATION OF LEADLESS PACEMAKER N/A 10/21/2022    Procedure: [id number];  Surgeon: JOSE Burgos MD;  Location: [facility];  Service: Cardiology;  Laterality: N/A;  SB, JOHNNA, MDT, ANES, EH,     squamous cell ca of face       Family History       Problem Relation (Age of Onset)    Alzheimer's disease Sister    Ankylosing spondylitis Daughter    Arthritis Mother    Asthma Son    Atrial fibrillation Daughter    Cancer Sister, Paternal Uncle    Diabetes Maternal Grandmother    Heart disease Mother, Daughter    Hyperlipidemia Son    Hypertension Sister, Son    Kidney disease Father, Daughter    Lupus Daughter    No Known Problems Daughter    Supraventricular tachycardia Daughter          Tobacco Use    Smoking status:  "Never    Smokeless tobacco: Never    Tobacco comments:     smoked for about 4 years in her twenties (socially)   Substance and Sexual Activity    Alcohol use: No    Drug use: No    Sexual activity: Never     Review of Systems   Unable to perform ROS: Dementia     Objective:     Weight: 68 kg (150 lb)  Body mass index is 24.96 kg/m².  Vital Signs (Most Recent):  Temp: 97.9 °F (36.6 °C) (05/05/24 1340)  Pulse: 70 (05/05/24 1642)  Resp: 13 (05/05/24 1642)  BP: (!) 125/58 (05/05/24 1642)  SpO2: 97 % (05/05/24 1642) Vital Signs (24h Range):  Temp:  [97.9 °F (36.6 °C)] 97.9 °F (36.6 °C)  Pulse:  [60-84] 70  Resp:  [12-18] 13  SpO2:  [96 %-100 %] 97 %  BP: (125-179)/(58-78) 125/58                                 Physical Exam  Vitals and nursing note reviewed.   Constitutional:       Appearance: She is ill-appearing.   HENT:      Head:      Comments: In cervical collar     Mouth/Throat:      Mouth: Mucous membranes are dry.   Eyes:      Extraocular Movements: Extraocular movements intact.   Cardiovascular:      Rate and Rhythm: Normal rate.   Pulmonary:      Effort: Pulmonary effort is normal.          NEURO:     GCS 14 E4V4M6  At baseline dementia, oriented x1-2  CN II-XII grossly intact  Fc x 4 antigravity  SILT     Exam limited by cooperation 2/2 dementia  Moves all extremities antigravity, able to withstand some resistance    No pathologic reflexes elicited  Tremors baseline BUE L>R          Significant Labs:  Recent Labs   Lab 05/05/24  1600   *      K 4.6      CO2 23   BUN 24   CREATININE 1.1   CALCIUM 9.7     Recent Labs   Lab 05/05/24  1332   WBC 9.13   HGB 14.0   HCT 41.4        No results for input(s): "LABPT", "INR", "APTT" in the last 48 hours.  Microbiology Results (last 7 days)       ** No results found for the last 168 hours. **          All pertinent labs from the last 24 hours have been reviewed.    Significant Diagnostics:  I have reviewed all pertinent imaging results/findings " within the past 24 hours.  I have reviewed and interpreted all pertinent imaging results/findings within the past 24 hours.  CT Cervical Spine Without Contrast    Result Date: 5/5/2024  Nondisplaced fractures involving the bilateral C4 lamina and left C5 lamina extending to the inferior articular process.  No definite widening of the facet joints.  Very mild anterolisthesis at C4-5 may be degenerative in nature.  Mild prevertebral edema within the mid cervical region. 4 mm right apical lung nodule nonspecific.  In a low risk patient, no follow-up indicated, in a high risk patient, optional CT at 1 year. Electronically signed by: Mayito Crook Date:    05/05/2024 Time:    15:01    CT Maxillofacial Without Contrast    Result Date: 5/5/2024  Limited evaluation of the mandible due to motion.  No evidence of acute facial fracture. Moderate particularly left-sided sinus mucosal thickening. Electronically signed by: Mayito Crook Date:    05/05/2024 Time:    14:44    CT Head Without Contrast    Result Date: 5/5/2024  No acute intracranial hemorrhage/injury. Electronically signed by: Mayito Crook Date:    05/05/2024 Time:    14:38    X-Ray Pelvis Routine AP    Result Date: 5/5/2024  Limited exam.  Noting this, no convincing evidence of acute displaced fracture or dislocation. Electronically signed by: Kd Berman Date:    05/05/2024 Time:    13:37    X-Ray Knee 3 View Bilateral    Result Date: 5/5/2024  No convincing evidence of acute displaced fracture or dislocation. Electronically signed by: Kd Berman Date:    05/05/2024 Time:    13:35

## 2024-05-05 NOTE — CONSULTS
[facility] - Emergency Dept  Neurosurgery  Consult Note    Inpatient consult to Neurosurgery  Consult performed by: Figueroa Brannon MD  Consult ordered by: Jagdish Krishnan MD        Subjective:     Chief Complaint/Reason for Admission: fall    History of Present Illness: 89 y/o F PMH CHF, Alzheimer's, DM, HTN who presents to the ED via EMS for evaluation of a fall with CT Csp demonstrating bilateral C4 lamina fracture and L C5 lamina fracture with extension into IAP. Reportedly fell forwards out of recliner, hit head, not witnessed. Reportedly patient had a fall forwards out of recliner, she hit her head on the ground and had a bleeding laceration. Limited hx given dementia, but pt denies any LOC, CP, dyspnea, N/V/D, headache, new weakness/numbness or neck pain. Baseline incontinence    (Not in a hospital admission)      Review of patient's allergies indicates:   Allergen Reactions    Tramadol Rash and Edema     Developed facial rash and edema.    Metformin Diarrhea       Past Medical History:   Diagnosis Date    Acute hypoxemic respiratory failure 9/2/2023    Acute on chronic diastolic congestive heart failure 10/19/2023    Alzheimer's disease     Anemia     Arthritis     lumbar    Back pain     Cancer     colon    Cataract     Colon cancer     Diabetes mellitus type II     Glaucoma     Hyperlipidemia     Hypertension     Hyperthyroidism     Hypothyroidism following radioiodine therapy     Pacemaker     Pneumonia     Pneumonia due to other staphylococcus     Renal manifestation of secondary diabetes mellitus     Squamous cell carcinoma     Stroke     TIA    Type 2 diabetes mellitus with stage 3 chronic kidney disease and hypertension 10/12/2017    Type 2 diabetes with peripheral circulatory disorder, controlled      Past Surgical History:   Procedure Laterality Date    CATARACT EXTRACTION W/  INTRAOCULAR LENS IMPLANT Left 09/13/2017        CHOLECYSTECTOMY      COLON SURGERY  2001    Colon CA    EYE  SURGERY      cataract removal left side    IMPLANTATION OF LEADLESS PACEMAKER N/A 10/21/2022    Procedure: [id number];  Surgeon: JOSE Burgos MD;  Location: [facility];  Service: Cardiology;  Laterality: N/A;  EMELYN, JOHNNA, MDT, ANES, EH,     squamous cell ca of face       Family History       Problem Relation (Age of Onset)    Alzheimer's disease Sister    Ankylosing spondylitis Daughter    Arthritis Mother    Asthma Son    Atrial fibrillation Daughter    Cancer Sister, Paternal Uncle    Diabetes Maternal Grandmother    Heart disease Mother, Daughter    Hyperlipidemia Son    Hypertension Sister, Son    Kidney disease Father, Daughter    Lupus Daughter    No Known Problems Daughter    Supraventricular tachycardia Daughter          Tobacco Use    Smoking status: Never    Smokeless tobacco: Never    Tobacco comments:     smoked for about 4 years in her twenties (socially)   Substance and Sexual Activity    Alcohol use: No    Drug use: No    Sexual activity: Never     Review of Systems   Unable to perform ROS: Dementia     Objective:     Weight: 68 kg (150 lb)  Body mass index is 24.96 kg/m².  Vital Signs (Most Recent):  Temp: 97.9 °F (36.6 °C) (05/05/24 1340)  Pulse: 70 (05/05/24 1642)  Resp: 13 (05/05/24 1642)  BP: (!) 125/58 (05/05/24 1642)  SpO2: 97 % (05/05/24 1642) Vital Signs (24h Range):  Temp:  [97.9 °F (36.6 °C)] 97.9 °F (36.6 °C)  Pulse:  [60-84] 70  Resp:  [12-18] 13  SpO2:  [96 %-100 %] 97 %  BP: (125-179)/(58-78) 125/58                                 Physical Exam  Vitals and nursing note reviewed.   Constitutional:       Appearance: She is ill-appearing.   HENT:      Head:      Comments: In cervical collar     Mouth/Throat:      Mouth: Mucous membranes are dry.   Eyes:      Extraocular Movements: Extraocular movements intact.   Cardiovascular:      Rate and Rhythm: Normal rate.   Pulmonary:      Effort: Pulmonary effort is normal.          NEURO:     GCS 14 E4V4M6  At  "baseline dementia, oriented x1-2  CN II-XII grossly intact  Fc x 4 antigravity  SILT     Exam limited by cooperation 2/2 dementia  Moves all extremities antigravity, able to withstand some resistance    No pathologic reflexes elicited  Tremors baseline BUE L>R          Significant Labs:  Recent Labs   Lab 05/05/24  1600   *      K 4.6      CO2 23   BUN 24   CREATININE 1.1   CALCIUM 9.7     Recent Labs   Lab 05/05/24  1332   WBC 9.13   HGB 14.0   HCT 41.4        No results for input(s): "LABPT", "INR", "APTT" in the last 48 hours.  Microbiology Results (last 7 days)       ** No results found for the last 168 hours. **          All pertinent labs from the last 24 hours have been reviewed.    Significant Diagnostics:  I have reviewed all pertinent imaging results/findings within the past 24 hours.  I have reviewed and interpreted all pertinent imaging results/findings within the past 24 hours.  CT Cervical Spine Without Contrast    Result Date: 5/5/2024  Nondisplaced fractures involving the bilateral C4 lamina and left C5 lamina extending to the inferior articular process.  No definite widening of the facet joints.  Very mild anterolisthesis at C4-5 may be degenerative in nature.  Mild prevertebral edema within the mid cervical region. 4 mm right apical lung nodule nonspecific.  In a low risk patient, no follow-up indicated, in a high risk patient, optional CT at 1 year. Electronically signed by: Mayito Crook Date:    05/05/2024 Time:    15:01    CT Maxillofacial Without Contrast    Result Date: 5/5/2024  Limited evaluation of the mandible due to motion.  No evidence of acute facial fracture. Moderate particularly left-sided sinus mucosal thickening. Electronically signed by: Mayito Crook Date:    05/05/2024 Time:    14:44    CT Head Without Contrast    Result Date: 5/5/2024  No acute intracranial hemorrhage/injury. Electronically signed by: Mayito Crook Date:    05/05/2024 " Time:    14:38    X-Ray Pelvis Routine AP    Result Date: 5/5/2024  Limited exam.  Noting this, no convincing evidence of acute displaced fracture or dislocation. Electronically signed by: Kd Berman Date:    05/05/2024 Time:    13:37    X-Ray Knee 3 View Bilateral    Result Date: 5/5/2024  No convincing evidence of acute displaced fracture or dislocation. Electronically signed by: Kd Berman Date:    05/05/2024 Time:    13:35     Assessment/Plan:     Nondisplaced fracture of fourth cervical vertebra  89 y/o F PMH CHF, Alzheimer's, DM, HTN who presents to the ED via EMS for evaluation of a fall with CT Csp demonstrating bilateral C4 lamina fracture and L C5 lamina fracture with extension into IAP. Reportedly fell forwards out of recliner, hit head, not witnessed. Reportedly patient had a fall forwards out of recliner, she hit her head on the ground and had a bleeding laceration. Limited hx given dementia, but pt denies any LOC, CP, dyspnea, N/V/D, headache, new weakness/numbness or neck pain. Baseline incontinence    Imaging:  CTH 5/5: no acute process  CT Csp 5/5: bilateral C4 lamina, L C5 lamina extending to IAP fractures   MRI Csp 5/5: No significant canal stenosis   Upright / supine Csp in collar: no dynamic instability    Plan:  Admitted HM; q4h nc/vs  All labs and significant diagnostics reviewed  No acute neurosurgical intervention  C collar at all times until clinic follow up in 2 months  Will coordinate outpatient clinic follow up at this time with repeat CT Csp  Okay for diet/SQH  Pain control        Thank you for your consult. I will sign off. Please contact us if you have any additional questions.    Figueroa Brannon MD  Neurosurgery  [facility] - Emergency Dept

## 2024-05-05 NOTE — HPI
89 y/o F PMH CHF, Alzheimer's, DM, HTN who presents to the ED via EMS for evaluation of a fall with CT Csp demonstrating bilateral C4 lamina fracture and L C5 lamina fracture with extension into IAP. Reportedly fell forwards out of recliner, hit head, not witnessed. Reportedly patient had a fall forwards out of recliner, she hit her head on the ground and had a bleeding laceration. Limited hx given dementia, but pt denies any LOC, CP, dyspnea, N/V/D, headache, new weakness/numbness or neck pain. Baseline incontinence

## 2024-05-05 NOTE — HPI
89 y/o F PMH CHF, Alzheimer's, DM, HTN who presents to the ED via EMS for evaluation of a fall with CT Csp demonstrating bilateral C4 lamina fracture and L C5 lamina fracture with extension into IAP. At baseline patient ambulates with wheelchair and needs assistance with transfers. Reportedly patient had a fall forwards out of recliner, she hit her head on the ground and had a bleeding laceration. Limited hx given dementia, but pt denies any LOC, CP, dyspnea, N/V/D, headache, new weakness/numbness or neck pain. Baseline incontinence .    In the ED patient afebrile and hemodynamically stable saturating well on room air. Moves all extremities, alert and oriented to person. Conversational and pleasant with limited insight. Patient placed in C collar NSGY consulted and evaluated patient. MRI without significant canal stenosis and no plan for acute intervention per NSGY. Admitted to the care of medicine for further evaluation and management.

## 2024-05-06 ENCOUNTER — TELEPHONE (OUTPATIENT)
Dept: NEUROSURGERY | Facility: CLINIC | Age: 89
End: 2024-05-06
Payer: MEDICARE

## 2024-05-06 PROBLEM — I13.10 BENIGN HYPERTENSIVE HEART AND KIDNEY DISEASE WITH CKD: Status: ACTIVE | Noted: 2024-05-06

## 2024-05-06 PROBLEM — D62 ACUTE BLOOD LOSS ANEMIA: Status: ACTIVE | Noted: 2024-05-06

## 2024-05-06 LAB
ALBUMIN SERPL BCP-MCNC: 2.4 G/DL (ref 3.5–5.2)
ALP SERPL-CCNC: 60 U/L (ref 55–135)
ALT SERPL W/O P-5'-P-CCNC: 16 U/L (ref 10–44)
ANION GAP SERPL CALC-SCNC: 8 MMOL/L (ref 8–16)
AST SERPL-CCNC: 18 U/L (ref 10–40)
BASOPHILS # BLD AUTO: 0.02 K/UL (ref 0–0.2)
BASOPHILS NFR BLD: 0.2 % (ref 0–1.9)
BILIRUB SERPL-MCNC: 0.4 MG/DL (ref 0.1–1)
BUN SERPL-MCNC: 24 MG/DL (ref 10–30)
CALCIUM SERPL-MCNC: 9.2 MG/DL (ref 8.7–10.5)
CHLORIDE SERPL-SCNC: 109 MMOL/L (ref 95–110)
CO2 SERPL-SCNC: 24 MMOL/L (ref 23–29)
CREAT SERPL-MCNC: 1.3 MG/DL (ref 0.5–1.4)
DIFFERENTIAL METHOD BLD: ABNORMAL
EOSINOPHIL # BLD AUTO: 0.2 K/UL (ref 0–0.5)
EOSINOPHIL NFR BLD: 1.4 % (ref 0–8)
ERYTHROCYTE [DISTWIDTH] IN BLOOD BY AUTOMATED COUNT: 12.9 % (ref 11.5–14.5)
EST. GFR  (NO RACE VARIABLE): 38.8 ML/MIN/1.73 M^2
GLUCOSE SERPL-MCNC: 137 MG/DL (ref 70–110)
HCT VFR BLD AUTO: 35.5 % (ref 37–48.5)
HGB BLD-MCNC: 11.3 G/DL (ref 12–16)
IMM GRANULOCYTES # BLD AUTO: 0.05 K/UL (ref 0–0.04)
IMM GRANULOCYTES NFR BLD AUTO: 0.5 % (ref 0–0.5)
LYMPHOCYTES # BLD AUTO: 2.9 K/UL (ref 1–4.8)
LYMPHOCYTES NFR BLD: 26.8 % (ref 18–48)
MAGNESIUM SERPL-MCNC: 1.5 MG/DL (ref 1.6–2.6)
MCH RBC QN AUTO: 29.9 PG (ref 27–31)
MCHC RBC AUTO-ENTMCNC: 31.8 G/DL (ref 32–36)
MCV RBC AUTO: 94 FL (ref 82–98)
MONOCYTES # BLD AUTO: 0.7 K/UL (ref 0.3–1)
MONOCYTES NFR BLD: 6.6 % (ref 4–15)
NEUTROPHILS # BLD AUTO: 7 K/UL (ref 1.8–7.7)
NEUTROPHILS NFR BLD: 64.5 % (ref 38–73)
NRBC BLD-RTO: 0 /100 WBC
OHS QRS DURATION: 142 MS
OHS QRS DURATION: 142 MS
OHS QTC CALCULATION: 492 MS
OHS QTC CALCULATION: 493 MS
PHOSPHATE SERPL-MCNC: 3.8 MG/DL (ref 2.7–4.5)
PLATELET # BLD AUTO: 174 K/UL (ref 150–450)
PMV BLD AUTO: 10.3 FL (ref 9.2–12.9)
POCT GLUCOSE: 131 MG/DL (ref 70–110)
POCT GLUCOSE: 145 MG/DL (ref 70–110)
POTASSIUM SERPL-SCNC: 4.5 MMOL/L (ref 3.5–5.1)
PROT SERPL-MCNC: 5.7 G/DL (ref 6–8.4)
RBC # BLD AUTO: 3.78 M/UL (ref 4–5.4)
SODIUM SERPL-SCNC: 141 MMOL/L (ref 136–145)
WBC # BLD AUTO: 10.85 K/UL (ref 3.9–12.7)

## 2024-05-06 PROCEDURE — 83735 ASSAY OF MAGNESIUM: CPT | Performed by: FAMILY MEDICINE

## 2024-05-06 PROCEDURE — 25000003 PHARM REV CODE 250: Performed by: HOSPITALIST

## 2024-05-06 PROCEDURE — 97530 THERAPEUTIC ACTIVITIES: CPT

## 2024-05-06 PROCEDURE — 97535 SELF CARE MNGMENT TRAINING: CPT

## 2024-05-06 PROCEDURE — 21400001 HC TELEMETRY ROOM

## 2024-05-06 PROCEDURE — 63600175 PHARM REV CODE 636 W HCPCS: Performed by: FAMILY MEDICINE

## 2024-05-06 PROCEDURE — 36415 COLL VENOUS BLD VENIPUNCTURE: CPT | Performed by: FAMILY MEDICINE

## 2024-05-06 PROCEDURE — 84100 ASSAY OF PHOSPHORUS: CPT | Performed by: FAMILY MEDICINE

## 2024-05-06 PROCEDURE — 25000003 PHARM REV CODE 250: Performed by: FAMILY MEDICINE

## 2024-05-06 PROCEDURE — 97162 PT EVAL MOD COMPLEX 30 MIN: CPT

## 2024-05-06 PROCEDURE — 80053 COMPREHEN METABOLIC PANEL: CPT | Performed by: FAMILY MEDICINE

## 2024-05-06 PROCEDURE — 85025 COMPLETE CBC W/AUTO DIFF WBC: CPT | Performed by: FAMILY MEDICINE

## 2024-05-06 PROCEDURE — 97165 OT EVAL LOW COMPLEX 30 MIN: CPT

## 2024-05-06 PROCEDURE — 92610 EVALUATE SWALLOWING FUNCTION: CPT

## 2024-05-06 PROCEDURE — 96372 THER/PROPH/DIAG INJ SC/IM: CPT | Performed by: FAMILY MEDICINE

## 2024-05-06 RX ORDER — OXYCODONE HYDROCHLORIDE 5 MG/1
5 TABLET ORAL EVERY 4 HOURS PRN
Status: DISCONTINUED | OUTPATIENT
Start: 2024-05-06 | End: 2024-05-07

## 2024-05-06 RX ORDER — ACETAMINOPHEN 500 MG
1000 TABLET ORAL EVERY 8 HOURS PRN
Status: DISCONTINUED | OUTPATIENT
Start: 2024-05-06 | End: 2024-05-06

## 2024-05-06 RX ORDER — LANOLIN ALCOHOL/MO/W.PET/CERES
400 CREAM (GRAM) TOPICAL ONCE
Status: COMPLETED | OUTPATIENT
Start: 2024-05-06 | End: 2024-05-06

## 2024-05-06 RX ORDER — METHOCARBAMOL 500 MG/1
500 TABLET, FILM COATED ORAL 4 TIMES DAILY
Status: DISCONTINUED | OUTPATIENT
Start: 2024-05-06 | End: 2024-05-09

## 2024-05-06 RX ORDER — HYDRALAZINE HYDROCHLORIDE 20 MG/ML
5 INJECTION INTRAMUSCULAR; INTRAVENOUS EVERY 6 HOURS PRN
Status: DISCONTINUED | OUTPATIENT
Start: 2024-05-06 | End: 2024-05-08

## 2024-05-06 RX ORDER — INSULIN ASPART 100 [IU]/ML
0-5 INJECTION, SOLUTION INTRAVENOUS; SUBCUTANEOUS EVERY 6 HOURS
Status: DISCONTINUED | OUTPATIENT
Start: 2024-05-06 | End: 2024-05-08

## 2024-05-06 RX ORDER — ACETAMINOPHEN 500 MG
1000 TABLET ORAL 3 TIMES DAILY
Status: DISCONTINUED | OUTPATIENT
Start: 2024-05-06 | End: 2024-05-10

## 2024-05-06 RX ORDER — LABETALOL HCL 20 MG/4 ML
10 SYRINGE (ML) INTRAVENOUS EVERY 6 HOURS PRN
Status: DISCONTINUED | OUTPATIENT
Start: 2024-05-06 | End: 2024-05-06

## 2024-05-06 RX ADMIN — ACETAMINOPHEN 1000 MG: 500 TABLET ORAL at 08:05

## 2024-05-06 RX ADMIN — ATORVASTATIN CALCIUM 80 MG: 40 TABLET, FILM COATED ORAL at 08:05

## 2024-05-06 RX ADMIN — CARVEDILOL 3.12 MG: 3.12 TABLET, FILM COATED ORAL at 08:05

## 2024-05-06 RX ADMIN — Medication 400 MG: at 08:05

## 2024-05-06 RX ADMIN — HEPARIN SODIUM 5000 UNITS: 5000 INJECTION INTRAVENOUS; SUBCUTANEOUS at 03:05

## 2024-05-06 RX ADMIN — HEPARIN SODIUM 5000 UNITS: 5000 INJECTION INTRAVENOUS; SUBCUTANEOUS at 06:05

## 2024-05-06 RX ADMIN — GABAPENTIN 300 MG: 300 CAPSULE ORAL at 08:05

## 2024-05-06 RX ADMIN — LEVOTHYROXINE SODIUM 75 MCG: 75 TABLET ORAL at 06:05

## 2024-05-06 RX ADMIN — METHOCARBAMOL 500 MG: 500 TABLET ORAL at 08:05

## 2024-05-06 RX ADMIN — METHOCARBAMOL 500 MG: 500 TABLET ORAL at 03:05

## 2024-05-06 RX ADMIN — ACETAMINOPHEN 1000 MG: 500 TABLET ORAL at 09:05

## 2024-05-06 RX ADMIN — POLYETHYLENE GLYCOL 3350 17 G: 17 POWDER, FOR SOLUTION ORAL at 08:05

## 2024-05-06 RX ADMIN — ACETAMINOPHEN 1000 MG: 500 TABLET ORAL at 03:05

## 2024-05-06 RX ADMIN — AMLODIPINE BESYLATE 10 MG: 5 TABLET ORAL at 08:05

## 2024-05-06 RX ADMIN — LEVETIRACETAM 250 MG: 250 TABLET, FILM COATED ORAL at 08:05

## 2024-05-06 NOTE — ASSESSMENT & PLAN NOTE
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

## 2024-05-06 NOTE — ASSESSMENT & PLAN NOTE
Patient has Abnormal Magnesium: hypomagnesemia. Will continue to monitor electrolytes closely. Will replace the affected electrolytes and repeat labs to be done after interventions completed. The patient's magnesium results have been reviewed and are listed below.  Recent Labs   Lab 05/06/24  0321   MG 1.5*

## 2024-05-06 NOTE — SUBJECTIVE & OBJECTIVE
Interval History:  Admitted overnight.  Pain control adjusted.  PT/OT consulted this morning, so likely wont see her until tomorrow.  Will need to discuss if she would be interested in post acute care.    Review of Systems   Unable to perform ROS: Dementia     Objective:     Vital Signs (Most Recent):  Temp: 96 °F (35.6 °C) (05/06/24 0430)  Pulse: 61 (05/06/24 0430)  Resp: 18 (05/06/24 0430)  BP: (!) 142/80 (05/06/24 0430)  SpO2: 98 % (05/06/24 0430) Vital Signs (24h Range):  Temp:  [96 °F (35.6 °C)-98 °F (36.7 °C)] 96 °F (35.6 °C)  Pulse:  [57-84] 61  Resp:  [12-18] 18  SpO2:  [95 %-100 %] 98 %  BP: (110-194)/(58-97) 142/80     Weight: 62 kg (136 lb 11 oz)  Body mass index is 22.75 kg/m².  No intake or output data in the 24 hours ending 05/06/24 0854      Physical Exam  Constitutional:       Appearance: Normal appearance.   HENT:      Head: Normocephalic and atraumatic.   Neck:      Comments: C collar in place  Cardiovascular:      Rate and Rhythm: Regular rhythm. Bradycardia present.      Heart sounds: No murmur heard.  Pulmonary:      Effort: Pulmonary effort is normal. No respiratory distress.      Breath sounds: Normal breath sounds. No wheezing or rales.   Abdominal:      General: There is no distension.      Palpations: Abdomen is soft.      Tenderness: There is no abdominal tenderness.   Musculoskeletal:         General: No deformity.   Skin:     General: Skin is warm and dry.   Neurological:      General: No focal deficit present.      Mental Status: She is alert. Mental status is at baseline.             Significant Labs: All pertinent labs within the past 24 hours have been reviewed.  CBC:   Recent Labs   Lab 05/05/24  1332 05/06/24  0321   WBC 9.13 10.85   HGB 14.0 11.3*   HCT 41.4 35.5*    174     CMP:   Recent Labs   Lab 05/05/24  1600 05/06/24  0321    141   K 4.6 4.5    109   CO2 23 24   * 137*   BUN 24 24   CREATININE 1.1 1.3   CALCIUM 9.7 9.2   PROT  --  5.7*   ALBUMIN  --   2.4*   BILITOT  --  0.4   ALKPHOS  --  60   AST  --  18   ALT  --  16   ANIONGAP 10 8       Significant Imaging: I have reviewed all pertinent imaging results/findings within the past 24 hours.

## 2024-05-06 NOTE — ASSESSMENT & PLAN NOTE
- Appreciate Cardiology assistance  - paused for MRI and previous settings resumed after completion

## 2024-05-06 NOTE — PLAN OF CARE
PT evaluation complete.     Problem: Physical Therapy  Goal: Physical Therapy Goal  Description: Goals to be met by: 24     Patient will increase functional independence with mobility by performin. Supine to sit with Minimal Assistance  2. Sit to stand transfer with Minimal Assistance  3. Bed to chair transfer with Minimal Assistance using LRAD  4. Gait x20 feet with Minimal Assistance using Rolling Walker    Outcome: Progressing

## 2024-05-06 NOTE — PROGRESS NOTES
[facility] Medicine  Progress Note    Patient Name: Quin Linn  MRN: [id number]  Patient Class: OP- Observation   Admission Date: 5/5/2024  Length of Stay: 0 days  Attending Physician: Rosita Galarza MD  Primary Care Provider: Mary Ellen Nesbitt MD        Subjective:     Principal Problem:Nondisplaced fracture of fourth cervical vertebra        HPI:  89 y/o F PMH CHF, Alzheimer's, DM, HTN who presents to the ED via EMS for evaluation of a fall with CT Csp demonstrating bilateral C4 lamina fracture and L C5 lamina fracture with extension into IAP. At baseline patient ambulates with wheelchair and needs assistance with transfers. Reportedly patient had a fall forwards out of recliner, she hit her head on the ground and had a bleeding laceration. Limited hx given dementia, but pt denies any LOC, CP, dyspnea, N/V/D, headache, new weakness/numbness or neck pain. Baseline incontinence .    In the ED patient afebrile and hemodynamically stable saturating well on room air. Moves all extremities, alert and oriented to person. Conversational and pleasant with limited insight. Patient placed in C collar NSGY consulted and evaluated patient. MRI without significant canal stenosis and no plan for acute intervention per NSGY. Admitted to the care of medicine for further evaluation and management.     Overview/Hospital Course:  Ms. Linn was admitted to Hospital Medicine for management of a cervical fracture.  NSGY was consulted who said C collar and pain control, no surgical intervention.  She was started on a multimodal pain regimen and PT/OT were consulted.    Interval History:  Admitted overnight.  Pain control adjusted.  PT/OT consulted this morning, so likely wont see her until tomorrow.  Will need to discuss if she would be interested in post acute care.    Review of Systems   Unable to perform ROS: Dementia     Objective:     Vital Signs (Most Recent):  Temp: 96 °F (35.6 °C) (05/06/24 0430)  Pulse: 61  (05/06/24 0430)  Resp: 18 (05/06/24 0430)  BP: (!) 142/80 (05/06/24 0430)  SpO2: 98 % (05/06/24 0430) Vital Signs (24h Range):  Temp:  [96 °F (35.6 °C)-98 °F (36.7 °C)] 96 °F (35.6 °C)  Pulse:  [57-84] 61  Resp:  [12-18] 18  SpO2:  [95 %-100 %] 98 %  BP: (110-194)/(58-97) 142/80     Weight: 62 kg (136 lb 11 oz)  Body mass index is 22.75 kg/m².  No intake or output data in the 24 hours ending 05/06/24 0854      Physical Exam  Constitutional:       Appearance: Normal appearance.   HENT:      Head: Normocephalic and atraumatic.   Neck:      Comments: C collar in place  Cardiovascular:      Rate and Rhythm: Regular rhythm. Bradycardia present.      Heart sounds: No murmur heard.  Pulmonary:      Effort: Pulmonary effort is normal. No respiratory distress.      Breath sounds: Normal breath sounds. No wheezing or rales.   Abdominal:      General: There is no distension.      Palpations: Abdomen is soft.      Tenderness: There is no abdominal tenderness.   Musculoskeletal:         General: No deformity.   Skin:     General: Skin is warm and dry.   Neurological:      General: No focal deficit present.      Mental Status: She is alert. Mental status is at baseline.             Significant Labs: All pertinent labs within the past 24 hours have been reviewed.  CBC:   Recent Labs   Lab 05/05/24  1332 05/06/24  0321   WBC 9.13 10.85   HGB 14.0 11.3*   HCT 41.4 35.5*    174     CMP:   Recent Labs   Lab 05/05/24  1600 05/06/24  0321    141   K 4.6 4.5    109   CO2 23 24   * 137*   BUN 24 24   CREATININE 1.1 1.3   CALCIUM 9.7 9.2   PROT  --  5.7*   ALBUMIN  --  2.4*   BILITOT  --  0.4   ALKPHOS  --  60   AST  --  18   ALT  --  16   ANIONGAP 10 8       Significant Imaging: I have reviewed all pertinent imaging results/findings within the past 24 hours.    Assessment/Plan:      * Nondisplaced fracture of fourth cervical vertebra  NSGY consulted ; appreciate recs  MRI with Increased signal intensity in the  bilateral C4 lamina and left C5 lamina consistent with fractures better identified on recent CT. Anterior widening of the C5-C6 intervertebral disc space with increased signal intensity and complete disruption of the anterior longitudinal ligament at this level.  Edema in the interspinous ligaments at C4-C7.  Findings are suggestive of hyperextension injury. Extensive prevertebral edema throughout the cervical and upper thoracic spine likely related to ligamentous injury.   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 Has not received any PRBCs to date. Etiology likely d/t chronic disease due to Chronic Kidney Disease  Current CBC reviewed-   Lab Results   Component Value Date    HGB 11.3 (L) 05/06/2024    HCT 35.5 (L) 05/06/2024     Monitor serial CBC and transfuse if patient becomes hemodynamically unstable, symptomatic or H/H drops below 7/21.    DNR (do not resuscitate)  DNR status confirmed      Paroxysmal A-fib  Holding home Eliquis as above at this time  Continue home coreg    CKD (chronic kidney disease), stage IV  Creatine stable for now. BMP reviewed- noted Estimated Creatinine Clearance: 25.4 mL/min (based on SCr of 1.3 mg/dL). according to latest data. Based on current GFR, CKD stage is stage 4 - GFR 15-29.  Monitor UOP and serial BMP and adjust therapy as needed. Renally dose meds. Avoid nephrotoxic medications and procedures.    Pacemaker  Appreciate Cardiology assistance  Paused for MRI and previous settings resumed after completion      Hypomagnesemia  Patient has Abnormal Magnesium: hypomagnesemia. Will continue to  monitor electrolytes closely. Will replace the affected electrolytes and repeat labs to be done after interventions completed. The patient's magnesium results have been reviewed and are listed below.  Recent Labs   Lab 05/06/24  0321   MG 1.5*        Controlled type 2 diabetes mellitus with stage 3 chronic kidney disease, without long-term current use of insulin  HbA1c 5.5, sugars controlled  Home DM regimen:  Glipizide  Hold oral hyperglycemic meds  SSI with POCT accuchecks to achieve blood glucose of 140-180 while hospitalized  Hypoglycemia protocol  Diabetic diet      Late onset Alzheimer's disease with behavioral disturbance  Chronic issue  Continue home seroquel  Delirium precautions    Episode of transient neurologic symptoms  Followed by Neurology and on 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  Continue home keppra      Hypertension  Chronic issue  Continue home amlodipine and coreg    Hypothyroidism following radioiodine therapy  Chronic and stable  Continue home synthroid        VTE Risk Mitigation (From admission, onward)           Ordered     heparin (porcine) injection 5,000 Units  Every 8 hours         05/05/24 1820     IP VTE HIGH RISK PATIENT  Once         05/05/24 1820     Place sequential compression device  Until discontinued         05/05/24 1820                    Discharge Planning   LILI: 5/7/2024     Code Status: DNR   Is the patient medically ready for discharge?:     Reason for patient still in hospital (select all that apply): PT / OT recommendations                     Rosita Galarza MD  Department of Hospital Medicine   [facility] - Surgery

## 2024-05-06 NOTE — ACP (ADVANCE CARE PLANNING)
Advance Care Planning     Date: 05/06/2024    Code Status  In light of the patients advanced and life limiting illness,I engaged the the patient and healthcare power of   in a voluntary conversation about the patient's preferences for care  at the very end of life. The patient wishes to have a natural, peaceful death.  Along those lines, the patient does not wish to have CPR or other invasive treatments performed when her heart and/or breathing stops. I communicated to the patient and healthcare power of   that a DNR order would be placed in her medical record to reflect this preference.  I spent a total of 15 minutes engaging the patient in this advance care planning discussion.        Rosita Galarza MD, [facility]

## 2024-05-06 NOTE — TELEPHONE ENCOUNTER
----- Message from Figueroa Brannon MD sent at 5/5/2024  6:08 PM CDT -----  Hello,    This patient has dementia, fell, had bilateral C4 lamina fracture and L C5 lamina fracture with extension into IAP. Further imaging revealed no canal stenosis or dynamic instability. She will wear her cervical collar for 2 months and will need follow up in 2 months with Latisha with repeat CT Csp w/o to re-assess fracture    Thank you  Figueroa

## 2024-05-06 NOTE — PT/OT/SLP EVAL
Physical Therapy Evaluation and Treatment    Patient Name: Quin Linn   MRN: [id number]  Recent Surgery: * No surgery found *      Recommendations:     Discharge Recommendations: Moderate Intensity Therapy   Discharge Equipment Recommendations: wheelchair   Barriers to discharge: Increased level of assist    Assessment:     Quin Linn is a 89 y.o. female admitted with a medical diagnosis of Nondisplaced fracture of fourth cervical vertebra. She presents with the following impairments/functional limitations: weakness, impaired functional mobility, gait instability, impaired balance, decreased upper extremity function, decreased lower extremity function, orthopedic precautions, decreased safety awareness. Aspen collar adjusted to fit patient properly. Patient sat edge of bed ~12 minutes with CGA. Patient coughing while trying to take medicine while seated on edge of bed. PA notified.     Rehab Prognosis: Good; patient would benefit from acute PT services to address these deficits and reach maximum level of function.    Plan:     During this hospitalization, patient to be seen 3 x/week to address the above listed problems via gait training, therapeutic activities, therapeutic groups, neuromuscular re-education    Plan of Care Expires: 06/06/24    Subjective     Chief Complaint: None stated.   Patient Comments/Goals: Currently not hurting.   Pain/Comfort:  Pain Rating 1: 0/10    Social History: PLOF gained from previous admissions. No family present to confirm.   Living Environment: Patient lives in a [facility] with 3 steps to enter.   Prior Level of Function: Prior to admission, patient ambulates with wheelchair and needs assistance for transfers.   Equipment Used at Home: walker, rolling, wheelchair   Assistance Upon Discharge:  caregiver    Objective:     Communicated with RN prior to session. Patient found HOB elevated with telemetry, SCD, PureWick upon PT entry to room.    General Precautions: Standard, seizure,  fall, aspiration   Orthopedic Precautions: N/A   Braces: Aspen collar    Respiratory Status: Room air    Exams:  RLE ROM: WFL  RLE Strength:  appears WFL but unable to follow commands due to following limited commands  LLE ROM: WFL  LLE Strength: appears WFL but unable to follow commands due to following limited commands  Cognitive: Patient is oriented to Person    Functional Mobility:  Bed Mobility  Supine to Sit: moderate assistance   Sit to Supine: maximal assistance   Transfers  Sit to Stand: maximal assistance with hand-held assist x2 trials from bed with knees and feet blocked due to patient stating her feet were sliding   Gait  Unable to take steps  Balance  Sitting: Patient sat on edge of bed ~12 minutes with contact guard assistance. Assisted patient with medication but RN and PA noted that she began coughing after several pills.       Therapeutic Activities and Exercises:  Patient educated in:  -PT role and POC  -C collar management   -safety with transfers including hand placement  -out of bed activity to maximize recovery with nursing staff assistance    AM-PAC 6 CLICK MOBILITY  Total Score:9    Patient left HOB elevated with all lines intact, call button in reach, and RN and PA notified.    GOALS:   Multidisciplinary Problems       Physical Therapy Goals          Problem: Physical Therapy    Goal Priority Disciplines Outcome Goal Variances Interventions   Physical Therapy Goal     PT, PT/OT Progressing     Description: Goals to be met by: 24     Patient will increase functional independence with mobility by performin. Supine to sit with Minimal Assistance  2. Sit to stand transfer with Minimal Assistance  3. Bed to chair transfer with Minimal Assistance using LRAD  4. Gait x20 feet with Minimal Assistance using Rolling Walker                         History:     Past Medical History:   Diagnosis Date    Acute hypoxemic respiratory failure 2023    Acute on chronic diastolic congestive  heart failure 10/19/2023    Alzheimer's disease     Anemia     Arthritis     lumbar    Back pain     Cancer     colon    Cataract     Colon cancer     Diabetes mellitus type II     Glaucoma     Hyperlipidemia     Hypertension     Hyperthyroidism     Hypothyroidism following radioiodine therapy     Pacemaker     Pneumonia     Pneumonia due to other staphylococcus     Renal manifestation of secondary diabetes mellitus     Squamous cell carcinoma     Stroke     TIA    Type 2 diabetes mellitus with stage 3 chronic kidney disease and hypertension 10/12/2017    Type 2 diabetes with peripheral circulatory disorder, controlled        Past Surgical History:   Procedure Laterality Date    CATARACT EXTRACTION W/  INTRAOCULAR LENS IMPLANT Left 09/13/2017        CHOLECYSTECTOMY      COLON SURGERY  2001    Colon CA    EYE SURGERY      cataract removal left side    IMPLANTATION OF LEADLESS PACEMAKER N/A 10/21/2022    Procedure: [id number];  Surgeon: JOSE Burgos MD;  Location: [facility] LAB;  Service: Cardiology;  Laterality: N/A;  JOHNNA DUNAWAY MDT, ANES, EH,     squamous cell ca of face         Time Tracking:     PT Received On: 05/06/24  PT Start Time: 0947  PT Stop Time: 1010  PT Total Time (min): 23 min     Billable Minutes: Evaluation 11 and Therapeutic Activity 12    5/6/2024

## 2024-05-06 NOTE — PT/OT/SLP EVAL
Speech Language Pathology Evaluation  Bedside Swallow    Patient Name:  Quin Linn   MRN:  [id number]  Admitting Diagnosis: Nondisplaced fracture of fourth cervical vertebra    Recommendations:                 General Recommendations:  Dysphagia therapy  Diet recommendations:  NPO, NPO   Aspiration Precautions: Strict aspiration precautions   General Precautions: Standard, aspiration, fall, seizure  Communication strategies:  provide increased time to answer    Assessment:     Quin Linn is a 89 y.o. female with an SLP diagnosis of Dysphagia.    History:     Past Medical History:   Diagnosis Date    Acute hypoxemic respiratory failure 9/2/2023    Acute on chronic diastolic congestive heart failure 10/19/2023    Alzheimer's disease     Anemia     Arthritis     lumbar    Back pain     Cancer     colon    Cataract     Colon cancer     Diabetes mellitus type II     Glaucoma     Hyperlipidemia     Hypertension     Hyperthyroidism     Hypothyroidism following radioiodine therapy     Pacemaker     Pneumonia     Pneumonia due to other staphylococcus     Renal manifestation of secondary diabetes mellitus     Squamous cell carcinoma     Stroke     TIA    Type 2 diabetes mellitus with stage 3 chronic kidney disease and hypertension 10/12/2017    Type 2 diabetes with peripheral circulatory disorder, controlled        Past Surgical History:   Procedure Laterality Date    CATARACT EXTRACTION W/  INTRAOCULAR LENS IMPLANT Left 09/13/2017        CHOLECYSTECTOMY      COLON SURGERY  2001    Colon CA    EYE SURGERY      cataract removal left side    IMPLANTATION OF LEADLESS PACEMAKER N/A 10/21/2022    Procedure: [id number];  Surgeon: JOSE Burgos MD;  Location: [facility] LAB;  Service: Cardiology;  Laterality: N/A;  JOHNNA DUNAWAY, MDT, ANES, EH,     squamous cell ca of face         Chest X-Rays: none this admit    Prior diet: Pt on soft diet/thin liquids prior to eval .      Subjective      Awake/confusion evident     Pain/Comfort:  Pain Rating 1: 0/10  Pain Rating Post-Intervention 1: 0/10    Respiratory Status: Room air    Objective:     Oral Musculature Evaluation  Oral Musculature: unable to assess due to poor participation/comprehension  Dentition: present and adequate  Voice Prior to PO Intake: wet, gurgly    Bedside Swallow Eval:   Consistencies Assessed:  Thin liquids x5   Puree x1      Oral Phase:   WFL    Pharyngeal Phase:   Wet vocal quality prior to trials  Coughing post thin and puree trials   Pt with multiple swallow across trials       Goals:   Multidisciplinary Problems       SLP Goals          Problem: SLP    Goal Priority Disciplines Outcome   SLP Goal     SLP    Description: Speech Language Pathology Goals  Goals expected to be met by 5/13    1. Pt will participate in ongoing swallow assessment                               Plan:     Patient to be seen:  4 x/week   Plan of Care reviewed with:  patient   SLP Follow-Up:  Yes       Discharge recommendations:    TBD      Time Tracking:     SLP Treatment Date:   05/06/24  Speech Start Time:  1128  Speech Stop Time:  1135     Speech Total Time (min):  7 min    Billable Minutes: Eval Swallow and Oral Function 7    05/06/2024

## 2024-05-06 NOTE — ASSESSMENT & PLAN NOTE
- NSGY consulted ; appreciate recs  - MRI with Increased signal intensity in the bilateral C4 lamina and left C5 lamina consistent with fractures better identified on recent CT. Anterior widening of the C5-C6 intervertebral disc space with increased signal intensity and complete disruption of the anterior longitudinal ligament at this level.  Edema in the interspinous ligaments at C4-C7.  Findings are suggestive of hyperextension injury. Extensive prevertebral edema throughout the cervical and upper thoracic spine likely related to ligamentous injury.   - no acute intervention per NSGY   - maintain C Collar at all times  - neurochecks q4h  - okay for diet and subQ heparin per NSGY  - will hold home Eliquis for now while monitoring frequent neurochecks  ;  resume as appropriate  - pain control  - PT/OT consult in am  - further management pending clinical course and future study review

## 2024-05-06 NOTE — PLAN OF CARE
[facility]  Initial Discharge Assessment       Primary Care Provider: Mary Ellen Nesbitt MD    Admission Diagnosis: Fall [W19.XXXA]  Chest pain [R07.9]  Laceration of left cheek, initial encounter [S01.412A]  Closed nondisplaced fracture of fourth cervical vertebra, unspecified fracture morphology, initial encounter [S12.301A]    Admission Date: 5/5/2024  Expected Discharge Date: 5/8/2024    Transition of Care Barriers: (P) None    Payor: [facility] MCARE / Plan: [employer] MEDICARE ADVANTAGE / Product Type: Medicare Advantage /     Extended Emergency Contact Information  Primary Emergency Contact: Janae Linn  Address: [address] [place] [address] [place] [address] [facility]  Home Phone: [phone]  Mobile Phone: [phone]  Relation: Daughter  Preferred language: English  Secondary Emergency Contact: Evan VALDOVINOS   [place]  Mobile Phone: [phone]  Relation: Relative    Discharge Plan A: (P) Skilled Nursing Facility  Discharge Plan B: (P) Home Health      [id number] - [place] - [address] [facility] [address] [place] [facility]  [address]  [place] [address]  Phone: [phone] Fax: [phone]      Initial Assessment (most recent)       Adult Discharge Assessment - 05/06/24 1352          Discharge Assessment    Assessment Type Discharge Planning Assessment (P)      Confirmed/corrected address, phone number and insurance Yes (P)      Confirmed Demographics Correct on Facesheet (P)      Source of Information family (P)      If unable to respond/provide information was family/caregiver contacted? Yes (P)      Contact Name/Number Ar Linn [phone] (P)      Communicated LILI with patient/caregiver Other (see comments) (P)    Date is tentative    Reason For Admission Nondisplaced fracture of fourth cervical vertebra (P)      People in Home child(jose), dependent (P)      Facility Arrived From:  Home (P)      Do you expect to return to your current living situation? Yes (P)      Do you have help at home or someone to help you manage your care at home? Yes (P)      Who are your caregiver(s) and their phone number(s)? Ar Linn [phone] (P)      Prior to hospitilization cognitive status: Unable to Assess (P)      Current cognitive status: Unable to Assess (P)      Walking or Climbing Stairs Difficulty yes (P)      Walking or Climbing Stairs ambulation difficulty, requires equipment;ambulation difficulty, assistance 1 person (P)      Mobility Management Wheelchair, 1 assist (P)      Dressing/Bathing Difficulty yes (P)      Dressing/Bathing bathing difficulty, dependent (P)      Do you have any problems with: Needs other help (P)      Specify other help Meals, transportation, hygiene (P)      Home Accessibility wheelchair accessible (P)      Home Layout Able to live on 1st floor (P)      Equipment Currently Used at Home wheelchair (P)      Readmission within 30 days? No (P)      Patient currently being followed by outpatient case management? No (P)      Do you currently have service(s) that help you manage your care at home? Yes (P)      Name and Contact number of agency Private pay CNA's (P)      Do you take prescription medications? Yes (P)      Do you have prescription coverage? Yes (P)      Coverage [facility] Medicare (P)      Do you have any problems affording any of your prescribed medications? No (P)      Is the patient taking medications as prescribed? yes (P)      Who is going to help you get home at discharge? Ar Linn [phone] (P)      How do you get to doctors appointments? family or friend will provide (P)      Are you on dialysis? No (P)      Do you take coumadin? No (P)      Discharge Plan A Skilled Nursing Facility (P)      Discharge Plan B Home Health (P)      DME Needed Upon Discharge  -- (P)    tbd    Discharge Plan discussed with: Adult children (P)      Transition  of Care Barriers None (P)         OTHER    Name(s) of People in Home Daughter Janae Linn [phone] (P)                    Discharge Plan A and Plan B have been determined by review of patient's clinical status, future medical and therapeutic needs, and coverage/benefits for post-acute care in coordination with multidisciplinary team members.    Pt lives in a one story home with 1 MATILDE. Pt was mostly dependent with ADL's prior to admission and uses a wheelchair for ambulation, gets bed baths. She owns a walker, but isn't currently using it. She is not on dialysis or Coumadin.     Pt's family states they will only agree to send Pt to [facility] if SNF is the recommendation. Pt is current with [facility].     Pt has transportation home with family. SW will follow for all discharge planning needs.     JOSE Turner, LMSW [facility]  [id number]

## 2024-05-06 NOTE — HOSPITAL COURSE
Ms. Linn was admitted to Hospital Medicine for management of a cervical fracture.  NSGY was consulted who said C collar and pain control, no surgical intervention.  She was started on a multimodal pain regimen & working with PT/OT while awaiting SNF placement. SLP consulted given some c/f dysphagia. Pt was otherwise medically ready for discharge (pending SNF placement), when she developed acute hypoxia 5/9 evening w/ copious oral secretions noted requiring frequent suctioning. BNP elevated (baseline unk), but CXR unrevealing. Received 20mg IV lasix & scopolamine patch applied. Discussed with daughter via phone who reports that prior to fall, pt was speaking & swallowing fine. Reports that pt never had any issues with swallowing, speech or issues with secretions/airway. Aside from mild short-term memory issues, no notable deficits/issues 2/2 pt's dx of Alzheimer's. Pt weaned to RA. Repeat CT head & MRI brain negative for acute process. Renal function stable/improving w/ mIVF; received addn'l D5W boluses for hypernatremia. C-collar removed to work w/ SLP during which pt did much better and was able to advance to soft/nectar thick diet. Will allow for C-collar to be removed for meals & meds only; otherwise C-collar to remain in place at all times. Transitioned back to home PO anti-hypertensive regimen w/ continuation of scheduled hydralazine given persistent HTN. Stable on RA and breath sounds clear. Given clinical improvement & negative ENT/neurological workup, pt medically ready to discharge to SNF.

## 2024-05-06 NOTE — SUBJECTIVE & OBJECTIVE
Past Medical History:   Diagnosis Date    Acute hypoxemic respiratory failure 9/2/2023    Acute on chronic diastolic congestive heart failure 10/19/2023    Alzheimer's disease     Anemia     Arthritis     lumbar    Back pain     Cancer     colon    Cataract     Colon cancer     Diabetes mellitus type II     Glaucoma     Hyperlipidemia     Hypertension     Hyperthyroidism     Hypothyroidism following radioiodine therapy     Pacemaker     Pneumonia     Pneumonia due to other staphylococcus     Renal manifestation of secondary diabetes mellitus     Squamous cell carcinoma     Stroke     TIA    Type 2 diabetes mellitus with stage 3 chronic kidney disease and hypertension 10/12/2017    Type 2 diabetes with peripheral circulatory disorder, controlled        Past Surgical History:   Procedure Laterality Date    CATARACT EXTRACTION W/  INTRAOCULAR LENS IMPLANT Left 09/13/2017        CHOLECYSTECTOMY      COLON SURGERY  2001    Colon CA    EYE SURGERY      cataract removal left side    IMPLANTATION OF LEADLESS PACEMAKER N/A 10/21/2022    Procedure: [id number];  Surgeon: JOSE uBrgos MD;  Location: [facility];  Service: Cardiology;  Laterality: N/A;  JOHNNA DUNAWAY, MDT, ANES, EH,     squamous cell ca of face         Review of patient's allergies indicates:   Allergen Reactions    Tramadol Rash and Edema     Developed facial rash and edema.    Metformin Diarrhea       Current Facility-Administered Medications   Medication Dose Route Frequency Provider Last Rate Last Admin    acetaminophen tablet 650 mg  650 mg Oral Q4H PRN Jered Kennedy MD        albuterol inhaler 2 puff  2 puff Inhalation Q6H PRN Jered Kennedy MD        aluminum-magnesium hydroxide-simethicone 200-200-20 mg/5 mL suspension 30 mL  30 mL Oral QID PRN Jered Kennedy MD        [START ON 5/6/2024] amLODIPine tablet 10 mg  10 mg Oral Daily Jered Kennedy MD        [START ON 5/6/2024] atorvastatin tablet 80  mg  80 mg Oral Daily Jered Kennedy MD        carvediloL tablet 3.125 mg  3.125 mg Oral BID Jered Kennedy MD        dextrose 10% bolus 125 mL 125 mL  12.5 g Intravenous PRN Jered Kennedy MD        dextrose 10% bolus 250 mL 250 mL  25 g Intravenous PRN Jered Kennedy MD        gabapentin capsule 300 mg  300 mg Oral BID Jered Kennedy MD        glucagon (human recombinant) injection 1 mg  1 mg Intramuscular PRN Jered Kennedy MD        glucose chewable tablet 16 g  16 g Oral PRN Jered Kennedy MD        glucose chewable tablet 24 g  24 g Oral Jered Contreras MD        heparin (porcine) injection 5,000 Units  5,000 Units Subcutaneous Q8H Jered Kennedy MD        insulin aspart U-100 pen 0-5 Units  0-5 Units Subcutaneous QID (AC + HS) PRN Jered Kennedy MD        levETIRAcetam tablet 250 mg  250 mg Oral BID Jered Kennedy MD        [START ON 5/6/2024] levothyroxine tablet 75 mcg  75 mcg Oral Before breakfast Jered Kennedy MD        melatonin tablet 6 mg  6 mg Oral Nightly PRN Jered Kennedy MD        morphine injection 4 mg  4 mg Intravenous Q4H PRN Jered Kennedy MD        naloxone 0.4 mg/mL injection 0.02 mg  0.02 mg Intravenous PRN Jered Kennedy MD        ondansetron injection 4 mg  4 mg Intravenous Q8H PRJered Richards MD        oxyCODONE immediate release tablet 5 mg  5 mg Oral Q6H PRN Jered Kennedy MD        [START ON 5/6/2024] polyethylene glycol packet 17 g  17 g Oral Daily Jered Kennedy MD        QUEtiapine tablet 75 mg  75 mg Oral QHS Jered Kennedy MD        senna-docusate 8.6-50 mg per tablet 1 tablet  1 tablet Oral BID PRN Jered Kennedy MD        sodium chloride 0.9% flush 10 mL  10 mL Intravenous Q12H PRN Jered Kennedy MD         Family History       Problem Relation (Age of Onset)    Alzheimer's disease Sister    Ankylosing spondylitis Daughter    Arthritis Mother    Asthma Son    Atrial  fibrillation Daughter    Cancer Sister, Paternal Uncle    Diabetes Maternal Grandmother    Heart disease Mother, Daughter    Hyperlipidemia Son    Hypertension Sister, Son    Kidney disease Father, Daughter    Lupus Daughter    No Known Problems Daughter    Supraventricular tachycardia Daughter          Tobacco Use    Smoking status: Never    Smokeless tobacco: Never    Tobacco comments:     smoked for about 4 years in her twenties (socially)   Substance and Sexual Activity    Alcohol use: No    Drug use: No    Sexual activity: Never     Review of Systems   Constitutional:  Negative for fever.   HENT:  Negative for trouble swallowing.    Eyes:  Negative for visual disturbance.   Respiratory:  Negative for cough and shortness of breath.    Cardiovascular:  Negative for chest pain.   Gastrointestinal:  Negative for abdominal pain, nausea and vomiting.   Musculoskeletal:  Positive for neck pain.   Skin:  Positive for wound. Negative for rash.   Neurological:  Negative for weakness and headaches.     Objective:     Vital Signs (Most Recent):  Temp: 98 °F (36.7 °C) (05/05/24 1913)  Pulse: 65 (05/05/24 1913)  Resp: 15 (05/05/24 1913)  BP: (!) 174/91 (05/05/24 1952)  SpO2: 95 % (05/05/24 1913) Vital Signs (24h Range):  Temp:  [97.9 °F (36.6 °C)-98 °F (36.7 °C)] 98 °F (36.7 °C)  Pulse:  [60-84] 65  Resp:  [12-18] 15  SpO2:  [95 %-100 %] 95 %  BP: (125-194)/(58-91) 174/91     Weight: 62 kg (136 lb 11 oz)  Body mass index is 22.75 kg/m².     Physical Exam  Constitutional:       General: She is not in acute distress.     Appearance: She is not toxic-appearing or diaphoretic.   HENT:      Head: Normocephalic.      Comments: Left facial/cheek abrasion with skin tear associated with bruising     Nose: Nose normal.   Eyes:      General: No scleral icterus.     Extraocular Movements: Extraocular movements intact.      Pupils: Pupils are equal, round, and reactive to light.   Neck:      Comments: C collar in place  Cardiovascular:       Rate and Rhythm: Normal rate and regular rhythm.   Pulmonary:      Effort: Pulmonary effort is normal. No respiratory distress.      Breath sounds: No wheezing or rales.   Abdominal:      General: Abdomen is flat. There is no distension.      Palpations: Abdomen is soft.      Tenderness: There is no abdominal tenderness. There is no guarding.   Musculoskeletal:         General: Normal range of motion.      Right lower leg: No edema.      Left lower leg: No edema.   Skin:     General: Skin is warm and dry.      Coloration: Skin is not jaundiced.   Neurological:      General: No focal deficit present.      Mental Status: She is alert. Mental status is at baseline.   Psychiatric:         Mood and Affect: Mood normal.         Behavior: Behavior normal.              CRANIAL NERVES     CN III, IV, VI   Pupils are equal, round, and reactive to light.       Significant Labs: All pertinent labs within the past 24 hours have been reviewed.  CBC:   Recent Labs   Lab 05/05/24  1332   WBC 9.13   HGB 14.0   HCT 41.4        CMP:   Recent Labs   Lab 05/05/24  1600      K 4.6      CO2 23   *   BUN 24   CREATININE 1.1   CALCIUM 9.7   ANIONGAP 10       Significant Imaging: I have reviewed all pertinent imaging results/findings within the past 24 hours.

## 2024-05-06 NOTE — NURSING
Nurses Note -- 4 Eyes      5/6/2024   12:59 AM      Skin assessed during: Daily Assessment      [x] No Altered Skin Integrity Present    []Prevention Measures Documented      [] Yes- Altered Skin Integrity Present or Discovered   [] LDA Added if Not in Epic (Describe Wound)   [] New Altered Skin Integrity was Present on Admit and Documented in LDA   [] Wound Image Taken    Wound Care Consulted? No    Attending Nurse:  Fracisco HERNANDEZ    Second RN/Staff Member:   Fanny BIANCHI

## 2024-05-06 NOTE — ASSESSMENT & PLAN NOTE
Patient's anemia is currently uncontrolled. Has not received any PRBCs to date. Etiology likely d/t chronic disease due to Chronic Kidney Disease  Current CBC reviewed-   Lab Results   Component Value Date    HGB 11.3 (L) 05/06/2024    HCT 35.5 (L) 05/06/2024     Monitor serial CBC and transfuse if patient becomes hemodynamically unstable, symptomatic or H/H drops below 7/21.

## 2024-05-06 NOTE — ASSESSMENT & PLAN NOTE
HbA1c 5.5, sugars controlled  Home DM regimen:  Glipizide  Hold oral hyperglycemic meds  SSI with POCT accuchecks to achieve blood glucose of 140-180 while hospitalized  Hypoglycemia protocol  Diabetic diet

## 2024-05-06 NOTE — CARE UPDATE
I have reviewed the chart of Quin Linn and collaborated with Rosita Galarza MD in the care of the patient who is hospitalized for the following:    Active Hospital Problems    Diagnosis    *Nondisplaced fracture of fourth cervical vertebra    Acute blood loss anemia    DNR (do not resuscitate)    Paroxysmal A-fib    Pacemaker    Hypomagnesemia    Controlled type 2 diabetes mellitus with stage 3 chronic kidney disease, without long-term current use of insulin    Late onset Alzheimer's disease with behavioral disturbance    Episode of transient neurologic symptoms    Hypertension    Hypothyroidism following radioiodine therapy          I have reviewed Quin Linn with the multidisciplinary team during discharge huddle.       Mary Anne Bond PA-C  [facility]

## 2024-05-06 NOTE — PLAN OF CARE
Problem: Occupational Therapy  Goal: Occupational Therapy Goal  Description: Goals to be met by: 6/5/24     Patient will increase functional independence with ADLs by performing:    LE Dressing with Minimal Assistance.  Grooming while standing at sink with Minimal Assistance.  Toileting from bedside commode with Minimal Assistance for hygiene and clothing management.   Supine to sit with Stand-by Assistance.  Stand pivot transfers with Minimal Assistance.  Toilet transfer to bedside commode with Minimal Assistance.      Outcome: Progressing

## 2024-05-06 NOTE — ASSESSMENT & PLAN NOTE
NSGY consulted ; appreciate recs  MRI with Increased signal intensity in the bilateral C4 lamina and left C5 lamina consistent with fractures better identified on recent CT. Anterior widening of the C5-C6 intervertebral disc space with increased signal intensity and complete disruption of the anterior longitudinal ligament at this level.  Edema in the interspinous ligaments at C4-C7.  Findings are suggestive of hyperextension injury. Extensive prevertebral edema throughout the cervical and upper thoracic spine likely related to ligamentous injury.   No acute intervention per NSGY   Maintain C Collar at all times  Neurochecks q4h  Okay for diet and subQ heparin per NSGY  Will hold home Eliquis for now while monitoring frequent neurochecks  ;  resume as appropriate  Pain control adjusted to include scheduled Tylenol and Robaxin  PT/OT consuled

## 2024-05-06 NOTE — PT/OT/SLP EVAL
Occupational Therapy   Evaluation    Name: Quin Linn  MRN: [id number]  Admitting Diagnosis: Nondisplaced fracture of fourth cervical vertebra  Recent Surgery: * No surgery found *      Recommendations:     Discharge Recommendations: Moderate Intensity Therapy  Discharge Equipment Recommendations:  wheelchair  Barriers to discharge:  Increase (A) fxl mob and ADLs    Assessment:     Quin Linn is a 89 y.o. female with a medical diagnosis of Nondisplaced fracture of fourth cervical vertebra. Performance deficits affecting function: gait instability, impaired functional mobility, impaired self care skills, impaired endurance, weakness, decreased safety awareness, orthopedic precautions.      Rehab Prognosis: Good; patient would benefit from acute skilled OT services to address these deficits and reach maximum level of function.       Plan:     Patient to be seen 3 x/week to address the above listed problems via self-care/home management, therapeutic activities, therapeutic exercises, neuromuscular re-education  Plan of Care Expires: 06/05/24  Plan of Care Reviewed with: patient    Subjective     Chief Complaint: None   Patient/Family Comments/goals: Pt.report she is not hurting     Occupational Profile:  Pt.poor historian and no family at bed side during eval   - Information provide from chart review: baseline patient ambulates with wheelchair and needs assistance with transfers   Previous level of function: Has a caregiver  Equipment Used at Home: Rolling walker  Assistance upon Discharge: Caregiver    Pain/Comfort:  Pain Rating 1: 0/10    Patients cultural, spiritual, Yarsani conflicts given the current situation: no    Objective:     Communicated with: Nurse prior to session.  Patient found HOB elevated with SCD, PureWick, telemetry upon OT entry to room.    General Precautions: Standard, seizure, fall, aspiration  Orthopedic Precautions: N/A  Braces: Aspen collar  Respiratory Status: Room air    Occupational  Performance:    Bed Mobility:    Patient completed Supine to Sit with moderate assistance  Patient completed Sit to Supine with maximal assistance    Functional Mobility/Transfers:  Patient completed Sit <> Stand Transfer with maximal assistance  with  hand-held assist  2 trials at EOB  Pt. Sat at EOB ~ 12 mins with Min A - CGA for ADL task (see below)    Activities of Daily Living:  Feeding:  minimum assistance to take meds at bedside table , nurse aware  Noted increase coughing ingesting 3 -4 pills individually, no noted pocketing of food  Proper Adjusting of aspen collar seated at EOB  Cognitive/Visual Perceptual:  Cognitive/Psychosocial Skills:     -       Oriented to: Person and Situation   -       Follows Commands/attention:Follows one-step commands  -       Communication: clear/fluent  -       Memory: No Deficits noted  -       Safety awareness/insight to disability: impaired   -       Mood/Affect/Coping skills/emotional control: Appropriate to situation    Physical Exam:  Balance:   Static Sitting:  Min A- CGA  Static Standing: Max A  Upper Extremity Range of Motion:     -       Right Upper Extremity: WFL  -       Left Upper Extremity: WFL  Upper Extremity Strength:    -       Right Upper Extremity: WFL  -       Left Upper Extremity: WFL    AMPAC 6 Click ADL:  AMPAC Total Score: 13    Treatment & Education:   Pt. Educated on Wear and care schedule of Chamberlain collar (reinforcement required )  Pt.educated on the importance of food clearance, to avoid pocketing  Pt educated on role of occupational therapy, POC, and safety during ADLs and functional mobility. Pt and OT discussed importance of safe, continued mobility to optimize daily living skills. Pt verbalized understanding. Pt given instruction to call for medical staff/nurse for assistance.   PT present for coeval due to pt's multiple medical comorbidities and functional/cognition deficits requiring two skilled therapists to appropriately progress pt's  musculoskeletal strength, neuromuscular control, and endurance while taking into consideration medical acuity and pt safety.    Patient left HOB elevated with all lines intact and call button in reachand nurse Ambika, aware of medicine present at bedside     GOALS:   Multidisciplinary Problems       Occupational Therapy Goals          Problem: Occupational Therapy    Goal Priority Disciplines Outcome Interventions   Occupational Therapy Goal     OT, PT/OT Progressing    Description: Goals to be met by: 6/5/24     Patient will increase functional independence with ADLs by performing:    LE Dressing with Minimal Assistance.  Grooming while standing at sink with Minimal Assistance.  Toileting from bedside commode with Minimal Assistance for hygiene and clothing management.   Supine to sit with Stand-by Assistance.  Stand pivot transfers with Minimal Assistance.  Toilet transfer to bedside commode with Minimal Assistance.                           History:     Past Medical History:   Diagnosis Date    Acute hypoxemic respiratory failure 9/2/2023    Acute on chronic diastolic congestive heart failure 10/19/2023    Alzheimer's disease     Anemia     Arthritis     lumbar    Back pain     Cancer     colon    Cataract     Colon cancer     Diabetes mellitus type II     Glaucoma     Hyperlipidemia     Hypertension     Hyperthyroidism     Hypothyroidism following radioiodine therapy     Pacemaker     Pneumonia     Pneumonia due to other staphylococcus     Renal manifestation of secondary diabetes mellitus     Squamous cell carcinoma     Stroke     TIA    Type 2 diabetes mellitus with stage 3 chronic kidney disease and hypertension 10/12/2017    Type 2 diabetes with peripheral circulatory disorder, controlled          Past Surgical History:   Procedure Laterality Date    CATARACT EXTRACTION W/  INTRAOCULAR LENS IMPLANT Left 09/13/2017        CHOLECYSTECTOMY      COLON SURGERY  2001    Colon CA    EYE SURGERY       cataract removal left side    IMPLANTATION OF LEADLESS PACEMAKER N/A 10/21/2022    Procedure: [id number];  Surgeon: JOSE Burgos MD;  Location: [facility];  Service: Cardiology;  Laterality: N/A;  EMELYN, JOHNNA, MDT, ANES, EH,     squamous cell ca of face         Time Tracking:     OT Date of Treatment: 05/06/24  OT Start Time: 0947  OT Stop Time: 1010  OT Total Time (min): 23 min    Billable Minutes:Evaluation 12  Self Care/Home Management 11    5/6/2024

## 2024-05-06 NOTE — ASSESSMENT & PLAN NOTE
Creatine stable for now. BMP reviewed- noted Estimated Creatinine Clearance: 25.4 mL/min (based on SCr of 1.3 mg/dL). according to latest data. Based on current GFR, CKD stage is stage 4 - GFR 15-29.  Monitor UOP and serial BMP and adjust therapy as needed. Renally dose meds. Avoid nephrotoxic medications and procedures.

## 2024-05-06 NOTE — ASSESSMENT & PLAN NOTE
Followed by Neurology and on 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  Continue home keppra

## 2024-05-06 NOTE — H&P
[facility] Medicine  History & Physical    Patient Name: Quin Linn  MRN: [id number]  Patient Class: OP- Observation  Admission Date: 5/5/2024  Attending Physician: Jered Kennedy MD   Primary Care Provider: Mary Ellen Nesbitt MD         Patient information was obtained from patient, past medical records, and ER records.     Subjective:     Principal Problem:Nondisplaced fracture of fourth cervical vertebra    Chief Complaint:   Chief Complaint   Patient presents with    Fall     Fall forward out of chair. Hitting face on floor. Skin tear to L cheek on Eliquis. Denies LOC         HPI: 89 y/o F PMH CHF, Alzheimer's, DM, HTN who presents to the ED via EMS for evaluation of a fall with CT Csp demonstrating bilateral C4 lamina fracture and L C5 lamina fracture with extension into IAP. At baseline patient ambulates with wheelchair and needs assistance with transfers. Reportedly patient had a fall forwards out of recliner, she hit her head on the ground and had a bleeding laceration. Limited hx given dementia, but pt denies any LOC, CP, dyspnea, N/V/D, headache, new weakness/numbness or neck pain. Baseline incontinence .    In the ED patient afebrile and hemodynamically stable saturating well on room air. Moves all extremities, alert and oriented to person. Conversational and pleasant with limited insight. Patient placed in C collar NSGY consulted and evaluated patient. MRI without significant canal stenosis and no plan for acute intervention per NSGY. Admitted to the care of medicine for further evaluation and management.     Past Medical History:   Diagnosis Date    Acute hypoxemic respiratory failure 9/2/2023    Acute on chronic diastolic congestive heart failure 10/19/2023    Alzheimer's disease     Anemia     Arthritis     lumbar    Back pain     Cancer     colon    Cataract     Colon cancer     Diabetes mellitus type II     Glaucoma     Hyperlipidemia     Hypertension     Hyperthyroidism      Hypothyroidism following radioiodine therapy     Pacemaker     Pneumonia     Pneumonia due to other staphylococcus     Renal manifestation of secondary diabetes mellitus     Squamous cell carcinoma     Stroke     TIA    Type 2 diabetes mellitus with stage 3 chronic kidney disease and hypertension 10/12/2017    Type 2 diabetes with peripheral circulatory disorder, controlled        Past Surgical History:   Procedure Laterality Date    CATARACT EXTRACTION W/  INTRAOCULAR LENS IMPLANT Left 09/13/2017        CHOLECYSTECTOMY      COLON SURGERY  2001    Colon CA    EYE SURGERY      cataract removal left side    IMPLANTATION OF LEADLESS PACEMAKER N/A 10/21/2022    Procedure: [id number];  Surgeon: JOSE Burgos MD;  [facility];  Service: Cardiology;  Laterality: N/A;  SB, JOHNNA, MDT, ANES, EH,     squamous cell ca of face         Review of patient's allergies indicates:   Allergen Reactions    Tramadol Rash and Edema     Developed facial rash and edema.    Metformin Diarrhea       Current Facility-Administered Medications   Medication Dose Route Frequency Provider Last Rate Last Admin    acetaminophen tablet 650 mg  650 mg Oral Q4H PRN Jered Kennedy MD        albuterol inhaler 2 puff  2 puff Inhalation Q6H PRN Jered Kennedy MD        aluminum-magnesium hydroxide-simethicone 200-200-20 mg/5 mL suspension 30 mL  30 mL Oral QID PRN Jered Kennedy MD        [START ON 5/6/2024] amLODIPine tablet 10 mg  10 mg Oral Daily Jered Kennedy MD        [START ON 5/6/2024] atorvastatin tablet 80 mg  80 mg Oral Daily Jered Kennedy MD        carvediloL tablet 3.125 mg  3.125 mg Oral BID Jered Kennedy MD        dextrose 10% bolus 125 mL 125 mL  12.5 g Intravenous PRN Jered Kennedy MD        dextrose 10% bolus 250 mL 250 mL  25 g Intravenous PRN Jered Kennedy MD        gabapentin capsule 300 mg  300 mg Oral BID Jered Kennedy MD         glucagon (human recombinant) injection 1 mg  1 mg Intramuscular PRN Jered Kennedy MD        glucose chewable tablet 16 g  16 g Oral PRN Jered Kennedy MD        glucose chewable tablet 24 g  24 g Oral PRN Jered Kennedy MD        heparin (porcine) injection 5,000 Units  5,000 Units Subcutaneous Q8H Jered Kennedy MD        insulin aspart U-100 pen 0-5 Units  0-5 Units Subcutaneous QID (AC + HS) PRN Jered Kennedy MD        levETIRAcetam tablet 250 mg  250 mg Oral BID Jered Kennedy MD        [START ON 5/6/2024] levothyroxine tablet 75 mcg  75 mcg Oral Before breakfast Jered Kennedy MD        melatonin tablet 6 mg  6 mg Oral Nightly PRN Jered Kennedy MD        morphine injection 4 mg  4 mg Intravenous Q4H PRN Jered Kennedy MD        naloxone 0.4 mg/mL injection 0.02 mg  0.02 mg Intravenous PRN Jered Kennedy MD        ondansetron injection 4 mg  4 mg Intravenous Q8H PRN Jered Kennedy MD        oxyCODONE immediate release tablet 5 mg  5 mg Oral Q6H PRN Jered Kennedy MD        [START ON 5/6/2024] polyethylene glycol packet 17 g  17 g Oral Daily Jered Kennedy MD        QUEtiapine tablet 75 mg  75 mg Oral QHS Jered Kennedy MD        senna-docusate 8.6-50 mg per tablet 1 tablet  1 tablet Oral BID PRN Jered Kennedy MD        sodium chloride 0.9% flush 10 mL  10 mL Intravenous Q12H PRN Jered Kennedy MD         Family History       Problem Relation (Age of Onset)    Alzheimer's disease Sister    Ankylosing spondylitis Daughter    Arthritis Mother    Asthma Son    Atrial fibrillation Daughter    Cancer Sister, Paternal Uncle    Diabetes Maternal Grandmother    Heart disease Mother, Daughter    Hyperlipidemia Son    Hypertension Sister, Son    Kidney disease Father, Daughter    Lupus Daughter    No Known Problems Daughter    Supraventricular tachycardia Daughter          Tobacco Use    Smoking status: Never    Smokeless tobacco: Never     Tobacco comments:     smoked for about 4 years in her twenties (socially)   Substance and Sexual Activity    Alcohol use: No    Drug use: No    Sexual activity: Never     Review of Systems   Constitutional:  Negative for fever.   HENT:  Negative for trouble swallowing.    Eyes:  Negative for visual disturbance.   Respiratory:  Negative for cough and shortness of breath.    Cardiovascular:  Negative for chest pain.   Gastrointestinal:  Negative for abdominal pain, nausea and vomiting.   Musculoskeletal:  Positive for neck pain.   Skin:  Positive for wound. Negative for rash.   Neurological:  Negative for weakness and headaches.     Objective:     Vital Signs (Most Recent):  Temp: 98 °F (36.7 °C) (05/05/24 1913)  Pulse: 65 (05/05/24 1913)  Resp: 15 (05/05/24 1913)  BP: (!) 174/91 (05/05/24 1952)  SpO2: 95 % (05/05/24 1913) Vital Signs (24h Range):  Temp:  [97.9 °F (36.6 °C)-98 °F (36.7 °C)] 98 °F (36.7 °C)  Pulse:  [60-84] 65  Resp:  [12-18] 15  SpO2:  [95 %-100 %] 95 %  BP: (125-194)/(58-91) 174/91     Weight: 62 kg (136 lb 11 oz)  Body mass index is 22.75 kg/m².     Physical Exam  Constitutional:       General: She is not in acute distress.     Appearance: She is not toxic-appearing or diaphoretic.   HENT:      Head: Normocephalic.      Comments: Left facial/cheek abrasion with skin tear associated with bruising     Nose: Nose normal.   Eyes:      General: No scleral icterus.     Extraocular Movements: Extraocular movements intact.      Pupils: Pupils are equal, round, and reactive to light.   Neck:      Comments: C collar in place  Cardiovascular:      Rate and Rhythm: Normal rate and regular rhythm.   Pulmonary:      Effort: Pulmonary effort is normal. No respiratory distress.      Breath sounds: No wheezing or rales.   Abdominal:      General: Abdomen is flat. There is no distension.      Palpations: Abdomen is soft.      Tenderness: There is no abdominal tenderness. There is no guarding.   Musculoskeletal:          General: Normal range of motion.      Right lower leg: No edema.      Left lower leg: No edema.   Skin:     General: Skin is warm and dry.      Coloration: Skin is not jaundiced.   Neurological:      General: No focal deficit present.      Mental Status: She is alert. Mental status is at baseline.   Psychiatric:         Mood and Affect: Mood normal.         Behavior: Behavior normal.              CRANIAL NERVES     CN III, IV, VI   Pupils are equal, round, and reactive to light.       Significant Labs: All pertinent labs within the past 24 hours have been reviewed.  CBC:   Recent Labs   Lab 05/05/24  1332   WBC 9.13   HGB 14.0   HCT 41.4        CMP:   Recent Labs   Lab 05/05/24  1600      K 4.6      CO2 23   *   BUN 24   CREATININE 1.1   CALCIUM 9.7   ANIONGAP 10       Significant Imaging: I have reviewed all pertinent imaging results/findings within the past 24 hours.  Assessment/Plan:     * Nondisplaced fracture of fourth cervical vertebra  - NSGY consulted ; appreciate recs  - MRI with Increased signal intensity in the bilateral C4 lamina and left C5 lamina consistent with fractures better identified on recent CT. Anterior widening of the C5-C6 intervertebral disc space with increased signal intensity and complete disruption of the anterior longitudinal ligament at this level.  Edema in the interspinous ligaments at C4-C7.  Findings are suggestive of hyperextension injury. Extensive prevertebral edema throughout the cervical and upper thoracic spine likely related to ligamentous injury.   - no acute intervention per NSGY   - maintain C Collar at all times  - neurochecks q4h  - okay for diet and subQ heparin per NSGY  - will hold home Eliquis for now while monitoring frequent neurochecks  ;  resume as appropriate  - pain control  - PT/OT consult in am  - further management pending clinical course and future study review        DNR (do not resuscitate)  - DNR status  confirmed      Paroxysmal A-fib  - holding home Eliquis as above at this time  - continue home coreg    Pacemaker  - Appreciate Cardiology assistance  - paused for MRI and previous settings resumed after completion      Controlled type 2 diabetes mellitus with stage 3 chronic kidney disease, without long-term current use of insulin  - SSI  - hypoglycemic protocol    Late onset Alzheimer's disease with behavioral disturbance  - continue home seroquel    Episode of transient neurologic symptoms  - followed by Neurology and on 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  - continue home keppra      Hypertension  - continue home amlodipine and coreg    Hypothyroidism following radioiodine therapy  - continue home synthroid        VTE Risk Mitigation (From admission, onward)           Ordered     heparin (porcine) injection 5,000 Units  Every 8 hours         05/05/24 1820     IP VTE HIGH RISK PATIENT  Once         05/05/24 1820     Place sequential compression device  Until discontinued         05/05/24 1820                       On 05/05/2024, patient should be placed in hospital observation services under my care.             Jered Kennedy MD  Department of Hospital Medicine  [facility]

## 2024-05-07 PROBLEM — R13.10 DYSPHAGIA: Status: ACTIVE | Noted: 2024-05-07

## 2024-05-07 LAB
POCT GLUCOSE: 110 MG/DL (ref 70–110)
POCT GLUCOSE: 111 MG/DL (ref 70–110)
POCT GLUCOSE: 131 MG/DL (ref 70–110)
POCT GLUCOSE: 137 MG/DL (ref 70–110)

## 2024-05-07 PROCEDURE — 63600175 PHARM REV CODE 636 W HCPCS: Performed by: HOSPITALIST

## 2024-05-07 PROCEDURE — 97535 SELF CARE MNGMENT TRAINING: CPT

## 2024-05-07 PROCEDURE — 30200315 PPD INTRADERMAL TEST REV CODE 302: Performed by: HOSPITALIST

## 2024-05-07 PROCEDURE — 21400001 HC TELEMETRY ROOM

## 2024-05-07 PROCEDURE — 86580 TB INTRADERMAL TEST: CPT | Performed by: HOSPITALIST

## 2024-05-07 PROCEDURE — 92526 ORAL FUNCTION THERAPY: CPT

## 2024-05-07 PROCEDURE — 25000003 PHARM REV CODE 250: Performed by: HOSPITALIST

## 2024-05-07 PROCEDURE — 25000003 PHARM REV CODE 250: Performed by: FAMILY MEDICINE

## 2024-05-07 PROCEDURE — 63600175 PHARM REV CODE 636 W HCPCS: Performed by: FAMILY MEDICINE

## 2024-05-07 RX ORDER — LEVETIRACETAM 250 MG/1
250 TABLET ORAL 2 TIMES DAILY
Status: DISCONTINUED | OUTPATIENT
Start: 2024-05-07 | End: 2024-05-09

## 2024-05-07 RX ORDER — BISACODYL 10 MG/1
10 SUPPOSITORY RECTAL DAILY PRN
Status: DISCONTINUED | OUTPATIENT
Start: 2024-05-07 | End: 2024-05-10

## 2024-05-07 RX ORDER — LEVETIRACETAM 500 MG/5ML
250 INJECTION, SOLUTION, CONCENTRATE INTRAVENOUS EVERY 12 HOURS
Status: DISCONTINUED | OUTPATIENT
Start: 2024-05-07 | End: 2024-05-07

## 2024-05-07 RX ADMIN — HYDRALAZINE HYDROCHLORIDE 5 MG: 20 INJECTION, SOLUTION INTRAMUSCULAR; INTRAVENOUS at 04:05

## 2024-05-07 RX ADMIN — METHOCARBAMOL 500 MG: 500 TABLET ORAL at 09:05

## 2024-05-07 RX ADMIN — ACETAMINOPHEN 1000 MG: 500 TABLET ORAL at 09:05

## 2024-05-07 RX ADMIN — CARVEDILOL 3.12 MG: 3.12 TABLET, FILM COATED ORAL at 09:05

## 2024-05-07 RX ADMIN — APIXABAN 2.5 MG: 2.5 TABLET, FILM COATED ORAL at 11:05

## 2024-05-07 RX ADMIN — GABAPENTIN 300 MG: 300 CAPSULE ORAL at 09:05

## 2024-05-07 RX ADMIN — TUBERCULIN PURIFIED PROTEIN DERIVATIVE 5 UNITS: 5 INJECTION, SOLUTION INTRADERMAL at 11:05

## 2024-05-07 RX ADMIN — APIXABAN 2.5 MG: 2.5 TABLET, FILM COATED ORAL at 09:05

## 2024-05-07 RX ADMIN — LEVETIRACETAM 250 MG: 250 TABLET, FILM COATED ORAL at 09:05

## 2024-05-07 RX ADMIN — LEVOTHYROXINE SODIUM 75 MCG: 75 TABLET ORAL at 05:05

## 2024-05-07 RX ADMIN — HEPARIN SODIUM 5000 UNITS: 5000 INJECTION INTRAVENOUS; SUBCUTANEOUS at 06:05

## 2024-05-07 RX ADMIN — ATORVASTATIN CALCIUM 80 MG: 40 TABLET, FILM COATED ORAL at 09:05

## 2024-05-07 RX ADMIN — AMLODIPINE BESYLATE 10 MG: 5 TABLET ORAL at 09:05

## 2024-05-07 RX ADMIN — QUETIAPINE FUMARATE 75 MG: 25 TABLET ORAL at 09:05

## 2024-05-07 RX ADMIN — LEVETIRACETAM 250 MG: 250 TABLET, FILM COATED ORAL at 11:05

## 2024-05-07 RX ADMIN — POLYETHYLENE GLYCOL 3350 17 G: 17 POWDER, FOR SOLUTION ORAL at 09:05

## 2024-05-07 NOTE — PLAN OF CARE
Problem: Adult Inpatient Plan of Care  Goal: Plan of Care Review  Outcome: Progressing  Goal: Patient-Specific Goal (Individualized)  Outcome: Progressing  Goal: Absence of Hospital-Acquired Illness or Injury  Outcome: Progressing  Goal: Optimal Comfort and Wellbeing  Outcome: Progressing  Goal: Readiness for Transition of Care  Outcome: Progressing     Problem: Diabetes Comorbidity  Goal: Blood Glucose Level Within Targeted Range  Outcome: Progressing     Problem: Fall Injury Risk  Goal: Absence of Fall and Fall-Related Injury  Outcome: Progressing     Problem: Skin Injury Risk Increased  Goal: Skin Health and Integrity  Outcome: Progressing   She has had a restful day.    Affect has been calm.   Her swallow seems to be effective.   No sign of distress noted.   Safety precautions remain in place.

## 2024-05-07 NOTE — ASSESSMENT & PLAN NOTE
HbA1c 5.5, sugars controlled  Home DM regimen:  Glipizide  Hold oral hyperglycemic meds  SSI with POCT accuchecks to achieve blood glucose of 140-180 while hospitalized  Hypoglycemia protocol  Diabetic diet when able to eat but remains NPO for now - so q6h glucose checks

## 2024-05-07 NOTE — PLAN OF CARE
Problem: Adult Inpatient Plan of Care  Goal: Plan of Care Review  5/7/2024 0834 by Fanny Doll LPN  Outcome: Progressing  Flowsheets (Taken 5/7/2024 0834)  Plan of Care Reviewed With: patient  5/7/2024 0716 by Fanny Doll LPN  Outcome: Progressing  Goal: Patient-Specific Goal (Individualized)  Outcome: Progressing     Problem: Adult Inpatient Plan of Care  Goal: Plan of Care Review  5/7/2024 0834 by Fanny Doll LPN  Outcome: Progressing  Flowsheets (Taken 5/7/2024 0834)  Plan of Care Reviewed With: patient  5/7/2024 0716 by Fanny Doll LPN  Outcome: Progressing     Problem: Diabetes Comorbidity  Goal: Blood Glucose Level Within Targeted Range  Outcome: Progressing     Problem: Diabetes Comorbidity  Goal: Blood Glucose Level Within Targeted Range  Outcome: Progressing   Remain SR on telemetry monitor.pt is disoriented at times however improved this AM and took meds; placed new IV this shift , applied foam dressing to  sacrum . No injury during shift, Side rails up x 2, call light by bedside.

## 2024-05-07 NOTE — ASSESSMENT & PLAN NOTE
Followed by Neurology and on 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  Continue home keppra, changed to IV with NPO status

## 2024-05-07 NOTE — SUBJECTIVE & OBJECTIVE
Interval History:  No acute events overnight.  Remains NPO.  Working on SNF placement.  Attempted to call daughter Janae to get information about patients oral intake but no answer.    Review of Systems   Unable to perform ROS: Dementia     Objective:     Vital Signs (Most Recent):  Temp: 96.1 °F (35.6 °C) (05/07/24 0538)  Pulse: 69 (05/07/24 0538)  Resp: 15 (05/07/24 0355)  BP: (!) 103/52 (05/07/24 0538)  SpO2: 98 % (05/07/24 0359) Vital Signs (24h Range):  Temp:  [95.5 °F (35.3 °C)-97.7 °F (36.5 °C)] 96.1 °F (35.6 °C)  Pulse:  [52-84] 69  Resp:  [15-18] 15  SpO2:  [93 %-98 %] 98 %  BP: (103-208)/(52-86) 103/52     Weight: 59.9 kg (132 lb 0.9 oz)  Body mass index is 21.98 kg/m².    Intake/Output Summary (Last 24 hours) at 5/7/2024 0905  Last data filed at 5/7/2024 0459  Gross per 24 hour   Intake 50 ml   Output 400 ml   Net -350 ml         Physical Exam  Constitutional:       Appearance: Normal appearance.   HENT:      Head: Normocephalic and atraumatic.   Neck:      Comments: C collar in place  Cardiovascular:      Rate and Rhythm: Regular rhythm. Bradycardia present.      Heart sounds: No murmur heard.  Pulmonary:      Effort: Pulmonary effort is normal. No respiratory distress.      Breath sounds: Normal breath sounds. No wheezing or rales.   Abdominal:      General: There is no distension.      Palpations: Abdomen is soft.      Tenderness: There is no abdominal tenderness.   Musculoskeletal:         General: No deformity.   Skin:     General: Skin is warm and dry.   Neurological:      General: No focal deficit present.      Mental Status: She is alert. Mental status is at baseline.             Significant Labs: All pertinent labs within the past 24 hours have been reviewed.  CBC:   Recent Labs   Lab 05/05/24  1332 05/06/24  0321   WBC 9.13 10.85   HGB 14.0 11.3*   HCT 41.4 35.5*    174     CMP:   Recent Labs   Lab 05/05/24  1600 05/06/24  0321    141   K 4.6 4.5    109   CO2 23 24   *  137*   BUN 24 24   CREATININE 1.1 1.3   CALCIUM 9.7 9.2   PROT  --  5.7*   ALBUMIN  --  2.4*   BILITOT  --  0.4   ALKPHOS  --  60   AST  --  18   ALT  --  16   ANIONGAP 10 8       Significant Imaging: I have reviewed all pertinent imaging results/findings within the past 24 hours.

## 2024-05-07 NOTE — PROGRESS NOTES
[facility] Medicine  Progress Note    Patient Name: Quin Linn  MRN: [id number]  Patient Class: IP- Inpatient   Admission Date: 5/5/2024  Length of Stay: 1 days  Attending Physician: Rosita Galarza MD  Primary Care Provider: Mary Ellen Nesbitt MD        Subjective:     Principal Problem:Nondisplaced fracture of fourth cervical vertebra        HPI:  89 y/o F PMH CHF, Alzheimer's, DM, HTN who presents to the ED via EMS for evaluation of a fall with CT Csp demonstrating bilateral C4 lamina fracture and L C5 lamina fracture with extension into IAP. At baseline patient ambulates with wheelchair and needs assistance with transfers. Reportedly patient had a fall forwards out of recliner, she hit her head on the ground and had a bleeding laceration. Limited hx given dementia, but pt denies any LOC, CP, dyspnea, N/V/D, headache, new weakness/numbness or neck pain. Baseline incontinence .    In the ED patient afebrile and hemodynamically stable saturating well on room air. Moves all extremities, alert and oriented to person. Conversational and pleasant with limited insight. Patient placed in C collar NSGY consulted and evaluated patient. MRI without significant canal stenosis and no plan for acute intervention per NSGY. Admitted to the care of medicine for further evaluation and management.     Overview/Hospital Course:  Ms. Linn was admitted to Hospital Medicine for management of a cervical fracture.  NSGY was consulted who said C collar and pain control, no surgical intervention.  She was started on a multimodal pain regimen and PT/OT were consulted who suggested moderate therapy.  Working on SNF placement.  SLP was consulted given concern for dysphagia and she was made NPO.    Interval History:  No acute events overnight.  Remains NPO.  Working on SNF placement.  Attempted to call daughter Janae to get information about patients oral intake but no answer.    Review of Systems   Unable to perform ROS:  Dementia     Objective:     Vital Signs (Most Recent):  Temp: 96.1 °F (35.6 °C) (05/07/24 0538)  Pulse: 69 (05/07/24 0538)  Resp: 15 (05/07/24 0355)  BP: (!) 103/52 (05/07/24 0538)  SpO2: 98 % (05/07/24 0359) Vital Signs (24h Range):  Temp:  [95.5 °F (35.3 °C)-97.7 °F (36.5 °C)] 96.1 °F (35.6 °C)  Pulse:  [52-84] 69  Resp:  [15-18] 15  SpO2:  [93 %-98 %] 98 %  BP: (103-208)/(52-86) 103/52     Weight: 59.9 kg (132 lb 0.9 oz)  Body mass index is 21.98 kg/m².    Intake/Output Summary (Last 24 hours) at 5/7/2024 0905  Last data filed at 5/7/2024 0459  Gross per 24 hour   Intake 50 ml   Output 400 ml   Net -350 ml         Physical Exam  Constitutional:       Appearance: Normal appearance.   HENT:      Head: Normocephalic and atraumatic.   Neck:      Comments: C collar in place  Cardiovascular:      Rate and Rhythm: Regular rhythm. Bradycardia present.      Heart sounds: No murmur heard.  Pulmonary:      Effort: Pulmonary effort is normal. No respiratory distress.      Breath sounds: Normal breath sounds. No wheezing or rales.   Abdominal:      General: There is no distension.      Palpations: Abdomen is soft.      Tenderness: There is no abdominal tenderness.   Musculoskeletal:         General: No deformity.   Skin:     General: Skin is warm and dry.   Neurological:      General: No focal deficit present.      Mental Status: She is alert. Mental status is at baseline.             Significant Labs: All pertinent labs within the past 24 hours have been reviewed.  CBC:   Recent Labs   Lab 05/05/24  1332 05/06/24  0321   WBC 9.13 10.85   HGB 14.0 11.3*   HCT 41.4 35.5*    174     CMP:   Recent Labs   Lab 05/05/24  1600 05/06/24  0321    141   K 4.6 4.5    109   CO2 23 24   * 137*   BUN 24 24   CREATININE 1.1 1.3   CALCIUM 9.7 9.2   PROT  --  5.7*   ALBUMIN  --  2.4*   BILITOT  --  0.4   ALKPHOS  --  60   AST  --  18   ALT  --  16   ANIONGAP 10 8       Significant Imaging: I have reviewed all  pertinent imaging results/findings within the past 24 hours.    Assessment/Plan:      * Nondisplaced fracture of fourth cervical vertebra  NSGY consulted ; appreciate recs  MRI with Increased signal intensity in the bilateral C4 lamina and left C5 lamina consistent with fractures better identified on recent CT. Anterior widening of the C5-C6 intervertebral disc space with increased signal intensity and complete disruption of the anterior longitudinal ligament at this level.  Edema in the interspinous ligaments at C4-C7.  Findings are suggestive of hyperextension injury. Extensive prevertebral edema throughout the cervical and upper thoracic spine likely related to ligamentous injury.   No acute intervention per NSGY   Maintain C Collar at all times  Neurochecks q4h  Okay for diet and subQ heparin per NSGY  Will hold home Eliquis for now while monitoring frequent neurochecks  ;  resume as appropriate  Pain control adjusted to include scheduled Tylenol and Robaxin  PT/OT consulted:  Suggest moderate intense therapy.  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 Has not received any PRBCs to date. Etiology likely d/t chronic disease due to Chronic Kidney Disease  Current CBC reviewed-   Lab Results   Component Value Date    HGB 11.3 (L) 05/06/2024    HCT 35.5 (L) 05/06/2024     Monitor serial CBC and transfuse if patient becomes hemodynamically unstable, symptomatic or H/H drops below 7/21.    DNR (do not resuscitate)  DNR status confirmed      Paroxysmal A-fib  Holding home Eliquis as above at this time  Continue home coreg when able to take PO    CKD (chronic kidney disease), stage IV  Creatine stable for now.  "BMP reviewed- noted Estimated Creatinine Clearance: 25.4 mL/min (based on SCr of 1.3 mg/dL). according to latest data. Based on current GFR, CKD stage is stage 4 - GFR 15-29.  Monitor UOP and serial BMP and adjust therapy as needed. Renally dose meds. Avoid nephrotoxic medications and procedures.    Pacemaker  Appreciate Cardiology assistance  Paused for MRI and previous settings resumed after completion      Hypomagnesemia  Patient has Abnormal Magnesium: hypomagnesemia. Will continue to monitor electrolytes closely. Will replace the affected electrolytes and repeat labs to be done after interventions completed. The patient's magnesium results have been reviewed and are listed below.  No results for input(s): "MG" in the last 24 hours.       Controlled type 2 diabetes mellitus with stage 3 chronic kidney disease, without long-term current use of insulin  HbA1c 5.5, sugars controlled  Home DM regimen:  Glipizide  Hold oral hyperglycemic meds  SSI with POCT accuchecks to achieve blood glucose of 140-180 while hospitalized  Hypoglycemia protocol  Diabetic diet when able to eat but remains NPO for now - so q6h glucose checks      Late onset Alzheimer's disease with behavioral disturbance  Chronic issue  Continue home seroquel when able to take PO  Delirium precautions    Episode of transient neurologic symptoms  Followed by Neurology and on 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  Continue home keppra, changed to IV with NPO status      Hypertension  Chronic issue  On Hydralazine prn with NPO status  Can consider Clonidine patch    Hypothyroidism following radioiodine therapy  Chronic and stable  Continue home synthroid when able to take PO        VTE Risk Mitigation (From admission, onward)           Ordered     heparin (porcine) injection 5,000 Units  Every 8 hours         05/05/24 1820     IP VTE HIGH RISK PATIENT  Once         05/05/24 1820     Place sequential compression device  Until discontinued         " 05/05/24 1820                    Discharge Planning   LILI: 5/9/2024     Code Status: DNR   Is the patient medically ready for discharge?:     Reason for patient still in hospital (select all that apply): Patient trending condition  Discharge Plan A: Skilled Nursing Facility                  Rosita Galarza MD  Department of Hospital Medicine   [facility]

## 2024-05-07 NOTE — ASSESSMENT & PLAN NOTE
NSGY consulted ; appreciate recs  MRI with Increased signal intensity in the bilateral C4 lamina and left C5 lamina consistent with fractures better identified on recent CT. Anterior widening of the C5-C6 intervertebral disc space with increased signal intensity and complete disruption of the anterior longitudinal ligament at this level.  Edema in the interspinous ligaments at C4-C7.  Findings are suggestive of hyperextension injury. Extensive prevertebral edema throughout the cervical and upper thoracic spine likely related to ligamentous injury.   No acute intervention per NSGY   Maintain C Collar at all times  Neurochecks q4h  Okay for diet and subQ heparin per NSGY  Will hold home Eliquis for now while monitoring frequent neurochecks  ;  resume as appropriate  Pain control adjusted to include scheduled Tylenol and Robaxin  PT/OT consulted:  Suggest moderate intense therapy.  Working on SNF

## 2024-05-07 NOTE — PLAN OF CARE
05/07/24 0802   Post-Acute Status   Post-Acute Authorization Placement   Post-Acute Placement Status Referrals Sent   Discharge Plan   Discharge Plan A Skilled Nursing Facility

## 2024-05-07 NOTE — ASSESSMENT & PLAN NOTE
Source unclear  Does have dementia  Attempting to get in touch with daughter Janae to determine oral status prior to hospitalization  SLP following  NPO with aspiration precautions for now

## 2024-05-07 NOTE — PT/OT/SLP PROGRESS
"Speech Language Pathology Treatment    Patient Name:  Quin Linn   MRN:  [id number]  Admitting Diagnosis: Nondisplaced fracture of fourth cervical vertebra    Recommendations:                 General Recommendations:   ongoing swallowing assessment  Diet recommendations:  NPO, Liquid Diet Level: Full liquids, Thin liquids - IDDSI Level 0   Aspiration Precautions: 1 bite/sip at a time, Alternating bites/sips, Assistance with meals, Avoid talking while eating, Eliminate distractions, Feed only when awake/alert, Frequent oral care, HOB to 90 degrees, Meds crushed in puree, Monitor for s/s of aspiration, Small bites/sips, and Strict aspiration precautions   General Precautions: Standard, aspiration, fall  Communication strategies:  go to room if call light pushed    Assessment:     Quin Linn is a 89 y.o. female with an SLP diagnosis of Dysphagia.     Subjective     "I'm not hearing you." Pt appearing [place]    Pain/Comfort:  Pain Rating 1: 0/10    Respiratory Status: Room air    Objective:     Has the patient been evaluated by SLP for swallowing?   Yes  Keep patient NPO? No   Current Respiratory Status:        Pt seen for ongoing swallowing assessment. Pt awake/alert with Aspen collar in place.  Pt exhibiting some confusion, but able to cooperate for ongoing swallowing assessment. HOB elevated to 40 degrees then bed tilted in reverse Trendelenburg position to further increase elevation.  Pt displaying glottal myrick vocal quality. She was unable to elicit a dry swallow upon command.  Pt accepted the following PO trials: ice chips x 2, thin water via 1/3 tsp x 1, 1/2 tsp x 1, full tsp x 1, straw sips x 3; pudding 1/2 tsp x 1 and full tsp x 1.  Pt exhibiting coughing after 2nd ice chip, but no other s/s of aspiration were observed with subsequent trials. Pt noted to produce 2-3 swallows for straw sips.  Swallow responses were delayed for purees.  SLP recommending pt initiate a full liquid diet with thin liquids with meds " crushed/buried in chocolate pudding.  Education was provided to pt regarding role of SLP, ongoing swallowing assessment, diet recommendations, medication administration, and SLP treatment plan and POC.   Pt demonstrated understanding of education provided, but will benefit from continued reinforcement.       Goals:   Multidisciplinary Problems       SLP Goals          Problem: SLP    Goal Priority Disciplines Outcome   SLP Goal     SLP    Description: Speech Language Pathology Goals  Goals expected to be met by 5/13    1. Pt will participate in ongoing swallow assessment                               Plan:     Patient to be seen:  4 x/week   Plan of Care expires:     Plan of Care reviewed with:  patient   SLP Follow-Up:  Yes       Discharge recommendations:  Moderate Intensity Therapy     Time Tracking:     SLP Treatment Date:   05/07/24  Speech Start Time:  0858  Speech Stop Time:  0922     Speech Total Time (min):  24 min    Billable Minutes:  Treatment Swallowing Dysfunction 16 and Self Care/Home Management Training 8    05/07/2024

## 2024-05-07 NOTE — PROGRESS NOTES
Occupational Therapy    Pt not seen    Quin Linn  MRN: [id number]  Room/Bed: 504/504 A    Pt not seen by OT this day secondary to Pt being too somnolent to participate. Nurse indicating that Pt had a restless night last night. Nurse alerted that Pt was unable to participate.  Will follow up next day.    Agusto Rodriguez, OTR/L  5/7/2024

## 2024-05-07 NOTE — NURSING
Nurses Note -- 4 Eyes      5/7/2024 05:00 AM        Skin assessed during: Admit      [] No Altered Skin Integrity Present    []Prevention Measures Documented      [x] Yes- Altered Skin Integrity Present or Discovered   [] LDA Added if Not in Epic (Describe Wound)   [] New Altered Skin Integrity was Present on Admit and Documented in LDA   [] Wound Image Taken    Wound Care Consulted? No    Attending Nurse:  Fanny     Placed foam on sacrum; blanchable redness present

## 2024-05-07 NOTE — PLAN OF CARE
05/07/24 0802   Post-Acute Status   Post-Acute Authorization Placement   Post-Acute Placement Status Referrals Sent   Discharge Plan   Discharge Plan A Skilled Nursing Facility     Referrals sent to [facility] in [facility] providers. Message sent to [facility] regarding bed availability as this is patient's first choice.       Patient denied by family's first choice of [facility]. Patient accepted by [facility], [facility], and [facility]. Daughter Very disappointed at [facility] denial. Would like the day to discuss choices with her family and will provide CM with a decision on 5/8/24. Patient's LILI is 5/9/24.        Natalie Garner RNCM  Case Management  [facility]  [phone]

## 2024-05-07 NOTE — PROGRESS NOTES
Nurses Note -- 4 Eyes      5/7/2024   7:01 AM      Skin assessed during: Q Shift Change      [x] No Altered Skin Integrity Present    [x]Prevention Measures Documented      [] Yes- Altered Skin Integrity Present or Discovered   [] LDA Added if Not in Epic (Describe Wound)   [] New Altered Skin Integrity was Present on Admit and Documented in LDA   [] Wound Image Taken    Wound Care Consulted? No    Attending Nurse:  Chetan Dotson RN/Staff Member:   Fanny

## 2024-05-08 LAB
ALBUMIN SERPL BCP-MCNC: 2.6 G/DL (ref 3.5–5.2)
ALP SERPL-CCNC: 68 U/L (ref 55–135)
ALT SERPL W/O P-5'-P-CCNC: 19 U/L (ref 10–44)
ANION GAP SERPL CALC-SCNC: 15 MMOL/L (ref 8–16)
AST SERPL-CCNC: 20 U/L (ref 10–40)
BACTERIA #/AREA URNS AUTO: ABNORMAL /HPF
BASOPHILS # BLD AUTO: 0.02 K/UL (ref 0–0.2)
BASOPHILS NFR BLD: 0.2 % (ref 0–1.9)
BILIRUB SERPL-MCNC: 0.5 MG/DL (ref 0.1–1)
BILIRUB UR QL STRIP: NEGATIVE
BUN SERPL-MCNC: 50 MG/DL (ref 10–30)
CALCIUM SERPL-MCNC: 9 MG/DL (ref 8.7–10.5)
CHLORIDE SERPL-SCNC: 106 MMOL/L (ref 95–110)
CK SERPL-CCNC: 42 U/L (ref 20–180)
CLARITY UR REFRACT.AUTO: ABNORMAL
CO2 SERPL-SCNC: 20 MMOL/L (ref 23–29)
COLOR UR AUTO: ABNORMAL
CREAT SERPL-MCNC: 1.8 MG/DL (ref 0.5–1.4)
DIFFERENTIAL METHOD BLD: ABNORMAL
EOSINOPHIL # BLD AUTO: 0.1 K/UL (ref 0–0.5)
EOSINOPHIL NFR BLD: 0.8 % (ref 0–8)
ERYTHROCYTE [DISTWIDTH] IN BLOOD BY AUTOMATED COUNT: 13.2 % (ref 11.5–14.5)
EST. GFR  (NO RACE VARIABLE): 26.3 ML/MIN/1.73 M^2
GLUCOSE SERPL-MCNC: 110 MG/DL (ref 70–110)
GLUCOSE UR QL STRIP: NEGATIVE
HCT VFR BLD AUTO: 36.3 % (ref 37–48.5)
HGB BLD-MCNC: 11.7 G/DL (ref 12–16)
HGB UR QL STRIP: ABNORMAL
HYALINE CASTS UR QL AUTO: 0 /LPF
IMM GRANULOCYTES # BLD AUTO: 0.04 K/UL (ref 0–0.04)
IMM GRANULOCYTES NFR BLD AUTO: 0.3 % (ref 0–0.5)
KETONES UR QL STRIP: NEGATIVE
LACTATE SERPL-SCNC: 1 MMOL/L (ref 0.5–2.2)
LEUKOCYTE ESTERASE UR QL STRIP: ABNORMAL
LYMPHOCYTES # BLD AUTO: 2 K/UL (ref 1–4.8)
LYMPHOCYTES NFR BLD: 17.4 % (ref 18–48)
MAGNESIUM SERPL-MCNC: 1.7 MG/DL (ref 1.6–2.6)
MCH RBC QN AUTO: 30.2 PG (ref 27–31)
MCHC RBC AUTO-ENTMCNC: 32.2 G/DL (ref 32–36)
MCV RBC AUTO: 94 FL (ref 82–98)
MICROSCOPIC COMMENT: ABNORMAL
MONOCYTES # BLD AUTO: 0.8 K/UL (ref 0.3–1)
MONOCYTES NFR BLD: 6.8 % (ref 4–15)
NEUTROPHILS # BLD AUTO: 8.6 K/UL (ref 1.8–7.7)
NEUTROPHILS NFR BLD: 74.5 % (ref 38–73)
NITRITE UR QL STRIP: NEGATIVE
NRBC BLD-RTO: 0 /100 WBC
PH UR STRIP: 7 [PH] (ref 5–8)
PHOSPHATE SERPL-MCNC: 4.9 MG/DL (ref 2.7–4.5)
PLATELET # BLD AUTO: 194 K/UL (ref 150–450)
PMV BLD AUTO: 10.5 FL (ref 9.2–12.9)
POCT GLUCOSE: 107 MG/DL (ref 70–110)
POCT GLUCOSE: 136 MG/DL (ref 70–110)
POTASSIUM SERPL-SCNC: 4 MMOL/L (ref 3.5–5.1)
PROT SERPL-MCNC: 6.2 G/DL (ref 6–8.4)
PROT UR QL STRIP: ABNORMAL
RBC # BLD AUTO: 3.87 M/UL (ref 4–5.4)
RBC #/AREA URNS AUTO: 24 /HPF (ref 0–4)
SODIUM SERPL-SCNC: 141 MMOL/L (ref 136–145)
SP GR UR STRIP: 1.02 (ref 1–1.03)
URN SPEC COLLECT METH UR: ABNORMAL
WBC # BLD AUTO: 11.58 K/UL (ref 3.9–12.7)
WBC #/AREA URNS AUTO: >100 /HPF (ref 0–5)
WBC CLUMPS UR QL AUTO: ABNORMAL

## 2024-05-08 PROCEDURE — 21400001 HC TELEMETRY ROOM

## 2024-05-08 PROCEDURE — 25000003 PHARM REV CODE 250: Performed by: FAMILY MEDICINE

## 2024-05-08 PROCEDURE — 51798 US URINE CAPACITY MEASURE: CPT

## 2024-05-08 PROCEDURE — 81001 URINALYSIS AUTO W/SCOPE: CPT | Performed by: HOSPITALIST

## 2024-05-08 PROCEDURE — 92526 ORAL FUNCTION THERAPY: CPT

## 2024-05-08 PROCEDURE — 97535 SELF CARE MNGMENT TRAINING: CPT

## 2024-05-08 PROCEDURE — 80053 COMPREHEN METABOLIC PANEL: CPT | Performed by: HOSPITALIST

## 2024-05-08 PROCEDURE — 85025 COMPLETE CBC W/AUTO DIFF WBC: CPT | Performed by: HOSPITALIST

## 2024-05-08 PROCEDURE — 83605 ASSAY OF LACTIC ACID: CPT | Performed by: HOSPITALIST

## 2024-05-08 PROCEDURE — 63600175 PHARM REV CODE 636 W HCPCS: Performed by: HOSPITALIST

## 2024-05-08 PROCEDURE — 25000003 PHARM REV CODE 250: Performed by: HOSPITALIST

## 2024-05-08 PROCEDURE — 82550 ASSAY OF CK (CPK): CPT | Performed by: HOSPITALIST

## 2024-05-08 PROCEDURE — 97530 THERAPEUTIC ACTIVITIES: CPT

## 2024-05-08 PROCEDURE — 87086 URINE CULTURE/COLONY COUNT: CPT | Performed by: HOSPITALIST

## 2024-05-08 PROCEDURE — 97112 NEUROMUSCULAR REEDUCATION: CPT | Mod: CQ

## 2024-05-08 PROCEDURE — 84100 ASSAY OF PHOSPHORUS: CPT | Performed by: HOSPITALIST

## 2024-05-08 PROCEDURE — 83735 ASSAY OF MAGNESIUM: CPT | Performed by: HOSPITALIST

## 2024-05-08 RX ORDER — SODIUM CHLORIDE, SODIUM LACTATE, POTASSIUM CHLORIDE, CALCIUM CHLORIDE 600; 310; 30; 20 MG/100ML; MG/100ML; MG/100ML; MG/100ML
INJECTION, SOLUTION INTRAVENOUS CONTINUOUS
Status: DISCONTINUED | OUTPATIENT
Start: 2024-05-08 | End: 2024-05-09

## 2024-05-08 RX ORDER — ACETAMINOPHEN 500 MG
1000 TABLET ORAL 3 TIMES DAILY
Start: 2024-05-08 | End: 2024-05-18

## 2024-05-08 RX ORDER — INSULIN ASPART 100 [IU]/ML
0-5 INJECTION, SOLUTION INTRAVENOUS; SUBCUTANEOUS
Status: DISCONTINUED | OUTPATIENT
Start: 2024-05-08 | End: 2024-05-11

## 2024-05-08 RX ORDER — METHOCARBAMOL 500 MG/1
500 TABLET, FILM COATED ORAL 4 TIMES DAILY
Start: 2024-05-08 | End: 2024-05-18

## 2024-05-08 RX ADMIN — SODIUM CHLORIDE, POTASSIUM CHLORIDE, SODIUM LACTATE AND CALCIUM CHLORIDE: 600; 310; 30; 20 INJECTION, SOLUTION INTRAVENOUS at 02:05

## 2024-05-08 RX ADMIN — ATORVASTATIN CALCIUM 80 MG: 40 TABLET, FILM COATED ORAL at 10:05

## 2024-05-08 RX ADMIN — APIXABAN 2.5 MG: 2.5 TABLET, FILM COATED ORAL at 10:05

## 2024-05-08 RX ADMIN — CARVEDILOL 3.12 MG: 3.12 TABLET, FILM COATED ORAL at 10:05

## 2024-05-08 RX ADMIN — AMLODIPINE BESYLATE 10 MG: 5 TABLET ORAL at 10:05

## 2024-05-08 RX ADMIN — POLYETHYLENE GLYCOL 3350 17 G: 17 POWDER, FOR SOLUTION ORAL at 10:05

## 2024-05-08 RX ADMIN — METHOCARBAMOL 500 MG: 500 TABLET ORAL at 10:05

## 2024-05-08 RX ADMIN — LEVETIRACETAM 250 MG: 250 TABLET, FILM COATED ORAL at 10:05

## 2024-05-08 RX ADMIN — ACETAMINOPHEN 1000 MG: 500 TABLET ORAL at 10:05

## 2024-05-08 RX ADMIN — GABAPENTIN 300 MG: 300 CAPSULE ORAL at 10:05

## 2024-05-08 RX ADMIN — LEVETIRACETAM 250 MG: 250 TABLET, FILM COATED ORAL at 11:05

## 2024-05-08 RX ADMIN — QUETIAPINE FUMARATE 75 MG: 25 TABLET ORAL at 10:05

## 2024-05-08 NOTE — PROGRESS NOTES
Nurses Note -- 4 Eyes      5/8/2024   8:54 AM      Skin assessed during: Q Shift Change      [x] No Altered Skin Integrity Present    [x]Prevention Measures Documented  At this time (see today's photo) skin at sacrum only blanchable redness.  No noted altered skin integrity.       [] Yes- Altered Skin Integrity Present or Discovered   [] LDA Added if Not in Epic (Describe Wound)   [] New Altered Skin Integrity was Present on Admit and Documented in LDA   [x] Wound Image Taken    Wound Care Consulted? No    Attending Nurse:  Shannan Dotson RN/Staff Member:   Chetan

## 2024-05-08 NOTE — PT/OT/SLP PROGRESS
Occupational Therapy   Co-Treatment    Name: Quin Linn  MRN: [id number]  Admitting Diagnosis:  Nondisplaced fracture of fourth cervical vertebra       Recommendations:     Discharge Recommendations: Moderate Intensity Therapy  Discharge Equipment Recommendations:  wheelchair  Barriers to discharge:   Decreased caregiver support     Assessment:     Quin Linn is a 89 y.o. female with a medical diagnosis of Nondisplaced fracture of fourth cervical vertebra.  She presents with no verbal response and eye closure ~ 50% of session at bed level and while seated at EOB which impacted her performance in today's session. Performance deficits affecting function are impaired endurance, weakness, gait instability, impaired functional mobility, impaired self care skills, impaired balance, decreased lower extremity function, decreased safety awareness, pain.     Rehab Prognosis:  Good; patient would benefit from acute skilled OT services to address these deficits and reach maximum level of function.       Plan:     Patient to be seen 3 x/week to address the above listed problems via self-care/home management, therapeutic exercises, neuromuscular re-education, therapeutic activities  Plan of Care Expires: 06/05/24  Plan of Care Reviewed with: patient, caregiver    Subjective     Chief Complaint: None  Patient/Family Comments/goals: None  Pain/Comfort:  Did not rate    Objective:     Communicated with: Nurse prior to session.  Patient found HOB elevated with SCD, telemetry, PureWick upon OT entry to room.    General Precautions: Standard, aspiration, fall    Orthopedic Precautions:N/A  Braces: Aspen collar  Respiratory Status: Room air     Occupational Performance:     Bed Mobility:    Patient completed Supine to Sit with maximal assistance and 2 persons  Patient completed Sit to Supine with maximal assistance and 2 persons     Functional Mobility/Transfers:  Patient completed Sit <> Stand Transfer with maximal assistance and  of 2 persons  with  rolling walker 3 trials at EOB  Noted: FFP, Max VC for upright posture    Activities of Daily Living:  Grooming: total assistance facial hygiene with wipes and face towel seated at EOB  Pt. Sat at EOB ~ 8-10mins with Min A to Total A with 1 LOB posteriorly with total (A) to recover  Proper Adjusting of aspen collar seated at EOB, Total  AMPAC 6 Click ADL: 13    Treatment & Education:  Pt. Educated on Wear and care schedule of [place] collar (reinforcement required )   Pt educated on role of occupational therapy, POC, and safety during ADLs and functional mobility. Pt and OT discussed importance of safe, continued mobility to optimize daily living skills. Pt verbalized understanding. Pt given instruction to call for medical staff/nurse for assistance.   Co-treatment with PT for maximal pt participation, safety, and activity tolerance     Patient left HOB elevated with all lines intact, call button in reach, bed alarm on, and nurse notified    GOALS:   Multidisciplinary Problems       Occupational Therapy Goals          Problem: Occupational Therapy    Goal Priority Disciplines Outcome Interventions   Occupational Therapy Goal     OT, PT/OT Progressing    Description: Goals to be met by: 6/5/24     Patient will increase functional independence with ADLs by performing:    LE Dressing with Minimal Assistance.  Grooming while standing at sink with Minimal Assistance.  Toileting from bedside commode with Minimal Assistance for hygiene and clothing management.   Supine to sit with Stand-by Assistance.  Stand pivot transfers with Minimal Assistance.  Toilet transfer to bedside commode with Minimal Assistance.                           Time Tracking:     OT Date of Treatment: 05/08/24  OT Start Time: 0833  OT Stop Time: 0850  OT Total Time (min): 17 min    Billable Minutes:Therapeutic Activity 17    OT/DANIEL: OT          5/8/2024

## 2024-05-08 NOTE — CARE UPDATE
RAPID RESPONSE NURSE PROACTIVE ROUNDING NOTE       Time of Visit: 1411    Admit Date: 2024  LOS: 2  Code Status: DNR   Date of Visit: 2024  : 1932  Age: 89 y.o.  Sex: female  Race: White  Bed: 504/504 A:   MRN: [id number]  Was the patient discharged from an ICU this admission? No   Was the patient discharged from a PACU within last 24 hours? No   Did the patient receive conscious sedation/general anesthesia in last 24 hours? No  Was the patient in the ED within the past 24 hours? No  Was the patient on NIPPV within the past 24 hours? No   Attending Physician: Rosita Galarza MD  Primary Service: [facility] [place] [facility] MED O   Time spent at the bedside: 30 - 45 min    SITUATION    Notified by bedside RN via phone call.  Reason for alert: Decreased responsiveness, decreased UOP  Called to evaluate the patient for Neuro    BACKGROUND     Why is the patient in the hospital?: Nondisplaced fracture of fourth cervical vertebra    Patient has a past medical history of Acute hypoxemic respiratory failure, Acute on chronic diastolic congestive heart failure, Alzheimer's disease, Anemia, Arthritis, Back pain, Cancer, Cataract, Colon cancer, Diabetes mellitus type II, Glaucoma, Hyperlipidemia, Hypertension, Hyperthyroidism, Hypothyroidism following radioiodine therapy, Pacemaker, Pneumonia, Pneumonia due to other staphylococcus, Renal manifestation of secondary diabetes mellitus, Squamous cell carcinoma, Stroke, Type 2 diabetes mellitus with stage 3 chronic kidney disease and hypertension, and Type 2 diabetes with peripheral circulatory disorder, controlled.    Last Vitals:  Temp: 98.5 °F (36.9 °C) ( 1140)  Pulse: 68 ( 1142)  Resp: 17 ( 1140)  BP: 137/63 ( 1140)  SpO2: 96 % ( 1140)    24 Hours Vitals Range:  Temp:  [97.3 °F (36.3 °C)-98.5 °F (36.9 °C)]   Pulse:  [68-81]   Resp:  [16-18]   BP: (127-188)/(63-79)   SpO2:  [96 %-99 %]     Labs:  Recent Labs     24  0321   WBC 10.85   HGB 11.3*   HCT  "35.5*          Recent Labs     05/05/24  1600 05/06/24  0321    141   K 4.6 4.5    109   CO2 23 24   BUN 24 24   CREATININE 1.1 1.3   * 137*   PHOS  --  3.8   MG  --  1.5*        No results for input(s): "PH", "PCO2", "PO2", "HCO3", "POCSATURATED", "BE" in the last 72 hours.     ASSESSMENT    Called to bedside for pt with decreased responsiveness and decreased UOP. On exam, pt is lethargic but responds voice and is able to identify herself but does not follow commands. CTH, CXR, and labs ordered by primary.     Physical Exam  HENT:      Mouth/Throat:      Mouth: Mucous membranes are moist.      Pharynx: Oropharynx is clear.   Eyes:      Pupils: Pupils are equal, round, and reactive to light.   Cardiovascular:      Rate and Rhythm: Normal rate. Rhythm irregular.   Pulmonary:      Effort: Pulmonary effort is normal.   Abdominal:      General: Abdomen is flat.      Palpations: Abdomen is soft.   Skin:     General: Skin is warm.      Capillary Refill: Capillary refill takes less than 2 seconds.   Neurological:      Mental Status: She is lethargic.      GCS: GCS eye subscore is 3. GCS verbal subscore is 2. GCS motor subscore is 5.         INTERVENTIONS    The patient was seen for Neurological problem. Staff concerns included decreased responsiveness. The following interventions were performed: POCT glucose, CT scan ordered, continuous cardiac monitoring continued, and  continuous fluids, CBC, CMP, Mg Phos, lactic, EEG, UA, CXR, CK (all ordered by Dr. Galarza.    RECOMMENDATIONS    F/u with labs/testing. Add . Interrogate pacer. Maintain tele and IV access. Strict aspiration precautions. Monitor intake and output.     PROVIDER ESCALATION    Yes/No  Yes    Orders received and case discussed with Dr. Galarza .    Disposition: Remain in room 504.    FOLLOW-UP    Bedside RNChetan  updated on plan of care. Instructed to call the Rapid Response Nurse, Norman London RN at [phone] for additional questions " or concerns.

## 2024-05-08 NOTE — ASSESSMENT & PLAN NOTE
NSGY consulted ; appreciate recs  MRI with Increased signal intensity in the bilateral C4 lamina and left C5 lamina consistent with fractures better identified on recent CT. Anterior widening of the C5-C6 intervertebral disc space with increased signal intensity and complete disruption of the anterior longitudinal ligament at this level.  Edema in the interspinous ligaments at C4-C7.  Findings are suggestive of hyperextension injury. Extensive prevertebral edema throughout the cervical and upper thoracic spine likely related to ligamentous injury.   No acute intervention per NSGY   Maintain C Collar at all times  Neurochecks q4h  Continue Eliquis  Pain control adjusted to include scheduled Tylenol and Robaxin  PT/OT consulted:  Suggest moderate intense therapy.  Working on SNF

## 2024-05-08 NOTE — PROGRESS NOTES
[facility] Medicine  Progress Note    Patient Name: Quin Linn  MRN: [id number]  Patient Class: IP- Inpatient   Admission Date: 5/5/2024  Length of Stay: 2 days  Attending Physician: Rosita Galarza MD  Primary Care Provider: Mary Ellen Nesbitt MD        Subjective:     Principal Problem:Nondisplaced fracture of fourth cervical vertebra        HPI:  89 y/o F PMH CHF, Alzheimer's, DM, HTN who presents to the ED via EMS for evaluation of a fall with CT Csp demonstrating bilateral C4 lamina fracture and L C5 lamina fracture with extension into IAP. At baseline patient ambulates with wheelchair and needs assistance with transfers. Reportedly patient had a fall forwards out of recliner, she hit her head on the ground and had a bleeding laceration. Limited hx given dementia, but pt denies any LOC, CP, dyspnea, N/V/D, headache, new weakness/numbness or neck pain. Baseline incontinence .    In the ED patient afebrile and hemodynamically stable saturating well on room air. Moves all extremities, alert and oriented to person. Conversational and pleasant with limited insight. Patient placed in C collar NSGY consulted and evaluated patient. MRI without significant canal stenosis and no plan for acute intervention per NSGY. Admitted to the care of medicine for further evaluation and management.     Overview/Hospital Course:  Ms. Linn was admitted to Hospital Medicine for management of a cervical fracture.  NSGY was consulted who said C collar and pain control, no surgical intervention.  She was started on a multimodal pain regimen and PT/OT were consulted who suggested moderate therapy.  Working on SNF placement.  SLP was consulted given concern for dysphagia and she was made NPO.  Diet was advanced to full liquids with crushed meds.    Interval History:  No acute events overnight.  Seen working with PT/OT.  Lethargic today but just waking up.    Review of Systems   Unable to perform ROS: Dementia  "    Objective:     Vital Signs (Most Recent):  Temp: 97.7 °F (36.5 °C) (05/08/24 0747)  Pulse: 80 (05/08/24 0747)  Resp: 16 (05/08/24 0747)  BP: 137/63 (05/08/24 0747)  SpO2: 96 % (05/08/24 0747) Vital Signs (24h Range):  Temp:  [96.7 °F (35.9 °C)-98.2 °F (36.8 °C)] 97.7 °F (36.5 °C)  Pulse:  [60-80] 80  Resp:  [16-18] 16  SpO2:  [96 %-99 %] 96 %  BP: (127-188)/(63-79) 137/63     Weight: 59.9 kg (132 lb 0.9 oz)  Body mass index is 21.98 kg/m².    Intake/Output Summary (Last 24 hours) at 5/8/2024 0901  Last data filed at 5/8/2024 0621  Gross per 24 hour   Intake 250 ml   Output 1175 ml   Net -925 ml         Physical Exam  Constitutional:       Appearance: Normal appearance.   HENT:      Head: Normocephalic and atraumatic.   Neck:      Comments: C collar in place  Cardiovascular:      Rate and Rhythm: Normal rate and regular rhythm.      Heart sounds: No murmur heard.  Pulmonary:      Effort: Pulmonary effort is normal. No respiratory distress.      Breath sounds: Normal breath sounds. No wheezing or rales.   Abdominal:      General: There is no distension.      Palpations: Abdomen is soft.      Tenderness: There is no abdominal tenderness.   Musculoskeletal:         General: No deformity.   Skin:     General: Skin is warm and dry.   Neurological:      General: No focal deficit present.      Mental Status: Mental status is at baseline.             Significant Labs: All pertinent labs within the past 24 hours have been reviewed.  CBC:   No results for input(s): "WBC", "HGB", "HCT", "PLT" in the last 48 hours.    CMP:   No results for input(s): "NA", "K", "CL", "CO2", "GLU", "BUN", "CREATININE", "CALCIUM", "PROT", "ALBUMIN", "BILITOT", "ALKPHOS", "AST", "ALT", "ANIONGAP", "EGFRNONAA" in the last 48 hours.    Invalid input(s): "ESTGF[place]"      Significant Imaging: I have reviewed all pertinent imaging results/findings within the past 24 hours.    Assessment/Plan:      * Nondisplaced fracture of fourth cervical " vertebra  NSGY consulted ; appreciate recs  MRI with Increased signal intensity in the bilateral C4 lamina and left C5 lamina consistent with fractures better identified on recent CT. Anterior widening of the C5-C6 intervertebral disc space with increased signal intensity and complete disruption of the anterior longitudinal ligament at this level.  Edema in the interspinous ligaments at C4-C7.  Findings are suggestive of hyperextension injury. Extensive prevertebral edema throughout the cervical and upper thoracic spine likely related to ligamentous injury.   No acute intervention per NSGY   Maintain C Collar at all times  Neurochecks q4h  Continue Eliquis  Pain control adjusted to include scheduled Tylenol and Robaxin  PT/OT consulted:  Suggest moderate intense therapy.  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 Has not received any PRBCs to date. Etiology likely d/t chronic disease due to Chronic Kidney Disease  Current CBC reviewed-   Lab Results   Component Value Date    HGB 11.3 (L) 05/06/2024    HCT 35.5 (L) 05/06/2024     Monitor serial CBC and transfuse if patient becomes hemodynamically unstable, symptomatic or H/H drops below 7/21.    DNR (do not resuscitate)  DNR status confirmed      Paroxysmal A-fib  Chronic issue  Continue Coreg and Eliquis    CKD (chronic kidney disease), stage IV  Creatine stable for now. BMP reviewed- noted Estimated Creatinine Clearance: 25.4 mL/min (based on SCr of 1.3 mg/dL). according to latest data. Based on current GFR, CKD stage is stage 4 - GFR 15-29.  Monitor UOP and serial BMP and adjust therapy as needed. Renally dose  "meds. Avoid nephrotoxic medications and procedures.    Pacemaker  Appreciate Cardiology assistance  Paused for MRI and previous settings resumed after completion      Hypomagnesemia  Patient has Abnormal Magnesium: hypomagnesemia. Will continue to monitor electrolytes closely. Will replace the affected electrolytes and repeat labs to be done after interventions completed. The patient's magnesium results have been reviewed and are listed below.  No results for input(s): "MG" in the last 24 hours.       Controlled type 2 diabetes mellitus with stage 3 chronic kidney disease, without long-term current use of insulin  HbA1c 5.5, sugars controlled  Home DM regimen:  Glipizide  Hold oral hyperglycemic meds  SSI with POCT accuchecks to achieve blood glucose of 140-180 while hospitalized  Hypoglycemia protocol  Diabetic diet when able to eat - on full liquids with meds crushed in pudding      Late onset Alzheimer's disease with behavioral disturbance  Chronic issue  Continue home seroquel   Delirium precautions    Episode of transient neurologic symptoms  Followed by Neurology and on 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  Continue home keppra      Hypertension  Chronic issue  Continue Norvasc and Coreg    Hypothyroidism following radioiodine therapy  Chronic and stable  Continue home synthroid        VTE Risk Mitigation (From admission, onward)           Ordered     apixaban tablet 2.5 mg  2 times daily         05/07/24 0933     IP VTE HIGH RISK PATIENT  Once         05/05/24 1820     Place sequential compression device  Until discontinued         05/05/24 1820                    Discharge Planning   LILI: 5/9/2024     Code Status: DNR   Is the patient medically ready for discharge?:     Reason for patient still in hospital (select all that apply): Patient trending condition and Pending disposition  Discharge Plan A: Skilled Nursing Facility                  Rosita Galarza MD  Department of Hospital Medicine "   [facility]

## 2024-05-08 NOTE — PLAN OF CARE
SW spoke to Bethany arias/ [facility] [place] reports pt Accepted, will submit authorization today (pending updated clinicals). Staff contacted pt's daughter, paperwork scheduled for 11 AM 05/10.    Latisha Austin LMSW  Case Management   [facility]   Ext. [phone]

## 2024-05-08 NOTE — ASSESSMENT & PLAN NOTE
HbA1c 5.5, sugars controlled  Home DM regimen:  Glipizide  Hold oral hyperglycemic meds  SSI with POCT accuchecks to achieve blood glucose of 140-180 while hospitalized  Hypoglycemia protocol  Diabetic diet when able to eat - on full liquids with meds crushed in pudding

## 2024-05-08 NOTE — SUBJECTIVE & OBJECTIVE
"Interval History:  No acute events overnight.  Seen working with PT/OT.  Lethargic today but just waking up.    Review of Systems   Unable to perform ROS: Dementia     Objective:     Vital Signs (Most Recent):  Temp: 97.7 °F (36.5 °C) (05/08/24 0747)  Pulse: 80 (05/08/24 0747)  Resp: 16 (05/08/24 0747)  BP: 137/63 (05/08/24 0747)  SpO2: 96 % (05/08/24 0747) Vital Signs (24h Range):  Temp:  [96.7 °F (35.9 °C)-98.2 °F (36.8 °C)] 97.7 °F (36.5 °C)  Pulse:  [60-80] 80  Resp:  [16-18] 16  SpO2:  [96 %-99 %] 96 %  BP: (127-188)/(63-79) 137/63     Weight: 59.9 kg (132 lb 0.9 oz)  Body mass index is 21.98 kg/m².    Intake/Output Summary (Last 24 hours) at 5/8/2024 0901  Last data filed at 5/8/2024 0621  Gross per 24 hour   Intake 250 ml   Output 1175 ml   Net -925 ml         Physical Exam  Constitutional:       Appearance: Normal appearance.   HENT:      Head: Normocephalic and atraumatic.   Neck:      Comments: C collar in place  Cardiovascular:      Rate and Rhythm: Normal rate and regular rhythm.      Heart sounds: No murmur heard.  Pulmonary:      Effort: Pulmonary effort is normal. No respiratory distress.      Breath sounds: Normal breath sounds. No wheezing or rales.   Abdominal:      General: There is no distension.      Palpations: Abdomen is soft.      Tenderness: There is no abdominal tenderness.   Musculoskeletal:         General: No deformity.   Skin:     General: Skin is warm and dry.   Neurological:      General: No focal deficit present.      Mental Status: Mental status is at baseline.             Significant Labs: All pertinent labs within the past 24 hours have been reviewed.  CBC:   No results for input(s): "WBC", "HGB", "HCT", "PLT" in the last 48 hours.    CMP:   No results for input(s): "NA", "K", "CL", "CO2", "GLU", "BUN", "CREATININE", "CALCIUM", "PROT", "ALBUMIN", "BILITOT", "ALKPHOS", "AST", "ALT", "ANIONGAP", "EGFRNONAA" in the last 48 hours.    Invalid input(s): "ESTGFAFRICA"      Significant " Imaging: I have reviewed all pertinent imaging results/findings within the past 24 hours.

## 2024-05-08 NOTE — PT/OT/SLP PROGRESS
"Speech Language Pathology Treatment    Patient Name:  Quin Linn   MRN:  [id number]  Admitting Diagnosis: Nondisplaced fracture of fourth cervical vertebra    Recommendations:                 General Recommendations:   ongoing swallowing assessment ; monitor diet tolerance  Diet recommendations:  Puree Diet - IDDSI Level 4, Liquid Diet Level: Mildly thick/Nectar thick liquids - IDDSI Level 2   Aspiration Precautions: Feed only when awake/alert, HOB to 90 degrees, 1 bite/sip at a time, Alternating bites/sips, Assistance with thickening liquids, Avoid talking while eating, Check for pocketing/oral residue, Eliminate distractions,  Frequent oral care, , Meds crushed in puree, Monitor for s/s of aspiration, Remain upright 30 minutes post meal, Small bites/sips, and Strict aspiration precautions   General Precautions: Standard, aspiration, fall  Communication strategies:  provide increased time to answer and go to room if call light pushed    Assessment:     Quin Linn is a 89 y.o. female with an SLP diagnosis of Dysphagia.     Subjective     "Quin Linn." Pt followed commands to state name to assess vocal quality after PO trials.     Pain/Comfort:  Pain Rating 1: 0/10    Respiratory Status: Room air    Objective:     Has the patient been evaluated by SLP for swallowing?   Yes  Keep patient NPO? No   Current Respiratory Status:        Pt seen for ongoing swallowing assessment. Nurse reported pt having difficulty swallowing PO meds this morning.  Sitter present and reported pt having difficulty tolerating thin liquids yesterday. Pt found to be very sleepy with wet/gurgly breath sounds upon entry.  Pt repositioned in bed to achieve more upright position and oral suctioning was provided in an effort to clear wet/gurgly breath sounds.  Pt difficult to maintain alertness and requiring cues to remain alert t/o session.  Pt was unable to produce a dry swallow upon command.  Pt accepted the following PO trials: thin water " via 1/2 tsp x 1, full tsp x 1, straw sips x 3; nectar thick liquid via tsp x 1 and straw sips x 3; pudding 1/2 tsp x 1 and full tsp x 2.  Multiple swallow produced (3-5) for thin water trials.  Wet vocal quality present after 1/2 tsp thin water.  Cough was present after 2 out of 3 straw sips of water. Pt required 2-3 swallows for nectar thick trials, but no overt s/s of aspiration were observed.  Poor oral acceptance was present for final full tsp pudding due to diminishing alertness.  This may have contributed to decreased tolerance and presence of throat clear.  Pt given straw sips of nectar thick water as a liquid wash then PO trials were ceased.  SLP recommending pt downgrade to nectar thick liquids.  The kitchen does not accommodate nectar thick liquids on a full liquid diet; therefore, SLP will recommend a pureed diet.  Education provided to pt and sitter regarding concerns for aspiration, overt s/s of aspiration, recommendations for nectar thick liquids and pureed diet, thickening instructions, strict aspiration precautions, only feeding pt when fully awake/alert and as upright as possible, and plan for ongoing swallowing assessment.  Pt's caregiver was able to demonstrate understanding, but pt was unable to do so.     Goals:   Multidisciplinary Problems       SLP Goals          Problem: SLP    Goal Priority Disciplines Outcome   SLP Goal     SLP    Description: Speech Language Pathology Goals  Goals expected to be met by 5/13    1. Pt will participate in ongoing swallow assessment                               Plan:     Patient to be seen:  4 x/week   Plan of Care expires:     Plan of Care reviewed with:  patient, caregiver   SLP Follow-Up:  Yes       Discharge recommendations:  Moderate Intensity Therapy     Time Tracking:     SLP Treatment Date:   05/08/24  Speech Start Time:  1146  Speech Stop Time:  1215     Speech Total Time (min):  29 min    Billable Minutes: Treatment Swallowing Dysfunction 15 and  Self Care/Home Management Training 14    05/08/2024

## 2024-05-08 NOTE — SIGNIFICANT EVENT
Hospital Medicine  Significant Event Note      Patient Name: Quin Linn  MRN:  [id number]  Hospital Medicine Team: [facility] Rosita Galarza MD  Date of Admission:  5/5/2024     Principal Problem:  Nondisplaced fracture of fourth cervical vertebra        History of Present Illness:  Ms. Quin Linn is a 89 y.o. female who is currently admitted to Hospital Medicine for a cervical fracture.  She has remained lethargic her entire stay, however nursing reports that on occasion she will wakeup and become more interactive.  Earlier today, SLP advanced her diet.  Nursing also reported decreased urine outpatient.      Went to bedside to evaluate patient because of increased lethargy.  Remains lethargic but in C collar.      Physical Exam:    Temp:  [96.7 °F (35.9 °C)-98.5 °F (36.9 °C)]   Pulse:  [66-81]   Resp:  [16-18]   BP: (127-188)/(63-79)   SpO2:  [96 %-99 %]     Constitutional: Appears lethargic, in C collar  Cardiovascular: Normal heart rate.  Regular heart rhythm.  No murmur heard.  Pulmonary/Chest: Effort normal. No respiratory distress. No wheezes, rales, or rhonchi  Abdominal: Soft. Bowel sounds are normal.  No distension.  No tenderness  Neurological: Lethargic      Plan:  Stat head CT  Start IVF with LR  Already on neuro checks q4h  Repeat labs  Check UA  Stop Gabapentin 300mg BID  Check EEG      Plan of care discussed with Rapid Response, Charge nurse Chetan      Critical Care Time: 30 minutes    Critical Care was time spent personally by me on the following activities: evaluating this patient's organ dysfunction, development of treatment plan, discussing treatment plan with patient or surrogate and bedside caregivers, discussions with consultants, evaluation of patient's response to treatment, examination of patient, ordering and performing treatments and interventions, ordering and review of laboratory studies, ordering and review of radiographic studies, re-evaluation of patient's condition. This  critical care time did not overlap with that of any other provider or involve time for any separately billed procedures    Diagnosis requiring critical care: Acute encephalopathy      Rosita Galarza MD, [facility]  [facility]

## 2024-05-08 NOTE — PT/OT/SLP PROGRESS
Physical Therapy Treatment    Patient Name:  Quin Linn   MRN:  [id number]    Recommendations:     Discharge Recommendations: Moderate Intensity Therapy  Discharge Equipment Recommendations: wheelchair  Barriers to discharge: Decreased caregiver support    Assessment:     Quin Linn is a 89 y.o. female admitted with a medical diagnosis of Nondisplaced fracture of fourth cervical vertebra.  She presents with the following impairments/functional limitations: weakness, impaired endurance, impaired self care skills, impaired functional mobility, gait instability, impaired balance, impaired cognition, decreased coordination, decreased safety awareness, pain, decreased ROM, impaired skin, orthopedic precautions. Pt with little engagement in session with frequent eyes closed and minimal to no communication.     Rehab Prognosis: Fair; patient would benefit from acute skilled PT services to address these deficits and reach maximum level of function.    Recent Surgery: * No surgery found *      Plan:     During this hospitalization, patient to be seen 3 x/week to address the identified rehab impairments via gait training, therapeutic activities, therapeutic groups, neuromuscular re-education and progress toward the following goals:    Plan of Care Expires:  06/06/24    Subjective     Chief Complaint: none   Patient/Family Comments/goals: none   Pain/Comfort:  Pain Rating 1: 0/10      Objective:     Communicated with NSG prior to session.  Patient found HOB elevated with SCD, telemetry, PureWick upon PT entry to room.     General Precautions: Standard, seizure, fall  Orthopedic Precautions: N/A  Braces: Aspen collar  Respiratory Status: Room air     Functional Mobility:  Bed Mobility:     Supine to Sit: maximal assistance and of 2 persons  Sit to Supine: maximal assistance and of 2 persons  Transfers:     Sit to Stand:  maximal assistance, of 2 persons, and 3 reps with rolling walker  EOB balance: varying from min to total  A with posterior lean and minimal trunk mm activation       AM-PAC 6 CLICK MOBILITY  Turning over in bed (including adjusting bedclothes, sheets and blankets)?: 2  Sitting down on and standing up from a chair with arms (e.g., wheelchair, bedside commode, etc.): 2  Moving from lying on back to sitting on the side of the bed?: 2  Moving to and from a bed to a chair (including a wheelchair)?: 1  Need to walk in hospital room?: 1  Climbing 3-5 steps with a railing?: 1  Basic Mobility Total Score: 9       Treatment & Education:  Pt sat EOB and performed x3 STS to allow for NSG care to sacrum. Each rep lacked pt engagement and all presented with flexed posture.   It was not safe to transfer pt to chair or continue mobility therefore session was concluded.   Bedside table in front of patient and area set up for function, convenience, and safety. RN aware of patient's mobility needs and status. Questions/concerns addressed within PTA scope of practice; patient  with no further questions. Time was provided for active listening, discussion of health disposition, and discussion of safe discharge.    Patient left HOB elevated with all lines intact, call button in reach, and NSG notified..    GOALS:   Multidisciplinary Problems       Physical Therapy Goals          Problem: Physical Therapy    Goal Priority Disciplines Outcome Goal Variances Interventions   Physical Therapy Goal     PT, PT/OT Progressing     Description: Goals to be met by: 24     Patient will increase functional independence with mobility by performin. Supine to sit with Minimal Assistance  2. Sit to stand transfer with Minimal Assistance  3. Bed to chair transfer with Minimal Assistance using LRAD  4. Gait x20 feet with Minimal Assistance using Rolling Walker                         Time Tracking:     PT Received On: 24  PT Start Time: 833     PT Stop Time: 850  PT Total Time (min): 17 min     Billable Minutes: Neuromuscular Re-education  17    Treatment Type: Treatment  PT/PTA: PTA     Number of PTA visits since last PT visit: 1     05/08/2024

## 2024-05-08 NOTE — ASSESSMENT & PLAN NOTE
Source unclear  Does have dementia  Attempting to get in touch with daughter Janae to determine oral status prior to hospitalization  SLP following  Diet advanced to full liquids with meds crushed and buried in chocolate pudding

## 2024-05-08 NOTE — PLAN OF CARE
Spoke with patient's daughter Janae regarding SNF placement.Patient accepted to [facility], [facility] DND, Emani WADE, [facility] and [facility]. Daughter wishes to proceed with admission to [facility]. SW to follow-up with facility regarding submitting auth and speaking with daughter regarding signing of paperwork .    Natalie RICHARDS  Case Management  [facility]  [phone]  Current LILI is 5/10/24.

## 2024-05-09 LAB
ALBUMIN SERPL BCP-MCNC: 2.8 G/DL (ref 3.5–5.2)
ALP SERPL-CCNC: 77 U/L (ref 55–135)
ALT SERPL W/O P-5'-P-CCNC: 22 U/L (ref 10–44)
ANION GAP SERPL CALC-SCNC: 12 MMOL/L (ref 8–16)
AST SERPL-CCNC: 25 U/L (ref 10–40)
BACTERIA UR CULT: NORMAL
BACTERIA UR CULT: NORMAL
BASOPHILS # BLD AUTO: 0.05 K/UL (ref 0–0.2)
BASOPHILS NFR BLD: 0.4 % (ref 0–1.9)
BILIRUB SERPL-MCNC: 0.5 MG/DL (ref 0.1–1)
BNP SERPL-MCNC: 436 PG/ML (ref 0–99)
BUN SERPL-MCNC: 46 MG/DL (ref 10–30)
CALCIUM SERPL-MCNC: 9.2 MG/DL (ref 8.7–10.5)
CHLORIDE SERPL-SCNC: 106 MMOL/L (ref 95–110)
CO2 SERPL-SCNC: 22 MMOL/L (ref 23–29)
CREAT SERPL-MCNC: 1.4 MG/DL (ref 0.5–1.4)
DIFFERENTIAL METHOD BLD: ABNORMAL
EOSINOPHIL # BLD AUTO: 0.1 K/UL (ref 0–0.5)
EOSINOPHIL NFR BLD: 0.8 % (ref 0–8)
ERYTHROCYTE [DISTWIDTH] IN BLOOD BY AUTOMATED COUNT: 13 % (ref 11.5–14.5)
EST. GFR  (NO RACE VARIABLE): 35.5 ML/MIN/1.73 M^2
GLUCOSE SERPL-MCNC: 126 MG/DL (ref 70–110)
HCT VFR BLD AUTO: 36.7 % (ref 37–48.5)
HGB BLD-MCNC: 12.2 G/DL (ref 12–16)
IMM GRANULOCYTES # BLD AUTO: 0.05 K/UL (ref 0–0.04)
IMM GRANULOCYTES NFR BLD AUTO: 0.4 % (ref 0–0.5)
LYMPHOCYTES # BLD AUTO: 1.8 K/UL (ref 1–4.8)
LYMPHOCYTES NFR BLD: 14.8 % (ref 18–48)
MAGNESIUM SERPL-MCNC: 1.7 MG/DL (ref 1.6–2.6)
MCH RBC QN AUTO: 30.7 PG (ref 27–31)
MCHC RBC AUTO-ENTMCNC: 33.2 G/DL (ref 32–36)
MCV RBC AUTO: 92 FL (ref 82–98)
MONOCYTES # BLD AUTO: 0.7 K/UL (ref 0.3–1)
MONOCYTES NFR BLD: 6.1 % (ref 4–15)
NEUTROPHILS # BLD AUTO: 9.3 K/UL (ref 1.8–7.7)
NEUTROPHILS NFR BLD: 77.5 % (ref 38–73)
NRBC BLD-RTO: 0 /100 WBC
PHOSPHATE SERPL-MCNC: 3.7 MG/DL (ref 2.7–4.5)
PLATELET # BLD AUTO: 212 K/UL (ref 150–450)
PMV BLD AUTO: 10 FL (ref 9.2–12.9)
POCT GLUCOSE: 124 MG/DL (ref 70–110)
POCT GLUCOSE: 136 MG/DL (ref 70–110)
POCT GLUCOSE: 142 MG/DL (ref 70–110)
POCT GLUCOSE: 154 MG/DL (ref 70–110)
POTASSIUM SERPL-SCNC: 3.6 MMOL/L (ref 3.5–5.1)
PROT SERPL-MCNC: 6.7 G/DL (ref 6–8.4)
RBC # BLD AUTO: 3.98 M/UL (ref 4–5.4)
SODIUM SERPL-SCNC: 140 MMOL/L (ref 136–145)
TB INDURATION 48 - 72 HR READ: 0 MM
TB SKIN TEST 48 - 72 HR READ: NEGATIVE
WBC # BLD AUTO: 11.97 K/UL (ref 3.9–12.7)

## 2024-05-09 PROCEDURE — 25000003 PHARM REV CODE 250

## 2024-05-09 PROCEDURE — 84100 ASSAY OF PHOSPHORUS: CPT | Performed by: HOSPITALIST

## 2024-05-09 PROCEDURE — 99900035 HC TECH TIME PER 15 MIN (STAT)

## 2024-05-09 PROCEDURE — 63600175 PHARM REV CODE 636 W HCPCS

## 2024-05-09 PROCEDURE — 25000003 PHARM REV CODE 250: Performed by: HOSPITALIST

## 2024-05-09 PROCEDURE — 84132 ASSAY OF SERUM POTASSIUM: CPT

## 2024-05-09 PROCEDURE — 85025 COMPLETE CBC W/AUTO DIFF WBC: CPT | Performed by: HOSPITALIST

## 2024-05-09 PROCEDURE — 83735 ASSAY OF MAGNESIUM: CPT | Performed by: HOSPITALIST

## 2024-05-09 PROCEDURE — 25000003 PHARM REV CODE 250: Performed by: FAMILY MEDICINE

## 2024-05-09 PROCEDURE — 31720 CLEARANCE OF AIRWAYS: CPT

## 2024-05-09 PROCEDURE — 82803 BLOOD GASES ANY COMBINATION: CPT

## 2024-05-09 PROCEDURE — 63600175 PHARM REV CODE 636 W HCPCS: Performed by: HOSPITALIST

## 2024-05-09 PROCEDURE — 82330 ASSAY OF CALCIUM: CPT

## 2024-05-09 PROCEDURE — 94761 N-INVAS EAR/PLS OXIMETRY MLT: CPT | Mod: XB

## 2024-05-09 PROCEDURE — 20600001 HC STEP DOWN PRIVATE ROOM

## 2024-05-09 PROCEDURE — 80053 COMPREHEN METABOLIC PANEL: CPT | Performed by: HOSPITALIST

## 2024-05-09 PROCEDURE — 36415 COLL VENOUS BLD VENIPUNCTURE: CPT | Performed by: HOSPITALIST

## 2024-05-09 PROCEDURE — 92526 ORAL FUNCTION THERAPY: CPT

## 2024-05-09 PROCEDURE — 83880 ASSAY OF NATRIURETIC PEPTIDE: CPT | Performed by: HOSPITALIST

## 2024-05-09 PROCEDURE — 85014 HEMATOCRIT: CPT

## 2024-05-09 PROCEDURE — 84295 ASSAY OF SERUM SODIUM: CPT

## 2024-05-09 PROCEDURE — 36600 WITHDRAWAL OF ARTERIAL BLOOD: CPT

## 2024-05-09 RX ORDER — LEVETIRACETAM 500 MG/5ML
250 INJECTION, SOLUTION, CONCENTRATE INTRAVENOUS EVERY 12 HOURS
Status: DISCONTINUED | OUTPATIENT
Start: 2024-05-09 | End: 2024-05-13

## 2024-05-09 RX ORDER — SCOLOPAMINE TRANSDERMAL SYSTEM 1 MG/1
1 PATCH, EXTENDED RELEASE TRANSDERMAL
Status: DISCONTINUED | OUTPATIENT
Start: 2024-05-09 | End: 2024-05-14

## 2024-05-09 RX ORDER — FUROSEMIDE 10 MG/ML
20 INJECTION INTRAMUSCULAR; INTRAVENOUS ONCE
Status: COMPLETED | OUTPATIENT
Start: 2024-05-09 | End: 2024-05-09

## 2024-05-09 RX ADMIN — ACETAMINOPHEN 1000 MG: 500 TABLET ORAL at 10:05

## 2024-05-09 RX ADMIN — FUROSEMIDE 20 MG: 10 INJECTION, SOLUTION INTRAMUSCULAR; INTRAVENOUS at 11:05

## 2024-05-09 RX ADMIN — LEVETIRACETAM 250 MG: 250 TABLET, FILM COATED ORAL at 09:05

## 2024-05-09 RX ADMIN — APIXABAN 2.5 MG: 2.5 TABLET, FILM COATED ORAL at 10:05

## 2024-05-09 RX ADMIN — CEFTRIAXONE 1 G: 1 INJECTION, POWDER, FOR SOLUTION INTRAMUSCULAR; INTRAVENOUS at 02:05

## 2024-05-09 RX ADMIN — ACETAMINOPHEN 1000 MG: 500 TABLET ORAL at 09:05

## 2024-05-09 RX ADMIN — ATORVASTATIN CALCIUM 80 MG: 40 TABLET, FILM COATED ORAL at 09:05

## 2024-05-09 RX ADMIN — AMLODIPINE BESYLATE 10 MG: 5 TABLET ORAL at 09:05

## 2024-05-09 RX ADMIN — QUETIAPINE FUMARATE 75 MG: 25 TABLET ORAL at 10:05

## 2024-05-09 RX ADMIN — SCOPOLAMINE 1 PATCH: 1.5 PATCH, EXTENDED RELEASE TRANSDERMAL at 09:05

## 2024-05-09 RX ADMIN — LEVETIRACETAM 250 MG: 100 INJECTION INTRAVENOUS at 10:05

## 2024-05-09 RX ADMIN — APIXABAN 2.5 MG: 2.5 TABLET, FILM COATED ORAL at 09:05

## 2024-05-09 NOTE — RESPIRATORY THERAPY
RAPID RESPONSE RESPIRATORY THERAPY FOLLOW-UP NOTE       Followed up with patient for proactive rounding. Patient moving to [phone] for HLOC. Please call Rapid Response RT, Leila Leahy, TONI at [phone] with any questions or concerns.

## 2024-05-09 NOTE — PROGRESS NOTES
Nurses Note -- 4 Eyes      5/9/2024   10:15 AM      Skin assessed during: Q Shift Change      [x] No Altered Skin Integrity Present    []Prevention Measures Documented      [] Yes- Altered Skin Integrity Present or Discovered   [] LDA Added if Not in Epic (Describe Wound)   [] New Altered Skin Integrity was Present on Admit and Documented in LDA   [] Wound Image Taken    Wound Care Consulted? No    Attending Nurse:  Naty Dotson RN/Staff Member:  Ilda HERNANDEZ

## 2024-05-09 NOTE — PLAN OF CARE
05/09/24 0943   Post-Acute Status   Post-Acute Authorization Placement   Post-Acute Placement Status Pending medical clearance/testing   Discharge Plan   Discharge Plan A Skilled Nursing Facility     Pt accepted to [facility], pending medically stability. Bethany arias/VIMAL informed of d/c date change.    Latisha Austin LMSW  Case Management   [facility]   Ext. [phone]

## 2024-05-09 NOTE — CARE UPDATE
RAPID RESPONSE NURSE PROACTIVE ROUNDING NOTE       Time of Visit: 0900    Admit Date: 2024  LOS: 3  Code Status: DNR   Date of Visit: 2024  : 1932  Age: 89 y.o.  Sex: female  Race: White  Bed: 504/504 A:   MRN: [id number]  Was the patient discharged from an ICU this admission? No   Was the patient discharged from a PACU within last 24 hours? No   Did the patient receive conscious sedation/general anesthesia in last 24 hours? No  Was the patient in the ED within the past 24 hours? No  Was the patient on NIPPV within the past 24 hours? No   Attending Physician: Rosita Berrios MD  Primary Service: [facility]   Time spent at the bedside: < 15 min    SITUATION    Notified by bedside RN via phone call.  Reason for alert: increased Work of breathing   Called to evaluate the patient for Respiratory    BACKGROUND     Why is the patient in the hospital?: Nondisplaced fracture of fourth cervical vertebra    Patient has a past medical history of Acute hypoxemic respiratory failure, Acute on chronic diastolic congestive heart failure, Alzheimer's disease, Anemia, Arthritis, Back pain, Cancer, Cataract, Colon cancer, Diabetes mellitus type II, Glaucoma, Hyperlipidemia, Hypertension, Hyperthyroidism, Hypothyroidism following radioiodine therapy, Pacemaker, Pneumonia, Pneumonia due to other staphylococcus, Renal manifestation of secondary diabetes mellitus, Squamous cell carcinoma, Stroke, Type 2 diabetes mellitus with stage 3 chronic kidney disease and hypertension, and Type 2 diabetes with peripheral circulatory disorder, controlled.    Last Vitals:  Temp: 98.5 °F (36.9 °C) (852)  Pulse: 74 (850)  Resp: 18 (850)  BP: 147/68 (850)  SpO2: 91 % (850)    24 Hours Vitals Range:  Temp:  [97.3 °F (36.3 °C)-98.5 °F (36.9 °C)]   Pulse:  []   Resp:  [17-20]   BP: (118-194)/()   SpO2:  [90 %-100 %]     Labs:  Recent Labs     24  1755 24  0718   WBC 11.58 11.97   HGB  11.7* 12.2   HCT 36.3* 36.7*    212       Recent Labs     05/08/24  1755 05/09/24  0718    140   K 4.0 3.6    106   CO2 20* 22*   BUN 50* 46*   CREATININE 1.8* 1.4    126*   PHOS 4.9* 3.7   MG 1.7 1.7        ASSESSMENT    Physical Exam  Constitutional:       Appearance: She is ill-appearing.      Interventions: Cervical collar and nasal cannula in place.   HENT:      Head: Abrasion present.   Cardiovascular:      Rate and Rhythm: Normal rate and regular rhythm.   Pulmonary:      Effort: Pulmonary effort is normal.      Breath sounds: Wheezing present.   Skin:     General: Skin is warm.   Neurological:      Mental Status: She is alert. Mental status is at baseline.      GCS: GCS eye subscore is 4.     Patient in bed with cervical collar in place.  Respiratory wheezes heard from the door. Patient sounded better after NT suctioning. Dr. Berrios ordered to transfer patient to step down unit.    INTERVENTIONS    The patient was seen for Respiratory problem. Staff concerns included increased oxygen requirements. The following interventions were performed: supplemental oxygen, POCT arterial blood gas , portable chest x-ray, and continuous pulse ox monitoring continued.    RECOMMENDATIONS  -continuous telmetry  -nt suction  -maintain IV Access  -abg  -chest x-ray  -transfer to stepdown  -delirium precautions  -pulmonary tolieting       PROVIDER ESCALATION    Yes/No  Yes    Orders received and case discussed with Dr. Berrios .    Disposition: Tx in ICU bed [address] [place] [address] .    FOLLOW-UP    charge Surendra HERNANDEZ  updated on plan of care. Instructed to call the Rapid Response Nurse, Peter Singh RN at [phone] for additional questions or concerns.

## 2024-05-09 NOTE — NURSING
On morning rounds, pt found to have o2 sats 86% on 6L NC. Copious secretions noted in mouth. Pt orally suctioned and o2 sat increased to 89-90%. Pt is alert and oriented to self only (baseline since hospitalization). [facility] med O paged, awaiting callback. RADHA Conteh at bedside and rapid response team called to assess pt. RR team at bedside.   NT suctioned by RR. Pt now on 3L NC, O2 sats increased to 95%. Pt resting comfortably at this time and Dr. Berriso at bedside.

## 2024-05-09 NOTE — PROGRESS NOTES
[facility] Hospital Medicine  Progress Note    [place] Team[place] [facility] [place] [facility] [place]  Admit Date: 5/5/2024    Subjective:      HPI:  89 y/o F PMH CHF, Alzheimer's, DM, HTN who presents to the ED via EMS for evaluation of a fall with CT Csp demonstrating bilateral C4 lamina fracture and L C5 lamina fracture with extension into IAP. At baseline patient ambulates with wheelchair and needs assistance with transfers. Reportedly patient had a fall forwards out of recliner, she hit her head on the ground and had a bleeding laceration. Limited hx given dementia, but pt denies any LOC, CP, dyspnea, N/V/D, headache, new weakness/numbness or neck pain. Baseline incontinence .     In the ED patient afebrile and hemodynamically stable saturating well on room air. Moves all extremities, alert and oriented to person. Conversational and pleasant with limited insight. Patient placed in C collar NSGY consulted and evaluated patient. MRI without significant canal stenosis and no plan for acute intervention per NSGY. Admitted to the care of medicine for further evaluation and management.      Overview/Hospital Course:  Ms. Linn was admitted to Hospital Medicine for management of a cervical fracture.  NSGY was consulted who said C collar and pain control, no surgical intervention.  She was started on a multimodal pain regimen and PT/OT were consulted who suggested moderate therapy.  Working on SNF placement.  SLP was consulted given concern for dysphagia and she was made NPO.  Diet was advanced to full liquids with crushed meds.    Interval History:  Developed hypoxia overnight. Rapid response called. Oral secretions requiring frequent suctioning. Scopolamine patch. Repeat CXR unrevealing. BNP elevated but unclear baseline - trial IV Lasix 20 mg x 1. Considering aspiration - NPO per SLP. Transfer to SD unit.     ROS:  As per HPI  General: no fever, no chills, no weight loss, no fatigue  Cardiovascular: no chest pain, no  orthopnea  All other systems reviewed & are negative.     Past Medical History:   Diagnosis Date    Acute hypoxemic respiratory failure 2023    Acute on chronic diastolic congestive heart failure 10/19/2023    Alzheimer's disease     Anemia     Arthritis     lumbar    Back pain     Cancer     colon    Cataract     Colon cancer     Diabetes mellitus type II     Glaucoma     Hyperlipidemia     Hypertension     Hyperthyroidism     Hypothyroidism following radioiodine therapy     Pacemaker     Pneumonia     Pneumonia due to other staphylococcus     Renal manifestation of secondary diabetes mellitus     Squamous cell carcinoma     Stroke     TIA    Type 2 diabetes mellitus with stage 3 chronic kidney disease and hypertension 10/12/2017    Type 2 diabetes with peripheral circulatory disorder, controlled      Past Surgical History:   Procedure Laterality Date    CATARACT EXTRACTION W/  INTRAOCULAR LENS IMPLANT Left 2017        CHOLECYSTECTOMY      COLON SURGERY      Colon CA    EYE SURGERY      cataract removal left side    IMPLANTATION OF LEADLESS PACEMAKER N/A 10/21/2022    Procedure: [id number];  Surgeon: JOSE Burgos MD;  Location: [facility];  Service: Cardiology;  Laterality: N/A;  SB, MICRA, MDT, ANES, EH,     squamous cell ca of face           Objective:   Last 24 Hour Vital Signs:  BP  Min: 118/78  Max: 194/81  Temp  Av.1 °F (36.7 °C)  Min: 97.3 °F (36.3 °C)  Max: 98.5 °F (36.9 °C)  Pulse  Av.6  Min: 72  Max: 108  Resp  Av.6  Min: 18  Max: 20  SpO2  Av.7 %  Min: 90 %  Max: 100 %  I/O last 3 completed shifts:  In: 250 [P.O.:250]  Out: 1275 [Urine:1275]    Physical Examination:  GEN: AAOx3  HEENT: NCAT, MMM, PERRL, EOMI, C-collar, copious oral secretions requiring NGT  CV: RRR, no m/r, no S3/S4  RESP: poor respiratory effort, rhonchi heard at anterior fields  ABD: soft, NTND, normoactive BS, no organomegaly  EXTR: no c/c/e, intact distal  pulses x 4  NEURO: PERRL, EOMI, moving all four extremities, intact sensation to light touch, no focal deficits  SKIN: no rashes, lesions, or color changes  PSYCH: normal affect    Laboratory:  I have reviewed all pertinent lab results/findings within the past 24 hours.    Radiology:  I have reviewed all pertinent imaging results/findings within the past 24 hours.    Current Medications:     Infusions:       Scheduled:   acetaminophen  1,000 mg Oral TID    amLODIPine  10 mg Oral Daily    apixaban  2.5 mg Oral BID    atorvastatin  80 mg Oral Daily    cefTRIAXone (Rocephin) IV (PEDS and ADULTS)  1 g Intravenous Q24H    levETIRAcetam (Keppra) IV (PEDS and ADULTS)  250 mg Intravenous Q12H    levothyroxine  75 mcg Oral Before breakfast    polyethylene glycol  17 g Oral Daily    QUEtiapine  75 mg Oral QHS    scopolamine  1 patch Transdermal Q3 Days        PRN:    Current Facility-Administered Medications:     albuterol, 2 puff, Inhalation, Q6H PRN **AND** MDI Q6H PRN, , , Q6H PRN    aluminum-magnesium hydroxide-simethicone, 30 mL, Oral, QID PRN    bisacodyL, 10 mg, Rectal, Daily PRN    dextrose 10%, 12.5 g, Intravenous, PRN    dextrose 10%, 25 g, Intravenous, PRN    glucagon (human recombinant), 1 mg, Intramuscular, PRN    glucose, 16 g, Oral, PRN    glucose, 24 g, Oral, PRN    insulin aspart U-100, 0-5 Units, Subcutaneous, QID (AC + HS) PRN    melatonin, 6 mg, Oral, Nightly PRN    naloxone, 0.02 mg, Intravenous, PRN    ondansetron, 4 mg, Intravenous, Q8H PRN    senna-docusate 8.6-50 mg, 1 tablet, Oral, BID PRN    sodium chloride 0.9%, 10 mL, Intravenous, Q12H PRN    Prior records reviewed.       Assessment/Plan:     Quin Linn is a 89 y.o.female with    Acute hypoxemic respiratory failure       Developed overnight and now weaned to 3 L NC. Copious oral secretions with frequent suctioning required. Scopolamine patch ordered. Concern for aspiration. Repeat CXR unrevealing. BNP elevated today (received IVFs overnight) -  trial IV Lasix 20 mg. Hx of diastolic dysfunction    Nondisplaced fracture of fourth cervical vertebra  NSGY consulted ; appreciate recs  MRI with Increased signal intensity in the bilateral C4 lamina and left C5 lamina consistent with fractures better identified on recent CT. Anterior widening of the C5-C6 intervertebral disc space with increased signal intensity and complete disruption of the anterior longitudinal ligament at this level.  Edema in the interspinous ligaments at C4-C7.  Findings are suggestive of hyperextension injury. Extensive prevertebral edema throughout the cervical and upper thoracic spine likely related to ligamentous injury.   No acute intervention per NSGY   Maintain C Collar at all times  Neurochecks q4h  Continue Eliquis  Pain control adjusted to include scheduled Tylenol and Robaxin  PT/OT consulted:  Suggest moderate intense therapy.  Working on SNF     Dysphagia  SLP following - made NPO today     Benign hypertensive heart and kidney disease with CKD  Noted     Acute blood loss anemia  Patient's anemia is currently uncontrolled. Has not received any PRBCs to date. Etiology likely d/t chronic disease due to Chronic Kidney Disease  Current CBC reviewed-         Lab Results   Component Value Date     HGB 11.3 (L) 05/06/2024     HCT 35.5 (L) 05/06/2024      Monitor serial CBC and transfuse if patient becomes hemodynamically unstable, symptomatic or H/H drops below 7/21.     DNR (do not resuscitate)  DNR status confirmed        Paroxysmal A-fib  Chronic issue  Continue Coreg and Eliquis     CKD (chronic kidney disease), stage IV  Creatine stable for now. BMP reviewed- noted Estimated Creatinine Clearance: 25.4 mL/min (based on SCr of 1.3 mg/dL). according to latest data. Based on current GFR, CKD stage is stage 4 - GFR 15-29.  Monitor UOP and serial BMP and adjust therapy as needed. Renally dose meds. Avoid nephrotoxic medications and procedures.     Pacemaker  Appreciate Cardiology  assistance  Paused for MRI and previous settings resumed after completion     Hypomagnesemia  Resolved     Controlled type 2 diabetes mellitus with stage 3 chronic kidney disease, without long-term current use of insulin  HbA1c 5.5, sugars controlled  Home DM regimen:  Glipizide  Hold oral hyperglycemic meds  SSI with POCT accuchecks to achieve blood glucose of 140-180 while hospitalized  Hypoglycemia protocol  Diabetic diet when able to eat - on full liquids with meds crushed in pudding     Late onset Alzheimer's disease with behavioral disturbance  Chronic issue  Continue home seroquel   Delirium precautions     Episode of transient neurologic symptoms  Followed by Neurology and on 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  Continue home keppra - changed to IV due to NPO status     Hypertension  Chronic issue  Continue Norvasc and Coreg     Hypothyroidism following radioiodine therapy  Chronic and stable  Continue home synthroid         Rosita Berrios MD  Hospital Medicine Staff  [facility]

## 2024-05-09 NOTE — PT/OT/SLP PROGRESS
Physical Therapy      Patient Name:  Quin Linn   MRN:  [id number]    Patient not seen today secondary to RN hold due to rapid response. Will follow-up again at next scheduled visit.

## 2024-05-09 NOTE — PT/OT/SLP PROGRESS
"Speech Language Pathology Treatment    Patient Name:  Quin Linn   MRN:  [id number]  Admitting Diagnosis: Acute hypoxemic respiratory failure    Recommendations:                 General Recommendations:   ongoing swallowing assessment  Diet recommendations:  NPO, Liquid Diet Level: NPO - consider changing all medications to IV form  Aspiration Precautions: Frequent oral care and Strict aspiration precautions   General Precautions: Standard, aspiration, fall, NPO  Communication strategies:  go to room if call light pushed    Assessment:     Quin Linn is a 89 y.o. female with an SLP diagnosis of Dysphagia.      Subjective     "She just got some medication about 30 minutes ago." Pt's caregiver stated.     Pain/Comfort:  Pain Rating 1:  (no complaints of pain)    Respiratory Status: Nasal cannula, flow 3 L/min    Objective:     Has the patient been evaluated by SLP for swallowing?   Yes  Keep patient NPO? Yes   Current Respiratory Status:        Pt seen for swallowing re-evaluation after pt was made NPO following a rapid response called this morning.  Per documentation, pt was found to have SPO2 86% and copious secretions from mouth during AM rounds.  RR team called to assess.  Oral and NT suctioning was performed,. Pt placed on 3L NC O2 and satting at 95%.  SLP reached out to attending who stated SLP could re-evaluate if pt had demonstrated improve management of oral secretions.  Pt found to be sitting upright with C-collar on in bed upon entry. Wet/gurgly breath sounds noted.  Pt roused to verbal stimulation and noted to have wet vocal quality and was coughing on secretions.  Suctioning or oral cavity and oropharynx performed and cleared what was presumed to be residuals from pudding mixed with pooled saliva.  Pt was able to perform dry swallow upon command with audible quality.  Wet vocal quality still detected and indicative of decreased management of secretions.  No PO trials were given due to high aspiration " concern. SLP recommending that pt remain strictly NPO at this time.  Suggest switching to IV forms of medication as able.  Medical team and NSG notified. SLP services to continue to follow for ongoing swallowing assessment.     Goals:   Multidisciplinary Problems       SLP Goals          Problem: SLP    Goal Priority Disciplines Outcome   SLP Goal     SLP    Description: Speech Language Pathology Goals  Goals expected to be met by 5/13    1. Pt will participate in ongoing swallow assessment                               Plan:     Patient to be seen:  5 x/week   Plan of Care expires:     Plan of Care reviewed with:  patient, caregiver   SLP Follow-Up:  Yes       Discharge recommendations:   (tbd)   Time Tracking:     SLP Treatment Date:   05/09/24  Speech Start Time:  1050  Speech Stop Time:  1100     Speech Total Time (min):  10 min    Billable Minutes: Treatment Swallowing Dysfunction 10    05/09/2024     show

## 2024-05-09 NOTE — NURSING
Pt transported to unit on a bed via [facility] nurses x2, no acute distress at this time.

## 2024-05-09 NOTE — NURSING
Nurses Note -- 4 Eyes      5/9/2024   4:55 PM      Skin assessed during: Transfer      [x] No Altered Skin Integrity Present    []Prevention Measures Documented      [] Yes- Altered Skin Integrity Present or Discovered   [] LDA Added if Not in Epic (Describe Wound)   [] New Altered Skin Integrity was Present on Admit and Documented in LDA   [] Wound Image Taken    Wound Care Consulted? No    Attending Nurse:  ARIAS Bradley    Second RN/Staff Member:  MARY Alfonso

## 2024-05-09 NOTE — RESPIRATORY THERAPY
"RAPID RESPONSE RESPIRATORY THERAPY PROACTIVE NOTE           Time of visit: 840     Code Status: DNR   : 1932  Bed: 504/504 A:   MRN: [id number]  Time spent at the bedside: 15 -30 min    SITUATION    Evaluated patient for: Respiratory    BACKGROUND    Why is the patient in the hospital?: Nondisplaced fracture of fourth cervical vertebra    Patient has a past medical history of Acute hypoxemic respiratory failure, Acute on chronic diastolic congestive heart failure, Alzheimer's disease, Anemia, Arthritis, Back pain, Cancer, Cataract, Colon cancer, Diabetes mellitus type II, Glaucoma, Hyperlipidemia, Hypertension, Hyperthyroidism, Hypothyroidism following radioiodine therapy, Pacemaker, Pneumonia, Pneumonia due to other staphylococcus, Renal manifestation of secondary diabetes mellitus, Squamous cell carcinoma, Stroke, Type 2 diabetes mellitus with stage 3 chronic kidney disease and hypertension, and Type 2 diabetes with peripheral circulatory disorder, controlled.    24 Hours Vitals Range:  Temp:  [97.3 °F (36.3 °C)-98.5 °F (36.9 °C)]   Pulse:  []   Resp:  [17-20]   BP: (118-194)/()   SpO2:  [90 %-100 %]     Labs:    Recent Labs     24  1755 24  0718    140   K 4.0 3.6    106   CO2 20* 22*   BUN 50* 46*   CREATININE 1.8* 1.4    126*   PHOS 4.9* 3.7   MG 1.7 1.7        No results for input(s): "PH", "PCO2", "PO2", "HCO3", "POCSATURATED", "BE" in the last 72 hours.    ASSESSMENT/INTERVENTIONS  Upon arrival to patient's room, patient having audible secretions blocking airway. Patient NT suctioned. ABG obtained.    Last VS   Temp: 98.5 °F (36.9 °C) (852)  Pulse: 74 (850)  Resp: 18 (850)  BP: 147/68 (850)  SpO2: 96 % (852)    Level of Consciousness: Level of Consciousness (AVPU): alert  Respiratory Effort:   Expansion/Accessory Muscle Usage:    All Lung Field Breath Sounds:    O2 Device/Concentration: 3L NC  NIPPV: No Surgical airway: " No  ETCO2 monitored:    Ambu at bedside:      Active Orders   Respiratory Care    ASP/SUCTION NASOTRACHEAL PRN     Frequency: PRN     Number of Occurrences: Until Specified    MDI Q6H PRN     Frequency: Q6H PRN     Number of Occurrences: Until Specified    Oxygen Continuous     Frequency: Continuous     Number of Occurrences: Until Specified     Order Questions:      Device type: Low flow      Device: Nasal Cannula (1- 5 Liters)      LPM: 1-5      Titrate O2 per Oxygen Titration Protocol: Yes      To maintain SpO2 goal of: >= 90%      Notify MD of: Inability to achieve desired SpO2; Sudden change in patient status and requires 20% increase in FiO2; Patient requires >60% FiO2    Pulse Oximetry Continuous     Frequency: Continuous     Number of Occurrences: Until Specified       RECOMMENDATIONS    We recommend: RRT Recs: Continue POC per primary team.    FOLLOW-UP    Please call back the Rapid Response RT, Leila Leahy RRT at x [phone] for any questions or concerns.

## 2024-05-10 LAB
ANION GAP SERPL CALC-SCNC: 13 MMOL/L (ref 8–16)
BASOPHILS # BLD AUTO: 0.03 K/UL (ref 0–0.2)
BASOPHILS NFR BLD: 0.2 % (ref 0–1.9)
BUN SERPL-MCNC: 45 MG/DL (ref 10–30)
CALCIUM SERPL-MCNC: 9.6 MG/DL (ref 8.7–10.5)
CHLORIDE SERPL-SCNC: 109 MMOL/L (ref 95–110)
CO2 SERPL-SCNC: 19 MMOL/L (ref 23–29)
CREAT SERPL-MCNC: 1.3 MG/DL (ref 0.5–1.4)
DIFFERENTIAL METHOD BLD: ABNORMAL
EOSINOPHIL # BLD AUTO: 0.1 K/UL (ref 0–0.5)
EOSINOPHIL NFR BLD: 0.7 % (ref 0–8)
ERYTHROCYTE [DISTWIDTH] IN BLOOD BY AUTOMATED COUNT: 13.1 % (ref 11.5–14.5)
EST. GFR  (NO RACE VARIABLE): 38.8 ML/MIN/1.73 M^2
GLUCOSE SERPL-MCNC: 119 MG/DL (ref 70–110)
HCT VFR BLD AUTO: 39.3 % (ref 37–48.5)
HGB BLD-MCNC: 12.2 G/DL (ref 12–16)
IMM GRANULOCYTES # BLD AUTO: 0.05 K/UL (ref 0–0.04)
IMM GRANULOCYTES NFR BLD AUTO: 0.4 % (ref 0–0.5)
LYMPHOCYTES # BLD AUTO: 1.7 K/UL (ref 1–4.8)
LYMPHOCYTES NFR BLD: 14.3 % (ref 18–48)
MCH RBC QN AUTO: 30.4 PG (ref 27–31)
MCHC RBC AUTO-ENTMCNC: 31 G/DL (ref 32–36)
MCV RBC AUTO: 98 FL (ref 82–98)
MONOCYTES # BLD AUTO: 0.7 K/UL (ref 0.3–1)
MONOCYTES NFR BLD: 6 % (ref 4–15)
NEUTROPHILS # BLD AUTO: 9.5 K/UL (ref 1.8–7.7)
NEUTROPHILS NFR BLD: 78.4 % (ref 38–73)
NRBC BLD-RTO: 0 /100 WBC
PLATELET # BLD AUTO: 193 K/UL (ref 150–450)
PMV BLD AUTO: 10.5 FL (ref 9.2–12.9)
POCT GLUCOSE: 144 MG/DL (ref 70–110)
POCT GLUCOSE: 156 MG/DL (ref 70–110)
POTASSIUM SERPL-SCNC: 4.3 MMOL/L (ref 3.5–5.1)
RBC # BLD AUTO: 4.01 M/UL (ref 4–5.4)
SODIUM SERPL-SCNC: 141 MMOL/L (ref 136–145)
WBC # BLD AUTO: 12.06 K/UL (ref 3.9–12.7)

## 2024-05-10 PROCEDURE — 25000003 PHARM REV CODE 250: Performed by: FAMILY MEDICINE

## 2024-05-10 PROCEDURE — 20600001 HC STEP DOWN PRIVATE ROOM

## 2024-05-10 PROCEDURE — 97535 SELF CARE MNGMENT TRAINING: CPT

## 2024-05-10 PROCEDURE — 31575 DIAGNOSTIC LARYNGOSCOPY: CPT | Mod: ,,, | Performed by: OTOLARYNGOLOGY

## 2024-05-10 PROCEDURE — 97110 THERAPEUTIC EXERCISES: CPT

## 2024-05-10 PROCEDURE — 63600175 PHARM REV CODE 636 W HCPCS

## 2024-05-10 PROCEDURE — 80048 BASIC METABOLIC PNL TOTAL CA: CPT | Performed by: HOSPITALIST

## 2024-05-10 PROCEDURE — 25000003 PHARM REV CODE 250

## 2024-05-10 PROCEDURE — 63600175 PHARM REV CODE 636 W HCPCS: Performed by: STUDENT IN AN ORGANIZED HEALTH CARE EDUCATION/TRAINING PROGRAM

## 2024-05-10 PROCEDURE — 85025 COMPLETE CBC W/AUTO DIFF WBC: CPT | Performed by: HOSPITALIST

## 2024-05-10 PROCEDURE — 92526 ORAL FUNCTION THERAPY: CPT

## 2024-05-10 PROCEDURE — 97530 THERAPEUTIC ACTIVITIES: CPT

## 2024-05-10 PROCEDURE — 99221 1ST HOSP IP/OBS SF/LOW 40: CPT | Mod: 25,,, | Performed by: OTOLARYNGOLOGY

## 2024-05-10 PROCEDURE — 36415 COLL VENOUS BLD VENIPUNCTURE: CPT | Performed by: HOSPITALIST

## 2024-05-10 PROCEDURE — 63600175 PHARM REV CODE 636 W HCPCS: Performed by: HOSPITALIST

## 2024-05-10 PROCEDURE — 25000003 PHARM REV CODE 250: Performed by: STUDENT IN AN ORGANIZED HEALTH CARE EDUCATION/TRAINING PROGRAM

## 2024-05-10 RX ORDER — HYDRALAZINE HYDROCHLORIDE 20 MG/ML
10 INJECTION INTRAMUSCULAR; INTRAVENOUS EVERY 6 HOURS PRN
Status: DISCONTINUED | OUTPATIENT
Start: 2024-05-10 | End: 2024-05-11

## 2024-05-10 RX ORDER — HYDRALAZINE HYDROCHLORIDE 20 MG/ML
10 INJECTION INTRAMUSCULAR; INTRAVENOUS EVERY 6 HOURS
Status: DISCONTINUED | OUTPATIENT
Start: 2024-05-10 | End: 2024-05-11

## 2024-05-10 RX ORDER — OLANZAPINE 10 MG/2ML
5 INJECTION, POWDER, FOR SOLUTION INTRAMUSCULAR NIGHTLY PRN
Status: DISCONTINUED | OUTPATIENT
Start: 2024-05-10 | End: 2024-05-13

## 2024-05-10 RX ORDER — LOSARTAN POTASSIUM 25 MG/1
25 TABLET ORAL DAILY
Status: DISCONTINUED | OUTPATIENT
Start: 2024-05-10 | End: 2024-05-10

## 2024-05-10 RX ORDER — ENOXAPARIN SODIUM 100 MG/ML
1 INJECTION SUBCUTANEOUS EVERY 24 HOURS
Status: DISCONTINUED | OUTPATIENT
Start: 2024-05-10 | End: 2024-05-13

## 2024-05-10 RX ORDER — BISACODYL 10 MG/1
10 SUPPOSITORY RECTAL DAILY
Status: DISCONTINUED | OUTPATIENT
Start: 2024-05-10 | End: 2024-05-15 | Stop reason: HOSPADM

## 2024-05-10 RX ADMIN — LEVETIRACETAM 250 MG: 100 INJECTION INTRAVENOUS at 08:05

## 2024-05-10 RX ADMIN — HYDRALAZINE HYDROCHLORIDE 10 MG: 20 INJECTION, SOLUTION INTRAMUSCULAR; INTRAVENOUS at 06:05

## 2024-05-10 RX ADMIN — CEFTRIAXONE 1 G: 1 INJECTION, POWDER, FOR SOLUTION INTRAMUSCULAR; INTRAVENOUS at 02:05

## 2024-05-10 RX ADMIN — BISACODYL 10 MG: 10 SUPPOSITORY RECTAL at 03:05

## 2024-05-10 RX ADMIN — ENOXAPARIN SODIUM 60 MG: 60 INJECTION SUBCUTANEOUS at 03:05

## 2024-05-10 RX ADMIN — LEVOTHYROXINE SODIUM 75 MCG: 75 TABLET ORAL at 06:05

## 2024-05-10 RX ADMIN — LEVETIRACETAM 250 MG: 100 INJECTION INTRAVENOUS at 09:05

## 2024-05-10 NOTE — NURSING
Nurses Note -- 4 Eyes      5/10/2024   10:30 AM      Skin assessed during: Daily Assessment      [] No Altered Skin Integrity Present    []Prevention Measures Documented      [x] Yes- Altered Skin Integrity Present or Discovered   [] LDA Added if Not in Epic (Describe Wound)   [] New Altered Skin Integrity was Present on Admit and Documented in LDA   [x] Wound Image Taken    Wound Care Consulted? No    Attending Nurse:  Roberth Dotson RN/Staff Member:  Lily Rodriguez RN

## 2024-05-10 NOTE — PLAN OF CARE
Problem: Physical Therapy  Goal: Physical Therapy Goal  Description: Goals to be met by: 24     Patient will increase functional independence with mobility by performin. Supine to sit with Minimal Assistance  2. Sit to stand transfer with Minimal Assistance  3. Bed to chair transfer with Minimal Assistance using LRAD  4. Gait x20 feet with Minimal Assistance using Rolling Walker    Outcome: Progressing

## 2024-05-10 NOTE — CONSULTS
[facility] - Tele[facility] Stepdown  Otorhinolaryngology-Head & Neck Surgery  Consult Note    Patient Name: Quin Linn  MRN: [id number]  Code Status: DNR  Admission Date: 5/5/2024  Hospital Length of Stay: 4 days  Attending Physician: Nel Hoover MD  Primary Care Provider: Mary Ellen Nesbitt MD    Inpatient consult to ENT  Consult performed by: Raghavendra Denson MD  Consult ordered by: Nel Hoover MD  Assessment/Recommendations: No evidence of anatomic abnormality or problems with vocal folds.  Recommend continued work with SLP, could consider MBSS to determine risk of aspiration prior to pursuing other means of nutrition.     No acute ENT interventions recommended at this time        Subjective:     Chief Complaint/Reason for Admission: Fall, C spine fracture    History of Present Illness: 89F with pmh of CHF, Alzheimer's, DM, HTN, presented via EMS after fall with subsequent imaging significant for bilateral C4 lamina fracture and L C5 lamina fracture. No acute NSGY interventions, admitted for management of her cervical fracture. SLP consulted due to dysphagia concern and patient was made npo. Per speech patient intially tolerated liquids but subsequently had trouble with concern for aspiration. Transitioned to thicker food/puree and did ok but then again had concern for aspiration. Patient failed bedside swallow assessment with SLP and made strictly NPO.     At time of assessment patient awake and interactive but not able to provide reliable history likely secondary to alzheimer's. Patient unable to swallow her secretions well on command and sounds wet due to poor secretion management. Not overtly aspirating.     Medications:  Continuous Infusions:  Scheduled Meds:   bisacodyL  10 mg Rectal Daily    cefTRIAXone (Rocephin) IV (PEDS and ADULTS)  1 g Intravenous Q24H    enoxparin  1 mg/kg Subcutaneous Q24H (treatment, non-standard time)    hydrALAZINE  10 mg Intravenous Q6H    levETIRAcetam (Keppra) IV (PEDS  and ADULTS)  250 mg Intravenous Q12H    scopolamine  1 patch Transdermal Q3 Days     PRN Meds:  Current Facility-Administered Medications:     albuterol, 2 puff, Inhalation, Q6H PRN **AND** MDI Q6H PRN, , , Q6H PRN    dextrose 10%, 12.5 g, Intravenous, PRN    dextrose 10%, 25 g, Intravenous, PRN    glucagon (human recombinant), 1 mg, Intramuscular, PRN    glucose, 16 g, Oral, PRN    glucose, 24 g, Oral, PRN    hydrALAZINE, 10 mg, Intravenous, Q6H PRN    insulin aspart U-100, 0-5 Units, Subcutaneous, QID (AC + HS) PRN    naloxone, 0.02 mg, Intravenous, PRN    OLANZapine, 5 mg, Intravenous, Nightly PRN    ondansetron, 4 mg, Intravenous, Q8H PRN    sodium chloride 0.9%, 10 mL, Intravenous, Q12H PRN     No current facility-administered medications on file prior to encounter.     Current Outpatient Medications on File Prior to Encounter   Medication Sig    ACCU-CHEK FASTCLIX LANCET DRUM Misc CHECK BLOOD GLUCOSE FOUR TIMES DAILY    ACCU-CHEK GUIDE TEST STRIPS Strp TEST BLOOD SUGAR FOUR TIMES DAILY OR AS DIRECTED    amLODIPine (NORVASC) 10 MG tablet Take 1 tablet (10 mg total) by mouth once daily.    apixaban (ELIQUIS) 2.5 mg Tab Take 1 tablet (2.5 mg total) by mouth 2 (two) times daily.    atorvastatin (LIPITOR) 80 MG tablet TAKE 1 TABLET(80 MG) BY MOUTH EVERY DAY    calcium-vitamin D3 (CALCIUM 500 + D) 500 mg-5 mcg (200 unit) per tablet Take 1 tablet by mouth 2 (two) times daily with meals.    carvediloL (COREG) 3.125 MG tablet Take 1 tablet (3.125 mg total) by mouth 2 (two) times daily.    gabapentin (NEURONTIN) 300 MG capsule Take 1 capsule (300 mg total) by mouth 2 (two) times daily.    levETIRAcetam (KEPPRA) 250 MG Tab Take 1 tablet (250 mg total) by mouth 2 (two) times daily.    levothyroxine (SYNTHROID) 75 MCG tablet Take 1 tablet (75 mcg total) by mouth before breakfast. Administer on an empty stomach at least 30 minutes before food or other medications.    QUEtiapine (SEROQUEL) 25 MG Tab Take 3 tablets (75 mg  total) by mouth every evening.    senna-docusate 8.6-50 mg (PERICOLACE) 8.6-50 mg per tablet Take 1 tablet by mouth 2 (two) times daily as needed for Constipation.       Review of patient's allergies indicates:   Allergen Reactions    Tramadol Rash and Edema     Developed facial rash and edema.    Metformin Diarrhea       Past Medical History:   Diagnosis Date    Acute hypoxemic respiratory failure 9/2/2023    Acute on chronic diastolic congestive heart failure 10/19/2023    Alzheimer's disease     Anemia     Arthritis     lumbar    Back pain     Cancer     colon    Cataract     Colon cancer     Diabetes mellitus type II     Glaucoma     Hyperlipidemia     Hypertension     Hyperthyroidism     Hypothyroidism following radioiodine therapy     Pacemaker     Pneumonia     Pneumonia due to other staphylococcus     Renal manifestation of secondary diabetes mellitus     Squamous cell carcinoma     Stroke     TIA    Type 2 diabetes mellitus with stage 3 chronic kidney disease and hypertension 10/12/2017    Type 2 diabetes with peripheral circulatory disorder, controlled      Past Surgical History:   Procedure Laterality Date    CATARACT EXTRACTION W/  INTRAOCULAR LENS IMPLANT Left 09/13/2017        CHOLECYSTECTOMY      COLON SURGERY  2001    Colon CA    EYE SURGERY      cataract removal left side    IMPLANTATION OF LEADLESS PACEMAKER N/A 10/21/2022    Procedure: [id number];  Surgeon: JOSE Burgos MD;  Location: [facility];  Service: Cardiology;  Laterality: N/A;  SB, JOHNNA, MDT, ANES, EH,     squamous cell ca of face       Family History       Problem Relation (Age of Onset)    Alzheimer's disease Sister    Ankylosing spondylitis Daughter    Arthritis Mother    Asthma Son    Atrial fibrillation Daughter    Cancer Sister, Paternal Uncle    Diabetes Maternal Grandmother    Heart disease Mother, Daughter    Hyperlipidemia Son    Hypertension Sister, Son    Kidney disease Father,  Daughter    Lupus Daughter    No Known Problems Daughter    Supraventricular tachycardia Daughter          Tobacco Use    Smoking status: Never    Smokeless tobacco: Never    Tobacco comments:     smoked for about 4 years in her twenties (socially)   Substance and Sexual Activity    Alcohol use: No    Drug use: No    Sexual activity: Never     Review of Systems  Unable to obtain due to patient mentation.     Objective:     Vital Signs (Most Recent):  Temp: 98.4 °F (36.9 °C) (05/10/24 1555)  Pulse: 72 (05/10/24 1555)  Resp: 14 (05/10/24 1555)  BP: (!) 156/79 (05/10/24 1555)  SpO2: 99 % (05/10/24 1555) Vital Signs (24h Range):  Temp:  [97.6 °F (36.4 °C)-98.5 °F (36.9 °C)] 98.4 °F (36.9 °C)  Pulse:  [68-84] 72  Resp:  [13-19] 14  SpO2:  [72 %-100 %] 99 %  BP: (156-205)/(49-79) 156/79     Weight: 59.9 kg (132 lb 0.9 oz)  Body mass index is 21.98 kg/m².    Date 05/10/24 0700 - 05/11/24 0659   Shift 7758-2048 0687-7227 7123-5071 24 Hour Total   INTAKE   P.O. 0   0   Shift Total(mL/kg) 0(0)   0(0)   OUTPUT   Urine(mL/kg/hr) 250(0.5)   250   Shift Total(mL/kg) 250(4.2)   250(4.2)   Weight (kg) 59.9 59.9 59.9 59.9       Physical Exam  NAD  C collar in place  Voice wet, unable to effectively swallow  MMM, no oral lesions    Procedure: Flexible laryngoscopy  After explaining the procedure and obtaining verbal consent, the flexible laryngoscope was inserted into the nare and advanced to visualize the nasal cavity, nasopharynx, the posterior oropharynx, hypopharynx, and the larynx with the findings noted. The scope was removed and the procedure terminated. The patient tolerated this procedure well without apparent complication.     OVERALL FINDINGS  Nasopharynx - the torus is clear. There are no lesions of the posterior wall.   Oropharynx - no lesions of the tongue base. There is no obvious fullness or asymmetry.  Hypopharynx - there are no lesions of the pyriform sinuses or postcricoid region. there is pooling of  secretions  Larynx - there are no lesions of the supraglottic or glottic larynx.  Vocal fold mobility is normal.       Significant Labs:  CBC:   Recent Labs   Lab 05/10/24  0833   WBC 12.06   RBC 4.01   HGB 12.2   HCT 39.3      MCV 98   MCH 30.4   MCHC 31.0*     CMP:   Recent Labs   Lab 05/09/24  0718 05/10/24  0833   * 119*   CALCIUM 9.2 9.6   ALBUMIN 2.8*  --    PROT 6.7  --     141   K 3.6 4.3   CO2 22* 19*    109   BUN 46* 45*   CREATININE 1.4 1.3   ALKPHOS 77  --    ALT 22  --    AST 25  --    BILITOT 0.5  --        Significant Diagnostics:  None    Assessment/Plan:     Dysphagia   89F with pmh of CHF, Alzheimer's, DM, HTN, presented via EMS after fall, C4 lamina fracture and L C5 lamina fracture, No acute NSGY interventions. Initially tolerated diet but has since developed s/s aspiration and failed bedside swallow assessment with SLP and made NPO. ENT consulted for dysphagia.     Pooling of secretions on scope exam but otherwise normal without any signs of pharyngeal edema from the cervical spine trauma and normal bilateral vocal fold mobility.     No evidence of anatomic abnormality or problems with vocal folds.  Recommend continued work with SLP, could consider MBSS to determine risk of aspiration prior to pursuing other means of nutrition.     No acute ENT interventions recommended at this time    Remainder of care per primary team        Active Diagnoses:    Diagnosis Date Noted POA    PRINCIPAL PROBLEM:  Acute hypoxemic respiratory failure [J96.01] 09/02/2023 Yes    Dysphagia [R13.10] 05/07/2024 Yes    Acute blood loss anemia [D62] 05/06/2024 No    Benign hypertensive heart and kidney disease with CKD [I13.10] 05/06/2024 Yes    Nondisplaced fracture of fourth cervical vertebra [S12.301A] 05/05/2024 Yes    DNR (do not resuscitate) [Z66] 09/05/2023 Yes    Paroxysmal A-fib [I48.0] 08/27/2023 Yes    CKD (chronic kidney disease), stage IV [N18.4] 07/16/2023 Yes    Pacemaker [Z95.0]  10/28/2022 Yes    Hypomagnesemia [E83.42] 06/27/2020 Yes    Controlled type 2 diabetes mellitus with stage 3 chronic kidney disease, without long-term current use of insulin [E11.22, N18.30] 01/11/2018 Yes    Late onset Alzheimer's disease with behavioral disturbance [G30.1, F02.818]  Yes     Chronic    Episode of transient neurologic symptoms [R29.90] 09/19/2015 Yes    Hypertension [I10] 04/03/2013 Yes    Hypothyroidism following radioiodine therapy [E89.0] 09/26/2012 Yes     Chronic      Problems Resolved During this Admission:     VTE Risk Mitigation (From admission, onward)           Ordered     enoxaparin injection 60 mg  Every 24 hours         05/10/24 1425     IP VTE HIGH RISK PATIENT  Once         05/05/24 1820     Place sequential compression device  Until discontinued         05/05/24 1820                    Thank you for your consult. I will sign off. Please contact us if you have any additional questions.    Raghavendra Denson MD  Otorhinolaryngology-Head & Neck Surgery  [facility] - Tele[facility]

## 2024-05-10 NOTE — PT/OT/SLP PROGRESS
"Physical Therapy Treatment    Patient Name:  Quin Linn   MRN:  [id number]    Recommendations:     Discharge Recommendations: Moderate Intensity Therapy  Discharge Equipment Recommendations: wheelchair  Barriers to discharge:  Increased skilled assistance required    Assessment:     Quin Linn is a 89 y.o. female admitted with a medical diagnosis of Acute hypoxemic respiratory failure.  She presents with the following impairments/functional limitations: weakness, impaired endurance, impaired self care skills, impaired functional mobility, impaired balance, gait instability, decreased lower extremity function, decreased upper extremity function, decreased safety awareness, decreased ROM, impaired coordination, impaired skin, impaired muscle length. Patient with good response to completed activities this date, evidenced by patient progress in all functional mobility completed this date. Attempted lateral steps this date, however patient's decreased command following impaired step initiation. C-collar donned for all EOB & OOB activities. Patient continues to demonstrate the need for moderate intensity therapy on a daily basis exhibited by decreased independence with self-care and functional mobility     Rehab Prognosis: Good; patient would benefit from acute skilled PT services to address these deficits and reach maximum level of function.    Recent Surgery: * No surgery found *      Plan:     During this hospitalization, patient to be seen 3 x/week to address the identified rehab impairments via gait training, therapeutic activities, therapeutic exercises, neuromuscular re-education and progress toward the following goals:    Plan of Care Expires:  06/06/24    Subjective     Chief Complaint: "Get it out! Get it out!" Pt referencing laryngoscope  Patient/Family Comments/goals: "I don't understand what you are saying to me"  Pain/Comfort:  Pain Rating 1: Pain not verbalized  Pain Rating Post-Intervention 1: Pain not " "verbalized      Objective:     Communicated with RN prior to session.  Patient found HOB elevated with telemetry, PureWick, pulse ox (continuous), blood pressure cuff, bed alarm upon PT entry to room.     General Precautions: Standard, aspiration, fall, NPO   Orthopedic Precautions:N/A   Braces: Aspen collar   Body mass index is 21.98 kg/m².  Oxygen Device: Room Air *NC doffed and satting >98%  Vitals: BP (!) 183/82   Pulse 72   Temp 98.4 °F (36.9 °C) (Oral)   Resp 14   Ht 5' 5" (1.651 m)   Wt 59.9 kg (132 lb 0.9 oz)   LMP  (LMP Unknown)   SpO2 99%   BMI 21.98 kg/m²      Functional Mobility:  Bed Mobility:     Boosting: TotalAx2 with HOB Flat  Supine>Sit: x1, ModAx2 with HOB Elevated  Sit>Supine: x1, CGA with HOB Flat    Transfers:     Sit<>Stand: x2, MinAx2 from Edge of Bed with Rolling Walker. AD stabilization required    Gait: Attempted but unable to complete.     Balance: Observed tendency for FFP. Cuing for upright posturing & forward gaze.  Static Sitting: SBA  Dynamic Sitting:  SBA regressing to Stacey    Static Standing:  MinAx2-ModA  with Rolling Walker. 2-pt facilitation for upright posturing at chest & sacrum required. AD stabilization required  Total Time Standing: ~3 mins    AM-PAC 6 CLICK MOBILITY  Turning over in bed (including adjusting bedclothes, sheets and blankets)?: 2  Sitting down on and standing up from a chair with arms (e.g., wheelchair, bedside commode, etc.): 2  Moving from lying on back to sitting on the side of the bed?: 2  Moving to and from a bed to a chair (including a wheelchair)?: 2  Need to walk in hospital room?: 2  Climbing 3-5 steps with a railing?: 1  Basic Mobility Total Score: 11     Treatment & Education:  Increased time required for pericare 2/2 receiving patient soiled  Increased time required secondary to MD Jessy) presenting intra-session    Patient participated in the following EOB activities:  LAQ: x5 L/R. Cuing for full AROM, however limited 2/2 impaired knee " extension PROM  Marches: x3 alternating L/R    Patient Education Provided on:  The role of physical therapy and how the patient can benefit from skilled services  The negative effects of prolonged bed rest/sedentary behavior, along with the importance of OOB activity & patient participation with PT  The importance of contacting RN, via call light, for mobility throughout the day  Pt white board updated with current therapists name and level of mobility assistance needed.     Patient left HOB elevated with all lines intact, call button in reach, bed alarm on, and RN notified.    GOALS:   Multidisciplinary Problems       Physical Therapy Goals          Problem: Physical Therapy    Goal Priority Disciplines Outcome Goal Variances Interventions   Physical Therapy Goal     PT, PT/OT Progressing     Description: Goals to be met by: 24     Patient will increase functional independence with mobility by performin. Supine to sit with Minimal Assistance  2. Sit to stand transfer with Minimal Assistance  3. Bed to chair transfer with Minimal Assistance using LRAD  4. Gait x20 feet with Minimal Assistance using Rolling Walker                         Time Tracking:     PT Received On: 05/10/24  PT Start Time: 1436     PT Stop Time: 1505  PT Total Time (min): 29 min     Billable Minutes: Therapeutic Activity 29    Treatment Type: Treatment  PT/PTA: PT     Number of PTA visits since last PT visit: 0     05/10/2024

## 2024-05-10 NOTE — PROGRESS NOTES
Nurses Note -- 4 Eyes      5/9/2024   7:32 PM      Skin assessed during: Q Shift Change      [x] No Altered Skin Integrity Present    []Prevention Measures Documented      [] Yes- Altered Skin Integrity Present or Discovered   [] LDA Added if Not in Epic (Describe Wound)   [] New Altered Skin Integrity was Present on Admit and Documented in LDA   [] Wound Image Taken    Wound Care Consulted? No    Attending Nurse:  Magi Dotson RN/Staff Member: Mirna

## 2024-05-10 NOTE — PT/OT/SLP PROGRESS
Occupational Therapy   Treatment    Name: Quin Linn  MRN: [id number]  Admitting Diagnosis:  Acute hypoxemic respiratory failure       Recommendations:     Discharge Recommendations: Moderate Intensity Therapy  Discharge Equipment Recommendations:  wheelchair  Barriers to discharge:    increased assistance for mobility and ADLs     Assessment:     Quin Linn is a 89 y.o. female with a medical diagnosis of Acute hypoxemic respiratory failure.  Pt tolerated treatment well without incident. Pt is progressing towards goals, however would benefit from continued skilled OT for improved coordination, strength, and activity tolerance neccessary for safe ADL completion  to maximize QOL and functional independence. She presents with deficits listed below. Performance deficits affecting function are weakness, impaired endurance, impaired self care skills, impaired functional mobility, gait instability, impaired balance, decreased coordination, decreased upper extremity function, decreased lower extremity function, decreased safety awareness.     Rehab Prognosis:  Good; patient would benefit from acute skilled OT services to address these deficits and reach maximum level of function.       Plan:     Patient to be seen 3 x/week to address the above listed problems via self-care/home management, therapeutic activities, therapeutic exercises, neuromuscular re-education  Plan of Care Expires: 06/05/24  Plan of Care Reviewed with: patient, caregiver    Subjective     Chief Complaint: None  Patient/Family Comments/goals: None verbalized  Pain/Comfort:  Pain Rating 1: 0/10  Pain Rating Post-Intervention 1: 0/10    Objective:     Communicated with: NSG prior to session.  Patient found HOB elevated with telemetry, PureWick, pulse ox (continuous), blood pressure cuff upon OT entry to room.    General Precautions: Standard, aspiration, NPO, fall    Orthopedic Precautions:N/A  Braces: Aspen collar  Respiratory Status: Room air, O2 sats  remaining >98% throughout session     Occupational Performance:     Bed Mobility:    Patient completed Supine to Sit with moderate assistance and 2 persons  Patient completed Sit to Supine with contact guard assistance     Functional Mobility/Transfers:  Patient completed Sit <> Stand Transfer with minimum assistance and of 2 persons  with  rolling walker  x2 trials.   Pt tolerated 2 standing trials . 1st with Min A for static standing and 2nd trial Min A progressing to Mod A for maintaining standing balance during pericare  Functional Mobility:    Activities of Daily Living:  Upper Body Dressing: maximal assistance to adán  Aspen collar prior to OOB mobility  Toileting: maximal assistance for pericare and clothes management.      [facility] 6 Click ADL: 13    Treatment & Education:  Role of OT and OT POC  BUE exercises while seated EOB-  AAROM shoulder flexion, scapular retraction, and bicep curls- Pt noted with more Active movement of LUE than  RUE.   Fall Prevention/Safety Awareness Training - RW management during functional mobility and standing ADLs to decrease risk of falls  Educated on OOB activity and impact on increasing functional independence.  Educated on importance of progressive mobilization to increase strength and endurance.      Patient left HOB elevated with all lines intact, call button in reach, bed alarm on, and NSG notified    GOALS:   Multidisciplinary Problems       Occupational Therapy Goals          Problem: Occupational Therapy    Goal Priority Disciplines Outcome Interventions   Occupational Therapy Goal     OT, PT/OT Progressing    Description: Goals to be met by: 6/5/24     Patient will increase functional independence with ADLs by performing:    LE Dressing with Minimal Assistance.  Grooming while standing at sink with Minimal Assistance.  Toileting from bedside commode with Minimal Assistance for hygiene and clothing management.   Supine to sit with Stand-by Assistance.  Stand pivot  transfers with Minimal Assistance.  Toilet transfer to bedside commode with Minimal Assistance.                           Time Tracking:     OT Date of Treatment: 05/10/24  OT Start Time: 1436  OT Stop Time: 1506  OT Total Time (min): 30 min    Billable Minutes:Self Care/Home Management 15  Therapeutic Exercise 15    OT/DANIEL: OT          5/10/2024

## 2024-05-10 NOTE — PT/OT/SLP PROGRESS
"Speech Language Pathology Treatment    Patient Name:  Quin Linn   MRN:  [id number]  Admitting Diagnosis: Acute hypoxemic respiratory failure    Recommendations:                 General Recommendations:   ongoing swallowing assessment  Diet recommendations:  NPO, Liquid Diet Level: NPO Pt not safe for PO medication at this time.  Aspiration Precautions: Alternate means of nutrition/hydration, Frequent oral care, and Strict aspiration precautions   General Precautions: Standard, aspiration, fall, NPO  Communication strategies:  go to room if call light pushed    Assessment:     Quin Linn is a 89 y.o. female with an SLP diagnosis of Dysphagia.      Subjective     "I think it's decreasing." Pt stated when SLP commented on difficulty managing secretions.     Pain/Comfort:  Pain Rating 1: 0/10    Respiratory Status: Nasal cannula, flow 3 L/min    Objective:     Has the patient been evaluated by SLP for swallowing?   Yes  Keep patient NPO? Yes   Current Respiratory Status:        Pt seen for ongoing swallowing assessment.  Pt's mental status/level of alertness appeared improved on this service date, but pt continues to have difficulty managing secretions.  Pt exhibited loud audible spontaneous swallows.  Unable to elicit a dry swallow upon command.  Pt received the following PO trials: ice chip x 1, 1/3 tsp thin water x 1; nectar thick water via tsp x 1 and cup sip x 1; honey thick water via tsp; pudding 1/2 tsp x 1.  Pt requiring multiple (3-4) swallows per trial.  Swallows were loud/audible.  Overt coughing/choking and wet/gurgly vocal quality followed all trials.  Pt also pften sneezing and with watery eyes.  SLP providing suctioning after each trial and coughing episode, but pt continued to display wet/gurgly vocal quality.  Pt's volitional cough was not productive enough to clear. Pt guarded and not allowing SLP to place [place] into oropharynx to attempt to reach presumed pooled material in pharyngeal area.  " Nurse notified at end of session.  Education was provided t/o session regarding role of SLP, ongoing swallowing assessment, continued concerns for aspiration, decreased ability to manage secretions and protect airway, presumed significant pharyngeal pooling, recommendations to remain strictly NPO at this time, and plan for SLP services to continue swallowing assessment.  Pt's nodding in response, but full understanding and carryover likely decreased.      Later today, nurse asked for SLP to contact ENT resident after consult was placed. SLP updated ENT on recent assessment and recommendations.  SLP will look for ENT report for findings.     Goals:   Multidisciplinary Problems       SLP Goals          Problem: SLP    Goal Priority Disciplines Outcome   SLP Goal     SLP    Description: Speech Language Pathology Goals  Goals expected to be met by 5/13    1. Pt will participate in ongoing swallow assessment                               Plan:     Patient to be seen:  5 x/week   Plan of Care expires:     Plan of Care reviewed with:  patient   SLP Follow-Up:  Yes       Discharge recommendations:   (tbd)     Time Tracking:     SLP Treatment Date:   05/10/24  Speech Start Time:  1007  Speech Stop Time:  1038     Speech Total Time (min):  31 min    Billable Minutes: Treatment Swallowing Dysfunction 8 and Self Care/Home Management Training 23    05/10/2024

## 2024-05-11 LAB
ANION GAP SERPL CALC-SCNC: 13 MMOL/L (ref 8–16)
BUN SERPL-MCNC: 46 MG/DL (ref 10–30)
CALCIUM SERPL-MCNC: 10 MG/DL (ref 8.7–10.5)
CHLORIDE SERPL-SCNC: 112 MMOL/L (ref 95–110)
CO2 SERPL-SCNC: 23 MMOL/L (ref 23–29)
CREAT SERPL-MCNC: 1.1 MG/DL (ref 0.5–1.4)
ERYTHROCYTE [DISTWIDTH] IN BLOOD BY AUTOMATED COUNT: 13.4 % (ref 11.5–14.5)
EST. GFR  (NO RACE VARIABLE): 47.4 ML/MIN/1.73 M^2
GLUCOSE SERPL-MCNC: 151 MG/DL (ref 70–110)
HCT VFR BLD AUTO: 36.7 % (ref 37–48.5)
HGB BLD-MCNC: 12.2 G/DL (ref 12–16)
MCH RBC QN AUTO: 30.6 PG (ref 27–31)
MCHC RBC AUTO-ENTMCNC: 33.2 G/DL (ref 32–36)
MCV RBC AUTO: 92 FL (ref 82–98)
PLATELET # BLD AUTO: 249 K/UL (ref 150–450)
PMV BLD AUTO: 10 FL (ref 9.2–12.9)
POCT GLUCOSE: 155 MG/DL (ref 70–110)
POCT GLUCOSE: 158 MG/DL (ref 70–110)
POCT GLUCOSE: 168 MG/DL (ref 70–110)
POCT GLUCOSE: 176 MG/DL (ref 70–110)
POTASSIUM SERPL-SCNC: 3.9 MMOL/L (ref 3.5–5.1)
RBC # BLD AUTO: 3.99 M/UL (ref 4–5.4)
SODIUM SERPL-SCNC: 148 MMOL/L (ref 136–145)
WBC # BLD AUTO: 12.63 K/UL (ref 3.9–12.7)

## 2024-05-11 PROCEDURE — 63600175 PHARM REV CODE 636 W HCPCS: Performed by: STUDENT IN AN ORGANIZED HEALTH CARE EDUCATION/TRAINING PROGRAM

## 2024-05-11 PROCEDURE — 25000003 PHARM REV CODE 250: Performed by: STUDENT IN AN ORGANIZED HEALTH CARE EDUCATION/TRAINING PROGRAM

## 2024-05-11 PROCEDURE — 63600175 PHARM REV CODE 636 W HCPCS: Performed by: HOSPITALIST

## 2024-05-11 PROCEDURE — 20600001 HC STEP DOWN PRIVATE ROOM

## 2024-05-11 PROCEDURE — 85027 COMPLETE CBC AUTOMATED: CPT | Performed by: STUDENT IN AN ORGANIZED HEALTH CARE EDUCATION/TRAINING PROGRAM

## 2024-05-11 PROCEDURE — 36415 COLL VENOUS BLD VENIPUNCTURE: CPT | Performed by: STUDENT IN AN ORGANIZED HEALTH CARE EDUCATION/TRAINING PROGRAM

## 2024-05-11 PROCEDURE — 80048 BASIC METABOLIC PNL TOTAL CA: CPT | Performed by: STUDENT IN AN ORGANIZED HEALTH CARE EDUCATION/TRAINING PROGRAM

## 2024-05-11 RX ORDER — HYDRALAZINE HYDROCHLORIDE 20 MG/ML
20 INJECTION INTRAMUSCULAR; INTRAVENOUS EVERY 6 HOURS
Status: DISCONTINUED | OUTPATIENT
Start: 2024-05-11 | End: 2024-05-13

## 2024-05-11 RX ORDER — CARVEDILOL 3.12 MG/1
3.12 TABLET ORAL 2 TIMES DAILY
Status: DISCONTINUED | OUTPATIENT
Start: 2024-05-11 | End: 2024-05-11

## 2024-05-11 RX ORDER — SODIUM CHLORIDE, SODIUM LACTATE, POTASSIUM CHLORIDE, CALCIUM CHLORIDE 600; 310; 30; 20 MG/100ML; MG/100ML; MG/100ML; MG/100ML
INJECTION, SOLUTION INTRAVENOUS CONTINUOUS
Status: DISCONTINUED | OUTPATIENT
Start: 2024-05-11 | End: 2024-05-13

## 2024-05-11 RX ORDER — HYDRALAZINE HYDROCHLORIDE 20 MG/ML
10 INJECTION INTRAMUSCULAR; INTRAVENOUS ONCE
Status: COMPLETED | OUTPATIENT
Start: 2024-05-11 | End: 2024-05-11

## 2024-05-11 RX ORDER — HYDRALAZINE HYDROCHLORIDE 20 MG/ML
20 INJECTION INTRAMUSCULAR; INTRAVENOUS EVERY 6 HOURS PRN
Status: DISCONTINUED | OUTPATIENT
Start: 2024-05-11 | End: 2024-05-15 | Stop reason: HOSPADM

## 2024-05-11 RX ORDER — HYDRALAZINE HYDROCHLORIDE 20 MG/ML
20 INJECTION INTRAMUSCULAR; INTRAVENOUS ONCE
Status: DISCONTINUED | OUTPATIENT
Start: 2024-05-11 | End: 2024-05-11

## 2024-05-11 RX ORDER — LABETALOL HCL 20 MG/4 ML
10 SYRINGE (ML) INTRAVENOUS EVERY 6 HOURS PRN
Status: DISCONTINUED | OUTPATIENT
Start: 2024-05-11 | End: 2024-05-12

## 2024-05-11 RX ORDER — INSULIN ASPART 100 [IU]/ML
0-5 INJECTION, SOLUTION INTRAVENOUS; SUBCUTANEOUS EVERY 6 HOURS
Status: DISCONTINUED | OUTPATIENT
Start: 2024-05-11 | End: 2024-05-14

## 2024-05-11 RX ADMIN — HYDRALAZINE HYDROCHLORIDE 10 MG: 20 INJECTION, SOLUTION INTRAMUSCULAR; INTRAVENOUS at 12:05

## 2024-05-11 RX ADMIN — BISACODYL 10 MG: 10 SUPPOSITORY RECTAL at 09:05

## 2024-05-11 RX ADMIN — ENOXAPARIN SODIUM 60 MG: 60 INJECTION SUBCUTANEOUS at 04:05

## 2024-05-11 RX ADMIN — LEVETIRACETAM 250 MG: 100 INJECTION INTRAVENOUS at 09:05

## 2024-05-11 RX ADMIN — HYDRALAZINE HYDROCHLORIDE 10 MG: 20 INJECTION, SOLUTION INTRAMUSCULAR; INTRAVENOUS at 04:05

## 2024-05-11 RX ADMIN — HYDRALAZINE HYDROCHLORIDE 10 MG: 20 INJECTION, SOLUTION INTRAMUSCULAR; INTRAVENOUS at 06:05

## 2024-05-11 RX ADMIN — CEFTRIAXONE 1 G: 1 INJECTION, POWDER, FOR SOLUTION INTRAMUSCULAR; INTRAVENOUS at 02:05

## 2024-05-11 RX ADMIN — SODIUM CHLORIDE, POTASSIUM CHLORIDE, SODIUM LACTATE AND CALCIUM CHLORIDE: 600; 310; 30; 20 INJECTION, SOLUTION INTRAVENOUS at 09:05

## 2024-05-11 RX ADMIN — HYDRALAZINE HYDROCHLORIDE 10 MG: 20 INJECTION, SOLUTION INTRAMUSCULAR; INTRAVENOUS at 11:05

## 2024-05-11 NOTE — ASSESSMENT & PLAN NOTE
Source unclear  Does have dementia  Will call daughter Janae to determine oral status prior to hospitalization  SLP following  ENT consulted  MRI brain pending  Strict NPO  Pending ability to advance diet, may need to consider alternative enteral access in the next few days

## 2024-05-11 NOTE — PLAN OF CARE
Problem: Adult Inpatient Plan of Care  Goal: Plan of Care Review  Outcome: Progressing  Flowsheets (Taken 5/11/2024 0549)  Plan of Care Reviewed With:   patient   family  Goal: Optimal Comfort and Wellbeing  Outcome: Progressing  Intervention: Provide Person-Centered Care  Flowsheets (Taken 5/11/2024 0549)  Trust Relationship/Rapport:   care explained   questions encouraged   choices provided   reassurance provided   emotional support provided   thoughts/feelings acknowledged   empathic listening provided   questions answered     Problem: Diabetes Comorbidity  Goal: Blood Glucose Level Within Targeted Range  Outcome: Progressing     Problem: Fall Injury Risk  Goal: Absence of Fall and Fall-Related Injury  Outcome: Progressing     Problem: Skin Injury Risk Increased  Goal: Skin Health and Integrity  Outcome: Progressing

## 2024-05-11 NOTE — ASSESSMENT & PLAN NOTE
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

## 2024-05-11 NOTE — PROGRESS NOTES
[facility] - Telemetry [facility] Medicine  Progress Note    Patient Name: Quin Linn  MRN: [id number]  Patient Class: IP- Inpatient   Admission Date: 5/5/2024  Length of Stay: 4 days  Attending Physician: Nel Hoover MD  Primary Care Provider: Mary Ellen Nesbitt MD        Subjective:     Principal Problem:Acute hypoxemic respiratory failure        HPI:  89 y/o F PMH CHF, Alzheimer's, DM, HTN who presents to the ED via EMS for evaluation of a fall with CT Csp demonstrating bilateral C4 lamina fracture and L C5 lamina fracture with extension into IAP. At baseline patient ambulates with wheelchair and needs assistance with transfers. Reportedly patient had a fall forwards out of recliner, she hit her head on the ground and had a bleeding laceration. Limited hx given dementia, but pt denies any LOC, CP, dyspnea, N/V/D, headache, new weakness/numbness or neck pain. Baseline incontinence .    In the ED patient afebrile and hemodynamically stable saturating well on room air. Moves all extremities, alert and oriented to person. Conversational and pleasant with limited insight. Patient placed in C collar NSGY consulted and evaluated patient. MRI without significant canal stenosis and no plan for acute intervention per NSGY. Admitted to the care of medicine for further evaluation and management.     Overview/Hospital Course:  Ms. Linn was admitted to Hospital Medicine for management of a cervical fracture.  NSGY was consulted who said C collar and pain control, no surgical intervention.  She was started on a multimodal pain regimen and PT/OT were consulted who suggested moderate therapy.  Working on SNF placement.  SLP was consulted given concern for dysphagia and she was made NPO.  Diet was advanced to full liquids with crushed meds.    Developed hypoxia 5/9 evening. Rapid response called. Oral secretions requiring frequent suctioning. Scopolamine patch. Repeat CXR unrevealing. BNP elevated but unclear baseline  - trial IV Lasix 20 mg x 1. Considering aspiration - strict NPO per SLP. Transferred to  service.     Interval History: Transferred to  service overnight following development of AHRF w/ notable secretions & c/f aspiration. Evaluated by SLP this AM who noted e/o aspiration and advised strict NPO (including meds). Meds changed to IV (as able). Pt w/ continued secretions & gurgly/coarse breath sounds. Cough appreciated though. ENT consulted & MRI brain ordered.    Review of Systems   Constitutional:  Negative for chills, diaphoresis and fever.   HENT:  Positive for trouble swallowing. Negative for congestion.    Respiratory:  Positive for cough. Negative for shortness of breath and wheezing.    Cardiovascular:  Negative for chest pain and palpitations.   Gastrointestinal:  Negative for abdominal pain, diarrhea, nausea and vomiting.   Neurological:  Negative for dizziness, light-headedness and headaches.     Objective:     Vital Signs (Most Recent):  Temp: 97.9 °F (36.6 °C) (05/10/24 2332)  Pulse: 84 (05/10/24 2332)  Resp: 17 (05/10/24 2332)  BP: (!) 169/78 (05/10/24 2332)  SpO2: 97 % (05/10/24 2037) Vital Signs (24h Range):  Temp:  [97.8 °F (36.6 °C)-98.5 °F (36.9 °C)] 97.9 °F (36.6 °C)  Pulse:  [68-84] 84  Resp:  [14-18] 17  SpO2:  [72 %-100 %] 97 %  BP: (156-205)/(49-82) 169/78     Weight: 59.9 kg (132 lb 0.9 oz)  Body mass index is 21.98 kg/m².    Intake/Output Summary (Last 24 hours) at 5/10/2024 2346  Last data filed at 5/10/2024 1837  Gross per 24 hour   Intake 0 ml   Output 800 ml   Net -800 ml         Physical Exam  Constitutional:       General: She is not in acute distress.     Appearance: She is normal weight. She is ill-appearing. She is not toxic-appearing or diaphoretic.   Eyes:      Conjunctiva/sclera: Conjunctivae normal.   Cardiovascular:      Rate and Rhythm: Normal rate and regular rhythm.      Heart sounds: Normal heart sounds.   Pulmonary:      Effort: Pulmonary effort is normal.      Breath  sounds: Rhonchi present.   Abdominal:      General: Bowel sounds are normal. There is no distension.      Palpations: Abdomen is soft.      Tenderness: There is no abdominal tenderness.   Skin:     General: Skin is warm and dry.   Psychiatric:         Mood and Affect: Mood normal.         Behavior: Behavior normal.             Significant Labs: All pertinent labs within the past 24 hours have been reviewed.    Significant Imaging: I have reviewed all pertinent imaging results/findings within the past 24 hours.    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 Has not received any PRBCs to date. Etiology likely d/t chronic disease due to Chronic Kidney Disease  Current CBC reviewed-   Lab Results   Component Value Date    HGB 12.2 05/10/2024    HCT 39.3 05/10/2024     Monitor serial CBC and transfuse if patient becomes hemodynamically unstable, symptomatic or H/H drops below 7/21.    Nondisplaced fracture of fourth cervical vertebra  NSGY consulted ; appreciate recs  MRI with Increased signal intensity in the bilateral C4 lamina and left C5 lamina consistent with fractures better identified on recent CT. Anterior widening of the C5-C6 intervertebral disc space with increased signal intensity and complete disruption of the anterior longitudinal ligament at this level.  Edema in the interspinous ligaments at C4-C7.  Findings are suggestive of hyperextension injury. Extensive prevertebral edema throughout the cervical and upper thoracic spine likely related to ligamentous injury.   No acute intervention per NSGY   Maintain C  "Collar at all times  Neurochecks q4h  Lovenox in place of home apixaban while NPO  Multimodal analgesia   PT/OT consulted:  Suggest moderate intense therapy.  Working on SNF      DNR (do not resuscitate)  DNR status confirmed    Paroxysmal A-fib  Chronic issue  Holding home coreg as NPO  Lovenox in place of home apixaban while pt NPO    CKD (chronic kidney disease), stage IV  Creatine stable for now. BMP reviewed- noted Estimated Creatinine Clearance: 25.4 mL/min (based on SCr of 1.3 mg/dL). according to latest data. Based on current GFR, CKD stage is stage 4 - GFR 15-29.  Monitor UOP and serial BMP and adjust therapy as needed. Renally dose meds. Avoid nephrotoxic medications and procedures.    Pacemaker  Appreciate Cardiology assistance  Paused for MRI and previous settings resumed after completion    Hypomagnesemia  Patient has Abnormal Magnesium: hypomagnesemia. Will continue to monitor electrolytes closely. Will replace the affected electrolytes and repeat labs to be done after interventions completed. The patient's magnesium results have been reviewed and are listed below.  No results for input(s): "MG" in the last 24 hours.       Controlled type 2 diabetes mellitus with stage 3 chronic kidney disease, without long-term current use of insulin  HbA1c 5.5, sugars controlled  Home DM regimen:  Glipizide  Hold oral hyperglycemic meds  SSI with POCT accuchecks to achieve blood glucose of 140-180 while hospitalized  Hypoglycemia protocol  Currently NPO; advance to diabetic diet when able       Late onset Alzheimer's disease with behavioral disturbance  Chronic issue  Unable to continue home seroquel while NPO; PRN IV zyprexa added  Delirium precautions    Episode of transient neurologic symptoms  Followed by Neurology and on 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  Continue home keppra (on IV while NPO)    Hypertension  Chronic issue  Hydralazine IV TID (+ PRN)  Holding home meds while NPO    Hypothyroidism " following radioiodine therapy  Chronic and stable  Continue home synthroid      VTE Risk Mitigation (From admission, onward)           Ordered     enoxaparin injection 60 mg  Every 24 hours         05/10/24 1425     IP VTE HIGH RISK PATIENT  Once         05/05/24 1820     Place sequential compression device  Until discontinued         05/05/24 1820                    Discharge Planning   LILI: 5/13/2024     Code Status: DNR   Is the patient medically ready for discharge?:     Reason for patient still in hospital (select all that apply): Patient trending condition, Imaging, Consult recommendations, and PT / OT recommendations  Discharge Plan A: Skilled Nursing Facility                  Nel Hoover MD  Department of Hospital Medicine   [facility] - Telemetry Stepdown

## 2024-05-11 NOTE — ASSESSMENT & PLAN NOTE
HbA1c 5.5, sugars controlled  Home DM regimen:  Glipizide  Hold oral hyperglycemic meds  SSI with POCT accuchecks to achieve blood glucose of 140-180 while hospitalized  Hypoglycemia protocol  Currently NPO; advance to diabetic diet when able

## 2024-05-11 NOTE — ASSESSMENT & PLAN NOTE
NSGY consulted ; appreciate recs  MRI with Increased signal intensity in the bilateral C4 lamina and left C5 lamina consistent with fractures better identified on recent CT. Anterior widening of the C5-C6 intervertebral disc space with increased signal intensity and complete disruption of the anterior longitudinal ligament at this level.  Edema in the interspinous ligaments at C4-C7.  Findings are suggestive of hyperextension injury. Extensive prevertebral edema throughout the cervical and upper thoracic spine likely related to ligamentous injury.   No acute intervention per NSGY   Maintain C Collar at all times  Neurochecks q4h  Lovenox in place of home apixaban while NPO  Multimodal analgesia   PT/OT consulted:  Suggest moderate intense therapy.  Working on SNF

## 2024-05-11 NOTE — ASSESSMENT & PLAN NOTE
Patient's anemia is currently uncontrolled. Has not received any PRBCs to date. Etiology likely d/t chronic disease due to Chronic Kidney Disease  Current CBC reviewed-   Lab Results   Component Value Date    HGB 12.2 05/10/2024    HCT 39.3 05/10/2024     Monitor serial CBC and transfuse if patient becomes hemodynamically unstable, symptomatic or H/H drops below 7/21.

## 2024-05-11 NOTE — SUBJECTIVE & OBJECTIVE
Interval History: Transferred to  service overnight following development of AHRF w/ notable secretions & c/f aspiration. Evaluated by SLP this AM who noted e/o aspiration and advised strict NPO (including meds). Meds changed to IV (as able). Pt w/ continued secretions & gurgly/coarse breath sounds. Cough appreciated though. ENT consulted & MRI brain ordered.    Review of Systems   Constitutional:  Negative for chills, diaphoresis and fever.   HENT:  Positive for trouble swallowing. Negative for congestion.    Respiratory:  Positive for cough. Negative for shortness of breath and wheezing.    Cardiovascular:  Negative for chest pain and palpitations.   Gastrointestinal:  Negative for abdominal pain, diarrhea, nausea and vomiting.   Neurological:  Negative for dizziness, light-headedness and headaches.     Objective:     Vital Signs (Most Recent):  Temp: 97.9 °F (36.6 °C) (05/10/24 2332)  Pulse: 84 (05/10/24 2332)  Resp: 17 (05/10/24 2332)  BP: (!) 169/78 (05/10/24 2332)  SpO2: 97 % (05/10/24 2037) Vital Signs (24h Range):  Temp:  [97.8 °F (36.6 °C)-98.5 °F (36.9 °C)] 97.9 °F (36.6 °C)  Pulse:  [68-84] 84  Resp:  [14-18] 17  SpO2:  [72 %-100 %] 97 %  BP: (156-205)/(49-82) 169/78     Weight: 59.9 kg (132 lb 0.9 oz)  Body mass index is 21.98 kg/m².    Intake/Output Summary (Last 24 hours) at 5/10/2024 2346  Last data filed at 5/10/2024 1837  Gross per 24 hour   Intake 0 ml   Output 800 ml   Net -800 ml         Physical Exam  Constitutional:       General: She is not in acute distress.     Appearance: She is normal weight. She is ill-appearing. She is not toxic-appearing or diaphoretic.   Eyes:      Conjunctiva/sclera: Conjunctivae normal.   Cardiovascular:      Rate and Rhythm: Normal rate and regular rhythm.      Heart sounds: Normal heart sounds.   Pulmonary:      Effort: Pulmonary effort is normal.      Breath sounds: Rhonchi present.   Abdominal:      General: Bowel sounds are normal. There is no distension.       Palpations: Abdomen is soft.      Tenderness: There is no abdominal tenderness.   Skin:     General: Skin is warm and dry.   Psychiatric:         Mood and Affect: Mood normal.         Behavior: Behavior normal.             Significant Labs: All pertinent labs within the past 24 hours have been reviewed.    Significant Imaging: I have reviewed all pertinent imaging results/findings within the past 24 hours.   no abrasions, no jaundice, no lesions, no pruritis, and no rashes.

## 2024-05-11 NOTE — ASSESSMENT & PLAN NOTE
Chronic issue  Unable to continue home seroquel while NPO; PRN IV zyprexa added  Delirium precautions

## 2024-05-11 NOTE — PLAN OF CARE
Problem: Adult Inpatient Plan of Care  Goal: Plan of Care Review  Outcome: Progressing  Goal: Patient-Specific Goal (Individualized)  Outcome: Progressing  Goal: Absence of Hospital-Acquired Illness or Injury  Outcome: Progressing  Goal: Optimal Comfort and Wellbeing  Outcome: Progressing  Goal: Readiness for Transition of Care  Outcome: Progressing     Problem: Diabetes Comorbidity  Goal: Blood Glucose Level Within Targeted Range  Outcome: Progressing  Intervention: Monitor and Manage Glycemia  Flowsheets (Taken 5/11/2024 1822)  Glycemic Management: blood glucose monitored     Problem: Fall Injury Risk  Goal: Absence of Fall and Fall-Related Injury  Outcome: Progressing  Intervention: Promote Injury-Free Environment  Flowsheets (Taken 5/11/2024 1822)  Safety Promotion/Fall Prevention:   assistive device/personal item within reach   bed alarm set   Fall Risk reviewed with patient/family   Fall Risk signage in place   nonskid shoes/socks when out of bed   side rails raised x 3     Problem: Skin Injury Risk Increased  Goal: Skin Health and Integrity  Outcome: Progressing     POC reviewed. Address questions and concerns from family. Pt b/p systolic elevated prn, hydralazine. Asymptomatic. C-collar in place. IV Fluids in progress. Reposition q2h. Foam  drsg. Bed in lowest position. Call light in reach. Family @bedside.

## 2024-05-12 PROBLEM — Z95.0 PRESENCE OF CARDIAC PACEMAKER: Status: ACTIVE | Noted: 2024-05-12

## 2024-05-12 PROBLEM — E87.5 HYPERKALEMIA: Status: RESOLVED | Noted: 2023-07-17 | Resolved: 2024-05-12

## 2024-05-12 PROBLEM — N30.00 ACUTE CYSTITIS WITHOUT HEMATURIA: Status: RESOLVED | Noted: 2017-09-16 | Resolved: 2024-05-12

## 2024-05-12 PROBLEM — I13.10 BENIGN HYPERTENSIVE HEART AND KIDNEY DISEASE WITH CHRONIC KIDNEY DISEASE: Status: ACTIVE | Noted: 2024-05-12

## 2024-05-12 PROBLEM — E11.9 DIABETES MELLITUS TYPE II, CONTROLLED: Status: ACTIVE | Noted: 2018-01-11

## 2024-05-12 PROBLEM — E83.42 HYPOMAGNESEMIA: Status: RESOLVED | Noted: 2020-06-27 | Resolved: 2024-05-12

## 2024-05-12 PROBLEM — D62 ACUTE BLOOD LOSS ANEMIA: Status: ACTIVE | Noted: 2024-05-12

## 2024-05-12 LAB
POCT GLUCOSE: 153 MG/DL (ref 70–110)
POCT GLUCOSE: 156 MG/DL (ref 70–110)
POCT GLUCOSE: 159 MG/DL (ref 70–110)
POCT GLUCOSE: 167 MG/DL (ref 70–110)

## 2024-05-12 PROCEDURE — 63600175 PHARM REV CODE 636 W HCPCS: Performed by: HOSPITALIST

## 2024-05-12 PROCEDURE — 63600175 PHARM REV CODE 636 W HCPCS: Performed by: STUDENT IN AN ORGANIZED HEALTH CARE EDUCATION/TRAINING PROGRAM

## 2024-05-12 PROCEDURE — 20600001 HC STEP DOWN PRIVATE ROOM

## 2024-05-12 PROCEDURE — 25000003 PHARM REV CODE 250: Performed by: HOSPITALIST

## 2024-05-12 PROCEDURE — 25000003 PHARM REV CODE 250: Performed by: STUDENT IN AN ORGANIZED HEALTH CARE EDUCATION/TRAINING PROGRAM

## 2024-05-12 RX ORDER — LABETALOL HCL 20 MG/4 ML
20 SYRINGE (ML) INTRAVENOUS ONCE
Status: DISCONTINUED | OUTPATIENT
Start: 2024-05-12 | End: 2024-05-12

## 2024-05-12 RX ORDER — LABETALOL HCL 20 MG/4 ML
20 SYRINGE (ML) INTRAVENOUS EVERY 6 HOURS
Status: DISCONTINUED | OUTPATIENT
Start: 2024-05-12 | End: 2024-05-13

## 2024-05-12 RX ORDER — LABETALOL HCL 20 MG/4 ML
20 SYRINGE (ML) INTRAVENOUS ONCE
Status: COMPLETED | OUTPATIENT
Start: 2024-05-12 | End: 2024-05-12

## 2024-05-12 RX ADMIN — HYDRALAZINE HYDROCHLORIDE 20 MG: 20 INJECTION, SOLUTION INTRAMUSCULAR; INTRAVENOUS at 12:05

## 2024-05-12 RX ADMIN — SCOPOLAMINE 1 PATCH: 1.5 PATCH, EXTENDED RELEASE TRANSDERMAL at 10:05

## 2024-05-12 RX ADMIN — LEVETIRACETAM 250 MG: 100 INJECTION INTRAVENOUS at 10:05

## 2024-05-12 RX ADMIN — BISACODYL 10 MG: 10 SUPPOSITORY RECTAL at 10:05

## 2024-05-12 RX ADMIN — LABETALOL HYDROCHLORIDE 20 MG: 5 INJECTION, SOLUTION INTRAVENOUS at 10:05

## 2024-05-12 RX ADMIN — HYDRALAZINE HYDROCHLORIDE 20 MG: 20 INJECTION, SOLUTION INTRAMUSCULAR; INTRAVENOUS at 06:05

## 2024-05-12 RX ADMIN — LABETALOL HYDROCHLORIDE 20 MG: 5 INJECTION, SOLUTION INTRAVENOUS at 01:05

## 2024-05-12 RX ADMIN — SODIUM CHLORIDE, POTASSIUM CHLORIDE, SODIUM LACTATE AND CALCIUM CHLORIDE: 600; 310; 30; 20 INJECTION, SOLUTION INTRAVENOUS at 10:05

## 2024-05-12 RX ADMIN — HYDRALAZINE HYDROCHLORIDE 20 MG: 20 INJECTION, SOLUTION INTRAMUSCULAR; INTRAVENOUS at 05:05

## 2024-05-12 RX ADMIN — ENOXAPARIN SODIUM 60 MG: 60 INJECTION SUBCUTANEOUS at 02:05

## 2024-05-12 RX ADMIN — LEVETIRACETAM 250 MG: 100 INJECTION INTRAVENOUS at 09:05

## 2024-05-12 RX ADMIN — LABETALOL HYDROCHLORIDE 20 MG: 5 INJECTION, SOLUTION INTRAVENOUS at 05:05

## 2024-05-12 NOTE — ASSESSMENT & PLAN NOTE
Follows w/ outpatient Neurology; last saw Dr. Mcgee via televisit on 4/11/24. Per neurology note, pt was having syncopal episodes where she'd be down for 30+ minutes, during which pt is not rousable, w/ possible eye deviation. Apparently had at least 4-5 events earlier this year. Although outpatient EEG unrevealing, placed on keppra for possible seizure activity. No seizure activity or periods of unresponsiveness noted during hospitalization thus far.   Continue home keppra (on IV while NPO)

## 2024-05-12 NOTE — ASSESSMENT & PLAN NOTE
Pacemaker  History of AV block  Hx of pAF along w/ 2nd degree AV block. [employer]onic Micra AV PM placed in Oct 2022. Appears to have been last interrogated in Jan 2024 which showed normal function & paced rhythm. Note pt's PM is MRI compatible, but requires EP lab assistance (device has to be set to MRI mode & then restored to original settings following scan), which is only available M-F.   Holding home coreg as NPO  Lovenox in place of home apixaban while pt NPO  Monitor electrolytes, replete PRN for goal K > 4 & Mg > 2  Telemetry

## 2024-05-12 NOTE — ASSESSMENT & PLAN NOTE
Hx of CKD3-4, favored to be 2/2 age & hypertensive/diabetic nephropathy. On admission, Cr 1.1 (baseline 1.2-1.4) w/ BUN 24. Cr jumped to 1.8 evening of 5/8 i/s/o AHRF, but subsequently normalized.   Monitor Cr & UOP  Minimize nephrotoxic agents as able & renally-dose meds

## 2024-05-12 NOTE — ASSESSMENT & PLAN NOTE
Chronic HTN, controlled via home regimen of amlodipine 10mg & carvedilol 3.125mg BID. On admission, SBP 130s-170s. Home meds continued w/ intermittent spikes to 170s-180s. Persistently elevated BP (140s-170s) on 5/8. Home meds held starting 5/9 d/t aspiration concerns & strict NPO status. IV hydralazine started, but with persistent BP 160s-200s.   Holding home meds while strict NPO  IV hydralazine 20mg Q6H   IV labetolol 20mg Q6H  IV hydralazine 20mg Q6H PRN  Goal SBP < 180  If BP resistant to measures above,  consider nitro paste vs antihypertensive ggt

## 2024-05-12 NOTE — ASSESSMENT & PLAN NOTE
AM of 5/9 RRT called as pt developed hypoxia- satting 86% on 6L w/ copious secretions. Mentation unchanged from earlier in hospitalization. NT & oral suctioning resulting in decreased O2 needs (down to 3L & satting 95%). CTX started 2/2 c/f aspiration. CXR clear/stable & no subsequent fever or leukocytosis. Weaned back to RA on 5/11 & CTX d/c'ed.   Holding further abx  Scopalamine patch; could consider 2nd line agent, but options limited given strict NPO status   Albuterol PRN   Mgmt of dysphagia as above   Suction PRN   Supp O2 PRN for goal SpO2 > 92%  Continuous pulse ox

## 2024-05-12 NOTE — PROGRESS NOTES
[facility] - Telemetry [facility] Medicine  Progress Note    Patient Name: Quin Linn  MRN: [id number]  Patient Class: IP- Inpatient   Admission Date: 5/5/2024  Length of Stay: 5 days  Attending Physician: Nel Hoover MD  Primary Care Provider: Mary Ellen Nesbitt MD        Subjective:     Principal Problem:Acute hypoxemic respiratory failure        HPI:  89 y/o F PMH CHF, Alzheimer's, DM, HTN who presents to the ED via EMS for evaluation of a fall with CT Csp demonstrating bilateral C4 lamina fracture and L C5 lamina fracture with extension into IAP. At baseline patient ambulates with wheelchair and needs assistance with transfers. Reportedly patient had a fall forwards out of recliner, she hit her head on the ground and had a bleeding laceration. Limited hx given dementia, but pt denies any LOC, CP, dyspnea, N/V/D, headache, new weakness/numbness or neck pain. Baseline incontinence .    In the ED patient afebrile and hemodynamically stable saturating well on room air. Moves all extremities, alert and oriented to person. Conversational and pleasant with limited insight. Patient placed in C collar NSGY consulted and evaluated patient. MRI without significant canal stenosis and no plan for acute intervention per NSGY. Admitted to the care of medicine for further evaluation and management.     Overview/Hospital Course:  Ms. Linn was admitted to Hospital Medicine for management of a cervical fracture.  NSGY was consulted who said C collar and pain control, no surgical intervention.  She was started on a multimodal pain regimen and PT/OT were consulted who suggested moderate therapy.  Working on SNF placement.  SLP was consulted given concern for dysphagia and she was made NPO.  Diet was advanced to full liquids with crushed meds.    Developed hypoxia 5/9 evening. Rapid response called. Oral secretions requiring frequent suctioning. Scopolamine patch. Repeat CXR unrevealing. BNP elevated but unclear baseline  - trial IV Lasix 20 mg x 1. Considering aspiration - strict NPO per SLP. Transferred to  service.     Interval History:   Continued coarse/gurgly breath sounds, but with good cough. Denies SOB. Awaiting MRI brain. Discussed with daughter via phone who reports that prior to fall, pt was speaking & swallowing fine. Reports that pt never had any issues with swallowing, speech or issues with secretions/airway. Aside from mild short-term memory issues, no notable deficits/issues 2/2 pt's dx of Alzheimer's.     Informed late this afternoon that because pt has PM present, requires specialized MRI that is only done M-F. CT head ordered in interim.       Review of Systems   Constitutional:  Negative for chills, diaphoresis and fever.   HENT:  Positive for trouble swallowing. Negative for congestion and rhinorrhea.    Eyes:  Negative for visual disturbance.   Respiratory:  Positive for cough. Negative for shortness of breath and wheezing.    Cardiovascular:  Negative for chest pain and palpitations.   Gastrointestinal:  Negative for abdominal pain, diarrhea, nausea and vomiting.   Musculoskeletal:  Negative for back pain and myalgias.   Skin:  Negative for color change and rash.   Neurological:  Negative for dizziness, light-headedness and headaches.   Psychiatric/Behavioral:  Negative for confusion and sleep disturbance.      Objective:     Vital Signs (Most Recent):  Temp: 97.7 °F (36.5 °C) (05/11/24 2035)  Pulse: 73 (05/11/24 2035)  Resp: 18 (05/11/24 2035)  BP: (!) 161/77 (05/11/24 2035)  SpO2: 98 % (05/11/24 2035) Vital Signs (24h Range):  Temp:  [97 °F (36.1 °C)-98 °F (36.7 °C)] 97.7 °F (36.5 °C)  Pulse:  [73-99] 73  Resp:  [12-20] 18  SpO2:  [95 %-98 %] 98 %  BP: (122-187)/() 161/77     Weight: 59.9 kg (132 lb 0.9 oz)  Body mass index is 21.98 kg/m².    Intake/Output Summary (Last 24 hours) at 5/11/2024 4400  Last data filed at 5/11/2024 1430  Gross per 24 hour   Intake 0 ml   Output --   Net 0 ml          Physical Exam  Constitutional:       General: She is not in acute distress.     Appearance: She is normal weight. She is ill-appearing. She is not toxic-appearing or diaphoretic.   Eyes:      Conjunctiva/sclera: Conjunctivae normal.   Cardiovascular:      Rate and Rhythm: Normal rate and regular rhythm.      Heart sounds: Normal heart sounds.   Pulmonary:      Effort: Pulmonary effort is normal.      Breath sounds: Rhonchi present.   Abdominal:      General: Bowel sounds are normal. There is no distension.      Palpations: Abdomen is soft.      Tenderness: There is no abdominal tenderness.   Skin:     General: Skin is warm and dry.   Neurological:      General: No focal deficit present.      Mental Status: She is oriented to person, place, and time.      Cranial Nerves: Dysarthria present.   Psychiatric:         Mood and Affect: Mood normal.         Behavior: Behavior normal.           Significant Labs: All pertinent labs within the past 24 hours have been reviewed.    Significant Imaging: I have reviewed all pertinent imaging results/findings within the past 24 hours.    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 Has not received any PRBCs to date. Etiology likely d/t chronic disease due to Chronic Kidney Disease  Current CBC reviewed-   Lab Results   Component Value Date    HGB 12.2 05/10/2024    HCT 39.3 05/10/2024     Monitor serial CBC and transfuse if patient becomes hemodynamically unstable, symptomatic or H/H drops  "below 7/21.    Nondisplaced fracture of fourth cervical vertebra  NSGY consulted ; appreciate recs  MRI with Increased signal intensity in the bilateral C4 lamina and left C5 lamina consistent with fractures better identified on recent CT. Anterior widening of the C5-C6 intervertebral disc space with increased signal intensity and complete disruption of the anterior longitudinal ligament at this level.  Edema in the interspinous ligaments at C4-C7.  Findings are suggestive of hyperextension injury. Extensive prevertebral edema throughout the cervical and upper thoracic spine likely related to ligamentous injury.   No acute intervention per NSGY   Maintain C Collar at all times  Neurochecks q4h  Lovenox in place of home apixaban while NPO  Multimodal analgesia   PT/OT consulted:  Suggest moderate intense therapy.  Working on SNF      DNR (do not resuscitate)  DNR status confirmed    Paroxysmal A-fib  Chronic issue  Holding home coreg as NPO  Lovenox in place of home apixaban while pt NPO    CKD (chronic kidney disease), stage IV  Creatine stable for now. BMP reviewed- noted Estimated Creatinine Clearance: 25.4 mL/min (based on SCr of 1.3 mg/dL). according to latest data. Based on current GFR, CKD stage is stage 4 - GFR 15-29.  Monitor UOP and serial BMP and adjust therapy as needed. Renally dose meds. Avoid nephrotoxic medications and procedures.    Pacemaker  Appreciate Cardiology assistance  Paused for MRI and previous settings resumed after completion    Hypomagnesemia  Patient has Abnormal Magnesium: hypomagnesemia. Will continue to monitor electrolytes closely. Will replace the affected electrolytes and repeat labs to be done after interventions completed. The patient's magnesium results have been reviewed and are listed below.  No results for input(s): "MG" in the last 24 hours.       Controlled type 2 diabetes mellitus with stage 3 chronic kidney disease, without long-term current use of insulin  HbA1c 5.5, " sugars controlled  Home DM regimen:  Glipizide  Hold oral hyperglycemic meds  SSI with POCT accuchecks to achieve blood glucose of 140-180 while hospitalized  Hypoglycemia protocol  Currently NPO; advance to diabetic diet when able       Late onset Alzheimer's disease with behavioral disturbance  Chronic issue. Family reports no notable issues/sx 2/2 dementia aside from mild short-term memory issues.   Unable to continue home seroquel while NPO; PRN IV zyprexa added  Delirium precautions    Episode of transient neurologic symptoms  Followed by Neurology and on 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  Continue home keppra (on IV while NPO)    Hypertension  Chronic issue  Hydralazine IV TID (+ PRN)  Holding home meds while NPO    Hypothyroidism following radioiodine therapy  Chronic and stable  Continue home synthroid      VTE Risk Mitigation (From admission, onward)           Ordered     enoxaparin injection 60 mg  Every 24 hours         05/10/24 1425     IP VTE HIGH RISK PATIENT  Once         05/05/24 1820     Place sequential compression device  Until discontinued         05/05/24 1820                    Discharge Planning   LILI: 5/13/2024     Code Status: DNR   Is the patient medically ready for discharge?:     Reason for patient still in hospital (select all that apply): Patient trending condition, Treatment, and Imaging  Discharge Plan A: Skilled Nursing Facility            Nel Hoover MD  Department of Hospital Medicine   [facility] - Telemetry Stepdown

## 2024-05-12 NOTE — SUBJECTIVE & OBJECTIVE
Interval History:   Continued coarse/gurgly breath sounds, but with good cough. Denies SOB. Awaiting MRI brain. Discussed with daughter via phone who reports that prior to fall, pt was speaking & swallowing fine. Reports that pt never had any issues with swallowing, speech or issues with secretions/airway. Aside from mild short-term memory issues, no notable deficits/issues 2/2 pt's dx of Alzheimer's.     Informed late this afternoon that because pt has PM present, requires specialized MRI that is only done M-F. CT head ordered in interim.       Review of Systems   Constitutional:  Negative for chills, diaphoresis and fever.   HENT:  Positive for trouble swallowing. Negative for congestion and rhinorrhea.    Eyes:  Negative for visual disturbance.   Respiratory:  Positive for cough. Negative for shortness of breath and wheezing.    Cardiovascular:  Negative for chest pain and palpitations.   Gastrointestinal:  Negative for abdominal pain, diarrhea, nausea and vomiting.   Musculoskeletal:  Negative for back pain and myalgias.   Skin:  Negative for color change and rash.   Neurological:  Negative for dizziness, light-headedness and headaches.   Psychiatric/Behavioral:  Negative for confusion and sleep disturbance.      Objective:     Vital Signs (Most Recent):  Temp: 97.7 °F (36.5 °C) (05/11/24 2035)  Pulse: 73 (05/11/24 2035)  Resp: 18 (05/11/24 2035)  BP: (!) 161/77 (05/11/24 2035)  SpO2: 98 % (05/11/24 2035) Vital Signs (24h Range):  Temp:  [97 °F (36.1 °C)-98 °F (36.7 °C)] 97.7 °F (36.5 °C)  Pulse:  [73-99] 73  Resp:  [12-20] 18  SpO2:  [95 %-98 %] 98 %  BP: (122-187)/() 161/77     Weight: 59.9 kg (132 lb 0.9 oz)  Body mass index is 21.98 kg/m².    Intake/Output Summary (Last 24 hours) at 5/11/2024 1141  Last data filed at 5/11/2024 1430  Gross per 24 hour   Intake 0 ml   Output --   Net 0 ml         Physical Exam  Constitutional:       General: She is not in acute distress.     Appearance: She is normal  weight. She is ill-appearing. She is not toxic-appearing or diaphoretic.   Eyes:      Conjunctiva/sclera: Conjunctivae normal.   Cardiovascular:      Rate and Rhythm: Normal rate and regular rhythm.      Heart sounds: Normal heart sounds.   Pulmonary:      Effort: Pulmonary effort is normal.      Breath sounds: Rhonchi present.   Abdominal:      General: Bowel sounds are normal. There is no distension.      Palpations: Abdomen is soft.      Tenderness: There is no abdominal tenderness.   Skin:     General: Skin is warm and dry.   Neurological:      General: No focal deficit present.      Mental Status: She is oriented to person, place, and time.      Cranial Nerves: Dysarthria present.   Psychiatric:         Mood and Affect: Mood normal.         Behavior: Behavior normal.           Significant Labs: All pertinent labs within the past 24 hours have been reviewed.    Significant Imaging: I have reviewed all pertinent imaging results/findings within the past 24 hours.

## 2024-05-12 NOTE — ASSESSMENT & PLAN NOTE
Hx of Alzheimer's dementia, although only with very mild sx per family at baseline. Daugher reports mild short-term memory impairment, but no orientation, speech, swallowing, or behavior issues previously.   Unable to continue home seroquel while NPO; PRN IV zyprexa added  Delirium precautions

## 2024-05-12 NOTE — ASSESSMENT & PLAN NOTE
Etiology unclear  Hx of Alzheimer's but no prior issues with speech, swallowing, secretions, etc   SLP following  ENT consulted  MRI brain pending; requires specialized MRI 2/2 presence of PM that can only be obtained M-F --> obtaining CT head in interim   Strict NPO  Pending ability to advance diet, may MBBS & to consider alternative enteral access in the next few days

## 2024-05-12 NOTE — ASSESSMENT & PLAN NOTE
Chronic issue. Family reports no notable issues/sx 2/2 dementia aside from mild short-term memory issues.   Unable to continue home seroquel while NPO; PRN IV zyprexa added  Delirium precautions

## 2024-05-12 NOTE — PROGRESS NOTES
Adidson Puckett - Telemetry [facility] Medicine  Progress Note    Patient Name: Quin Linn  MRN: [id number]  Patient Class: IP- Inpatient   Admission Date: 5/5/2024  Length of Stay: 6 days  Attending Physician: Nel Hoover MD  Primary Care Provider: Mary Ellen Nesbitt MD    Subjective:     Principal Problem: Acute hypoxemic respiratory failure    HPI:  89 y/o F PMH CHF, Alzheimer's, DM, HTN who presents to the ED via EMS for evaluation of a fall with CT Csp demonstrating bilateral C4 lamina fracture and L C5 lamina fracture with extension into IAP. At baseline patient ambulates with wheelchair and needs assistance with transfers. Reportedly patient had a fall forwards out of recliner, she hit her head on the ground and had a bleeding laceration. Limited hx given dementia, but pt denies any LOC, CP, dyspnea, N/V/D, headache, new weakness/numbness or neck pain. Baseline incontinence .    In the ED patient afebrile and hemodynamically stable saturating well on room air. Moves all extremities, alert and oriented to person. Conversational and pleasant with limited insight. Patient placed in C collar NSGY consulted and evaluated patient. MRI without significant canal stenosis and no plan for acute intervention per NSGY. Admitted to the care of medicine for further evaluation and management.     Overview/Hospital Course:  Ms. Linn was admitted to Hospital Medicine for management of a cervical fracture.  NSGY was consulted who said C collar and pain control, no surgical intervention.  She was started on a multimodal pain regimen and PT/OT were consulted who suggested moderate therapy.  Working on SNF placement.  SLP was consulted given concern for dysphagia and she was made NPO.  Diet was advanced to full liquids with crushed meds.    Developed hypoxia 5/9 evening. Rapid response called. Oral secretions requiring frequent suctioning. Scopolamine patch. Repeat CXR unrevealing. BNP elevated but unclear baseline - trial  IV Lasix 20 mg x 1. Considering aspiration - strict NPO per SLP. Transferred to  service.     Interval History:   NAEON. Remains persistently hypertensive despite scheduled IV hydralazine (w/ PRN labetolol & hydralazine ordered). Some continued coarse breath sounds, but improved from prior & less gurgly, and w/ good cough; stable on RA & denies SOB. Unable to obtain MRI brain over the weekend as pt requires EP lab (only available M-F) 2/2 presence of PM. CT head obtained in interim which was negative for any acute infarcts, bleeds, etc. Will pursue MRI brain tmrw & continue to work with SLP; if continued aspiration/dysphagia, may pursue MBBS to assess potential alternatives for enteric access.       Review of Systems   Constitutional:  Negative for chills, diaphoresis and fever.   HENT:  Positive for trouble swallowing. Negative for congestion and rhinorrhea.    Eyes:  Negative for visual disturbance.   Respiratory:  Positive for cough. Negative for shortness of breath and wheezing.    Cardiovascular:  Negative for chest pain and palpitations.   Gastrointestinal:  Negative for abdominal pain, diarrhea, nausea and vomiting.   Musculoskeletal:  Negative for back pain and myalgias.   Skin:  Negative for color change and rash.   Neurological:  Negative for dizziness, light-headedness and headaches.   Psychiatric/Behavioral:  Negative for confusion and sleep disturbance.      Objective:     Vital Signs (Most Recent):  Temp: 98 °F (36.7 °C) (05/12/24 0900)  Pulse: 62 (05/12/24 1515)  Resp: 14 (05/12/24 1137)  BP: (!) 164/66 (05/12/24 1137)  SpO2: 96 % (05/12/24 1515) Vital Signs (24h Range):  Temp:  [97.6 °F (36.4 °C)-99.8 °F (37.7 °C)] 98 °F (36.7 °C)  Pulse:  [60-91] 62  Resp:  [13-18] 14  SpO2:  [96 %-99 %] 96 %  BP: (127-190)/(48-89) 164/66     Weight: 59.9 kg (132 lb 0.9 oz)  Body mass index is 21.98 kg/m².  No intake or output data in the 24 hours ending 05/12/24 1532        Physical Exam  Constitutional:        General: She is not in acute distress.     Appearance: She is normal weight. She is not toxic-appearing or diaphoretic.   HENT:      Head: Normocephalic.   Eyes:      Conjunctiva/sclera: Conjunctivae normal.   Cardiovascular:      Rate and Rhythm: Normal rate and regular rhythm.      Heart sounds: Normal heart sounds.   Pulmonary:      Effort: Pulmonary effort is normal. No respiratory distress.      Breath sounds: Rhonchi present. No wheezing.   Abdominal:      General: Bowel sounds are normal. There is no distension.      Palpations: Abdomen is soft.      Tenderness: There is no abdominal tenderness. There is no guarding.   Musculoskeletal:      Right lower leg: No edema.      Left lower leg: No edema.   Skin:     General: Skin is warm and dry.      Findings: Bruising (facial contusion) present.   Neurological:      General: No focal deficit present.      Mental Status: She is oriented to person, place, and time.      Cranial Nerves: Dysarthria present.   Psychiatric:         Mood and Affect: Mood normal.         Behavior: Behavior normal.           Significant Labs: All pertinent labs within the past 24 hours have been reviewed.    Significant Imaging: I have reviewed all pertinent imaging results/findings within the past 24 hours.    Assessment/Plan:      Dysphagia  Mentation below baseline after admission, w/ somnolence & poor comprehension limiting participation w/ SLP evaluation and thus initially made NPO. Gradual improvement in mental status, allowing for SLP to advance diet to puree on 5/8. However, made strict NPO again after 5/9 episode where pt was found to be hypoxic w/ copious pooling secretions. Etiology unclear- while pt has hx of Alzheimer's, no prior issues with speech, swallowing, secretions, etc per family report. Repeat CT head w/o acute findings. Laryngoscope per ENT showed notable pooling secretions, but was otherwise unrevealing. Considering decompensation 2/2 traumatic fall w/o direct  injury/trauma to oropharyngeal structures.   ENT consulted; appreciate recs - now signed off  SLP following; appreciate assistance  MRI brain tmrw   Strict NPO  Pending ability to advance diet, may MBBS & to consider alternative enteral access in the next few days      Nondisplaced fracture of fourth cervical vertebra  Presented following GLF where pt fell forwards out of her recliner & hit her head. Workup, including CT & MRI of C-spine, revealed nondisplaced fractures involving the b/l C4 lamina & left C5 lamina w/ extention into IAP.    NSGY consulted - no acute intervention indicated at this time  Neuro checks Q4H  Maintain C-collar at all times  Follow-up in NSGY clinic in 2 months   PT/OT    Acute hypoxemic respiratory failure - resolved  AM of 5/9 RRT called as pt developed hypoxia- satting 86% on 6L w/ copious secretions. Mentation unchanged from earlier in hospitalization. NT & oral suctioning resulting in decreased O2 needs (down to 3L & satting 95%). CTX started 2/2 c/f aspiration. CXR clear/stable & no subsequent fever or leukocytosis. Weaned back to RA on 5/11 & CTX d/c'ed.   Holding further abx  Scopalamine patch; could consider 2nd line agent, but options limited given strict NPO status   Albuterol PRN   Mgmt of dysphagia as above   Suction PRN   Supp O2 PRN for goal SpO2 > 92%  Continuous pulse ox    Hypertension  Chronic HTN, controlled via home regimen of amlodipine 10mg & carvedilol 3.125mg BID. On admission, SBP 130s-170s. Home meds continued w/ intermittent spikes to 170s-180s. Persistently elevated BP (140s-170s) on 5/8. Home meds held starting 5/9 d/t aspiration concerns & strict NPO status. IV hydralazine started, but with persistent BP 160s-200s.   Holding home meds while strict NPO  IV hydralazine 20mg Q6H   IV labetolol 20mg Q6H  IV hydralazine 20mg Q6H PRN  Goal SBP < 180  If BP resistant to measures above,  consider nitro paste vs antihypertensive ggt     Diabetes mellitus type II,  controlled  Hx of T2DM, well-controlled w/ home regimen of glipizide. Last A1c 5.5% (Oct 2023). Serum glucose 148 on admission & CBGs have remained at goal during hospitalization thus far.   Holding home glipizide  Deferring SSI for now  CBGs Q6H while NPO  Hypoglycemia protocol  Repeat A1c pending    Late onset Alzheimer's disease with behavioral disturbance  Hx of Alzheimer's dementia, although only with very mild sx per family at baseline. Norma reports mild short-term memory impairment, but no orientation, speech, swallowing, or behavior issues previously.   Unable to continue home seroquel while NPO; PRN IV zyprexa added  Delirium precautions     Paroxysmal A-fib  Pacemaker  History of AV block  Hx of pAF along w/ 2nd degree AV block. [employer]onic Micra AV PM placed in Oct 2022. Appears to have been last interrogated in Jan 2024 which showed normal function & paced rhythm. Note pt's PM is MRI compatible, but requires EP lab assistance (device has to be set to MRI mode & then restored to original settings following scan), which is only available M-F.   Holding home coreg as NPO  Lovenox in place of home apixaban while pt NPO  Monitor electrolytes, replete PRN for goal K > 4 & Mg > 2  Telemetry    CKD (chronic kidney disease), stage IV  Hx of CKD3-4, favored to be 2/2 age & hypertensive/diabetic nephropathy. On admission, Cr 1.1 (baseline 1.2-1.4) w/ BUN 24. Cr jumped to 1.8 evening of 5/8 i/s/o AHRF, but subsequently normalized.   Monitor Cr & UOP  Minimize nephrotoxic agents as able & renally-dose meds    Episode of transient neurologic symptoms  Follows w/ outpatient Neurology; last saw Dr. Mcgee via televisit on 4/11/24. Per neurology note, pt was having syncopal episodes where she'd be down for 30+ minutes, during which pt is not rousable, w/ possible eye deviation. Apparently had at least 4-5 events earlier this year. Although outpatient EEG unrevealing, placed on keppra for possible seizure activity. No  seizure activity or periods of unresponsiveness noted during hospitalization thus far.   Continue home keppra (on IV while NPO)    Hypothyroidism following radioiodine therapy  Last TSH (Jan 2024) stable/nml at 3.962.   Continue home synthroid    DNR (do not resuscitate)  DNR status confirmed on admission. ACP docs on file.       VTE Risk Mitigation (From admission, onward)           Ordered     enoxaparin injection 60 mg  Every 24 hours         05/10/24 1425     IP VTE HIGH RISK PATIENT  Once         05/05/24 1820     Place sequential compression device  Until discontinued         05/05/24 1820                    Discharge Planning   LILI: 5/15/2024     Code Status: DNR   Is the patient medically ready for discharge?:     Reason for patient still in hospital (select all that apply): Patient trending condition  Discharge Plan A: Skilled Nursing Facility            Nel Hoover MD  Department of Hospital Medicine   [facility]za - Telemetry Stepdown

## 2024-05-12 NOTE — PLAN OF CARE
Problem: Adult Inpatient Plan of Care  Goal: Plan of Care Review  Outcome: Progressing  Flowsheets (Taken 5/12/2024 0505)  Plan of Care Reviewed With: patient  Goal: Optimal Comfort and Wellbeing  Outcome: Progressing  Intervention: Provide Person-Centered Care  Flowsheets (Taken 5/12/2024 0505)  Trust Relationship/Rapport:   care explained   questions answered   thoughts/feelings acknowledged   choices provided   questions encouraged   emotional support provided   reassurance provided   empathic listening provided     Problem: Diabetes Comorbidity  Goal: Blood Glucose Level Within Targeted Range  Outcome: Progressing  Intervention: Monitor and Manage Glycemia  Flowsheets (Taken 5/12/2024 0505)  Glycemic Management: blood glucose monitored     Problem: Fall Injury Risk  Goal: Absence of Fall and Fall-Related Injury  Outcome: Progressing  Intervention: Identify and Manage Contributors  Flowsheets (Taken 5/12/2024 0505)  Self-Care Promotion:   BADL personal objects within reach   BADL personal routines maintained  Medication Review/Management: medications reviewed  Intervention: Promote Injury-Free Environment  Flowsheets (Taken 5/12/2024 0505)  Safety Promotion/Fall Prevention:   assistive device/personal item within reach   bed alarm set   Fall Risk reviewed with patient/family   Fall Risk signage in place   lighting adjusted   nonskid shoes/socks when out of bed   side rails raised x 2   instructed to call staff for mobility     Problem: Skin Injury Risk Increased  Goal: Skin Health and Integrity  Outcome: Progressing  Intervention: Optimize Skin Protection  Flowsheets (Taken 5/12/2024 0505)  Pressure Reduction Techniques:   frequent weight shift encouraged   weight shift assistance provided  Skin Protection: incontinence pads utilized

## 2024-05-12 NOTE — ASSESSMENT & PLAN NOTE
Mentation below baseline after admission, w/ somnolence & poor comprehension limiting participation w/ SLP evaluation and thus initially made NPO. Gradual improvement in mental status, allowing for SLP to advance diet to puree on 5/8. However, made strict NPO again after 5/9 episode where pt was found to be hypoxic w/ copious pooling secretions. Etiology unclear- while pt has hx of Alzheimer's, no prior issues with speech, swallowing, secretions, etc per family report. Repeat CT head w/o acute findings. Laryngoscope per ENT showed notable pooling secretions, but was otherwise unrevealing. Considering decompensation 2/2 traumatic fall w/o direct injury/trauma to oropharyngeal structures.   ENT consulted; appreciate recs - now signed off  SLP following; appreciate assistance  MRI brain tmrw   Strict NPO  Pending ability to advance diet, may MBBS & to consider alternative enteral access in the next few days

## 2024-05-12 NOTE — NURSING
Nurses Note -- 4 Eyes      5/11/2024   9:37 PM      Skin assessed during: Q Shift Change      [] No Altered Skin Integrity Present    []Prevention Measures Documented      [x] Yes- Altered Skin Integrity Present or Discovered   [] LDA Added if Not in Epic (Describe Wound)   [] New Altered Skin Integrity was Present on Admit and Documented in LDA   [] Wound Image Taken    Wound Care Consulted? No    Attending Nurse:  Hali Dotson RN/Staff Member:  Mirna

## 2024-05-12 NOTE — ASSESSMENT & PLAN NOTE
Hx of T2DM, well-controlled w/ home regimen of glipizide. Last A1c 5.5% (Oct 2023). Serum glucose 148 on admission & CBGs have remained at goal during hospitalization thus far.   Holding home glipizide  Deferring SSI for now  CBGs Q6H while NPO  Hypoglycemia protocol  Repeat A1c pending

## 2024-05-12 NOTE — SUBJECTIVE & OBJECTIVE
Interval History:   SUN. Remains persistently hypertensive despite scheduled IV hydralazine (w/ PRN labetolol & hydralazine ordered). Some continued coarse breath sounds, but improved from prior & less gurgly, and w/ good cough; stable on RA & denies SOB. Unable to obtain MRI brain over the weekend as pt requires EP lab (only available M-F) 2/2 presence of PM. CT head obtained in interim which was negative for any acute infarcts, bleeds, etc. Will pursue MRI brain tmrw & continue to work with SLP; if continued aspiration/dysphagia, may pursue MBBS to assess potential alternatives for enteric access.       Review of Systems   Constitutional:  Negative for chills, diaphoresis and fever.   HENT:  Positive for trouble swallowing. Negative for congestion and rhinorrhea.    Eyes:  Negative for visual disturbance.   Respiratory:  Positive for cough. Negative for shortness of breath and wheezing.    Cardiovascular:  Negative for chest pain and palpitations.   Gastrointestinal:  Negative for abdominal pain, diarrhea, nausea and vomiting.   Musculoskeletal:  Negative for back pain and myalgias.   Skin:  Negative for color change and rash.   Neurological:  Negative for dizziness, light-headedness and headaches.   Psychiatric/Behavioral:  Negative for confusion and sleep disturbance.      Objective:     Vital Signs (Most Recent):  Temp: 98 °F (36.7 °C) (05/12/24 0900)  Pulse: 62 (05/12/24 1515)  Resp: 14 (05/12/24 1137)  BP: (!) 164/66 (05/12/24 1137)  SpO2: 96 % (05/12/24 1515) Vital Signs (24h Range):  Temp:  [97.6 °F (36.4 °C)-99.8 °F (37.7 °C)] 98 °F (36.7 °C)  Pulse:  [60-91] 62  Resp:  [13-18] 14  SpO2:  [96 %-99 %] 96 %  BP: (127-190)/(48-89) 164/66     Weight: 59.9 kg (132 lb 0.9 oz)  Body mass index is 21.98 kg/m².  No intake or output data in the 24 hours ending 05/12/24 1532        Physical Exam  Constitutional:       General: She is not in acute distress.     Appearance: She is normal weight. She is not  toxic-appearing or diaphoretic.   HENT:      Head: Normocephalic.   Eyes:      Conjunctiva/sclera: Conjunctivae normal.   Cardiovascular:      Rate and Rhythm: Normal rate and regular rhythm.      Heart sounds: Normal heart sounds.   Pulmonary:      Effort: Pulmonary effort is normal. No respiratory distress.      Breath sounds: Rhonchi present. No wheezing.   Abdominal:      General: Bowel sounds are normal. There is no distension.      Palpations: Abdomen is soft.      Tenderness: There is no abdominal tenderness. There is no guarding.   Musculoskeletal:      Right lower leg: No edema.      Left lower leg: No edema.   Skin:     General: Skin is warm and dry.      Findings: Bruising (facial contusion) present.   Neurological:      General: No focal deficit present.      Mental Status: She is oriented to person, place, and time.      Cranial Nerves: Dysarthria present.   Psychiatric:         Mood and Affect: Mood normal.         Behavior: Behavior normal.           Significant Labs: All pertinent labs within the past 24 hours have been reviewed.    Significant Imaging: I have reviewed all pertinent imaging results/findings within the past 24 hours.

## 2024-05-12 NOTE — ASSESSMENT & PLAN NOTE
Presented following GLF where pt fell forwards out of her recliner & hit her head. Workup, including CT & MRI of C-spine, revealed nondisplaced fractures involving the b/l C4 lamina & left C5 lamina w/ extention into IAP.    NSGY consulted - no acute intervention indicated at this time  Neuro checks Q4H  Maintain C-collar at all times  Follow-up in NSGY clinic in 2 months   PT/OT

## 2024-05-13 ENCOUNTER — DOCUMENTATION ONLY (OUTPATIENT)
Dept: ELECTROPHYSIOLOGY | Facility: CLINIC | Age: 89
End: 2024-05-13
Payer: MEDICARE

## 2024-05-13 LAB
ANION GAP SERPL CALC-SCNC: 9 MMOL/L (ref 8–16)
ANION GAP SERPL CALC-SCNC: 9 MMOL/L (ref 8–16)
BUN SERPL-MCNC: 46 MG/DL (ref 10–30)
BUN SERPL-MCNC: 49 MG/DL (ref 10–30)
CALCIUM SERPL-MCNC: 9.5 MG/DL (ref 8.7–10.5)
CALCIUM SERPL-MCNC: 9.6 MG/DL (ref 8.7–10.5)
CHLORIDE SERPL-SCNC: 116 MMOL/L (ref 95–110)
CHLORIDE SERPL-SCNC: 118 MMOL/L (ref 95–110)
CO2 SERPL-SCNC: 23 MMOL/L (ref 23–29)
CO2 SERPL-SCNC: 24 MMOL/L (ref 23–29)
CREAT SERPL-MCNC: 1 MG/DL (ref 0.5–1.4)
CREAT SERPL-MCNC: 1.1 MG/DL (ref 0.5–1.4)
ERYTHROCYTE [DISTWIDTH] IN BLOOD BY AUTOMATED COUNT: 14.3 % (ref 11.5–14.5)
ERYTHROCYTE [DISTWIDTH] IN BLOOD BY AUTOMATED COUNT: 14.5 % (ref 11.5–14.5)
EST. GFR  (NO RACE VARIABLE): 47.4 ML/MIN/1.73 M^2
EST. GFR  (NO RACE VARIABLE): 53.2 ML/MIN/1.73 M^2
ESTIMATED AVG GLUCOSE: 128 MG/DL (ref 68–131)
GLUCOSE SERPL-MCNC: 133 MG/DL (ref 70–110)
GLUCOSE SERPL-MCNC: 229 MG/DL (ref 70–110)
HBA1C MFR BLD: 6.1 % (ref 4–5.6)
HCT VFR BLD AUTO: 32.5 % (ref 37–48.5)
HCT VFR BLD AUTO: 35.1 % (ref 37–48.5)
HGB BLD-MCNC: 10.8 G/DL (ref 12–16)
HGB BLD-MCNC: 10.9 G/DL (ref 12–16)
MCH RBC QN AUTO: 30.1 PG (ref 27–31)
MCH RBC QN AUTO: 30.7 PG (ref 27–31)
MCHC RBC AUTO-ENTMCNC: 31.1 G/DL (ref 32–36)
MCHC RBC AUTO-ENTMCNC: 33.2 G/DL (ref 32–36)
MCV RBC AUTO: 92 FL (ref 82–98)
MCV RBC AUTO: 97 FL (ref 82–98)
OSMOLALITY SERPL: 332 MOSM/KG (ref 275–295)
PLATELET # BLD AUTO: 212 K/UL (ref 150–450)
PLATELET # BLD AUTO: 215 K/UL (ref 150–450)
PMV BLD AUTO: 10 FL (ref 9.2–12.9)
PMV BLD AUTO: 10.4 FL (ref 9.2–12.9)
POCT GLUCOSE: 134 MG/DL (ref 70–110)
POCT GLUCOSE: 140 MG/DL (ref 70–110)
POCT GLUCOSE: 152 MG/DL (ref 70–110)
POCT GLUCOSE: 167 MG/DL (ref 70–110)
POCT GLUCOSE: 173 MG/DL (ref 70–110)
POTASSIUM SERPL-SCNC: 3.6 MMOL/L (ref 3.5–5.1)
POTASSIUM SERPL-SCNC: 3.8 MMOL/L (ref 3.5–5.1)
RBC # BLD AUTO: 3.52 M/UL (ref 4–5.4)
RBC # BLD AUTO: 3.62 M/UL (ref 4–5.4)
SODIUM SERPL-SCNC: 149 MMOL/L (ref 136–145)
SODIUM SERPL-SCNC: 150 MMOL/L (ref 136–145)
WBC # BLD AUTO: 6.46 K/UL (ref 3.9–12.7)
WBC # BLD AUTO: 6.85 K/UL (ref 3.9–12.7)

## 2024-05-13 PROCEDURE — 25000003 PHARM REV CODE 250: Performed by: STUDENT IN AN ORGANIZED HEALTH CARE EDUCATION/TRAINING PROGRAM

## 2024-05-13 PROCEDURE — 63600175 PHARM REV CODE 636 W HCPCS: Mod: JZ,JG | Performed by: STUDENT IN AN ORGANIZED HEALTH CARE EDUCATION/TRAINING PROGRAM

## 2024-05-13 PROCEDURE — 97530 THERAPEUTIC ACTIVITIES: CPT

## 2024-05-13 PROCEDURE — 85027 COMPLETE CBC AUTOMATED: CPT | Mod: 91 | Performed by: STUDENT IN AN ORGANIZED HEALTH CARE EDUCATION/TRAINING PROGRAM

## 2024-05-13 PROCEDURE — A9585 GADOBUTROL INJECTION: HCPCS | Performed by: STUDENT IN AN ORGANIZED HEALTH CARE EDUCATION/TRAINING PROGRAM

## 2024-05-13 PROCEDURE — 36415 COLL VENOUS BLD VENIPUNCTURE: CPT | Performed by: STUDENT IN AN ORGANIZED HEALTH CARE EDUCATION/TRAINING PROGRAM

## 2024-05-13 PROCEDURE — 97535 SELF CARE MNGMENT TRAINING: CPT

## 2024-05-13 PROCEDURE — 63600175 PHARM REV CODE 636 W HCPCS: Performed by: HOSPITALIST

## 2024-05-13 PROCEDURE — 80048 BASIC METABOLIC PNL TOTAL CA: CPT | Mod: 91 | Performed by: STUDENT IN AN ORGANIZED HEALTH CARE EDUCATION/TRAINING PROGRAM

## 2024-05-13 PROCEDURE — 20600001 HC STEP DOWN PRIVATE ROOM

## 2024-05-13 PROCEDURE — 83036 HEMOGLOBIN GLYCOSYLATED A1C: CPT | Performed by: STUDENT IN AN ORGANIZED HEALTH CARE EDUCATION/TRAINING PROGRAM

## 2024-05-13 PROCEDURE — 92526 ORAL FUNCTION THERAPY: CPT

## 2024-05-13 PROCEDURE — 83930 ASSAY OF BLOOD OSMOLALITY: CPT | Performed by: STUDENT IN AN ORGANIZED HEALTH CARE EDUCATION/TRAINING PROGRAM

## 2024-05-13 PROCEDURE — 25500020 PHARM REV CODE 255: Performed by: STUDENT IN AN ORGANIZED HEALTH CARE EDUCATION/TRAINING PROGRAM

## 2024-05-13 RX ORDER — LEVETIRACETAM 250 MG/1
250 TABLET ORAL 2 TIMES DAILY
Status: DISCONTINUED | OUTPATIENT
Start: 2024-05-13 | End: 2024-05-15 | Stop reason: HOSPADM

## 2024-05-13 RX ORDER — HYDRALAZINE HYDROCHLORIDE 50 MG/1
50 TABLET, FILM COATED ORAL EVERY 6 HOURS
Status: DISCONTINUED | OUTPATIENT
Start: 2024-05-13 | End: 2024-05-15 | Stop reason: HOSPADM

## 2024-05-13 RX ORDER — AMLODIPINE BESYLATE 10 MG/1
10 TABLET ORAL DAILY
Status: DISCONTINUED | OUTPATIENT
Start: 2024-05-14 | End: 2024-05-15 | Stop reason: HOSPADM

## 2024-05-13 RX ORDER — GADOBUTROL 604.72 MG/ML
6 INJECTION INTRAVENOUS
Status: COMPLETED | OUTPATIENT
Start: 2024-05-13 | End: 2024-05-13

## 2024-05-13 RX ORDER — QUETIAPINE FUMARATE 25 MG/1
75 TABLET, FILM COATED ORAL NIGHTLY PRN
Status: DISCONTINUED | OUTPATIENT
Start: 2024-05-13 | End: 2024-05-15 | Stop reason: HOSPADM

## 2024-05-13 RX ORDER — LABETALOL HCL 20 MG/4 ML
10 SYRINGE (ML) INTRAVENOUS EVERY 6 HOURS
Status: DISCONTINUED | OUTPATIENT
Start: 2024-05-13 | End: 2024-05-13

## 2024-05-13 RX ORDER — POLYETHYLENE GLYCOL 3350 17 G/17G
17 POWDER, FOR SOLUTION ORAL DAILY
Status: DISCONTINUED | OUTPATIENT
Start: 2024-05-14 | End: 2024-05-15 | Stop reason: HOSPADM

## 2024-05-13 RX ORDER — LABETALOL HCL 20 MG/4 ML
10 SYRINGE (ML) INTRAVENOUS EVERY 6 HOURS PRN
Status: DISCONTINUED | OUTPATIENT
Start: 2024-05-13 | End: 2024-05-15 | Stop reason: HOSPADM

## 2024-05-13 RX ORDER — CARVEDILOL 3.12 MG/1
3.12 TABLET ORAL 2 TIMES DAILY
Status: DISCONTINUED | OUTPATIENT
Start: 2024-05-13 | End: 2024-05-15 | Stop reason: HOSPADM

## 2024-05-13 RX ORDER — LEVOTHYROXINE SODIUM 75 UG/1
75 TABLET ORAL
Status: DISCONTINUED | OUTPATIENT
Start: 2024-05-14 | End: 2024-05-15 | Stop reason: HOSPADM

## 2024-05-13 RX ADMIN — HYDRALAZINE HYDROCHLORIDE 20 MG: 20 INJECTION, SOLUTION INTRAMUSCULAR; INTRAVENOUS at 12:05

## 2024-05-13 RX ADMIN — HYDRALAZINE HYDROCHLORIDE 20 MG: 20 INJECTION, SOLUTION INTRAMUSCULAR; INTRAVENOUS at 06:05

## 2024-05-13 RX ADMIN — APIXABAN 2.5 MG: 2.5 TABLET, FILM COATED ORAL at 11:05

## 2024-05-13 RX ADMIN — BISACODYL 10 MG: 10 SUPPOSITORY RECTAL at 08:05

## 2024-05-13 RX ADMIN — GADOBUTROL 6 ML: 604.72 INJECTION INTRAVENOUS at 12:05

## 2024-05-13 RX ADMIN — HYDRALAZINE HYDROCHLORIDE 20 MG: 20 INJECTION, SOLUTION INTRAMUSCULAR; INTRAVENOUS at 02:05

## 2024-05-13 RX ADMIN — CARVEDILOL 3.12 MG: 3.12 TABLET, FILM COATED ORAL at 11:05

## 2024-05-13 RX ADMIN — LEVETIRACETAM 250 MG: 100 INJECTION INTRAVENOUS at 08:05

## 2024-05-13 RX ADMIN — HYDRALAZINE HYDROCHLORIDE 50 MG: 50 TABLET ORAL at 07:05

## 2024-05-13 RX ADMIN — LABETALOL HYDROCHLORIDE 20 MG: 5 INJECTION, SOLUTION INTRAVENOUS at 05:05

## 2024-05-13 RX ADMIN — LABETALOL HYDROCHLORIDE 20 MG: 5 INJECTION, SOLUTION INTRAVENOUS at 12:05

## 2024-05-13 NOTE — PT/OT/SLP PROGRESS
Occupational Therapy   Treatment    Name: Quin Linn  MRN: [id number]  Admitting Diagnosis:  Acute hypoxemic respiratory failure       Recommendations:     Discharge Recommendations: Moderate Intensity Therapy  Discharge Equipment Recommendations:  wheelchair  Barriers to discharge:  Other (Comment) (requires increased assist)    Assessment:     Quin Linn is a 89 y.o. female with a medical diagnosis of Acute hypoxemic respiratory failure.  She presents with deficits in mobility and ADL tasks as compared to PLOF. Pt. Is a HIGH FALL risk at this time. Pt. Participated well in session on this date.  Patient would benefit from continued OT services to maximize level of safety and independence with self-care tasks.   Performance deficits affecting function are weakness, impaired endurance, impaired self care skills, impaired functional mobility, gait instability, decreased upper extremity function, decreased lower extremity function, decreased safety awareness, impaired balance.     Rehab Prognosis:  Good; patient would benefit from acute skilled OT services to address these deficits and reach maximum level of function.       Plan:     Patient to be seen 3 x/week to address the above listed problems via self-care/home management, therapeutic activities, therapeutic exercises, neuromuscular re-education  Plan of Care Expires: 06/05/24  Plan of Care Reviewed with: patient, caregiver    Subjective     Chief Complaint: No specific complaints. Liked being up in chair  Patient/Family Comments/goals: no specific goals stated  Pain/Comfort:  Pain Rating 1: 0/10  Pain Rating Post-Intervention 1: 0/10    Objective:     Communicated with: nurse prior to session.  Patient found supine with telemetry, pulse ox (continuous), peripheral IV, PureWick, bed alarm upon OT entry to room. Caregiver in room    General Precautions: Standard, aspiration, fall, NPO  [employer]  Orthopedic Precautions:spinal precautions  Braces: Aspen  collar  Respiratory Status: Room air     Occupational Performance:     Bed Mobility:    Patient completed Rolling/Turning to Right with moderate assistance  Patient completed Supine to Sit with moderate assistance  Patient completed Sit to Supine with maximal assistance     Functional Mobility/Transfers:  Patient completed Sit <> Stand Transfer with moderate assistance  with  no assistive device   Patient completed Bed <> Chair Transfer using Step Transfer technique with moderate assistance with no assistive device  Functional Mobility: Pt. Performed stand pivot transfer to chair without an AD and Mod A    Activities of Daily Living:  Total A to don c-collar (as portion was removed for speech assessment but immediately placed back on pt. With nurse/therapist assist)  Toileting: Total A for cleaning from  partial BM    [facility] 6 Click ADL: 12    Treatment & Education:  Pt. Was able to sit EOB with CGA on this date    Patient left  on stretcher with transport  with  nurse present    GOALS:   Multidisciplinary Problems       Occupational Therapy Goals          Problem: Occupational Therapy    Goal Priority Disciplines Outcome Interventions   Occupational Therapy Goal     OT, PT/OT Progressing    Description: Goals to be met by: 6/5/24     Patient will increase functional independence with ADLs by performing:    LE Dressing with Minimal Assistance.  Grooming while standing at sink with Minimal Assistance.  Toileting from bedside commode with Minimal Assistance for hygiene and clothing management.   Supine to sit with Stand-by Assistance.  Stand pivot transfers with Minimal Assistance.  Toilet transfer to bedside commode with Minimal Assistance.                           Time Tracking:     OT Date of Treatment: 05/13/24  OT Start Time: 1041  OT Stop Time: 1112  OT Total Time (min): 31 min    Billable Minutes:Self Care/Home Management 11  Therapeutic Activity 20    OT/DANIEL: OT          5/13/2024

## 2024-05-13 NOTE — PROGRESS NOTES
Scan ongoing / frequent movement and pt unable to follow commands and confused / EKG useless due to frequent movement and pt pulling at transmitter box and banging on tube at times

## 2024-05-13 NOTE — PROGRESS NOTES
Pt arrived earlier from floor / on hold till MRI available / pt cardiac device placed MRI mode by Maria Dolores with [employer] and ready to scan / HR to flow sheet

## 2024-05-13 NOTE — NURSING
Nurses Note -- 4 Eyes      5/13/2024   1:17 AM      Skin assessed during: Q Shift Change      [] No Altered Skin Integrity Present    []Prevention Measures Documented      [x] Yes- Altered Skin Integrity Present or Discovered   [] LDA Added if Not in Epic (Describe Wound)   [] New Altered Skin Integrity was Present on Admit and Documented in LDA   [] Wound Image Taken    Wound Care Consulted? No    Attending Nurse:  Hali Dotson RN/Staff Member:  Casi

## 2024-05-13 NOTE — PROGRESS NOTES
Pt has pulled monitor off and no EKG available at this time / pt moving and not following commands / tech stopped to check on pt several times during scan / antonio 2 minutes left in scan

## 2024-05-13 NOTE — PLAN OF CARE
Problem: Adult Inpatient Plan of Care  Goal: Plan of Care Review  Outcome: Progressing  Flowsheets (Taken 5/13/2024 0428)  Plan of Care Reviewed With: patient  Goal: Optimal Comfort and Wellbeing  Outcome: Progressing  Intervention: Provide Person-Centered Care  Flowsheets (Taken 5/13/2024 0428)  Trust Relationship/Rapport:   care explained   questions answered   thoughts/feelings acknowledged   choices provided   questions encouraged   reassurance provided   emotional support provided   empathic listening provided     Problem: Diabetes Comorbidity  Goal: Blood Glucose Level Within Targeted Range  Outcome: Progressing  Intervention: Monitor and Manage Glycemia  Flowsheets (Taken 5/13/2024 0428)  Glycemic Management: blood glucose monitored     Problem: Fall Injury Risk  Goal: Absence of Fall and Fall-Related Injury  Outcome: Progressing  Intervention: Promote Injury-Free Environment  Flowsheets (Taken 5/13/2024 0428)  Safety Promotion/Fall Prevention:   assistive device/personal item within reach   bed alarm set   Fall Risk reviewed with patient/family   Fall Risk signage in place   lighting adjusted   nonskid shoes/socks when out of bed   side rails raised x 2   instructed to call staff for mobility     Problem: Skin Injury Risk Increased  Goal: Skin Health and Integrity  Outcome: Progressing  Intervention: Optimize Skin Protection  Flowsheets (Taken 5/13/2024 0428)  Pressure Reduction Techniques:   frequent weight shift encouraged   weight shift assistance provided     Plan of care reviewed with patient. CBG assessed Q6h. Pt assisted with frequent position changes. Pt satting well on RA. Patient continues to try to pull her c-collar off. Fall precautions maintained.

## 2024-05-13 NOTE — PLAN OF CARE
Problem: Adult Inpatient Plan of Care  Goal: Plan of Care Review  Outcome: Progressing  Goal: Patient-Specific Goal (Individualized)  Outcome: Progressing  Goal: Absence of Hospital-Acquired Illness or Injury  Outcome: Progressing  Goal: Optimal Comfort and Wellbeing  Outcome: Progressing  Goal: Readiness for Transition of Care  Outcome: Progressing     Problem: Diabetes Comorbidity  Goal: Blood Glucose Level Within Targeted Range  Outcome: Progressing     Problem: Fall Injury Risk  Goal: Absence of Fall and Fall-Related Injury  Outcome: Progressing     Problem: Wound  Goal: Optimal Coping  Outcome: Progressing  Goal: Optimal Functional Ability  Outcome: Progressing  Goal: Absence of Infection Signs and Symptoms  Outcome: Progressing  Goal: Improved Oral Intake  Outcome: Progressing  Goal: Optimal Pain Control and Function  Outcome: Progressing  Goal: Skin Health and Integrity  Outcome: Progressing  Goal: Optimal Wound Healing  Outcome: Progressing

## 2024-05-13 NOTE — PT/OT/SLP PROGRESS
Speech Language Pathology Treatment    Patient Name:  Quin Linn   MRN:  [id number]  Admitting Diagnosis: Acute hypoxemic respiratory failure    Recommendations:                 General Recommendations:  Dysphagia therapy  Diet recommendations:  Soft & Bite Sized Diet - IDDSI Level 6, Liquid Diet Level: Mildly thick/Nectar thick liquids - IDDSI Level 2   Aspiration Precautions: Feed only when awake/alert, HOB to 90 degrees, Small bites/sips, and Standard aspiration precautions   General Precautions: Standard, aspiration, fall  Communication strategies:  provide increased time to answer    Assessment:     Quin Linn is a 89 y.o. female with an SLP diagnosis of Dysphagia.      Subjective     Awake/alert    Pain/Comfort:  Pain Rating 1: 0/10  Pain Rating Post-Intervention 1: 0/10    Respiratory Status: Room air    Objective:     Has the patient been evaluated by SLP for swallowing?   Yes  Keep patient NPO? Yes     Pt repositioned upright in bed. RN spoke with MD via secure chat prior to SLP entry who stated pt may remove c-collar for PO intake. Pt required increased time to answer and repetition throughout session. She tolerated thin liquids x4oz via cup/straw with intermittent coughing and eyes watering post trials. Nectar thick liquids via straw/cup x3 oz, puree and solid trials x3 tolerated with timely swallow initiation and no overt s/s of airway compromise. Recommend nectar thick liquids/soft and bite sized diet. SLP educated pt and caregiver at bedside on swallow precautions and diet recs.     Goals:   Multidisciplinary Problems       SLP Goals          Problem: SLP    Goal Priority Disciplines Outcome   SLP Goal     SLP    Description: Speech Language Pathology Goals  Goals expected to be met by 5/13    1. Pt will participate in ongoing swallow assessment                               Plan:     Patient to be seen:  4 x/week   Plan of Care reviewed with:  patient, caregiver   SLP Follow-Up:  Yes        Discharge recommendations:   (tbd)       Time Tracking:     SLP Treatment Date:   05/13/24  Speech Start Time:  1021  Speech Stop Time:  1043     Speech Total Time (min):  22 min    Billable Minutes: Treatment Swallowing Dysfunction 11 and Self Care/Home Management Training 11    05/13/2024

## 2024-05-13 NOTE — PROGRESS NOTES
Received a call from Jb in the MRI Department in relation to this Inpatient needing to have a MRI and has a [employer] Pacemaker.  Please see information below as it relates to patient's Device being MRI compatible/conditional.  To meet protocol the Pacemaker/ICD must have been implanted no less than 6 weeks prior to scheduled date of Scan.  ICD/PPM and leads must be from same .  MRI informed ordering MD must input a Cardiac Device Check in clinic and hospital order, patient must have an xray within 6 months or less prior to MRI reprogramming.  Chest xray to be reviewed by Radiologist.        As per [employer]'s MR-Conditional product listing site this (PPM) system (IS) MRI compatible.  MRI scheduled on (today @11:00 am).  [employer] Rep (Maria Dolores JORDAN) notified.  Confirmation received from Rep.

## 2024-05-13 NOTE — PT/OT/SLP PROGRESS
Physical Therapy Treatment    Patient Name:  Quin Linn   MRN:  [id number]    Recommendations:     Discharge Recommendations: Moderate Intensity Therapy  Discharge Equipment Recommendations: wheelchair  Barriers to discharge:  Increased skilled assistance required    Assessment:     Quin Linn is a 89 y.o. female admitted with a medical diagnosis of Acute hypoxemic respiratory failure.  She presents with the following impairments/functional limitations: weakness, impaired endurance, impaired functional mobility, gait instability, impaired balance, decreased lower extremity function, decreased upper extremity function, decreased safety awareness, decreased ROM, impaired coordination, orthopedic precautions. Patient demonstrating increased motivation to participate in PT treatment session, with good response to completed activities and provided education. Patient only requiring physical assistance of 1 person for all completed functional mobility, ranging from ModA-SBA. On this date, patient able to initiate steps with increased assistance & facilitation, which is noted improvement from previous session. Despite progress observed, patient does remain high fall risk at this time. Patient continues to demonstrate the need for moderate intensity therapy on a daily basis exhibited by decreased independence with self-care and functional mobility       Rehab Prognosis: Good; patient would benefit from acute skilled PT services to address these deficits and reach maximum level of function.    Recent Surgery: * No surgery found *      Plan:     During this hospitalization, patient to be seen 3 x/week to address the identified rehab impairments via gait training, therapeutic activities, therapeutic exercises, neuromuscular re-education and progress toward the following goals:    Plan of Care Expires:  06/06/24    Subjective     Chief Complaint: Patient's daughter reporting that she would like the patient to go to [facility]  "SNF due to patient previously receiving services there and responding very well vs. patient also attending other SNF losing functional mobility  Patient/Family Comments/goals: "I want to get in the chair"  Pain/Comfort:  Pain Rating 1: 0/10  Pain Rating Post-Intervention 1: 0/10      Objective:     Communicated with RN prior to session.  Patient found HOB elevated with telemetry, pulse ox (continuous), PureWick, blood pressure cuff, bed alarm, cervical collar upon PT entry to room.     General Precautions: Standard, aspiration, fall   Orthopedic Precautions:spinal precautions   Braces: Aspen collar   Body mass index is 21.98 kg/m².  Oxygen Device: Room Air  Vitals: BP (!) 166/70   Pulse 64   Temp 96.6 °F (35.9 °C) (Axillary)   Resp 16   Ht 5' 5" (1.651 m)   Wt 59.9 kg (132 lb 0.9 oz)   LMP  (LMP Unknown)   SpO2 99%   BMI 21.98 kg/m²      Functional Mobility:  Bed Mobility: Cuing required for breakdown of complex motor sequence into single steps     Rolling Right: x1, Stacey with HOB Elevated  EOB Scooting:   Anterior:  SBA & Stacey  Supine>Sit: x1, Stacey with HOB Elevated    Transfers:    Sit<>Stand:   x1, ModA from Edge of Bed with Rolling Walker. AD stabilization required. Patient w/ prior unsuccessful task attempt.  x1, ModA from Edge of Bed with HHA. Patient w/ prior unsuccessful task attempt.  Bed>Chair: x1, ModA with HHA using Step transfer technique to L.     Gait: x~3 lat steps to L, ModA with HHA. Facilitation of WS to encourage stepping required    Total Distance: ~3 lat steps  Gait Assessment: decreased step length & height, narrow base of support, unsteady gait, impaired step initiation    Balance: Observed tendency for FFP. Cuing for upright posturing & forward gaze   Static Sitting:  CGA-SBA  Dynamic Sitting:  Stacey & SBA    Static Standing: ModA with Rolling Walker & HHA.  Dynamic Standing: ModA with HHA    AM-PAC 6 CLICK MOBILITY  Turning over in bed (including adjusting bedclothes, sheets and " blankets)?: 3  Sitting down on and standing up from a chair with arms (e.g., wheelchair, bedside commode, etc.): 2  Moving from lying on back to sitting on the side of the bed?: 3  Moving to and from a bed to a chair (including a wheelchair)?: 2  Need to walk in hospital room?: 2  Climbing 3-5 steps with a railing?: 1  Basic Mobility Total Score: 13     Treatment & Education:  Increased patient contact time for conversation with family and education surrounding patient's progress. Writing therapist encouraged patient's family to discuss post-acute placement wishes with patient's designated CM/SW, as that is there area of specialty.     Increased patient contact time for patient positioning while UIC.  Discussed duration for patient to be UIC    Increased patient contact time to educate patient's daughter on how to adán/doff c-collar    Patient Education Provided on:  The role of physical therapy and how the patient can benefit from skilled services  The negative effects of prolonged bed rest/sedentary behavior, along with the importance of OOB activity & patient participation with PT  The importance of contacting RN, via call light, for mobility throughout the day  Pt white board updated with current therapists name and level of mobility assistance needed.     Patient's family Verbalized understanding of all topics touched on this date. All questions answered within the PT scope of practice    Patient left up in chair with all lines intact, call button in reach, RN notified, Family present, and C-collar donned ..    GOALS:   Multidisciplinary Problems       Physical Therapy Goals          Problem: Physical Therapy    Goal Priority Disciplines Outcome Goal Variances Interventions   Physical Therapy Goal     PT, PT/OT Progressing     Description: Goals to be met by: 24     Patient will increase functional independence with mobility by performin. Supine to sit with Minimal Assistance  2. Sit to stand  transfer with Minimal Assistance  3. Bed to chair transfer with Minimal Assistance using LRAD  4. Gait x20 feet with Minimal Assistance using Rolling Walker                         Time Tracking:     PT Received On: 05/13/24  PT Start Time: 1538     PT Stop Time: 1632  PT Total Time (min): 54 min     Billable Minutes: Therapeutic Activity 54    Treatment Type: Treatment  PT/PTA: PT     Number of PTA visits since last PT visit: 0     05/13/2024   Eye Shield Used: No

## 2024-05-13 NOTE — SUBJECTIVE & OBJECTIVE
Interval History:   NAEON. Transitioned back to home PO anti-hypertensive regimen w/ continuation of scheduled hydralazine given persistent HTN. Na 148 (appears pt didn't receive D5W bolus yesterday); calculated free water deficit 1.5L for which 1.5L of D5W ordered. Renal function continues to improve. Stable on RA and breath sounds clear. Pt denies pain, SOB, or cough. Medically ready for discharge to SNF pending placement.       Review of Systems   Constitutional:  Negative for chills, diaphoresis and fever.   HENT:  Negative for congestion, rhinorrhea and sore throat.    Eyes:  Negative for visual disturbance.   Respiratory:  Negative for cough, shortness of breath and wheezing.    Cardiovascular:  Negative for chest pain and palpitations.   Gastrointestinal:  Negative for abdominal pain, diarrhea, nausea and vomiting.   Musculoskeletal:  Negative for back pain and myalgias.   Skin:  Negative for color change and rash.   Neurological:  Negative for dizziness, light-headedness and headaches.   Psychiatric/Behavioral:  Negative for confusion and sleep disturbance.      Objective:     Vital Signs (Most Recent):  Temp: 97.6 °F (36.4 °C) (05/13/24 1611)  Pulse: 70 (05/13/24 1611)  Resp: 18 (05/13/24 1611)  BP: (!) 162/69 (05/13/24 1611)  SpO2: 98 % (05/13/24 1611) Vital Signs (24h Range):  Temp:  [96 °F (35.6 °C)-98 °F (36.7 °C)] 97.6 °F (36.4 °C)  Pulse:  [61-78] 70  Resp:  [12-18] 18  SpO2:  [95 %-100 %] 98 %  BP: (129-175)/(60-93) 162/69     Weight: 59.9 kg (132 lb 0.9 oz)  Body mass index is 21.98 kg/m².    Intake/Output Summary (Last 24 hours) at 5/13/2024 2740  Last data filed at 5/13/2024 0545  Gross per 24 hour   Intake --   Output 400 ml   Net -400 ml           Physical Exam  Constitutional:       General: She is not in acute distress.     Appearance: She is normal weight. She is not toxic-appearing or diaphoretic.   HENT:      Head: Normocephalic.   Eyes:      Conjunctiva/sclera: Conjunctivae normal.   Neck:       Comments: C-collar in place  Cardiovascular:      Rate and Rhythm: Normal rate and regular rhythm.      Heart sounds: Normal heart sounds.   Pulmonary:      Effort: Pulmonary effort is normal. No respiratory distress.      Breath sounds: No wheezing, rhonchi or rales.   Abdominal:      General: Bowel sounds are normal. There is no distension.      Palpations: Abdomen is soft.      Tenderness: There is no abdominal tenderness. There is no guarding.   Musculoskeletal:      Right lower leg: No edema.      Left lower leg: No edema.   Skin:     General: Skin is warm and dry.      Findings: Bruising (facial contusion) present.   Neurological:      General: No focal deficit present.      Mental Status: She is oriented to person, place, and time.   Psychiatric:         Mood and Affect: Mood normal.         Behavior: Behavior normal.           Significant Labs: All pertinent labs within the past 24 hours have been reviewed.    Significant Imaging: I have reviewed all pertinent imaging results/findings within the past 24 hours.

## 2024-05-13 NOTE — PROGRESS NOTES
[facility] - Telemetry [facility] Medicine  Progress Note    Patient Name: Quin Linn  MRN: [id number]  Patient Class: IP- Inpatient   Admission Date: 5/5/2024  Length of Stay: 7 days  Attending Physician: Nel Hoover MD  Primary Care Provider: Mary Ellen Nesbitt MD    Subjective:     Principal Problem: Acute hypoxemic respiratory failure    HPI:  89 y/o F PMH CHF, Alzheimer's, DM, HTN who presents to the ED via EMS for evaluation of a fall with CT Csp demonstrating bilateral C4 lamina fracture and L C5 lamina fracture with extension into IAP. At baseline patient ambulates with wheelchair and needs assistance with transfers. Reportedly patient had a fall forwards out of recliner, she hit her head on the ground and had a bleeding laceration. Limited hx given dementia, but pt denies any LOC, CP, dyspnea, N/V/D, headache, new weakness/numbness or neck pain. Baseline incontinence .    In the ED patient afebrile and hemodynamically stable saturating well on room air. Moves all extremities, alert and oriented to person. Conversational and pleasant with limited insight. Patient placed in C collar NSGY consulted and evaluated patient. MRI without significant canal stenosis and no plan for acute intervention per NSGY. Admitted to the care of medicine for further evaluation and management.     Overview/Hospital Course:  Ms. Linn was admitted to Hospital Medicine for management of a cervical fracture.  NSGY was consulted who said C collar and pain control, no surgical intervention.  She was started on a multimodal pain regimen and PT/OT were consulted who suggested moderate therapy.  Working on SNF placement.  SLP was consulted given concern for dysphagia and she was made NPO.  Diet was advanced to full liquids with crushed meds.    Developed hypoxia 5/9 evening. Rapid response called. Oral secretions requiring frequent suctioning. Scopolamine patch. Repeat CXR unrevealing. BNP elevated but unclear baseline - trial  IV Lasix 20 mg x 1. Considering aspiration - strict NPO per SLP. Transferred to  service. Weaned to RA. Repeat CT head & MRI brain negative for acute process.     Interval History:   NAEON. Improved BP control since add'n of scheduled IV labetolol (+ scheduled IV hydralazine). Remains NPO w/ nml CBGs & on mIVF. Na 150 this AM (ordering D5W bolus); renal function stable/improving. Stable on RA and breath sounds increasingly clear. C-collar removed to work with SLP today- pt did well and diet advanced to soft/nectar thick. Will allow for C-collar to be removed for meals & meds only (RN communication order placed). Given clinical improvement & negative ENT/neurological workup, anticipate pt will be medically ready for discharge tmrw to SNF. Patient & family updated at bedside.      Review of Systems   Constitutional:  Negative for chills, diaphoresis and fever.   HENT:  Positive for trouble swallowing (much improved). Negative for congestion and rhinorrhea.    Eyes:  Negative for visual disturbance.   Respiratory:  Positive for cough (minimal). Negative for shortness of breath and wheezing.    Cardiovascular:  Negative for chest pain and palpitations.   Gastrointestinal:  Negative for abdominal pain, diarrhea, nausea and vomiting.   Musculoskeletal:  Negative for back pain and myalgias.   Skin:  Negative for color change and rash.   Neurological:  Negative for dizziness, light-headedness and headaches.   Psychiatric/Behavioral:  Negative for confusion and sleep disturbance.      Objective:     Vital Signs (Most Recent):  Temp: 97.6 °F (36.4 °C) (05/13/24 1611)  Pulse: 70 (05/13/24 1611)  Resp: 18 (05/13/24 1611)  BP: (!) 162/69 (05/13/24 1611)  SpO2: 98 % (05/13/24 1611) Vital Signs (24h Range):  Temp:  [96 °F (35.6 °C)-98 °F (36.7 °C)] 97.6 °F (36.4 °C)  Pulse:  [61-78] 70  Resp:  [12-18] 18  SpO2:  [95 %-100 %] 98 %  BP: (129-175)/(60-93) 162/69     Weight: 59.9 kg (132 lb 0.9 oz)  Body mass index is 21.98  kg/m².    Intake/Output Summary (Last 24 hours) at 5/13/2024 1823  Last data filed at 5/13/2024 0545  Gross per 24 hour   Intake --   Output 400 ml   Net -400 ml           Physical Exam  Constitutional:       General: She is not in acute distress.     Appearance: She is normal weight. She is not toxic-appearing or diaphoretic.   HENT:      Head: Normocephalic.   Eyes:      Conjunctiva/sclera: Conjunctivae normal.   Cardiovascular:      Rate and Rhythm: Normal rate and regular rhythm.      Heart sounds: Normal heart sounds.   Pulmonary:      Effort: Pulmonary effort is normal. No respiratory distress.      Breath sounds: No wheezing, rhonchi or rales.   Abdominal:      General: Bowel sounds are normal. There is no distension.      Palpations: Abdomen is soft.      Tenderness: There is no abdominal tenderness. There is no guarding.   Musculoskeletal:      Right lower leg: No edema.      Left lower leg: No edema.   Skin:     General: Skin is warm and dry.      Findings: Bruising (facial contusion) present.   Neurological:      General: No focal deficit present.      Mental Status: She is oriented to person, place, and time.   Psychiatric:         Mood and Affect: Mood normal.         Behavior: Behavior normal.           Significant Labs: All pertinent labs within the past 24 hours have been reviewed.    Significant Imaging: I have reviewed all pertinent imaging results/findings within the past 24 hours.    Assessment/Plan:      Dysphagia  Mentation below baseline after admission, w/ somnolence & poor comprehension limiting participation w/ SLP evaluation and thus initially made NPO. Gradual improvement in mental status, allowing for SLP to advance diet to puree on 5/8. However, made strict NPO again after 5/9 episode where pt was found to be hypoxic w/ copious pooling secretions. Etiology unclear- while pt has hx of Alzheimer's, no prior issues with speech, swallowing, secretions, etc per family report. Repeat CT head  w/o acute findings. Laryngoscope per ENT showed notable pooling secretions, but was otherwise unrevealing. Considering decompensation 2/2 traumatic fall w/o direct injury/trauma to oropharyngeal structures. MRI brain w/o acute findings. Improved secretion mgmt & swallow w/ advancement of diet on 5/13.   ENT consulted; appreciate recs - now signed off  SLP following; appreciate assistance  Advance diet to soft/nectar thick per SLP recs    Ok to remove C-collar when working with SLP & for meals/meds only    Nondisplaced fracture of fourth cervical vertebra  Presented following GLF where pt fell forwards out of her recliner & hit her head. Workup, including CT & MRI of C-spine, revealed nondisplaced fractures involving the b/l C4 lamina & left C5 lamina w/ extention into IAP.    NSGY consulted - no acute intervention indicated at this time  Neuro checks Q4H  Maintain C-collar at all times (ok to remove for meals & meds only)   Follow-up in NSGY clinic in 2 months   PT/OT    Acute hypoxemic respiratory failure - resolved  AM of 5/9 RRT called as pt developed hypoxia- satting 86% on 6L w/ copious secretions. Mentation unchanged from earlier in hospitalization. NT & oral suctioning resulting in decreased O2 needs (down to 3L & satting 95%). CTX started 2/2 c/f aspiration. CXR clear/stable & no subsequent fever or leukocytosis. Weaned back to RA on 5/11 & CTX d/c'ed.   Holding further abx  Scopalamine patch  Albuterol PRN   Mgmt of dysphagia as above   Suction PRN   Supp O2 PRN for goal SpO2 > 92%  Continuous pulse ox    Hypertension  Chronic HTN, controlled via home regimen of amlodipine 10mg & carvedilol 3.125mg BID. On admission, SBP 130s-170s. Home meds continued w/ intermittent spikes to 170s-180s. Persistently elevated BP (140s-170s) on 5/8. Home meds held starting 5/9 d/t aspiration concerns & strict NPO status. IV hydralazine started, but with persistent BP 160s-200s.   Resume home coreg & amlodipine   PO  hydralazine 50mg Q6H   PRN IV hydralazine 20mg Q6H & IV labetolol to 10mg Q6H  Goal SBP < 180    Diabetes mellitus type II, controlled  Hx of T2DM, well-controlled w/ home regimen of glipizide. Last A1c 5.5% (Oct 2023). Serum glucose 148 on admission & CBGs have remained at goal during hospitalization thus far. Repeat A1c 6.1%.  Holding home glipizide  Deferring SSI for now  CBGs Q6H while NPO  Hypoglycemia protocol    Late onset Alzheimer's disease with behavioral disturbance  Hx of Alzheimer's dementia, although only with very mild sx per family at baseline. Deysier reports mild short-term memory impairment, but no orientation, speech, swallowing, or behavior issues previously.   Resume home seroquel (but ordered as PRN for now)   Delirium precautions     Paroxysmal A-fib  Pacemaker  History of AV block  Hx of pAF along w/ 2nd degree AV block. Medtronic Micra AV PM placed in Oct 2022. Appears to have been last interrogated in Jan 2024 which showed normal function & paced rhythm. Note pt's PM is MRI compatible, but requires EP lab assistance (device has to be set to MRI mode & then restored to original settings following scan), which is only available M-F.   Resume home coreg & apixaban  Monitor electrolytes, replete PRN for goal K > 4 & Mg > 2  Telemetry    CKD (chronic kidney disease), stage IV  Hx of CKD3-4, favored to be 2/2 age & hypertensive/diabetic nephropathy. On admission, Cr 1.1 (baseline 1.2-1.4) w/ BUN 24. Cr jumped to 1.8 evening of 5/8 i/s/o AHRF, but subsequently normalized.   Monitor Cr & UOP  Minimize nephrotoxic agents as able & renally-dose meds    Episode of transient neurologic symptoms  Follows w/ outpatient Neurology; last saw Dr. Mcgee via televisit on 4/11/24. Per neurology note, pt was having syncopal episodes where she'd be down for 30+ minutes, during which pt is not rousable, w/ possible eye deviation. Apparently had at least 4-5 events earlier this year. Although outpatient EEG  unrevealing, placed on keppra for possible seizure activity. No seizure activity or periods of unresponsiveness noted during hospitalization thus far.   Continue home keppra    Hypothyroidism following radioiodine therapy  Last TSH (Jan 2024) stable/nml at 3.962.   Continue home synthroid    DNR (do not resuscitate)  DNR status confirmed on admission. ACP docs on file.       VTE Risk Mitigation (From admission, onward)           Ordered     apixaban tablet 2.5 mg  2 times daily         05/13/24 1516     IP VTE HIGH RISK PATIENT  Once         05/05/24 1820     Place sequential compression device  Until discontinued         05/05/24 1820                    Discharge Planning   LILI: 5/14/2024     Code Status: DNR   Is the patient medically ready for discharge?:     Reason for patient still in hospital (select all that apply): Patient trending condition and Pending disposition  Discharge Plan A: Skilled Nursing Facility            Nel Hoover MD  Department of Hospital Medicine   [facility] - Telemetry Stepdown

## 2024-05-13 NOTE — PROGRESS NOTES
Scan completed and pt moved back to stretcher / cardiac device placed back to nor al mode by Maria Dolores with [facility] / waiting on transport to floor

## 2024-05-14 PROBLEM — D64.9 ANEMIA: Status: ACTIVE | Noted: 2024-05-14

## 2024-05-14 PROBLEM — E87.0 HYPERNATREMIA: Status: ACTIVE | Noted: 2024-05-14

## 2024-05-14 LAB
ANION GAP SERPL CALC-SCNC: 10 MMOL/L (ref 8–16)
BUN SERPL-MCNC: 41 MG/DL (ref 10–30)
CALCIUM SERPL-MCNC: 9.5 MG/DL (ref 8.7–10.5)
CHLORIDE SERPL-SCNC: 115 MMOL/L (ref 95–110)
CO2 SERPL-SCNC: 23 MMOL/L (ref 23–29)
CREAT SERPL-MCNC: 1 MG/DL (ref 0.5–1.4)
CREAT UR-MCNC: 75 MG/DL (ref 15–325)
ERYTHROCYTE [DISTWIDTH] IN BLOOD BY AUTOMATED COUNT: 14.5 % (ref 11.5–14.5)
EST. GFR  (NO RACE VARIABLE): 53.2 ML/MIN/1.73 M^2
FERRITIN SERPL-MCNC: 138 NG/ML (ref 20–300)
FOLATE SERPL-MCNC: 4.4 NG/ML (ref 4–24)
GLUCOSE SERPL-MCNC: 143 MG/DL (ref 70–110)
HCT VFR BLD AUTO: 34.2 % (ref 37–48.5)
HGB BLD-MCNC: 10.9 G/DL (ref 12–16)
IRON SERPL-MCNC: 112 UG/DL (ref 30–160)
MCH RBC QN AUTO: 30.2 PG (ref 27–31)
MCHC RBC AUTO-ENTMCNC: 31.9 G/DL (ref 32–36)
MCV RBC AUTO: 95 FL (ref 82–98)
OSMOLALITY UR: 568 MOSM/KG (ref 50–1200)
PLATELET # BLD AUTO: 219 K/UL (ref 150–450)
PMV BLD AUTO: 10.1 FL (ref 9.2–12.9)
POCT GLUCOSE: 133 MG/DL (ref 70–110)
POCT GLUCOSE: 218 MG/DL (ref 70–110)
POCT GLUCOSE: 233 MG/DL (ref 70–110)
POTASSIUM SERPL-SCNC: 3.5 MMOL/L (ref 3.5–5.1)
RBC # BLD AUTO: 3.61 M/UL (ref 4–5.4)
SATURATED IRON: 50 % (ref 20–50)
SODIUM SERPL-SCNC: 148 MMOL/L (ref 136–145)
SODIUM UR-SCNC: 52 MMOL/L (ref 20–250)
TOTAL IRON BINDING CAPACITY: 223 UG/DL (ref 250–450)
TRANSFERRIN SERPL-MCNC: 151 MG/DL (ref 200–375)
VIT B12 SERPL-MCNC: 720 PG/ML (ref 210–950)
WBC # BLD AUTO: 6.19 K/UL (ref 3.9–12.7)

## 2024-05-14 PROCEDURE — 85027 COMPLETE CBC AUTOMATED: CPT | Performed by: STUDENT IN AN ORGANIZED HEALTH CARE EDUCATION/TRAINING PROGRAM

## 2024-05-14 PROCEDURE — 83540 ASSAY OF IRON: CPT | Performed by: STUDENT IN AN ORGANIZED HEALTH CARE EDUCATION/TRAINING PROGRAM

## 2024-05-14 PROCEDURE — 84300 ASSAY OF URINE SODIUM: CPT | Performed by: STUDENT IN AN ORGANIZED HEALTH CARE EDUCATION/TRAINING PROGRAM

## 2024-05-14 PROCEDURE — 83935 ASSAY OF URINE OSMOLALITY: CPT | Performed by: STUDENT IN AN ORGANIZED HEALTH CARE EDUCATION/TRAINING PROGRAM

## 2024-05-14 PROCEDURE — 82570 ASSAY OF URINE CREATININE: CPT | Performed by: STUDENT IN AN ORGANIZED HEALTH CARE EDUCATION/TRAINING PROGRAM

## 2024-05-14 PROCEDURE — 25000003 PHARM REV CODE 250: Performed by: STUDENT IN AN ORGANIZED HEALTH CARE EDUCATION/TRAINING PROGRAM

## 2024-05-14 PROCEDURE — 97110 THERAPEUTIC EXERCISES: CPT

## 2024-05-14 PROCEDURE — 97116 GAIT TRAINING THERAPY: CPT

## 2024-05-14 PROCEDURE — 82746 ASSAY OF FOLIC ACID SERUM: CPT | Performed by: STUDENT IN AN ORGANIZED HEALTH CARE EDUCATION/TRAINING PROGRAM

## 2024-05-14 PROCEDURE — 82728 ASSAY OF FERRITIN: CPT | Performed by: STUDENT IN AN ORGANIZED HEALTH CARE EDUCATION/TRAINING PROGRAM

## 2024-05-14 PROCEDURE — 36415 COLL VENOUS BLD VENIPUNCTURE: CPT | Performed by: STUDENT IN AN ORGANIZED HEALTH CARE EDUCATION/TRAINING PROGRAM

## 2024-05-14 PROCEDURE — 80048 BASIC METABOLIC PNL TOTAL CA: CPT | Performed by: STUDENT IN AN ORGANIZED HEALTH CARE EDUCATION/TRAINING PROGRAM

## 2024-05-14 PROCEDURE — 92526 ORAL FUNCTION THERAPY: CPT

## 2024-05-14 PROCEDURE — 97535 SELF CARE MNGMENT TRAINING: CPT

## 2024-05-14 PROCEDURE — 82607 VITAMIN B-12: CPT | Performed by: STUDENT IN AN ORGANIZED HEALTH CARE EDUCATION/TRAINING PROGRAM

## 2024-05-14 PROCEDURE — 20600001 HC STEP DOWN PRIVATE ROOM

## 2024-05-14 PROCEDURE — 63600175 PHARM REV CODE 636 W HCPCS: Performed by: STUDENT IN AN ORGANIZED HEALTH CARE EDUCATION/TRAINING PROGRAM

## 2024-05-14 RX ORDER — FOLIC ACID 1 MG/1
1 TABLET ORAL DAILY
Qty: 30 TABLET | Refills: 11 | Status: SHIPPED | OUTPATIENT
Start: 2024-05-15 | End: 2025-05-15

## 2024-05-14 RX ORDER — DEXTROSE MONOHYDRATE 50 MG/ML
INJECTION, SOLUTION INTRAVENOUS CONTINUOUS
Status: DISCONTINUED | OUTPATIENT
Start: 2024-05-14 | End: 2024-05-14

## 2024-05-14 RX ORDER — CARVEDILOL 3.12 MG/1
3.12 TABLET ORAL 2 TIMES DAILY
Qty: 60 TABLET | Refills: 3 | Status: SHIPPED | OUTPATIENT
Start: 2024-05-14 | End: 2025-05-14

## 2024-05-14 RX ORDER — HYDRALAZINE HYDROCHLORIDE 50 MG/1
50 TABLET, FILM COATED ORAL EVERY 6 HOURS
Qty: 120 TABLET | Refills: 11 | Status: SHIPPED | OUTPATIENT
Start: 2024-05-14 | End: 2025-05-14

## 2024-05-14 RX ORDER — DEXTROSE MONOHYDRATE 50 MG/ML
INJECTION, SOLUTION INTRAVENOUS CONTINUOUS
Status: ACTIVE | OUTPATIENT
Start: 2024-05-14 | End: 2024-05-14

## 2024-05-14 RX ORDER — LEVOTHYROXINE SODIUM 75 UG/1
75 TABLET ORAL
Qty: 90 TABLET | Refills: 3 | Status: SHIPPED | OUTPATIENT
Start: 2024-05-14

## 2024-05-14 RX ORDER — DEXTROSE, SODIUM CHLORIDE, SODIUM LACTATE, POTASSIUM CHLORIDE, AND CALCIUM CHLORIDE 5; .6; .31; .03; .02 G/100ML; G/100ML; G/100ML; G/100ML; G/100ML
INJECTION, SOLUTION INTRAVENOUS CONTINUOUS
Status: DISCONTINUED | OUTPATIENT
Start: 2024-05-14 | End: 2024-05-14

## 2024-05-14 RX ORDER — POLYETHYLENE GLYCOL 3350 17 G/17G
17 POWDER, FOR SOLUTION ORAL DAILY
Start: 2024-05-15

## 2024-05-14 RX ORDER — QUETIAPINE FUMARATE 25 MG/1
75 TABLET, FILM COATED ORAL NIGHTLY
Qty: 90 TABLET | Refills: 11 | Status: SHIPPED | OUTPATIENT
Start: 2024-05-14 | End: 2025-05-14

## 2024-05-14 RX ORDER — INSULIN ASPART 100 [IU]/ML
0-5 INJECTION, SOLUTION INTRAVENOUS; SUBCUTANEOUS
Status: DISCONTINUED | OUTPATIENT
Start: 2024-05-14 | End: 2024-05-15 | Stop reason: HOSPADM

## 2024-05-14 RX ORDER — LEVETIRACETAM 250 MG/1
250 TABLET ORAL 2 TIMES DAILY
Qty: 60 TABLET | Refills: 11 | Status: SHIPPED | OUTPATIENT
Start: 2024-05-14 | End: 2025-05-14

## 2024-05-14 RX ORDER — FOLIC ACID 1 MG/1
1 TABLET ORAL DAILY
Status: DISCONTINUED | OUTPATIENT
Start: 2024-05-14 | End: 2024-05-15 | Stop reason: HOSPADM

## 2024-05-14 RX ORDER — SCOLOPAMINE TRANSDERMAL SYSTEM 1 MG/1
1 PATCH, EXTENDED RELEASE TRANSDERMAL
Start: 2024-05-15

## 2024-05-14 RX ORDER — AMLODIPINE BESYLATE 10 MG/1
10 TABLET ORAL DAILY
Qty: 90 TABLET | Refills: 0 | Status: SHIPPED | OUTPATIENT
Start: 2024-05-14

## 2024-05-14 RX ADMIN — LEVETIRACETAM 250 MG: 250 TABLET, FILM COATED ORAL at 12:05

## 2024-05-14 RX ADMIN — CARVEDILOL 3.12 MG: 3.12 TABLET, FILM COATED ORAL at 08:05

## 2024-05-14 RX ADMIN — FOLIC ACID 1 MG: 1 TABLET ORAL at 08:05

## 2024-05-14 RX ADMIN — POTASSIUM BICARBONATE 25 MEQ: 978 TABLET, EFFERVESCENT ORAL at 09:05

## 2024-05-14 RX ADMIN — HYDRALAZINE HYDROCHLORIDE 50 MG: 50 TABLET ORAL at 05:05

## 2024-05-14 RX ADMIN — BISACODYL 10 MG: 10 SUPPOSITORY RECTAL at 08:05

## 2024-05-14 RX ADMIN — POLYETHYLENE GLYCOL 3350 17 G: 17 POWDER, FOR SOLUTION ORAL at 08:05

## 2024-05-14 RX ADMIN — HYDRALAZINE HYDROCHLORIDE 50 MG: 50 TABLET ORAL at 06:05

## 2024-05-14 RX ADMIN — AMLODIPINE BESYLATE 10 MG: 10 TABLET ORAL at 08:05

## 2024-05-14 RX ADMIN — POTASSIUM BICARBONATE 25 MEQ: 978 TABLET, EFFERVESCENT ORAL at 01:05

## 2024-05-14 RX ADMIN — INSULIN ASPART 2 UNITS: 100 INJECTION, SOLUTION INTRAVENOUS; SUBCUTANEOUS at 05:05

## 2024-05-14 RX ADMIN — DEXTROSE MONOHYDRATE: 50 INJECTION, SOLUTION INTRAVENOUS at 09:05

## 2024-05-14 RX ADMIN — APIXABAN 2.5 MG: 2.5 TABLET, FILM COATED ORAL at 08:05

## 2024-05-14 RX ADMIN — HYDRALAZINE HYDROCHLORIDE 50 MG: 50 TABLET ORAL at 11:05

## 2024-05-14 RX ADMIN — LEVOTHYROXINE SODIUM 75 MCG: 75 TABLET ORAL at 06:05

## 2024-05-14 RX ADMIN — LEVETIRACETAM 250 MG: 250 TABLET, FILM COATED ORAL at 09:05

## 2024-05-14 RX ADMIN — APIXABAN 2.5 MG: 2.5 TABLET, FILM COATED ORAL at 09:05

## 2024-05-14 RX ADMIN — LEVETIRACETAM 250 MG: 250 TABLET, FILM COATED ORAL at 08:05

## 2024-05-14 RX ADMIN — CARVEDILOL 3.12 MG: 3.12 TABLET, FILM COATED ORAL at 09:05

## 2024-05-14 RX ADMIN — INSULIN ASPART 2 UNITS: 100 INJECTION, SOLUTION INTRAVENOUS; SUBCUTANEOUS at 11:05

## 2024-05-14 NOTE — PT/OT/SLP PROGRESS
Speech Language Pathology Treatment    Patient Name:  Quin Linn   MRN:  [id number]  Admitting Diagnosis: Acute hypoxemic respiratory failure    Recommendations:                 General Recommendations:  Dysphagia therapy  Diet recommendations:  Soft & Bite Sized Diet - IDDSI Level 6, Liquid Diet Level: Mildly thick/Nectar thick liquids - IDDSI Level 2   Aspiration Precautions: Feed only when awake/alert, HOB to 90 degrees, Small bites/sips, and Standard aspiration precautions   General Precautions: Standard, fall, aspiration, nectar thick  Communication strategies:  none    Assessment:     Quin Linn is a 89 y.o. female with an SLP diagnosis of Dysphagia.      Subjective     Awake/alert  Caregiver at bedside     Pain/Comfort:  Pain Rating 1: 0/10  Pain Rating Post-Intervention 1: 0/10    Respiratory Status: Room air    Objective:     Has the patient been evaluated by SLP for swallowing?   Yes  Keep patient NPO? Yes     Pt repositioned upright in bed for PO trials. Caregiver at bedside stated pt is tolerating diet without difficulty. She tolerated thin liquids x5 via consecutive straw sips with coughing noted post swallow. Pt tolerated thin liquids with pinched straw x 6oz without overt s/s of airway compromise. Recommend continue nectar thick liquids/soft diet at this time. SLP reviewed swallow precautions with pt and caregiver who verbalized understanding.     Goals:   Multidisciplinary Problems       SLP Goals          Problem: SLP    Goal Priority Disciplines Outcome   SLP Goal     SLP    Description: Speech Language Pathology Goals  Goals expected to be met by 5/13    1. Pt will participate in ongoing swallow assessment                               Plan:     Patient to be seen:  4 x/week   Plan of Care reviewed with:  patient, caregiver   SLP Follow-Up:  Yes       Discharge recommendations:   (tbd)   Time Tracking:     SLP Treatment Date:   05/14/24  Speech Start Time:  1003  Speech Stop Time:  1013      Speech Total Time (min):  10 min    Billable Minutes: Treatment Swallowing Dysfunction 10    05/14/2024

## 2024-05-14 NOTE — PT/OT/SLP PROGRESS
Physical Therapy Treatment    Patient Name:  Quin Linn   MRN:  [id number]  Admitting Diagnosis:  Acute hypoxemic respiratory failure   Recent Surgery: * No surgery found *    Admit Date: 5/5/2024  Length of Stay: 8 days    Recommendations:     Discharge Recommendations:  Moderate Intensity Therapy  Discharge Equipment Recommendations: wheelchair, walker, rolling   Justification for Equipment: Patient has a mobility limitation that significantly impairs their ability to participate in one or more mobility related activities of daily living in customary locations in the home. The mobility limitation cannot be sufficiently resolved by the use of a cane or walker. The use of a manual wheelchair will greatly improve the patient's ability to participate in MRADLs. The patient will use the wheelchair on a regular basis at home. They have expressed their willingness to use a manual wheelchair in the home, and have a caregiver who is available and willing to assist with the wheelchair if needed.    Patient demonstrates a mobility limitation that significantly impairs their ability to participate in one or more mobility related activities of daily living. Patient's mobility limitation cannot be sufficiently resolved with the use of a cane, but can be sufficiently resolved with the use of a rolling walker.The use of a rolling walker will considerably improve their ability to participate in MRADLs. Patient will use the walker on a regular basis at home.     Barriers to discharge: Evolving Clinical Presentation    Assessment:     Quin Linn is a 89 y.o. female admitted with a medical diagnosis of Acute hypoxemic respiratory failure.  She presents with the following impairments/functional limitations:  weakness, impaired functional mobility, impaired cognition, decreased safety awareness, gait instability, impaired endurance, impaired balance, impaired self care skills, decreased lower extremity function.     Pt agreeable to  "therapy, tolerates increased gait distance today with RW, mildly unsteady needing assistance for stability and safety. Pt minimally verbal, likely due to dementia. Use caution when ambulating as patient will sit down suddenly.    Rehab Prognosis: Fair; patient would benefit from acute skilled PT services to address these deficits and reach maximum level of function.    Recent Surgery: * No surgery found *      Treatment Tolerated: Fairly Well    Highest level of mobility achieved this visit: ambulates 6ft + 4ft w/ RW and mod A    Activity with RN/PCT: transfer with one person assist    Plan:     During this hospitalization, patient to be seen 3 x/week to address the identified rehab impairments via gait training, therapeutic activities, therapeutic exercises, neuromuscular re-education and progress toward the following goals:    Plan of Care Expires:  06/06/24    Subjective     RN Misael notified prior to session. Pt's caregiver present upon PT entrance into room.    Chief Complaint: fatigue  Patient/Family Comments/goals: Caregiver reports she has not walked since being discharged to nursing home form last admission.   Pain/Comfort:  Pain Rating 1: 0/10      Objective:     Additional staff present: none    Patient found up in chair with: cervical collar, PureWick, telemetry   Cognition:   Cooperative and Flat affect  Command following: Follows one-step verbal commands most of the time  Fluency: largely non-verbal  General Precautions: Standard, fall, aspiration, nectar thick   Orthopedic Precautions:spinal precautions   Braces: Aspen collar   Body mass index is 21.98 kg/m².  Oxygen Device: Room Air    Vitals: BP (!) 133/55 (Patient Position: Lying)   Pulse 63   Temp 98.2 °F (36.8 °C) (Axillary)   Resp 18   Ht 5' 5" (1.651 m)   Wt 59.9 kg (132 lb 0.9 oz)   LMP  (LMP Unknown)   SpO2 (!) 94%   BMI 21.98 kg/m²     Outcome Measures:  AM-PAC 6 CLICK MOBILITY  Turning over in bed (including adjusting bedclothes, " sheets and blankets)?: 3  Sitting down on and standing up from a chair with arms (e.g., wheelchair, bedside commode, etc.): 2  Moving from lying on back to sitting on the side of the bed?: 3  Moving to and from a bed to a chair (including a wheelchair)?: 2  Need to walk in hospital room?: 2  Climbing 3-5 steps with a railing?: 1  Basic Mobility Total Score: 13     Functional Mobility:    Bed Mobility:   Pt found/returned to bedside chair    Transfers:   Sit > Stand Transfer: moderate assistance with rolling walker   Stand > Sit Transfer: moderate assistance with rolling walker   x2 trials from bedside chair  Sits abruptly and uncontrolled when fatigued    Standing Balance:  Assistance Level Required: Minimal Assistance  Patient used: rolling walker   Time: 3 min + 2 min  Postural deviations noted: slouched posture and rounded shoulders  Encouraged: upright stance with hip extension      Gait:  Patient ambulated: 6ft + 4ft   Patient required: moderate assist  Patient used:  rolling walker   Gait Pattern observed: swing-to gait  Gait Deviation(s): occasional unsteady gait, decreased step length, narrow base of support, shuffle gait, flexed posture, and decreased parrish  Impairments due to: impaired balance, decreased strength, decreased endurance, and impaired postural control  all lines remained intact throughout ambulation trial  Chair follow for patient safety  Gait belt utilized    Therapeutic Exercises:   Seated in bedside chair AROM: Pt performed marches, LAQs, hip add/abd, and APs x  10-15 repetitions with facilitation for correct performance and sequencing. Exercises performed to develop and maintain pt's  strength.     Education:  Time provided for education, counseling and discussion of health disposition in regards to patient's current status  All questions answered within PT scope of practice and to patient's satisfaction  PT role in POC to address current functional deficits  Pt educated on proper body  mechanics, safety techniques, and energy conservation with PT facilitation and cueing throughout session    Patient left up in chair with all lines intact, call button in reach, and caregiver present.    GOALS:   Multidisciplinary Problems       Physical Therapy Goals          Problem: Physical Therapy    Goal Priority Disciplines Outcome Goal Variances Interventions   Physical Therapy Goal     PT, PT/OT Progressing     Description: Goals to be met by: 24     Patient will increase functional independence with mobility by performin. Supine to sit with Minimal Assistance  2. Sit to stand transfer with Minimal Assistance  3. Bed to chair transfer with Minimal Assistance using LRAD  4. Gait x20 feet with Minimal Assistance using Rolling Walker                       Time Tracking:     PT Received On: 24  PT Start Time: 1137     PT Stop Time: 1200  PT Total Time (min): 23 min     Billable Minutes:   Gait Training 10 min and Therapeutic Exercise 13 min    Treatment Type: Treatment  PT/PTA: PT       Lobo Ross, PT, DPT  2024

## 2024-05-14 NOTE — PT/OT/SLP PROGRESS
Occupational Therapy   Treatment    Name: Quin Linn  MRN: [id number]  Admitting Diagnosis:  Acute hypoxemic respiratory failure       Recommendations:     Discharge Recommendations: Moderate Intensity Therapy  Discharge Equipment Recommendations:  wheelchair  Barriers to discharge:  Other (Comment) (requires increased assist)    Assessment:     Quin Linn is a 89 y.o. female with a medical diagnosis of Acute hypoxemic respiratory failure.  She presents with deficits in mobility as well as ADL tasks. Pt. Required significant assist for transfer on this date. Pt. Participated well in there ex and grooming tasks. Patient would benefit from continued OT services to maximize level of safety and independence with self-care tasks.   . Performance deficits affecting function are weakness, impaired endurance, impaired self care skills, impaired functional mobility, impaired balance, decreased lower extremity function, decreased upper extremity function, decreased ROM.     Rehab Prognosis:  Good; patient would benefit from acute skilled OT services to address these deficits and reach maximum level of function.       Plan:     Patient to be seen 3 x/week to address the above listed problems via self-care/home management, therapeutic activities, therapeutic exercises  Plan of Care Expires: 06/05/24  Plan of Care Reviewed with: patient, caregiver    Subjective     Chief Complaint: No complaints noted  Patient/Family Comments/goals: No specific goals stated  Pain/Comfort:  Pain Rating 1: 0/10  Pain Rating Post-Intervention 1: 0/10    Objective:     Communicated with: nurse prior to session.  Patient found supine with telemetry, PureWick, cervical collar (caregiver present) upon OT entry to room.    General Precautions: Standard, fall, aspiration, nectar thick    Orthopedic Precautions:spinal precautions  Braces: Aspen collar  Respiratory Status: Room air     Occupational Performance:     Bed Mobility:    Patient completed  Rolling/Turning to Right with min/moderate assistance log roll  Patient completed Supine to Sit with moderate assistance with HOB elevated    Functional Mobility/Transfers:  Patient completed Sit <> Stand Transfer with moderate assistance  with  no assistive device : pt. Performed sit to stand from bedside chair with Mod A  Patient completed Bed <> Chair Transfer using Stand Pivot technique with maximal assistance with no assistive device  Functional Mobility: Not tested    Activities of Daily Living:  Grooming: moderate assistance to comb back of hair         UBD: Max A to don gown like robe seated EOB    [facility] 6 Click ADL: 14    Treatment & Education:  Pt. Performed 2 sets 10 reps of BUE AROM there ex seated in bedside chair for all major planes of BUE/ BLE  motion with assist provided on RUE for all shoulder motions    Patient left up in chair with all lines intact, call button in reach, and sitter present    GOALS:   Multidisciplinary Problems       Occupational Therapy Goals          Problem: Occupational Therapy    Goal Priority Disciplines Outcome Interventions   Occupational Therapy Goal     OT, PT/OT Progressing    Description: Goals to be met by: 6/5/24     Patient will increase functional independence with ADLs by performing:    LE Dressing with Minimal Assistance.  Grooming while standing at sink with Minimal Assistance.  Toileting from bedside commode with Minimal Assistance for hygiene and clothing management.   Supine to sit with Stand-by Assistance.  Stand pivot transfers with Minimal Assistance.  Toilet transfer to bedside commode with Minimal Assistance.                           Time Tracking:     OT Date of Treatment: 05/14/24  OT Start Time: 1037  OT Stop Time: 1109  OT Total Time (min): 32 min    Billable Minutes:Self Care/Home Management 16  Therapeutic Exercise 16    OT/DANIEL: OT     Number of DANIEL visits since last OT visit: 0    5/14/2024

## 2024-05-14 NOTE — PROGRESS NOTES
[facility] - Telemetry [facility] Medicine  Progress Note    Patient Name: Quin Linn  MRN: [id number]  Patient Class: IP- Inpatient   Admission Date: 5/5/2024  Length of Stay: 8 days  Attending Physician: Nel Hoover MD  Primary Care Provider: Mary Ellen Nesbitt MD    Subjective:     Principal Problem: Acute hypoxemic respiratory failure    HPI:  89 y/o F PMH CHF, Alzheimer's, DM, HTN who presents to the ED via EMS for evaluation of a fall with CT Csp demonstrating bilateral C4 lamina fracture and L C5 lamina fracture with extension into IAP. At baseline patient ambulates with wheelchair and needs assistance with transfers. Reportedly patient had a fall forwards out of recliner, she hit her head on the ground and had a bleeding laceration. Limited hx given dementia, but pt denies any LOC, CP, dyspnea, N/V/D, headache, new weakness/numbness or neck pain. Baseline incontinence .    In the ED patient afebrile and hemodynamically stable saturating well on room air. Moves all extremities, alert and oriented to person. Conversational and pleasant with limited insight. Patient placed in C collar NSGY consulted and evaluated patient. MRI without significant canal stenosis and no plan for acute intervention per NSGY. Admitted to the care of medicine for further evaluation and management.     Overview/Hospital Course:  Ms. Linn was admitted to Hospital Medicine for management of a cervical fracture.  NSGY was consulted who said C collar and pain control, no surgical intervention.  She was started on a multimodal pain regimen and PT/OT were consulted who suggested moderate therapy.  Working on SNF placement.  SLP was consulted given concern for dysphagia and she was made NPO.  Diet was advanced to full liquids with crushed meds.    Developed hypoxia 5/9 evening. Rapid response called. Oral secretions requiring frequent suctioning. Scopolamine patch. Repeat CXR unrevealing. BNP elevated but unclear baseline - trial  IV Lasix 20 mg x 1. Considering aspiration - strict NPO per SLP. Transferred to  service. Weaned to RA. Repeat CT head & MRI brain negative for acute process.     Interval History:   NAEON. Transitioned back to home PO anti-hypertensive regimen w/ continuation of scheduled hydralazine given persistent HTN. Na 148 (appears pt didn't receive D5W bolus yesterday); calculated free water deficit 1.5L for which 1.5L of D5W ordered. Renal function continues to improve. Stable on RA and breath sounds clear. Pt denies pain, SOB, or cough. Medically ready for discharge to SNF pending placement.       Review of Systems   Constitutional:  Negative for chills, diaphoresis and fever.   HENT:  Negative for congestion, rhinorrhea and sore throat.    Eyes:  Negative for visual disturbance.   Respiratory:  Negative for cough, shortness of breath and wheezing.    Cardiovascular:  Negative for chest pain and palpitations.   Gastrointestinal:  Negative for abdominal pain, diarrhea, nausea and vomiting.   Musculoskeletal:  Negative for back pain and myalgias.   Skin:  Negative for color change and rash.   Neurological:  Negative for dizziness, light-headedness and headaches.   Psychiatric/Behavioral:  Negative for confusion and sleep disturbance.      Objective:     Vital Signs (Most Recent):  Temp: 97.6 °F (36.4 °C) (05/13/24 1611)  Pulse: 70 (05/13/24 1611)  Resp: 18 (05/13/24 1611)  BP: (!) 162/69 (05/13/24 1611)  SpO2: 98 % (05/13/24 1611) Vital Signs (24h Range):  Temp:  [96 °F (35.6 °C)-98 °F (36.7 °C)] 97.6 °F (36.4 °C)  Pulse:  [61-78] 70  Resp:  [12-18] 18  SpO2:  [95 %-100 %] 98 %  BP: (129-175)/(60-93) 162/69     Weight: 59.9 kg (132 lb 0.9 oz)  Body mass index is 21.98 kg/m².    Intake/Output Summary (Last 24 hours) at 5/13/2024 1672  Last data filed at 5/13/2024 0545  Gross per 24 hour   Intake --   Output 400 ml   Net -400 ml           Physical Exam  Constitutional:       General: She is not in acute distress.      Appearance: She is normal weight. She is not toxic-appearing or diaphoretic.   HENT:      Head: Normocephalic.   Eyes:      Conjunctiva/sclera: Conjunctivae normal.   Neck:      Comments: C-collar in place  Cardiovascular:      Rate and Rhythm: Normal rate and regular rhythm.      Heart sounds: Normal heart sounds.   Pulmonary:      Effort: Pulmonary effort is normal. No respiratory distress.      Breath sounds: No wheezing, rhonchi or rales.   Abdominal:      General: Bowel sounds are normal. There is no distension.      Palpations: Abdomen is soft.      Tenderness: There is no abdominal tenderness. There is no guarding.   Musculoskeletal:      Right lower leg: No edema.      Left lower leg: No edema.   Skin:     General: Skin is warm and dry.      Findings: Bruising (facial contusion) present.   Neurological:      General: No focal deficit present.      Mental Status: She is oriented to person, place, and time.   Psychiatric:         Mood and Affect: Mood normal.         Behavior: Behavior normal.           Significant Labs: All pertinent labs within the past 24 hours have been reviewed.    Significant Imaging: I have reviewed all pertinent imaging results/findings within the past 24 hours.    Assessment/Plan:      Dysphagia  Mentation below baseline after admission, w/ somnolence & poor comprehension limiting participation w/ SLP evaluation and thus initially made NPO. Gradual improvement in mental status, allowing for SLP to advance diet to puree on 5/8. However, made strict NPO again after 5/9 episode where pt was found to be hypoxic w/ copious pooling secretions. Etiology unclear- while pt has hx of Alzheimer's, no prior issues with speech, swallowing, secretions, etc per family report. Repeat CT head w/o acute findings. Laryngoscope per ENT showed notable pooling secretions, but was otherwise unrevealing. Considering decompensation 2/2 traumatic fall w/o direct injury/trauma to oropharyngeal structures. MRI  brain w/o acute findings. Improved secretion mgmt & swallow w/ advancement of diet on 5/13.   ENT consulted; appreciate recs - now signed off  SLP following; appreciate assistance  Advance diet to soft/nectar thick per SLP recs    Ok to remove C-collar when working with SLP & for meals/meds only    Nondisplaced fracture of fourth cervical vertebra  Presented following GLF where pt fell forwards out of her recliner & hit her head. Workup, including CT & MRI of C-spine, revealed nondisplaced fractures involving the b/l C4 lamina & left C5 lamina w/ extention into IAP.    NSGY consulted - no acute intervention indicated at this time  Neuro checks Q4H  Maintain C-collar at all times (ok to remove for meals & meds only)   Follow-up in NSGY clinic in 2 months   PT/OT    Acute hypoxemic respiratory failure - resolved  AM of 5/9 RRT called as pt developed hypoxia- satting 86% on 6L w/ copious secretions. Mentation unchanged from earlier in hospitalization. NT & oral suctioning resulting in decreased O2 needs (down to 3L & satting 95%). CTX started 2/2 c/f aspiration. CXR clear/stable & no subsequent fever or leukocytosis. Weaned back to RA on 5/11 & CTX d/c'ed.   Holding further abx  D/c scopalamine patch  Albuterol PRN   Mgmt of dysphagia as above   Suction PRN   Supp O2 PRN for goal SpO2 > 92%  Continuous pulse ox    Hypertension  Chronic HTN, controlled via home regimen of amlodipine 10mg & carvedilol 3.125mg BID. On admission, SBP 130s-170s. Home meds continued w/ intermittent spikes to 170s-180s. Persistently elevated BP (140s-170s) on 5/8. Home meds held starting 5/9 d/t aspiration concerns & strict NPO status. IV hydralazine started, but with persistent BP 160s-200s.   Resume home coreg & amlodipine   PO hydralazine 50mg Q6H   PRN IV hydralazine 20mg Q6H & IV labetolol to 10mg Q6H  Goal SBP < 180    Anemia  On admission, Hgb 14 (although suspect hemoconcentration given baseline ~11-12). Drop in Hgb to 11.3 the  following day. MCV 90s. Although pt w/ hematoma, no e/o active bleeding. Nml B12 (720) & iron panel w/ low TIBC (223) & transferrin (151), but otherwise nml. Folate low-nml (4.4). Suspect mild anemia of chronic disease w/ contributing folate deficiency.   Daily folic acid supplement   Monitor CBC & for e/o active bleeding  Transfuse PRN for goal Hgb > 7    Hypernatremia  On admission, Na 142. Remained stable in low 140s until 5/11 when it jumped to 148-150. Renal function however remained nml. Unclear etiology- pt had received gentle mIVF while NPO, so lower suspicion for hypovolemic etiology. Iram 52, Ucr 75, Uosm 568. Calculated free water deficit of 1.5L.   1.5L D5W (150cc/hr for 10hrs)  BMP daily  Consider nephrology consult if no improvement with IVF    Diabetes mellitus type II, controlled  Hx of T2DM, well-controlled w/ home regimen of glipizide. Last A1c 5.5% (Oct 2023). Serum glucose 148 on admission & CBGs have remained at goal during hospitalization thus far. Repeat A1c 6.1%.  Holding home glipizide  Deferring SSI for now  CBGs Q6H while NPO  Hypoglycemia protocol    Late onset Alzheimer's disease with behavioral disturbance  Hx of Alzheimer's dementia, although only with very mild sx per family at baseline. Norma reports mild short-term memory impairment, but no orientation, speech, swallowing, or behavior issues previously.   Resume home seroquel (but ordered as PRN for now)   Delirium precautions     Paroxysmal A-fib  Pacemaker  History of AV block  Hx of pAF along w/ 2nd degree AV block. Medtronic Micra AV PM placed in Oct 2022. Appears to have been last interrogated in Jan 2024 which showed normal function & paced rhythm. Note pt's PM is MRI compatible, but requires EP lab assistance (device has to be set to MRI mode & then restored to original settings following scan), which is only available M-F.   Resume home coreg & apixaban  Monitor electrolytes, replete PRN for goal K > 4 & Mg >  2  Telemetry    CKD (chronic kidney disease), stage IV  Hx of CKD3-4, favored to be 2/2 age & hypertensive/diabetic nephropathy. On admission, Cr 1.1 (baseline 1.2-1.4) w/ BUN 24. Cr jumped to 1.8 evening of 5/8 i/s/o AHRF, but subsequently normalized.   Monitor Cr & UOP  Minimize nephrotoxic agents as able & renally-dose meds    Episode of transient neurologic symptoms  Follows w/ outpatient Neurology; last saw Dr. Mcgee via televisit on 4/11/24. Per neurology note, pt was having syncopal episodes where she'd be down for 30+ minutes, during which pt is not rousable, w/ possible eye deviation. Apparently had at least 4-5 events earlier this year. Although outpatient EEG unrevealing, placed on keppra for possible seizure activity. No seizure activity or periods of unresponsiveness noted during hospitalization thus far.   Continue home keppra    Hypothyroidism following radioiodine therapy  Last TSH (Jan 2024) stable/nml at 3.962.   Continue home synthroid    DNR (do not resuscitate)  DNR status confirmed on admission. ACP docs on file.       VTE Risk Mitigation (From admission, onward)           Ordered     apixaban tablet 2.5 mg  2 times daily         05/13/24 1516     IP VTE HIGH RISK PATIENT  Once         05/05/24 1820     Place sequential compression device  Until discontinued         05/05/24 1820                    Discharge Planning   LILI: 5/15/2024     Code Status: DNR   Is the patient medically ready for discharge?:     Reason for patient still in hospital (select all that apply): Pending disposition  Discharge Plan A: Skilled Nursing Facility            Nel Hoover MD  Department of Hospital Medicine   [facility] - Telemetry Stepdown

## 2024-05-14 NOTE — PLAN OF CARE
Problem: Adult Inpatient Plan of Care  Goal: Plan of Care Review  Outcome: Progressing  Goal: Patient-Specific Goal (Individualized)  Outcome: Progressing  Goal: Absence of Hospital-Acquired Illness or Injury  Outcome: Progressing  Goal: Optimal Comfort and Wellbeing  Outcome: Progressing  Goal: Readiness for Transition of Care  Outcome: Progressing     Problem: Diabetes Comorbidity  Goal: Blood Glucose Level Within Targeted Range  Outcome: Progressing     Problem: Skin Injury Risk Increased  Goal: Skin Health and Integrity  Outcome: Progressing     Problem: Fall Injury Risk  Goal: Absence of Fall and Fall-Related Injury  Outcome: Progressing     Problem: Wound  Goal: Optimal Coping  Outcome: Progressing  Goal: Optimal Functional Ability  Outcome: Progressing  Goal: Absence of Infection Signs and Symptoms  Outcome: Progressing  Goal: Improved Oral Intake  Outcome: Progressing  Goal: Optimal Pain Control and Function  Outcome: Progressing  Goal: Skin Health and Integrity  Outcome: Progressing  Goal: Optimal Wound Healing  Outcome: Progressing

## 2024-05-14 NOTE — PLAN OF CARE
NURSING HOME ORDERS    05/15/2024  [facility]  [facility] - TELEMETRY STEPDOWN  [address]  [place] [facility] [place] [facility] [address]  Dept: [phone]  Loc: [phone]     Admit to Nursing Home:  Skilled Nursing Facility    Diagnoses:  Active Hospital Problems    Diagnosis  POA    *Acute hypoxemic respiratory failure [J96.01]  Yes    Anemia [D64.9]  No    Hypernatremia [E87.0]  No    Dysphagia [R13.10]  Yes    Nondisplaced fracture of fourth cervical vertebra [S12.301A]  Yes    DNR (do not resuscitate) [Z66]  Yes    Paroxysmal A-fib [I48.0]  Yes    CKD (chronic kidney disease), stage IV [N18.4]  Yes    Diabetes mellitus type II, controlled [E11.9]  Yes    Late onset Alzheimer's disease with behavioral disturbance [G30.1, F02.818]  Yes     Chronic    Episode of transient neurologic symptoms [R29.90]  Yes    Hypertension [I10]  Yes    Hypothyroidism following radioiodine therapy [E89.0]  Yes     Chronic      Resolved Hospital Problems    Diagnosis Date Resolved POA    Hypomagnesemia [E83.42] 05/12/2024 Yes       Patient is homebound due to:  Acute hypoxemic respiratory failure    Allergies:  Review of patient's allergies indicates:   Allergen Reactions    Tramadol Rash and Edema     Developed facial rash and edema.    Metformin Diarrhea       Vitals:  Routine    Diet: soft diet, nectar thick fluids    Activities:   Activity as tolerated    Goals of Care Treatment Preferences:  Code Status: DNR    Living Will: Yes          Labs:  Per discretion of facility provider    Nursing Precautions:  Aspiration  and Fall. C-collar on at all times; ok to remove for meals/meds only.     Consults:   PT to evaluate and treat- 5 times a week, OT to evaluate and treat- 5 times a week, and ST to evaluate and treat- 5 times a week     Miscellaneous Care: Diabetes Care: Diabetes: Check blood sugar. Fingerstick blood sugar AC and HS  Sliding Scale/Hypoglycemia Protocol: For FSG<80, give 1 amp D50 or 1 glucose tablet.  For FSG , do nothing. For -200, give 1 unit of novolog in addition to pre-meal insulin. For -250, give 2 units of novolog in addition to pre-meal insulin. For -300, give 3 units of novolog in addition to pre-meal insulin. For 301-350, give 4 units of novolog in addition to pre-meal insulin. For 351-400, give 5 units of novolog in addition to pre-meal insulin. For FSG >400, give 5 units of novolog in addition to pre-meal insulin and please call MD                   Diabetes Care:  SN to perform and educate Diabetic management with blood glucose monitoring:, Fingerstick blood sugar AC and HS, and Report CBG < 60 or > 350 to physician.      Medications: Discontinue all previous medication orders, if any. See new list below.     Medication List        START taking these medications      acetaminophen 500 MG tablet  Commonly known as: TYLENOL  Take 2 tablets (1,000 mg total) by mouth 3 (three) times daily. for 10 days     folic acid 1 MG tablet  Commonly known as: FOLVITE  Take 1 tablet (1 mg total) by mouth once daily.     hydrALAZINE 50 MG tablet  Commonly known as: APRESOLINE  Take 1 tablet (50 mg total) by mouth every 6 (six) hours.     methocarbamoL 500 MG Tab  Commonly known as: ROBAXIN  Take 1 tablet (500 mg total) by mouth 4 (four) times daily. for 10 days     scopolamine 1.3-1.5 mg (1 mg over 3 days)  Commonly known as: TRANSDERM-SCOP  Place 1 patch onto the skin Every 3 (three) days.            CONTINUE taking these medications      amLODIPine 10 MG tablet  Commonly known as: NORVASC  Take 1 tablet (10 mg total) by mouth once daily.     apixaban 2.5 mg Tab  Commonly known as: ELIQUIS  Take 1 tablet (2.5 mg total) by mouth 2 (two) times daily.     atorvastatin 80 MG tablet  Commonly known as: LIPITOR  TAKE 1 TABLET(80 MG) BY MOUTH EVERY DAY     calcium-vitamin D3 500 mg-5 mcg (200 unit) per tablet  Commonly known as: CALCIUM 500 + D  Take 1 tablet by mouth 2 (two) times daily with  meals.     carvediloL 3.125 MG tablet  Commonly known as: COREG  Take 1 tablet (3.125 mg total) by mouth 2 (two) times daily.     gabapentin 300 MG capsule  Commonly known as: NEURONTIN  Take 1 capsule (300 mg total) by mouth 2 (two) times daily.     levETIRAcetam 250 MG Tab  Commonly known as: KEPPRA  Take 1 tablet (250 mg total) by mouth 2 (two) times daily.     levothyroxine 75 MCG tablet  Commonly known as: SYNTHROID  Take 1 tablet (75 mcg total) by mouth before breakfast. Administer on an empty stomach at least 30 minutes before food or other medications.     polyethylene glycol 17 gram Pwpk  Commonly known as: GLYCOLAX  Take 17 g by mouth once daily.     QUEtiapine 25 MG Tab  Commonly known as: SEROQUEL  Take 3 tablets (75 mg total) by mouth every evening.     senna-docusate 8.6-50 mg 8.6-50 mg per tablet  Commonly known as: PERICOLACE  Take 1 tablet by mouth 2 (two) times daily as needed for Constipation.            STOP taking these medications      ACCU-CHEK FASTCLIX LANCET DRUM Misc  Generic drug: lancets     ACCU-CHEK GUIDE TEST STRIPS Strp  Generic drug: blood sugar diagnostic     glipiZIDE 2.5 MG tablet  Commonly known as: GLUCOTROL     HYDROcodone-acetaminophen 5-325 mg per tablet  Commonly known as: NORCO                Immunizations Administered as of 5/15/2024       Name Date Dose VIS Date Route Exp Date    COVID-19, MRNA, LN-S, PF (Pfizer) (Purple Cap) 8/16/2021  4:09 PM 0.3 mL 12/12/2020 Intramuscular 10/31/2021    Site: Left deltoid     Given By: Heather Hollis RN     : [employer]     Lot: [id number]     COVID-19, MRNA, LN-S, PF (Pfizer) (Purple Cap) 1/30/2021  2:17 PM 0.3 mL 12/12/2020 Intramuscular 5/31/2021    Site: Left deltoid     Given By: Nataly Franco MA     : [employer]     Lot: [id number]     COVID-19, MRNA, LN-S, PF (Pfizer) (Purple Cap) 1/9/2021  2:44 PM 0.3 mL 12/12/2020 Intramuscular 4/30/2021    Site: Left deltoid     Given By: Taylor Mcghee, MARYN      : [employer]     Lot: [id number]             This patient has had both covid vaccinations    Some patients may experience side effects after vaccination.  These may include fever, headache, muscle or joint aches.  Most symptoms resolve with 24-48 hours and do not require urgent medical evaluation unless they persist for more than 72 hours or symptoms are concerning for an unrelated medical condition.          _________________________________  Nel Hoover MD  05/15/2024

## 2024-05-15 VITALS
RESPIRATION RATE: 18 BRPM | BODY MASS INDEX: 22 KG/M2 | HEIGHT: 65 IN | SYSTOLIC BLOOD PRESSURE: 157 MMHG | TEMPERATURE: 98 F | OXYGEN SATURATION: 95 % | HEART RATE: 65 BPM | WEIGHT: 132.06 LBS | DIASTOLIC BLOOD PRESSURE: 67 MMHG

## 2024-05-15 LAB
ANION GAP SERPL CALC-SCNC: 9 MMOL/L (ref 8–16)
BUN SERPL-MCNC: 23 MG/DL (ref 10–30)
CALCIUM SERPL-MCNC: 9.3 MG/DL (ref 8.7–10.5)
CHLORIDE SERPL-SCNC: 109 MMOL/L (ref 95–110)
CO2 SERPL-SCNC: 24 MMOL/L (ref 23–29)
CREAT SERPL-MCNC: 0.8 MG/DL (ref 0.5–1.4)
ERYTHROCYTE [DISTWIDTH] IN BLOOD BY AUTOMATED COUNT: 13.6 % (ref 11.5–14.5)
EST. GFR  (NO RACE VARIABLE): >60 ML/MIN/1.73 M^2
GLUCOSE SERPL-MCNC: 147 MG/DL (ref 70–110)
HCT VFR BLD AUTO: 37.2 % (ref 37–48.5)
HGB BLD-MCNC: 11.8 G/DL (ref 12–16)
MCH RBC QN AUTO: 30.1 PG (ref 27–31)
MCHC RBC AUTO-ENTMCNC: 31.7 G/DL (ref 32–36)
MCV RBC AUTO: 95 FL (ref 82–98)
PLATELET # BLD AUTO: 204 K/UL (ref 150–450)
PMV BLD AUTO: 10.3 FL (ref 9.2–12.9)
POCT GLUCOSE: 140 MG/DL (ref 70–110)
POCT GLUCOSE: 187 MG/DL (ref 70–110)
POCT GLUCOSE: 218 MG/DL (ref 70–110)
POTASSIUM SERPL-SCNC: 3.7 MMOL/L (ref 3.5–5.1)
RBC # BLD AUTO: 3.92 M/UL (ref 4–5.4)
SODIUM SERPL-SCNC: 142 MMOL/L (ref 136–145)
WBC # BLD AUTO: 8.48 K/UL (ref 3.9–12.7)

## 2024-05-15 PROCEDURE — 36415 COLL VENOUS BLD VENIPUNCTURE: CPT | Performed by: STUDENT IN AN ORGANIZED HEALTH CARE EDUCATION/TRAINING PROGRAM

## 2024-05-15 PROCEDURE — 85027 COMPLETE CBC AUTOMATED: CPT | Performed by: STUDENT IN AN ORGANIZED HEALTH CARE EDUCATION/TRAINING PROGRAM

## 2024-05-15 PROCEDURE — 25000003 PHARM REV CODE 250: Performed by: STUDENT IN AN ORGANIZED HEALTH CARE EDUCATION/TRAINING PROGRAM

## 2024-05-15 PROCEDURE — 80048 BASIC METABOLIC PNL TOTAL CA: CPT | Performed by: STUDENT IN AN ORGANIZED HEALTH CARE EDUCATION/TRAINING PROGRAM

## 2024-05-15 RX ADMIN — BISACODYL 10 MG: 10 SUPPOSITORY RECTAL at 09:05

## 2024-05-15 RX ADMIN — LEVOTHYROXINE SODIUM 75 MCG: 75 TABLET ORAL at 06:05

## 2024-05-15 RX ADMIN — POTASSIUM BICARBONATE 25 MEQ: 978 TABLET, EFFERVESCENT ORAL at 09:05

## 2024-05-15 RX ADMIN — APIXABAN 2.5 MG: 2.5 TABLET, FILM COATED ORAL at 09:05

## 2024-05-15 RX ADMIN — HYDRALAZINE HYDROCHLORIDE 50 MG: 50 TABLET ORAL at 12:05

## 2024-05-15 RX ADMIN — POLYETHYLENE GLYCOL 3350 17 G: 17 POWDER, FOR SOLUTION ORAL at 09:05

## 2024-05-15 RX ADMIN — LEVETIRACETAM 250 MG: 250 TABLET, FILM COATED ORAL at 08:05

## 2024-05-15 RX ADMIN — HYDRALAZINE HYDROCHLORIDE 50 MG: 50 TABLET ORAL at 06:05

## 2024-05-15 RX ADMIN — CARVEDILOL 3.12 MG: 3.12 TABLET, FILM COATED ORAL at 09:05

## 2024-05-15 RX ADMIN — FOLIC ACID 1 MG: 1 TABLET ORAL at 09:05

## 2024-05-15 RX ADMIN — AMLODIPINE BESYLATE 10 MG: 10 TABLET ORAL at 09:05

## 2024-05-15 NOTE — PLAN OF CARE
Problem: Adult Inpatient Plan of Care  Goal: Plan of Care Review  Outcome: Met  Goal: Patient-Specific Goal (Individualized)  Outcome: Met  Goal: Absence of Hospital-Acquired Illness or Injury  Outcome: Met  Goal: Optimal Comfort and Wellbeing  Outcome: Met  Goal: Readiness for Transition of Care  Outcome: Met   Patient discharged to SNF.

## 2024-05-15 NOTE — PLAN OF CARE
CHW met with patient/family at bedside. Patient experience rounding completed and reviewed the following.     Do you know your discharge plan? Yes,    If yes, what is the plan? SNF     Have you discussed your needs and preferences with your SW/CM? Yes      If you are discharging home, do you have help at home? SNF    Do you think you will need help additional at home at discharge? SNF     Do you currently have difficulty keeping up with bills, affording medicine or buying food? SNF    Assigned SW/CM notified of any patient/family needs or concerns. Appropriate resources provided to address patient's needs.

## 2024-05-15 NOTE — PLAN OF CARE
Spoke with Bethany at [facility], she applied for auth this morning. All documents uploaded to [facility]. MD notified. \    11:46 AM  Updated orders sent    12:12 PM  Orders approved, Bethany sending paperwork to sister for signing. Must be done by 3pm.    JOSE Turner, LMSW [facility]  [id number]

## 2024-05-15 NOTE — PLAN OF CARE
[facility] - Telemetry Stepdown  Discharge Final Note    Primary Care Provider: Mary Ellen Nesbitt MD    Expected Discharge Date: 5/15/2024    Final Discharge Note (most recent)       Final Note - 05/15/24 1506          Final Note    Assessment Type Final Discharge Note (P)      Anticipated Discharge Disposition Skilled Nursing Facility (P)      What phone number can be called within the next 1-3 days to see how you are doing after discharge? [phone] (P)      Hospital Resources/Appts/Education Provided Provided patient/caregiver with written discharge plan information;Appointments scheduled and added to AVS (P)         Post-Acute Status    Post-Acute Authorization Placement (P)      Post-Acute Placement Status Set-up Complete/Auth obtained (P)      Coverage [facility] (P)      Patient choice form signed by patient/caregiver List from System Post-Acute Care (P)      Discharge Delays Ambulance Transport/Facility Transport (P)                      Important Message from Medicare  Important Message from Medicare regarding Discharge Appeal Rights: Given to patient/caregiver, Explained to patient/caregiver, Signed/date by patient/caregiver     Date IMM was signed: 05/15/24  Time IMM was signed: 0926    Patient is discharging to [facility]. Pt's hospital follow up appointments are scheduled and in the Pt's AVS.     Pt's transportation has been scheduled for 4PM, RN and family notified.    RN to call report to [phone] room 112A    Pt has no other post acute needs and is cleared for discharge from a case management standpoint.     JOSE Turner, LMSW [facility]  [id number]

## 2024-05-15 NOTE — PT/OT/SLP PROGRESS
Occupational Therapy      Patient Name:  Quin Linn   MRN:  [id number]    Patient not seen today secondary to Other (Comment) (pt is scheduled to d/c from the hospital this afternoon). Will follow-up if pt remains in-house.    5/15/2024

## 2024-05-15 NOTE — PROGRESS NOTES
Patient discharged to [facility] per ambulance stretcher in no apparent distress and neck brace in place. PIV discontinued with dressing applied after hemostasis achieved. Patient tolerated procedure well. Discharge instructions provided to ambulance team/forward to NH. Daughter and son in law at bedside.

## 2024-05-16 ENCOUNTER — EXTERNAL HOME HEALTH (OUTPATIENT)
Dept: HOME HEALTH SERVICES | Facility: HOSPITAL | Age: 89
End: 2024-05-16
Payer: MEDICARE

## 2024-06-02 NOTE — DISCHARGE SUMMARY
[facility] - Telemetry [facility] Medicine  Discharge Summary      Patient Name: Quin Linn  MRN: [id number]  APOLLO: [id number]  Patient Class: IP- Inpatient  Admission Date: 5/5/2024  Hospital Length of Stay: 9 days  Discharge Date and Time: 5/15/2024  6:06 PM  Attending Physician: Nel Hoover MD  Discharging Provider: Nel Hoover MD  Primary Care Provider: Mary Ellen Nesbitt MD  [facility] Medicine Team: [facility] [place] [facility]OS[facility] MED S   Primary Care Team: [facility] [place] [facility]OS[facility] MED S    HPI:   89 y/o F PMH CHF, Alzheimer's, DM, HTN who presents to the ED via EMS for evaluation of a fall with CT Csp demonstrating bilateral C4 lamina fracture and L C5 lamina fracture with extension into IAP. At baseline patient ambulates with wheelchair and needs assistance with transfers. Reportedly patient had a fall forwards out of recliner, she hit her head on the ground and had a bleeding laceration. Limited hx given dementia, but pt denies any LOC, CP, dyspnea, N/V/D, headache, new weakness/numbness or neck pain. Baseline incontinence .    In the ED patient afebrile and hemodynamically stable saturating well on room air. Moves all extremities, alert and oriented to person. Conversational and pleasant with limited insight. Patient placed in C collar NSGY consulted and evaluated patient. MRI without significant canal stenosis and no plan for acute intervention per NSGY. Admitted to the care of medicine for further evaluation and management.     Hospital Course:   Ms. Linn was admitted to Hospital Medicine for management of a cervical fracture.  NSGY was consulted who said C collar and pain control, no surgical intervention.  She was started on a multimodal pain regimen & working with PT/OT while awaiting SNF placement. SLP consulted given some c/f dysphagia. Pt was otherwise medically ready for discharge (pending SNF placement), when she developed acute hypoxia 5/9 evening w/ copious oral secretions noted requiring frequent suctioning. BNP  elevated (baseline unk), but CXR unrevealing. Received 20mg IV lasix & scopolamine patch applied. Discussed with daughter via phone who reports that prior to fall, pt was speaking & swallowing fine. Reports that pt never had any issues with swallowing, speech or issues with secretions/airway. Aside from mild short-term memory issues, no notable deficits/issues 2/2 pt's dx of Alzheimer's. Pt weaned to RA. Repeat CT head & MRI brain negative for acute process. Renal function stable/improving w/ mIVF; received addn'l D5W boluses for hypernatremia. C-collar removed to work w/ SLP during which pt did much better and was able to advance to soft/nectar thick diet. Will allow for C-collar to be removed for meals & meds only; otherwise C-collar to remain in place at all times. Transitioned back to home PO anti-hypertensive regimen w/ continuation of scheduled hydralazine given persistent HTN. Stable on RA and breath sounds clear. Given clinical improvement & negative ENT/neurological workup, pt medically ready to discharge to SNF.     Goals of Care Treatment Preferences:  Code Status: DNR  Living Will: Yes          Consults:   Consults (From admission, onward)          Status Ordering Provider     Inpatient consult to ENT  Once        Provider:  (Not yet assigned)    Completed JAYCOB ROSENBERG     Inpatient consult to Neurosurgery  Once        Provider:  (Not yet assigned)    Completed JACKY BENDER          PROBLEMS ADDRESSED DURING ADMISSION:     Dysphagia  Mentation below baseline after admission, w/ somnolence & poor comprehension limiting participation w/ SLP evaluation and thus initially made NPO. Gradual improvement in mental status, allowing for SLP to advance diet to puree on 5/8. However, made strict NPO again after 5/9 episode where pt was found to be hypoxic w/ copious pooling secretions. Etiology unclear- while pt has hx of Alzheimer's, no prior issues with speech, swallowing, secretions, etc per family  report. Repeat CT head w/o acute findings. Laryngoscope per ENT showed notable pooling secretions, but was otherwise unrevealing. Considering decompensation 2/2 traumatic fall w/o direct injury/trauma to oropharyngeal structures. MRI brain w/o acute findings. Improved secretion mgmt & swallow w/ advancement of diet on 5/13.   SLP following; appreciate assistance  Advance diet to soft/nectar thick per SLP recs    Ok to remove C-collar when working with SLP & for meals/meds only     Nondisplaced fracture of fourth cervical vertebra  Presented following GLF where pt fell forwards out of her recliner & hit her head. Workup, including CT & MRI of C-spine, revealed nondisplaced fractures involving the b/l C4 lamina & left C5 lamina w/ extention into IAP.    NSGY consulted - no acute intervention indicated at this time  Maintain C-collar at all times (ok to remove for meals & meds only)   Follow-up in NSGY clinic in 2 months   PT/OT     Acute hypoxemic respiratory failure - resolved  AM of 5/9 RRT called as pt developed hypoxia- satting 86% on 6L w/ copious secretions. Mentation unchanged from earlier in hospitalization. NT & oral suctioning resulting in decreased O2 needs (down to 3L & satting 95%). CTX started 2/2 c/f aspiration. CXR clear/stable & no subsequent fever or leukocytosis. Weaned back to RA on 5/11 & CTX d/c'ed. Scopalamine patch d/c'ed.   Scopalamine patch - wean off as able   Mgmt of dysphagia as above      Hypertension  Chronic HTN, controlled via home regimen of amlodipine 10mg & carvedilol 3.125mg BID. On admission, SBP 130s-170s. Home meds continued w/ intermittent spikes to 170s-180s. Persistently elevated BP (140s-170s) on 5/8. Home meds held starting 5/9 d/t aspiration concerns & strict NPO status. IV hydralazine started, but with persistent BP 160s-200s.   Resume home coreg & amlodipine   PO hydralazine 50mg Q6H      Anemia  On admission, Hgb 14 (although suspect hemoconcentration given baseline  ~11-12). Drop in Hgb to 11.3 the following day. MCV 90s. Although pt w/ hematoma, no e/o active bleeding. Nml B12 (720) & iron panel w/ low TIBC (223) & transferrin (151), but otherwise nml. Folate low-nml (4.4). Suspect mild anemia of chronic disease w/ contributing folate deficiency.   Daily folic acid supplement      Hypernatremia  On admission, Na 142. Remained stable in low 140s until 5/11 when it jumped to 148-150. Renal function however remained nml. Unclear etiology- pt had received gentle mIVF while NPO, so lower suspicion for hypovolemic etiology. Iram 52, Ucr 75, Uosm 568. Calculated free water deficit of 1.5L. Received addn'l 1.5L D5W w/ improvement.   Encourage PO hydration   Monitor BMP      Diabetes mellitus type II, controlled  Hx of T2DM, well-controlled w/ home regimen of glipizide. Last A1c 5.5% (Oct 2023). Serum glucose 148 on admission & CBGs have remained at goal during hospitalization. Repeat A1c 6.1%.  Holding home glipizide  Deferring SSI for now     Late onset Alzheimer's disease with behavioral disturbance  Hx of Alzheimer's dementia, although only with very mild sx per family at baseline. Deysier reports mild short-term memory impairment, but no orientation, speech, swallowing, or behavior issues previously.   Resume home seroquel  Delirium precautions      Paroxysmal A-fib  Pacemaker  History of AV block  Hx of pAF along w/ 2nd degree AV block. [employer] Micra AV PM placed in Oct 2022. Appears to have been last interrogated in Jan 2024 which showed normal function & paced rhythm. Note pt's PM is MRI compatible, but requires EP lab assistance (device has to be set to MRI mode & then restored to original settings following scan), which is only available M-F.   Resume home coreg & apixaban     CKD (chronic kidney disease), stage IV  Hx of CKD3-4, favored to be 2/2 age & hypertensive/diabetic nephropathy. On admission, Cr 1.1 (baseline 1.2-1.4) w/ BUN 24. Cr jumped to 1.8 evening of 5/8 i/s/o  AHRF, but subsequently normalized.   Monitor BMP   Minimize nephrotoxic agents as able & renally-dose meds     Episode of transient neurologic symptoms  Follows w/ outpatient Neurology; last saw Dr. Mcgee via televisit on 4/11/24. Per neurology note, pt was having syncopal episodes where she'd be down for 30+ minutes, during which pt is not rousable, w/ possible eye deviation. Apparently had at least 4-5 events earlier this year. Although outpatient EEG unrevealing, placed on keppra for possible seizure activity. No seizure activity or periods of unresponsiveness noted during hospitalization thus far.   Continue home keppra     Hypothyroidism following radioiodine therapy  Last TSH (Jan 2024) stable/nml at 3.962.   Continue home synthroid     DNR (do not resuscitate)  DNR status confirmed on admission. ACP docs on file.     Final Active Diagnoses:    Diagnosis Date Noted POA    PRINCIPAL PROBLEM:  Acute hypoxemic respiratory failure [J96.01] 09/02/2023 Yes    Anemia [D64.9] 05/14/2024 No    Hypernatremia [E87.0] 05/14/2024 No    Dysphagia [R13.10] 05/07/2024 Yes    Nondisplaced fracture of fourth cervical vertebra [S12.301A] 05/05/2024 Yes    DNR (do not resuscitate) [Z66] 09/05/2023 Yes    Paroxysmal A-fib [I48.0] 08/27/2023 Yes    CKD (chronic kidney disease), stage IV [N18.4] 07/16/2023 Yes    Diabetes mellitus type II, controlled [E11.9] 01/11/2018 Yes    Late onset Alzheimer's disease with behavioral disturbance [G30.1, F02.818]  Yes     Chronic    Episode of transient neurologic symptoms [R29.90] 09/19/2015 Yes    Hypertension [I10] 04/03/2013 Yes    Hypothyroidism following radioiodine therapy [E89.0] 09/26/2012 Yes     Chronic      Problems Resolved During this Admission:    Diagnosis Date Noted Date Resolved POA    Hypomagnesemia [E83.42] 06/27/2020 05/12/2024 Yes       Discharged Condition: fair  Physical Exam  Constitutional:       General: She is not in acute distress.     Appearance: She is normal  "weight. She is not toxic-appearing or diaphoretic.   HENT:      Head: Normocephalic.   Eyes:      Conjunctiva/sclera: Conjunctivae normal.   Neck:      Comments: C-collar in place  Cardiovascular:      Rate and Rhythm: Normal rate and regular rhythm.      Heart sounds: Normal heart sounds.   Pulmonary:      Effort: Pulmonary effort is normal. No respiratory distress.      Breath sounds: No wheezing, rhonchi or rales.   Abdominal:      General: Bowel sounds are normal. There is no distension.      Palpations: Abdomen is soft.      Tenderness: There is no abdominal tenderness. There is no guarding.   Musculoskeletal:      Right lower leg: No edema.      Left lower leg: No edema.   Skin:     General: Skin is warm and dry.      Findings: Bruising (facial contusion) present.   Neurological:      General: No focal deficit present.      Mental Status: She is oriented to person, place, and time.   Psychiatric:         Mood and Affect: Mood normal.         Behavior: Behavior normal.    Disposition: Skilled Nursing Facility    Follow Up:    Patient Instructions:   No discharge procedures on file.    Significant Diagnostic Studies: N/A ; as discussed in "Hospital Course" above    Pending Diagnostic Studies:       None           Medications:  Reconciled Home Medications:      Medication List        START taking these medications      folic acid 1 MG tablet  Commonly known as: FOLVITE  Take 1 tablet (1 mg total) by mouth once daily.     hydrALAZINE 50 MG tablet  Commonly known as: APRESOLINE  Take 1 tablet (50 mg total) by mouth every 6 (six) hours.     scopolamine 1.3-1.5 mg (1 mg over 3 days)  Commonly known as: TRANSDERM-SCOP  Place 1 patch onto the skin Every 3 (three) days.            CONTINUE taking these medications      amLODIPine 10 MG tablet  Commonly known as: NORVASC  Take 1 tablet (10 mg total) by mouth once daily.     apixaban 2.5 mg Tab  Commonly known as: ELIQUIS  Take 1 tablet (2.5 mg total) by mouth 2 (two) " times daily.     atorvastatin 80 MG tablet  Commonly known as: LIPITOR  TAKE 1 TABLET(80 MG) BY MOUTH EVERY DAY     calcium-vitamin D3 500 mg-5 mcg (200 unit) per tablet  Commonly known as: CALCIUM 500 + D  Take 1 tablet by mouth 2 (two) times daily with meals.     carvediloL 3.125 MG tablet  Commonly known as: COREG  Take 1 tablet (3.125 mg total) by mouth 2 (two) times daily.     gabapentin 300 MG capsule  Commonly known as: NEURONTIN  Take 1 capsule (300 mg total) by mouth 2 (two) times daily.     levETIRAcetam 250 MG Tab  Commonly known as: KEPPRA  Take 1 tablet (250 mg total) by mouth 2 (two) times daily.     levothyroxine 75 MCG tablet  Commonly known as: SYNTHROID  Take 1 tablet (75 mcg total) by mouth before breakfast. Administer on an empty stomach at least 30 minutes before food or other medications.     polyethylene glycol 17 gram Pwpk  Commonly known as: GLYCOLAX  Take 17 g by mouth once daily.     QUEtiapine 25 MG Tab  Commonly known as: SEROQUEL  Take 3 tablets (75 mg total) by mouth every evening.     senna-docusate 8.6-50 mg 8.6-50 mg per tablet  Commonly known as: PERICOLACE  Take 1 tablet by mouth 2 (two) times daily as needed for Constipation.            STOP taking these medications      ACCU-CHEK FASTCLIX LANCET DRUM Misc  Generic drug: lancets     ACCU-CHEK GUIDE TEST STRIPS Strp  Generic drug: blood sugar diagnostic     glipiZIDE 2.5 MG tablet  Commonly known as: GLUCOTROL     HYDROcodone-acetaminophen 5-325 mg per tablet  Commonly known as: NORCO            ASK your doctor about these medications      acetaminophen 500 MG tablet  Commonly known as: TYLENOL  Take 2 tablets (1,000 mg total) by mouth 3 (three) times daily. for 10 days  Ask about: Should I take this medication?     methocarbamoL 500 MG Tab  Commonly known as: ROBAXIN  Take 1 tablet (500 mg total) by mouth 4 (four) times daily. for 10 days  Ask about: Should I take this medication?              Indwelling Lines/Drains at time of  discharge:   Lines/Drains/Airways       None            Time spent on the discharge of patient: 30 minutes         Nel Hoover MD  Department of Hospital Medicine  [facility] - Telemetry Stepdown

## 2024-06-03 ENCOUNTER — TELEPHONE (OUTPATIENT)
Dept: INTERNAL MEDICINE | Facility: CLINIC | Age: 89
End: 2024-06-03
Payer: MEDICARE

## 2024-06-03 NOTE — TELEPHONE ENCOUNTER
States patient was seen in ED 5/5/24 for a fracture in neck. Daughter states they were advised to have a x-ray done 3 to 4 weeks after to monitor how the fracture is healing. Requesting a x-ray order.

## 2024-06-03 NOTE — TELEPHONE ENCOUNTER
See message below from Dr. Figueroa Brannon in neurosurgery  She is to have CT at 2 months and needs to see NS at that time  I don't see anything in discharge summary regarding need for x-ray at 3-4 weeks  Please have her daughter contact neurosurgery to discuss imaging needs    Hello,     This patient has dementia, fell, had bilateral C4 lamina fracture and L C5 lamina fracture with extension into IAP. Further imaging revealed no canal stenosis or dynamic instability. She will wear her cervical collar for 2 months and will need follow up in 2 months with Latisha with repeat CT Csp w/o to re-assess fracture     Thank you  Figueroa

## 2024-06-03 NOTE — TELEPHONE ENCOUNTER
----- Message from Merissa Alvarenga sent at 6/3/2024  2:33 PM CDT -----  Contact: [phone]/Janae/ Daughter  Patient had a fall, and crack bone in the neck, now she is at the [facility] nursing home, and will need order for xray. Please f/u with them. Fax # [phone]. Thank you.

## 2024-06-03 NOTE — TELEPHONE ENCOUNTER
Called buster back and advised of Jada talbert. Patient daughter expressed understanding and will contact neurosurgery directly.

## 2024-06-10 ENCOUNTER — PATIENT MESSAGE (OUTPATIENT)
Dept: INTERNAL MEDICINE | Facility: CLINIC | Age: 89
End: 2024-06-10
Payer: MEDICARE

## 2024-06-10 ENCOUNTER — TELEPHONE (OUTPATIENT)
Dept: INTERNAL MEDICINE | Facility: CLINIC | Age: 89
End: 2024-06-10
Payer: MEDICARE

## 2024-06-10 DIAGNOSIS — G30.1 LATE ONSET ALZHEIMER'S DISEASE WITH BEHAVIORAL DISTURBANCE: Primary | ICD-10-CM

## 2024-06-10 DIAGNOSIS — S12.301G: ICD-10-CM

## 2024-06-10 DIAGNOSIS — F02.818 LATE ONSET ALZHEIMER'S DISEASE WITH BEHAVIORAL DISTURBANCE: Primary | ICD-10-CM

## 2024-06-10 NOTE — TELEPHONE ENCOUNTER
----- Message from Taylor Villareal sent at 6/10/2024  2:05 PM CDT -----  Contact: [phone] Janae  1[employer]ICAL[employer]VICE     Patient is calling for Medical Advice regarding:discharged from skilled nursing     How long has patient had these symptoms:    Pharmacy name and phone#:    Would like response via [employer]:  no     Comments:  Pts daughter is calling she states they told her the pt needs a follow up the first week after discharging from skilled nursing she discharged on Saturday please give return call

## 2024-06-10 NOTE — TELEPHONE ENCOUNTER
Needs home health orders. Needs ST, PT, and OT. Speech therapy is for she her swallowing issue she has had since neck injury.

## 2024-06-17 ENCOUNTER — TELEPHONE (OUTPATIENT)
Dept: INTERNAL MEDICINE | Facility: CLINIC | Age: 89
End: 2024-06-17
Payer: MEDICARE

## 2024-06-17 NOTE — TELEPHONE ENCOUNTER
----- Message from Cat Shultz sent at 6/17/2024  1:11 PM CDT -----  Contact: Pt's daughter Janae  [phone]  Janae called in regards to she cancelled the pt's antonio for today due to the weather she would like to get it rescheduled with only Dr Nesbitt this week Wed or Thurs please advise

## 2024-06-17 NOTE — TELEPHONE ENCOUNTER
----- Message from Veronica Frnaco MA sent at 6/17/2024  9:18 AM CDT -----  Contact: Mr. Ch Phone# [phone]    ----- Message -----  From: Taylor Silvestre  Sent: 6/14/2024   2:45 PM CDT  To: Delicia MACEDO Staff    Mr. Ch patients Physical Therapist from [facility] said that the neck brace that the patient have is too big and he would like to put in an order for a smaller neck brace.

## 2024-06-24 ENCOUNTER — TELEPHONE (OUTPATIENT)
Dept: NEUROSURGERY | Facility: CLINIC | Age: 89
End: 2024-06-24
Payer: MEDICARE

## 2024-06-24 ENCOUNTER — OFFICE VISIT (OUTPATIENT)
Dept: INTERNAL MEDICINE | Facility: CLINIC | Age: 89
End: 2024-06-24
Payer: MEDICARE

## 2024-06-24 VITALS
HEIGHT: 65 IN | WEIGHT: 132.06 LBS | DIASTOLIC BLOOD PRESSURE: 60 MMHG | BODY MASS INDEX: 22 KG/M2 | OXYGEN SATURATION: 98 % | SYSTOLIC BLOOD PRESSURE: 132 MMHG | HEART RATE: 59 BPM

## 2024-06-24 DIAGNOSIS — G89.29 OTHER CHRONIC PAIN: ICD-10-CM

## 2024-06-24 DIAGNOSIS — F02.80 ALZHEIMER'S DISEASE: ICD-10-CM

## 2024-06-24 DIAGNOSIS — R53.81 DEBILITY: ICD-10-CM

## 2024-06-24 DIAGNOSIS — S12.001A CLOSED NONDISPLACED FRACTURE OF FIRST CERVICAL VERTEBRA, UNSPECIFIED FRACTURE MORPHOLOGY, INITIAL ENCOUNTER: Primary | ICD-10-CM

## 2024-06-24 DIAGNOSIS — S12.401D CLOSED NONDISPLACED FRACTURE OF FIFTH CERVICAL VERTEBRA WITH ROUTINE HEALING, UNSPECIFIED FRACTURE MORPHOLOGY, SUBSEQUENT ENCOUNTER: ICD-10-CM

## 2024-06-24 DIAGNOSIS — G30.9 ALZHEIMER'S DISEASE: ICD-10-CM

## 2024-06-24 DIAGNOSIS — E46 MALNUTRITION, UNSPECIFIED TYPE: ICD-10-CM

## 2024-06-24 DIAGNOSIS — S12.301D CLOSED NONDISPLACED FRACTURE OF FOURTH CERVICAL VERTEBRA WITH ROUTINE HEALING, UNSPECIFIED FRACTURE MORPHOLOGY, SUBSEQUENT ENCOUNTER: ICD-10-CM

## 2024-06-24 DIAGNOSIS — I70.0 AORTIC ATHEROSCLEROSIS: ICD-10-CM

## 2024-06-24 DIAGNOSIS — E11.42 DIABETIC POLYNEUROPATHY ASSOCIATED WITH TYPE 2 DIABETES MELLITUS: Primary | ICD-10-CM

## 2024-06-24 DIAGNOSIS — F11.20 OPIOID DEPENDENCE, DAILY USE: ICD-10-CM

## 2024-06-24 PROBLEM — U07.1 COVID-19: Status: RESOLVED | Noted: 2023-12-27 | Resolved: 2024-06-24

## 2024-06-24 PROBLEM — J96.01 ACUTE HYPOXEMIC RESPIRATORY FAILURE: Status: RESOLVED | Noted: 2023-09-02 | Resolved: 2024-06-24

## 2024-06-24 PROCEDURE — 99214 OFFICE O/P EST MOD 30 MIN: CPT | Mod: S$GLB,,, | Performed by: INTERNAL MEDICINE

## 2024-06-24 PROCEDURE — 1126F AMNT PAIN NOTED NONE PRSNT: CPT | Mod: CPTII,S$GLB,, | Performed by: INTERNAL MEDICINE

## 2024-06-24 PROCEDURE — 1157F ADVNC CARE PLAN IN RCRD: CPT | Mod: CPTII,S$GLB,, | Performed by: INTERNAL MEDICINE

## 2024-06-24 PROCEDURE — G2211 COMPLEX E/M VISIT ADD ON: HCPCS | Mod: S$GLB,,, | Performed by: INTERNAL MEDICINE

## 2024-06-24 PROCEDURE — 1100F PTFALLS ASSESS-DOCD GE2>/YR: CPT | Mod: CPTII,S$GLB,, | Performed by: INTERNAL MEDICINE

## 2024-06-24 PROCEDURE — 1160F RVW MEDS BY RX/DR IN RCRD: CPT | Mod: CPTII,S$GLB,, | Performed by: INTERNAL MEDICINE

## 2024-06-24 PROCEDURE — 3288F FALL RISK ASSESSMENT DOCD: CPT | Mod: CPTII,S$GLB,, | Performed by: INTERNAL MEDICINE

## 2024-06-24 PROCEDURE — 99999 PR PBB SHADOW E&M-EST. PATIENT-LVL IV: CPT | Mod: PBBFAC,,, | Performed by: INTERNAL MEDICINE

## 2024-06-24 PROCEDURE — 1159F MED LIST DOCD IN RCRD: CPT | Mod: CPTII,S$GLB,, | Performed by: INTERNAL MEDICINE

## 2024-06-24 RX ORDER — QUETIAPINE FUMARATE 25 MG/1
TABLET, FILM COATED ORAL
Qty: 60 TABLET | Refills: 5 | Status: SHIPPED | OUTPATIENT
Start: 2024-06-24

## 2024-06-24 RX ORDER — HYDROCODONE BITARTRATE AND ACETAMINOPHEN 5; 325 MG/1; MG/1
1 TABLET ORAL EVERY 8 HOURS PRN
Qty: 90 TABLET | Refills: 0 | Status: SHIPPED | OUTPATIENT
Start: 2024-06-24

## 2024-06-24 NOTE — TELEPHONE ENCOUNTER
"Spoke with patient's daughter scheduled appointment from referral from Dr. Nesbitt. Patient's daughter stated" her mother's collar was not fitting her properly." Patient's daughter was advised that a call would be put into [facility] D[facility] to contact her for refitting." [facility] and she will call patient to discuss.  "

## 2024-06-24 NOTE — PROGRESS NOTES
Subjective     Patient ID: Quin Linn is a 89 y.o. female.    Chief Complaint: Follow-up    HPI  Daughter, Lonnie and PEPITO, Evan, are present.  Hospitalized 5/5-15 after sustaining cervical fractures [bilateral C4 lamina fracture and L C5 lamina fracture with extension into IAP] after a fall, managed non -surgically..  Followed by SNF at [facility] x 3 weeks.  She has been home x 1 week.  Daughter and son-in-law now living with her.  She is getting HH, PT, OT.  She c/o tight cervical brace..  Resp failure on admission, now resolved.  Normal Dexa 2016.    DM is well controlled.   A1c 6.1      Has not needed prn glipizide.    Albumin - 2. On discharge.    Then has been controlled.    Seroquel was stopped at SNF.  Daughter would like another refill prn agitation and sleep.    She has chronic pain due to lumbar spinal stenosis.  She was taking hydrocodone 5 mg tid prior to admission.  Now with additional neck pain. Neck brace is uncomfortable.  Review of Systems   Constitutional:  Negative for fever and unexpected weight change.   HENT:  Negative for nasal congestion and postnasal drip.    Respiratory:  Negative for chest tightness, shortness of breath and wheezing.    Cardiovascular:  Negative for chest pain and leg swelling.   Gastrointestinal:  Negative for abdominal pain, anal bleeding, constipation, diarrhea, nausea and vomiting.   Genitourinary:  Negative for dysuria and urgency.   Integumentary:  Negative for rash.   Neurological:  Negative for headaches.   Psychiatric/Behavioral:  Negative for dysphoric mood and sleep disturbance. The patient is not nervous/anxious.           Objective     Physical Exam  Constitutional:       General: She is not in acute distress.     Appearance: She is well-developed. She is not ill-appearing, toxic-appearing or diaphoretic.      Comments: Wearing neck brace   Eyes:      General: No scleral icterus.  Neck:      Thyroid: No thyromegaly.      Vascular: No JVD.    Cardiovascular:      Rate and Rhythm: Normal rate and regular rhythm.      Heart sounds: Normal heart sounds.   Pulmonary:      Effort: Pulmonary effort is normal. No respiratory distress.      Breath sounds: Normal breath sounds. No stridor. No wheezing, rhonchi or rales.   Abdominal:      General: There is no distension.      Palpations: Abdomen is soft. There is no mass.      Tenderness: There is no abdominal tenderness. There is no guarding or rebound.      Hernia: No hernia is present.   Musculoskeletal:      Right lower leg: No edema.      Left lower leg: No edema.   Neurological:      Mental Status: She is alert and oriented to person, place, and time.      Cranial Nerves: No cranial nerve deficit.   Psychiatric:         Mood and Affect: Mood normal.         Behavior: Behavior normal.            Assessment and Plan     1. Diabetic polyneuropathy associated with type 2 diabetes mellitus    2. Debility    3. Closed nondisplaced fracture of fourth cervical vertebra with routine healing, unspecified fracture morphology, subsequent encounter  -     Ambulatory referral/consult to Neurosurgery; Future; Expected date: 07/01/2024    4. Closed nondisplaced fracture of fifth cervical vertebra with routine healing, unspecified fracture morphology, subsequent encounter  -     Ambulatory referral/consult to Neurosurgery; Future; Expected date: 07/01/2024    5. Opioid dependence, daily use    6. Malnutrition, unspecified type    7. Aortic atherosclerosis  Assessment & Plan:  Asymptomatic.  Tx with atorvasttin 80 mg      8. Other chronic pain  -     HYDROcodone-acetaminophen (NORCO) 5-325 mg per tablet; Take 1 tablet by mouth every 8 (eight) hours as needed for Pain.  Dispense: 90 tablet; Refill: 0    9. Alzheimer's disease    Other orders  -     QUEtiapine (SEROQUEL) 25 MG Tab; Take 1 to 2 tablets by mouth every night at bedtime for sleep and agitation  Dispense: 60 tablet; Refill: 5      She requires NS f/u and  instruction on continuing neck brace.  Restart seroquel prn agitation, sleep             Follow up in about 3 months (around 9/24/2024) for virtual visit.

## 2024-06-27 ENCOUNTER — PATIENT MESSAGE (OUTPATIENT)
Dept: INTERNAL MEDICINE | Facility: CLINIC | Age: 89
End: 2024-06-27
Payer: MEDICARE

## 2024-06-27 ENCOUNTER — TELEPHONE (OUTPATIENT)
Dept: ELECTROPHYSIOLOGY | Facility: CLINIC | Age: 89
End: 2024-06-27
Payer: MEDICARE

## 2024-06-27 ENCOUNTER — PATIENT MESSAGE (OUTPATIENT)
Dept: ELECTROPHYSIOLOGY | Facility: CLINIC | Age: 89
End: 2024-06-27
Payer: MEDICARE

## 2024-06-27 DIAGNOSIS — E11.42 TYPE 2 DIABETES MELLITUS WITH DIABETIC POLYNEUROPATHY, WITHOUT LONG-TERM CURRENT USE OF INSULIN: ICD-10-CM

## 2024-06-27 DIAGNOSIS — E11.22 CONTROLLED TYPE 2 DIABETES MELLITUS WITH STAGE 3 CHRONIC KIDNEY DISEASE, WITHOUT LONG-TERM CURRENT USE OF INSULIN: ICD-10-CM

## 2024-06-27 DIAGNOSIS — N18.4 CHRONIC KIDNEY DISEASE, STAGE IV (SEVERE): ICD-10-CM

## 2024-06-27 DIAGNOSIS — I44.1 WENCKEBACH SECOND DEGREE AV BLOCK: ICD-10-CM

## 2024-06-27 DIAGNOSIS — N18.30 CONTROLLED TYPE 2 DIABETES MELLITUS WITH STAGE 3 CHRONIC KIDNEY DISEASE, WITHOUT LONG-TERM CURRENT USE OF INSULIN: ICD-10-CM

## 2024-06-27 DIAGNOSIS — F02.818 LATE ONSET ALZHEIMER'S DISEASE WITH BEHAVIORAL DISTURBANCE: Chronic | ICD-10-CM

## 2024-06-27 DIAGNOSIS — Z95.0 PRESENCE OF CARDIAC PACEMAKER: Primary | ICD-10-CM

## 2024-06-27 DIAGNOSIS — G30.9 ALZHEIMER'S DISEASE: Primary | ICD-10-CM

## 2024-06-27 DIAGNOSIS — N18.4 CKD (CHRONIC KIDNEY DISEASE), STAGE IV: ICD-10-CM

## 2024-06-27 DIAGNOSIS — G30.1 LATE ONSET ALZHEIMER'S DISEASE WITH BEHAVIORAL DISTURBANCE: Chronic | ICD-10-CM

## 2024-06-27 DIAGNOSIS — F02.80 ALZHEIMER'S DISEASE: Primary | ICD-10-CM

## 2024-06-27 DIAGNOSIS — I48.0 PAROXYSMAL A-FIB: ICD-10-CM

## 2024-06-27 NOTE — TELEPHONE ENCOUNTER
Pt is overdue for annual appointments. Sent a message to MA staff to coordinate appts.      Also, left vm that I would be sending a schedule of dates to send her manual remote transmission for her Micra PPM thru the Portal.      Asked her to contact our clinic if she still has questions.

## 2024-06-28 NOTE — TELEPHONE ENCOUNTER
Pt dtr requesting referral for Palliative Care, per recommendation of therapist     LOV 6/24/24    Referral pended for your review/consideration

## 2024-07-29 PROCEDURE — 99284 EMERGENCY DEPT VISIT MOD MDM: CPT | Mod: 25

## 2024-07-30 ENCOUNTER — HOSPITAL ENCOUNTER (EMERGENCY)
Facility: HOSPITAL | Age: 89
Discharge: HOME | End: 2024-07-30
Attending: EMERGENCY MEDICINE
Payer: MEDICARE

## 2024-07-30 VITALS
WEIGHT: 132 LBS | OXYGEN SATURATION: 97 % | TEMPERATURE: 98 F | DIASTOLIC BLOOD PRESSURE: 80 MMHG | RESPIRATION RATE: 16 BRPM | BODY MASS INDEX: 21.97 KG/M2 | SYSTOLIC BLOOD PRESSURE: 177 MMHG | HEART RATE: 73 BPM

## 2024-07-30 DIAGNOSIS — K06.8 BLEEDING GUMS: Primary | ICD-10-CM

## 2024-07-30 LAB
ALBUMIN SERPL BCP-MCNC: 3.4 G/DL (ref 3.5–5.2)
ALP SERPL-CCNC: 60 U/L (ref 55–135)
ALT SERPL W/O P-5'-P-CCNC: 25 U/L (ref 10–44)
ANION GAP SERPL CALC-SCNC: 10 MMOL/L (ref 8–16)
AST SERPL-CCNC: 24 U/L (ref 10–40)
BASOPHILS # BLD AUTO: 0.03 K/UL (ref 0–0.2)
BASOPHILS NFR BLD: 0.4 % (ref 0–1.9)
BILIRUB SERPL-MCNC: 0.5 MG/DL (ref 0.1–1)
BUN SERPL-MCNC: 37 MG/DL (ref 10–30)
CALCIUM SERPL-MCNC: 10.3 MG/DL (ref 8.7–10.5)
CHLORIDE SERPL-SCNC: 109 MMOL/L (ref 95–110)
CO2 SERPL-SCNC: 25 MMOL/L (ref 23–29)
CREAT SERPL-MCNC: 1.2 MG/DL (ref 0.5–1.4)
DIFFERENTIAL METHOD BLD: ABNORMAL
EOSINOPHIL # BLD AUTO: 0.2 K/UL (ref 0–0.5)
EOSINOPHIL NFR BLD: 2.9 % (ref 0–8)
ERYTHROCYTE [DISTWIDTH] IN BLOOD BY AUTOMATED COUNT: 13.1 % (ref 11.5–14.5)
EST. GFR  (NO RACE VARIABLE): 42.5 ML/MIN/1.73 M^2
GLUCOSE SERPL-MCNC: 119 MG/DL (ref 70–110)
HCT VFR BLD AUTO: 41 % (ref 37–48.5)
HGB BLD-MCNC: 13 G/DL (ref 12–16)
IMM GRANULOCYTES # BLD AUTO: 0.02 K/UL (ref 0–0.04)
IMM GRANULOCYTES NFR BLD AUTO: 0.2 % (ref 0–0.5)
INR PPP: 1 (ref 0.8–1.2)
LYMPHOCYTES # BLD AUTO: 2.5 K/UL (ref 1–4.8)
LYMPHOCYTES NFR BLD: 30.5 % (ref 18–48)
MCH RBC QN AUTO: 30.2 PG (ref 27–31)
MCHC RBC AUTO-ENTMCNC: 31.7 G/DL (ref 32–36)
MCV RBC AUTO: 95 FL (ref 82–98)
MONOCYTES # BLD AUTO: 0.6 K/UL (ref 0.3–1)
MONOCYTES NFR BLD: 6.9 % (ref 4–15)
NEUTROPHILS # BLD AUTO: 4.9 K/UL (ref 1.8–7.7)
NEUTROPHILS NFR BLD: 59.1 % (ref 38–73)
NRBC BLD-RTO: 0 /100 WBC
PLATELET # BLD AUTO: 208 K/UL (ref 150–450)
PMV BLD AUTO: 10.8 FL (ref 9.2–12.9)
POTASSIUM SERPL-SCNC: 4.6 MMOL/L (ref 3.5–5.1)
PROT SERPL-MCNC: 7.2 G/DL (ref 6–8.4)
PROTHROMBIN TIME: 11 SEC (ref 9–12.5)
RBC # BLD AUTO: 4.31 M/UL (ref 4–5.4)
SODIUM SERPL-SCNC: 144 MMOL/L (ref 136–145)
WBC # BLD AUTO: 8.27 K/UL (ref 3.9–12.7)

## 2024-07-30 PROCEDURE — 85025 COMPLETE CBC W/AUTO DIFF WBC: CPT | Performed by: EMERGENCY MEDICINE

## 2024-07-30 PROCEDURE — 96374 THER/PROPH/DIAG INJ IV PUSH: CPT

## 2024-07-30 PROCEDURE — 85610 PROTHROMBIN TIME: CPT | Performed by: EMERGENCY MEDICINE

## 2024-07-30 PROCEDURE — 25000003 PHARM REV CODE 250: Performed by: EMERGENCY MEDICINE

## 2024-07-30 PROCEDURE — 80053 COMPREHEN METABOLIC PANEL: CPT | Performed by: EMERGENCY MEDICINE

## 2024-07-30 RX ORDER — HYDROCODONE BITARTRATE AND ACETAMINOPHEN 10; 325 MG/1; MG/1
1 TABLET ORAL
Status: COMPLETED | OUTPATIENT
Start: 2024-07-30 | End: 2024-07-30

## 2024-07-30 RX ORDER — TRANEXAMIC ACID 100 MG/ML
1000 INJECTION, SOLUTION INTRAVENOUS
Status: COMPLETED | OUTPATIENT
Start: 2024-07-30 | End: 2024-07-30

## 2024-07-30 RX ADMIN — HYDROCODONE BITARTRATE AND ACETAMINOPHEN 1 TABLET: 10; 325 TABLET ORAL at 01:07

## 2024-07-30 RX ADMIN — TRANEXAMIC ACID 1000 MG: 100 INJECTION, SOLUTION INTRAVENOUS at 01:07

## 2024-07-30 NOTE — DISCHARGE INSTRUCTIONS
If her gums start bleeding again, apply firm direct pressure for 10 minutes.  If the symptoms do not improve, return to the ER.  Return to the ER for any weakness, lightheadedness, chest pain, shortness of breath, severe headache, or other concerning symptoms.

## 2024-07-30 NOTE — ED PROVIDER NOTES
Encounter Date: 7/29/2024       History     Chief Complaint   Patient presents with    bleeding gums     Pt arrives via EMS from home for bleeding gums. Reports bleeding above right front tooth. No trauma to the area, +blood thinners     89 yo W with extensive pmhx including DM, HTN, HLD,   Alzheimer's, CKD 4, anemia, dCHF, pAfib on apixaban, recent cervical fracture presents with a chief complaint of bleeding gums.  History is provided by the patient's family member.  They noted intermittent bleeding from gums today.  She was endorsing chronic pain in the buttocks which is typically controlled on hydrocodone at home.  Patient denies any weakness or lightheadedness.  She has been compliant with the apixaban and family does not believe she has doubled any doses.  No mouth trauma. No mouth pain.        Review of patient's allergies indicates:   Allergen Reactions    Tramadol Rash and Edema     Developed facial rash and edema.    Metformin Diarrhea     Past Medical History:   Diagnosis Date    Acute hypoxemic respiratory failure 9/2/2023    Acute on chronic diastolic congestive heart failure 10/19/2023    Alzheimer's disease     Anemia     Anemia 5/14/2024    Arthritis     lumbar    Back pain     Cancer     colon    Cataract     Colon cancer     Diabetes mellitus type II     Glaucoma     Hyperlipidemia     Hypertension     Hyperthyroidism     Hypothyroidism following radioiodine therapy     Pacemaker     Pneumonia     Pneumonia due to other staphylococcus     Renal manifestation of secondary diabetes mellitus     Squamous cell carcinoma     Stroke     TIA    Type 2 diabetes mellitus with stage 3 chronic kidney disease and hypertension 10/12/2017    Type 2 diabetes with peripheral circulatory disorder, controlled      Past Surgical History:   Procedure Laterality Date    CATARACT EXTRACTION W/  INTRAOCULAR LENS IMPLANT Left 09/13/2017        CHOLECYSTECTOMY      COLON SURGERY  2001    Colon CA    EYE SURGERY       cataract removal left side    IMPLANTATION OF LEADLESS PACEMAKER N/A 10/21/2022    Procedure: [id number];  Surgeon: JOSE Burgos MD;  Location: [facility];  Service: Cardiology;  Laterality: N/A;  EMELYN, JOHNNA, MDT, ANES, EH,     squamous cell ca of face       Family History   Problem Relation Name Age of Onset    Heart disease Mother      Arthritis Mother      Kidney disease Father      Alzheimer's disease Sister      Hypertension Sister      Cancer Sister          ovarian    No Known Problems Daughter Khalida     Ankylosing spondylitis Daughter Elizabeth     Lupus Daughter Elizabeth     Kidney disease Daughter Janae     Heart disease Daughter Janae     Atrial fibrillation Daughter Janae     Supraventricular tachycardia Daughter Janae     Hypertension Son Mayito     Hyperlipidemia Son Mayito     Asthma Son Mayito     Cancer Paternal Uncle      Diabetes Maternal Grandmother      Amblyopia Neg Hx      Blindness Neg Hx      Cataracts Neg Hx      Glaucoma Neg Hx      Macular degeneration Neg Hx      Retinal detachment Neg Hx      Strabismus Neg Hx      Stroke Neg Hx      Thyroid disease Neg Hx       Social History     Tobacco Use    Smoking status: Never    Smokeless tobacco: Never    Tobacco comments:     smoked for about 4 years in her twenties (socially)   Substance Use Topics    Alcohol use: No    Drug use: No     Review of Systems    Physical Exam     Initial Vitals [07/29/24 2111]   BP Pulse Resp Temp SpO2   (!) 170/90 72 18 97.9 °F (36.6 °C) 97 %      MAP       --         Physical Exam    Nursing note and vitals reviewed.  Constitutional: She appears well-developed and well-nourished. She is not diaphoretic. She appears distressed (Uncomfortable appearing secondary to chronic buttock pain).   HENT:   Head: Normocephalic.    Abnormal dentition with significant  gingivitis   There is  oozing blood at gums between right maxillary central and lateral incisor  without obvious laceration or  ulcer   Neck:    Cervical collar in place   Cardiovascular:  Normal rate.           Pulmonary/Chest: No stridor. No respiratory distress.     Neurological: She is alert.   Skin: Skin is warm and dry. Capillary refill takes less than 2 seconds. No pallor.   Psychiatric: Thought content normal.         ED Course   Procedures  Labs Reviewed   CBC W/ AUTO DIFFERENTIAL - Abnormal       Result Value    WBC 8.27      RBC 4.31      Hemoglobin 13.0      Hematocrit 41.0      MCV 95      MCH 30.2      MCHC 31.7 (*)     RDW 13.1      Platelets 208      MPV 10.8      Immature Granulocytes 0.2      Gran # (ANC) 4.9      Immature Grans (Abs) 0.02      Lymph # 2.5      Mono # 0.6      Eos # 0.2      Baso # 0.03      nRBC 0      Gran % 59.1      Lymph % 30.5      Mono % 6.9      Eosinophil % 2.9      Basophil % 0.4      Differential Method Automated     COMPREHENSIVE METABOLIC PANEL - Abnormal    Sodium 144      Potassium 4.6      Chloride 109      CO2 25      Glucose 119 (*)     BUN 37 (*)     Creatinine 1.2      Calcium 10.3      Total Protein 7.2      Albumin 3.4 (*)     Total Bilirubin 0.5      Alkaline Phosphatase 60      AST 24      ALT 25      eGFR 42.5 (*)     Anion Gap 10     PROTIME-INR    Prothrombin Time 11.0      INR 1.0            Imaging Results    None          Medications   tranexamic acid injection Soln 1,000 mg (1,000 mg Intravenous Given 7/30/24 0119)   HYDROcodone-acetaminophen  mg per tablet 1 tablet (1 tablet Oral Given 7/30/24 0103)     Medical Decision Making  89 yo W with extensive pmhx including DM, HTN, HLD,   Alzheimer's, CKD 4, anemia, dCHF, pAfib on apixaban, recent cervical fracture presents with a chief complaint of bleeding gums.      Differential includes, but is not limited to: Coagulopathy, bleeding gums, anemia     Will obtain labs and coags.  Will administer home dose of hydrocodone.  Will administer TXA to a gauze and apply pressure.      Reassessment: CBC without leukocytosis or anemia.   CMP with minimal increase in creatinine 1.2 up from 0.8 from 2 months ago.  Mild elevated BUN.  Electrolytes normal.  Coags normal.  After 10 minutes of of pressure from gauze, bleeding stopped.  Patient hypertensive but asymptomatic.  No signs of end-organ damage to suggest hypertensive emergency.  Patient is stable for discharge.  Patient and family comfortable with follow-up.  Return precautions.    Amount and/or Complexity of Data Reviewed  Labs: ordered.    Risk  Prescription drug management.                                      Clinical Impression:  Final diagnoses:  [K06.8] Bleeding gums (Primary)          ED Disposition Condition    Discharge Stable          ED Prescriptions    None       Follow-up Information       Follow up With Specialties Details Why Contact Info    Mary Ellen Nesbitt MD Internal Medicine  As needed, If symptoms worsen [address]  [place] [address]  [phone]      [facility] - Emergency Dept Emergency Medicine  As needed, If symptoms worsen [address] [facility] [id number] [phone]             Brain Hurst MD  07/30/24 0206     Assistance with ambulation/Assistance OOB with selected safe patient handling equipment/Communicate Risk of Fall with Harm to all staff/Discuss with provider need for PT consult/Monitor gait and stability/Provide patient with walking aids - walker, cane, crutches/Reinforce activity limits and safety measures with patient and family/Tailored Fall Risk Interventions/Visual Cue: Yellow wristband and red socks/Bed in lowest position, wheels locked, appropriate side rails in place/Call bell, personal items and telephone in reach/Instruct patient to call for assistance before getting out of bed or chair/Non-slip footwear when patient is out of bed/[place] to call system/Physically safe environment - no spills, clutter or unnecessary equipment/Purposeful Proactive Rounding/Room/bathroom lighting operational, light cord in reach

## 2024-07-30 NOTE — ED TRIAGE NOTES
Quin Linn, a 89 y.o. female presents to the ED w/ complaint of bleeding gums. No trauma, +blood thinner    Triage note:  Chief Complaint   Patient presents with    bleeding gums     Pt arrives via EMS from home for bleeding gums. Reports bleeding above right front tooth. No trauma to the area, +blood thinners     Review of patient's allergies indicates:   Allergen Reactions    Tramadol Rash and Edema     Developed facial rash and edema.    Metformin Diarrhea     Past Medical History:   Diagnosis Date    Acute hypoxemic respiratory failure 9/2/2023    Acute on chronic diastolic congestive heart failure 10/19/2023    Alzheimer's disease     Anemia     Anemia 5/14/2024    Arthritis     lumbar    Back pain     Cancer     colon    Cataract     Colon cancer     Diabetes mellitus type II     Glaucoma     Hyperlipidemia     Hypertension     Hyperthyroidism     Hypothyroidism following radioiodine therapy     Pacemaker     Pneumonia     Pneumonia due to other staphylococcus     Renal manifestation of secondary diabetes mellitus     Squamous cell carcinoma     Stroke     TIA    Type 2 diabetes mellitus with stage 3 chronic kidney disease and hypertension 10/12/2017    Type 2 diabetes with peripheral circulatory disorder, controlled

## 2024-08-05 ENCOUNTER — HOSPITAL ENCOUNTER (OUTPATIENT)
Dept: RADIOLOGY | Facility: HOSPITAL | Age: 89
Discharge: HOME | End: 2024-08-05
Attending: NURSE PRACTITIONER
Payer: MEDICARE

## 2024-08-05 ENCOUNTER — OFFICE VISIT (OUTPATIENT)
Dept: NEUROSURGERY | Facility: CLINIC | Age: 89
End: 2024-08-05
Payer: MEDICARE

## 2024-08-05 VITALS
HEART RATE: 65 BPM | HEIGHT: 65 IN | WEIGHT: 132.06 LBS | SYSTOLIC BLOOD PRESSURE: 104 MMHG | DIASTOLIC BLOOD PRESSURE: 64 MMHG | BODY MASS INDEX: 22 KG/M2

## 2024-08-05 DIAGNOSIS — S12.301D CLOSED NONDISPLACED FRACTURE OF FOURTH CERVICAL VERTEBRA WITH ROUTINE HEALING, UNSPECIFIED FRACTURE MORPHOLOGY, SUBSEQUENT ENCOUNTER: ICD-10-CM

## 2024-08-05 DIAGNOSIS — S12.001A CLOSED NONDISPLACED FRACTURE OF FIRST CERVICAL VERTEBRA, UNSPECIFIED FRACTURE MORPHOLOGY, INITIAL ENCOUNTER: ICD-10-CM

## 2024-08-05 DIAGNOSIS — S12.401D CLOSED NONDISPLACED FRACTURE OF FIFTH CERVICAL VERTEBRA WITH ROUTINE HEALING, UNSPECIFIED FRACTURE MORPHOLOGY, SUBSEQUENT ENCOUNTER: ICD-10-CM

## 2024-08-05 PROCEDURE — 1126F AMNT PAIN NOTED NONE PRSNT: CPT | Mod: CPTII,S$GLB,, | Performed by: NURSE PRACTITIONER

## 2024-08-05 PROCEDURE — 72040 X-RAY EXAM NECK SPINE 2-3 VW: CPT | Mod: TC

## 2024-08-05 PROCEDURE — 3288F FALL RISK ASSESSMENT DOCD: CPT | Mod: CPTII,S$GLB,, | Performed by: NURSE PRACTITIONER

## 2024-08-05 PROCEDURE — 1159F MED LIST DOCD IN RCRD: CPT | Mod: CPTII,S$GLB,, | Performed by: NURSE PRACTITIONER

## 2024-08-05 PROCEDURE — 99999 PR PBB SHADOW E&M-EST. PATIENT-LVL IV: CPT | Mod: PBBFAC,,, | Performed by: NURSE PRACTITIONER

## 2024-08-05 PROCEDURE — 99213 OFFICE O/P EST LOW 20 MIN: CPT | Mod: S$GLB,,, | Performed by: NURSE PRACTITIONER

## 2024-08-05 PROCEDURE — 1160F RVW MEDS BY RX/DR IN RCRD: CPT | Mod: CPTII,S$GLB,, | Performed by: NURSE PRACTITIONER

## 2024-08-05 PROCEDURE — 1101F PT FALLS ASSESS-DOCD LE1/YR: CPT | Mod: CPTII,S$GLB,, | Performed by: NURSE PRACTITIONER

## 2024-08-05 PROCEDURE — 72040 X-RAY EXAM NECK SPINE 2-3 VW: CPT | Mod: 26,,, | Performed by: RADIOLOGY

## 2024-08-05 PROCEDURE — 1157F ADVNC CARE PLAN IN RCRD: CPT | Mod: CPTII,S$GLB,, | Performed by: NURSE PRACTITIONER

## 2024-08-08 ENCOUNTER — HOSPITAL ENCOUNTER (OUTPATIENT)
Dept: RADIOLOGY | Facility: HOSPITAL | Age: 89
Discharge: HOME | End: 2024-08-08
Attending: NURSE PRACTITIONER
Payer: MEDICARE

## 2024-08-08 DIAGNOSIS — S12.301D CLOSED NONDISPLACED FRACTURE OF FOURTH CERVICAL VERTEBRA WITH ROUTINE HEALING, UNSPECIFIED FRACTURE MORPHOLOGY, SUBSEQUENT ENCOUNTER: ICD-10-CM

## 2024-08-08 DIAGNOSIS — S12.401D CLOSED NONDISPLACED FRACTURE OF FIFTH CERVICAL VERTEBRA WITH ROUTINE HEALING, UNSPECIFIED FRACTURE MORPHOLOGY, SUBSEQUENT ENCOUNTER: ICD-10-CM

## 2024-08-08 DIAGNOSIS — S12.301D CLOSED NONDISPLACED FRACTURE OF FOURTH CERVICAL VERTEBRA WITH ROUTINE HEALING, UNSPECIFIED FRACTURE MORPHOLOGY, SUBSEQUENT ENCOUNTER: Primary | ICD-10-CM

## 2024-08-08 PROCEDURE — 72125 CT NECK SPINE W/O DYE: CPT | Mod: 26,,, | Performed by: RADIOLOGY

## 2024-08-08 PROCEDURE — 72125 CT NECK SPINE W/O DYE: CPT | Mod: TC

## 2024-08-12 ENCOUNTER — HOSPITAL ENCOUNTER (OUTPATIENT)
Dept: RADIOLOGY | Facility: HOSPITAL | Age: 89
Discharge: HOME | End: 2024-08-12
Attending: NURSE PRACTITIONER
Payer: MEDICARE

## 2024-08-12 DIAGNOSIS — S12.301D CLOSED NONDISPLACED FRACTURE OF FOURTH CERVICAL VERTEBRA WITH ROUTINE HEALING, UNSPECIFIED FRACTURE MORPHOLOGY, SUBSEQUENT ENCOUNTER: ICD-10-CM

## 2024-08-16 ENCOUNTER — HOSPITAL ENCOUNTER (INPATIENT)
Facility: HOSPITAL | Age: 89
LOS: 5 days | Discharge: HOME HEALTH | DRG: 871 | End: 2024-08-22
Attending: STUDENT IN AN ORGANIZED HEALTH CARE EDUCATION/TRAINING PROGRAM | Admitting: STUDENT IN AN ORGANIZED HEALTH CARE EDUCATION/TRAINING PROGRAM
Payer: MEDICARE

## 2024-08-16 DIAGNOSIS — R07.9 CHEST PAIN: ICD-10-CM

## 2024-08-16 DIAGNOSIS — J81.1 PULMONARY EDEMA: ICD-10-CM

## 2024-08-16 DIAGNOSIS — J96.01 ACUTE HYPOXEMIC RESPIRATORY FAILURE: ICD-10-CM

## 2024-08-16 DIAGNOSIS — G93.41 METABOLIC ENCEPHALOPATHY: Primary | ICD-10-CM

## 2024-08-16 DIAGNOSIS — R33.8 ACUTE URINARY RETENTION: ICD-10-CM

## 2024-08-16 DIAGNOSIS — R41.82 ALTERED MENTAL STATUS: ICD-10-CM

## 2024-08-16 LAB
ALBUMIN SERPL BCP-MCNC: 3.2 G/DL (ref 3.5–5.2)
ALLENS TEST: ABNORMAL
ALLENS TEST: NORMAL
ALP SERPL-CCNC: 62 U/L (ref 55–135)
ALT SERPL W/O P-5'-P-CCNC: 21 U/L (ref 10–44)
ANION GAP SERPL CALC-SCNC: 16 MMOL/L (ref 8–16)
AST SERPL-CCNC: 28 U/L (ref 10–40)
BASOPHILS # BLD AUTO: 0.04 K/UL (ref 0–0.2)
BASOPHILS NFR BLD: 0.4 % (ref 0–1.9)
BILIRUB SERPL-MCNC: 0.9 MG/DL (ref 0.1–1)
BUN SERPL-MCNC: 30 MG/DL (ref 10–30)
CALCIUM SERPL-MCNC: 10 MG/DL (ref 8.7–10.5)
CHLORIDE SERPL-SCNC: 106 MMOL/L (ref 95–110)
CO2 SERPL-SCNC: 17 MMOL/L (ref 23–29)
CREAT SERPL-MCNC: 1.4 MG/DL (ref 0.5–1.4)
DIFFERENTIAL METHOD BLD: NORMAL
EOSINOPHIL # BLD AUTO: 0 K/UL (ref 0–0.5)
EOSINOPHIL NFR BLD: 0.3 % (ref 0–8)
ERYTHROCYTE [DISTWIDTH] IN BLOOD BY AUTOMATED COUNT: 12.6 % (ref 11.5–14.5)
EST. GFR  (NO RACE VARIABLE): 35.3 ML/MIN/1.73 M^2
GLUCOSE SERPL-MCNC: 140 MG/DL (ref 70–110)
HCO3 UR-SCNC: 24.5 MMOL/L (ref 24–28)
HCT VFR BLD AUTO: 44.1 % (ref 37–48.5)
HGB BLD-MCNC: 14.6 G/DL (ref 12–16)
IMM GRANULOCYTES # BLD AUTO: 0.04 K/UL (ref 0–0.04)
IMM GRANULOCYTES NFR BLD AUTO: 0.4 % (ref 0–0.5)
INFLUENZA A, MOLECULAR: NEGATIVE
INFLUENZA B, MOLECULAR: NEGATIVE
LDH SERPL L TO P-CCNC: 1.37 MMOL/L (ref 0.5–2.2)
LYMPHOCYTES # BLD AUTO: 2.4 K/UL (ref 1–4.8)
LYMPHOCYTES NFR BLD: 23.9 % (ref 18–48)
MAGNESIUM SERPL-MCNC: 1.8 MG/DL (ref 1.6–2.6)
MCH RBC QN AUTO: 30.8 PG (ref 27–31)
MCHC RBC AUTO-ENTMCNC: 33.1 G/DL (ref 32–36)
MCV RBC AUTO: 93 FL (ref 82–98)
MONOCYTES # BLD AUTO: 0.7 K/UL (ref 0.3–1)
MONOCYTES NFR BLD: 7.5 % (ref 4–15)
NEUTROPHILS # BLD AUTO: 6.7 K/UL (ref 1.8–7.7)
NEUTROPHILS NFR BLD: 67.5 % (ref 38–73)
NRBC BLD-RTO: 0 /100 WBC
PCO2 BLDA: 28.6 MMHG (ref 35–45)
PH SMN: 7.54 [PH] (ref 7.35–7.45)
PLATELET # BLD AUTO: 195 K/UL (ref 150–450)
PMV BLD AUTO: 10.5 FL (ref 9.2–12.9)
PO2 BLDA: 73 MMHG (ref 40–60)
POC BE: 2 MMOL/L
POC SATURATED O2: 96 % (ref 95–100)
POC TCO2: 25 MMOL/L (ref 24–29)
POCT GLUCOSE: 139 MG/DL (ref 70–110)
POTASSIUM SERPL-SCNC: 5.3 MMOL/L (ref 3.5–5.1)
PROT SERPL-MCNC: 7.5 G/DL (ref 6–8.4)
RBC # BLD AUTO: 4.74 M/UL (ref 4–5.4)
SAMPLE: ABNORMAL
SAMPLE: NORMAL
SARS-COV-2 RDRP RESP QL NAA+PROBE: NEGATIVE
SITE: ABNORMAL
SITE: NORMAL
SODIUM SERPL-SCNC: 139 MMOL/L (ref 136–145)
SPECIMEN SOURCE: NORMAL
TROPONIN I SERPL DL<=0.01 NG/ML-MCNC: 0.04 NG/ML (ref 0–0.03)
TROPONIN I SERPL DL<=0.01 NG/ML-MCNC: 0.06 NG/ML (ref 0–0.03)
TSH SERPL DL<=0.005 MIU/L-ACNC: 0.2 UIU/ML (ref 0.4–4)
WBC # BLD AUTO: 9.85 K/UL (ref 3.9–12.7)

## 2024-08-16 PROCEDURE — 93010 ELECTROCARDIOGRAM REPORT: CPT | Mod: ,,, | Performed by: INTERNAL MEDICINE

## 2024-08-16 PROCEDURE — 82962 GLUCOSE BLOOD TEST: CPT

## 2024-08-16 PROCEDURE — 84443 ASSAY THYROID STIM HORMONE: CPT

## 2024-08-16 PROCEDURE — 80053 COMPREHEN METABOLIC PANEL: CPT | Performed by: STUDENT IN AN ORGANIZED HEALTH CARE EDUCATION/TRAINING PROGRAM

## 2024-08-16 PROCEDURE — 63600175 PHARM REV CODE 636 W HCPCS: Performed by: STUDENT IN AN ORGANIZED HEALTH CARE EDUCATION/TRAINING PROGRAM

## 2024-08-16 PROCEDURE — 83735 ASSAY OF MAGNESIUM: CPT | Performed by: STUDENT IN AN ORGANIZED HEALTH CARE EDUCATION/TRAINING PROGRAM

## 2024-08-16 PROCEDURE — 84484 ASSAY OF TROPONIN QUANT: CPT | Mod: 91 | Performed by: STUDENT IN AN ORGANIZED HEALTH CARE EDUCATION/TRAINING PROGRAM

## 2024-08-16 PROCEDURE — 99900035 HC TECH TIME PER 15 MIN (STAT)

## 2024-08-16 PROCEDURE — 85025 COMPLETE CBC W/AUTO DIFF WBC: CPT | Performed by: STUDENT IN AN ORGANIZED HEALTH CARE EDUCATION/TRAINING PROGRAM

## 2024-08-16 PROCEDURE — 93005 ELECTROCARDIOGRAM TRACING: CPT

## 2024-08-16 PROCEDURE — G0378 HOSPITAL OBSERVATION PER HR: HCPCS

## 2024-08-16 PROCEDURE — 84484 ASSAY OF TROPONIN QUANT: CPT

## 2024-08-16 PROCEDURE — 99285 EMERGENCY DEPT VISIT HI MDM: CPT | Mod: 25

## 2024-08-16 PROCEDURE — 87502 INFLUENZA DNA AMP PROBE: CPT | Performed by: STUDENT IN AN ORGANIZED HEALTH CARE EDUCATION/TRAINING PROGRAM

## 2024-08-16 PROCEDURE — 87040 BLOOD CULTURE FOR BACTERIA: CPT | Mod: 59 | Performed by: STUDENT IN AN ORGANIZED HEALTH CARE EDUCATION/TRAINING PROGRAM

## 2024-08-16 PROCEDURE — 83605 ASSAY OF LACTIC ACID: CPT

## 2024-08-16 PROCEDURE — U0002 COVID-19 LAB TEST NON-CDC: HCPCS | Performed by: STUDENT IN AN ORGANIZED HEALTH CARE EDUCATION/TRAINING PROGRAM

## 2024-08-16 PROCEDURE — 82803 BLOOD GASES ANY COMBINATION: CPT

## 2024-08-16 PROCEDURE — 84439 ASSAY OF FREE THYROXINE: CPT

## 2024-08-16 RX ORDER — DEXTROSE MONOHYDRATE AND SODIUM CHLORIDE 5; .45 G/100ML; G/100ML
INJECTION, SOLUTION INTRAVENOUS CONTINUOUS
Status: ACTIVE | OUTPATIENT
Start: 2024-08-16 | End: 2024-08-17

## 2024-08-16 RX ORDER — ATORVASTATIN CALCIUM 40 MG/1
80 TABLET, FILM COATED ORAL DAILY
Status: DISCONTINUED | OUTPATIENT
Start: 2024-08-17 | End: 2024-08-23 | Stop reason: HOSPADM

## 2024-08-16 RX ORDER — POLYETHYLENE GLYCOL 3350 17 G/17G
17 POWDER, FOR SOLUTION ORAL DAILY
Status: DISCONTINUED | OUTPATIENT
Start: 2024-08-17 | End: 2024-08-23 | Stop reason: HOSPADM

## 2024-08-16 RX ORDER — INSULIN ASPART 100 [IU]/ML
0-5 INJECTION, SOLUTION INTRAVENOUS; SUBCUTANEOUS
Status: DISCONTINUED | OUTPATIENT
Start: 2024-08-17 | End: 2024-08-19

## 2024-08-16 RX ORDER — HYDRALAZINE HYDROCHLORIDE 25 MG/1
50 TABLET, FILM COATED ORAL EVERY 6 HOURS
Status: DISCONTINUED | OUTPATIENT
Start: 2024-08-17 | End: 2024-08-18

## 2024-08-16 RX ORDER — AMLODIPINE BESYLATE 10 MG/1
10 TABLET ORAL DAILY
Status: DISCONTINUED | OUTPATIENT
Start: 2024-08-17 | End: 2024-08-18

## 2024-08-16 RX ORDER — NALOXONE HCL 0.4 MG/ML
0.02 VIAL (ML) INJECTION
Status: DISCONTINUED | OUTPATIENT
Start: 2024-08-16 | End: 2024-08-23 | Stop reason: HOSPADM

## 2024-08-16 RX ORDER — IBUPROFEN 200 MG
16 TABLET ORAL
Status: DISCONTINUED | OUTPATIENT
Start: 2024-08-16 | End: 2024-08-23 | Stop reason: HOSPADM

## 2024-08-16 RX ORDER — GLUCAGON 1 MG
1 KIT INJECTION
Status: DISCONTINUED | OUTPATIENT
Start: 2024-08-16 | End: 2024-08-23 | Stop reason: HOSPADM

## 2024-08-16 RX ORDER — FERROUS SULFATE, DRIED 160(50) MG
1 TABLET, EXTENDED RELEASE ORAL 2 TIMES DAILY WITH MEALS
Status: DISCONTINUED | OUTPATIENT
Start: 2024-08-17 | End: 2024-08-23 | Stop reason: HOSPADM

## 2024-08-16 RX ORDER — ACETAMINOPHEN 325 MG/1
650 TABLET ORAL EVERY 6 HOURS PRN
Status: DISCONTINUED | OUTPATIENT
Start: 2024-08-16 | End: 2024-08-23 | Stop reason: HOSPADM

## 2024-08-16 RX ORDER — AMOXICILLIN 250 MG
1 CAPSULE ORAL DAILY
Status: DISCONTINUED | OUTPATIENT
Start: 2024-08-17 | End: 2024-08-23 | Stop reason: HOSPADM

## 2024-08-16 RX ORDER — LEVETIRACETAM 250 MG/1
250 TABLET ORAL 2 TIMES DAILY
Status: DISCONTINUED | OUTPATIENT
Start: 2024-08-16 | End: 2024-08-18

## 2024-08-16 RX ORDER — SODIUM CHLORIDE 0.9 % (FLUSH) 0.9 %
10 SYRINGE (ML) INJECTION EVERY 12 HOURS PRN
Status: DISCONTINUED | OUTPATIENT
Start: 2024-08-16 | End: 2024-08-23 | Stop reason: HOSPADM

## 2024-08-16 RX ORDER — IBUPROFEN 200 MG
24 TABLET ORAL
Status: DISCONTINUED | OUTPATIENT
Start: 2024-08-16 | End: 2024-08-23 | Stop reason: HOSPADM

## 2024-08-16 RX ORDER — CARVEDILOL 3.12 MG/1
3.12 TABLET ORAL 2 TIMES DAILY
Status: DISCONTINUED | OUTPATIENT
Start: 2024-08-16 | End: 2024-08-18

## 2024-08-16 RX ORDER — GABAPENTIN 300 MG/1
300 CAPSULE ORAL 2 TIMES DAILY
Status: DISCONTINUED | OUTPATIENT
Start: 2024-08-16 | End: 2024-08-23 | Stop reason: HOSPADM

## 2024-08-16 RX ORDER — FOLIC ACID 1 MG/1
1 TABLET ORAL DAILY
Status: DISCONTINUED | OUTPATIENT
Start: 2024-08-17 | End: 2024-08-23 | Stop reason: HOSPADM

## 2024-08-16 RX ORDER — DEXTROSE MONOHYDRATE AND SODIUM CHLORIDE 5; .45 G/100ML; G/100ML
INJECTION, SOLUTION INTRAVENOUS CONTINUOUS
Status: DISCONTINUED | OUTPATIENT
Start: 2024-08-16 | End: 2024-08-16

## 2024-08-16 RX ORDER — LEVOTHYROXINE SODIUM 75 UG/1
75 TABLET ORAL
Status: DISCONTINUED | OUTPATIENT
Start: 2024-08-17 | End: 2024-08-23 | Stop reason: HOSPADM

## 2024-08-16 RX ADMIN — SODIUM CHLORIDE, POTASSIUM CHLORIDE, SODIUM LACTATE AND CALCIUM CHLORIDE 1000 ML: 600; 310; 30; 20 INJECTION, SOLUTION INTRAVENOUS at 06:08

## 2024-08-16 NOTE — ED NOTES
Attempted to straight cath pt per VO and no urine output. Pt's daughter states pt has not had anything to eat or drink for about 5-6 days. MD notified and fluids ordered.

## 2024-08-16 NOTE — ED PROVIDER NOTES
"Encounter Date: 8/16/2024       History     Chief Complaint   Patient presents with    Altered Mental Status     X 3 days. Nonverbal at baseline, decreased responses about 3 days ago. Per family "has these episodes that normally last about 2 days"      Quin Linn is a 89 y.o. female with a PMH of T2DM, HTN, HLD,  Alzheimer's, CKD 4, anemia, dCHF, pAfib on apixaban who presents to the ED via EMS for altered mental status. Per EMS, patient's family was concerned because she has not been talking or eating much the past 3 days and appears more lethargic than usual. Per EMS, family states she has these periods where she appears altered for up to 2 days, likely 2/2 to her Alzheimer's, but this episode has lasted longer so they decided to call EMS. Unable to obtain history from patient at this time. Spoke with patient's daughter who states this is not her baseline. She is usually very talkative and interactive, knows who her family is. Denies any recent falls or syncopal episodes. She was started on keppra 2 months ago, but denies any other recent medication changes.    Of note, Seen by neurology 4/2024 for workup of syncope vs seizure. At that time workup negative but high suspicion for ictal periods so patient was started on Keppra 250 BID        Review of patient's allergies indicates:   Allergen Reactions    Tramadol Rash and Edema     Developed facial rash and edema.    Metformin Diarrhea     Past Medical History:   Diagnosis Date    Acute hypoxemic respiratory failure 9/2/2023    Acute on chronic diastolic congestive heart failure 10/19/2023    Alzheimer's disease     Anemia     Anemia 5/14/2024    Arthritis     lumbar    Back pain     Cancer     colon    Cataract     Colon cancer     Diabetes mellitus type II     Glaucoma     Hyperlipidemia     Hypertension     Hyperthyroidism     Hypothyroidism following radioiodine therapy     Pacemaker     Pneumonia     Pneumonia due to other staphylococcus     Renal " manifestation of secondary diabetes mellitus     Squamous cell carcinoma     Stroke     TIA    Type 2 diabetes mellitus with stage 3 chronic kidney disease and hypertension 10/12/2017    Type 2 diabetes with peripheral circulatory disorder, controlled      Past Surgical History:   Procedure Laterality Date    CATARACT EXTRACTION W/  INTRAOCULAR LENS IMPLANT Left 09/13/2017        CHOLECYSTECTOMY      COLON SURGERY  2001    Colon CA    EYE SURGERY      cataract removal left side    IMPLANTATION OF LEADLESS PACEMAKER N/A 10/21/2022    Procedure: [id number];  Surgeon: JOSE Burgos MD;  Location: [facility];  Service: Cardiology;  Laterality: N/A;  SB, JOHNNA, MDT, ANES, EH,     squamous cell ca of face       Family History   Problem Relation Name Age of Onset    Heart disease Mother      Arthritis Mother      Kidney disease Father      Alzheimer's disease Sister      Hypertension Sister      Cancer Sister          ovarian    No Known Problems Daughter Khalida     Ankylosing spondylitis Daughter Elizabeth     Lupus Daughter Elizabeth     Kidney disease Daughter Janae     Heart disease Daughter Janae     Atrial fibrillation Daughter Janae     Supraventricular tachycardia Daughter Janae     Hypertension Son Mayito     Hyperlipidemia Son Mayito     Asthma Son Mayito     Cancer Paternal Uncle      Diabetes Maternal Grandmother      Amblyopia Neg Hx      Blindness Neg Hx      Cataracts Neg Hx      Glaucoma Neg Hx      Macular degeneration Neg Hx      Retinal detachment Neg Hx      Strabismus Neg Hx      Stroke Neg Hx      Thyroid disease Neg Hx       Social History     Tobacco Use    Smoking status: Never    Smokeless tobacco: Never    Tobacco comments:     smoked for about 4 years in her twenties (socially)   Substance Use Topics    Alcohol use: No    Drug use: No     Review of Systems    Physical Exam     Initial Vitals [08/16/24 1715]   BP Pulse Resp Temp SpO2   (!) 168/80 94 14 98.8 °F  (37.1 °C) 98 %      MAP       --         Physical Exam    Nursing note and vitals reviewed.  Constitutional: She appears lethargic.   Lethargic appearing   HENT:   Head: Normocephalic and atraumatic.   Right Ear: External ear normal.   Left Ear: External ear normal.   Mouth/Throat: Mucous membranes are dry.   Eyes: Pupils are equal, round, and reactive to light. No scleral icterus.   Neck: No JVD present.   Cardiovascular:  Normal rate and regular rhythm.           Pulmonary/Chest: No respiratory distress.   Abdominal: Abdomen is soft. She exhibits no distension. There is no abdominal tenderness.   Musculoskeletal:         General: No tenderness.     Lymphadenopathy:     She has no cervical adenopathy.   Neurological: She appears lethargic. She is disoriented (unclear if baseline). GCS eye subscore is 3. GCS verbal subscore is 2. GCS motor subscore is 4.   Difficult to assess strength and sensation as patient not following commands         ED Course   Procedures  Labs Reviewed   COMPREHENSIVE METABOLIC PANEL - Abnormal       Result Value    Sodium 139      Potassium 5.3 (*)     Chloride 106      CO2 17 (*)     Glucose 140 (*)     BUN 30      Creatinine 1.4      Calcium 10.0      Total Protein 7.5      Albumin 3.2 (*)     Total Bilirubin 0.9      Alkaline Phosphatase 62      AST 28      ALT 21      eGFR 35.3 (*)     Anion Gap 16     TROPONIN I - Abnormal    Troponin I 0.060 (*)    POCT GLUCOSE - Abnormal    POCT Glucose 139 (*)    ISTAT PROCEDURE - Abnormal    POC PH 7.540 (*)     POC PCO2 28.6 (*)     POC PO2 73 (*)     POC HCO3 24.5      POC BE 2      POC SATURATED O2 96      POC TCO2 25      Sample VENOUS      Site Other      Allens Test N/A     INFLUENZA A & B BY MOLECULAR    Influenza A, Molecular Negative      Influenza B, Molecular Negative      Flu A & B Source Nasal swab     CULTURE, BLOOD   CULTURE, BLOOD   CBC W/ AUTO DIFFERENTIAL    WBC 9.85      RBC 4.74      Hemoglobin 14.6      Hematocrit 44.1      MCV  93      MCH 30.8      MCHC 33.1      RDW 12.6      Platelets 195      MPV 10.5      Immature Granulocytes 0.4      Gran # (ANC) 6.7      Immature Grans (Abs) 0.04      Lymph # 2.4      Mono # 0.7      Eos # 0.0      Baso # 0.04      nRBC 0      Gran % 67.5      Lymph % 23.9      Mono % 7.5      Eosinophil % 0.3      Basophil % 0.4      Differential Method Automated     MAGNESIUM    Magnesium 1.8     SARS-COV-2 RNA AMPLIFICATION, QUAL    SARS-CoV-2 RNA, Amplification, Qual Negative     URINALYSIS, REFLEX TO URINE CULTURE   TROPONIN I   ISTAT LACTATE    POC Lactate 1.37      Sample VENOUS      Site Other      Allens Test N/A            Imaging Results              CT Head Without Contrast (Final result)  Result time 08/16/24 20:45:00      Final result by Kasi Malagon MD (08/16/24 20:45:00)                   Impression:      No acute hemorrhage or CT evidence of major vascular distribution infarct.    Grossly stable senescent changes as above.    Additional evaluation, as clinically warranted.    Electronically signed by resident: Nicolas Rios  Date:    08/16/2024  Time:    20:25    Electronically signed by: Kasi Malagon MD  Date:    08/16/2024  Time:    20:45               Narrative:    EXAMINATION:  CT HEAD WITHOUT CONTRAST    CLINICAL HISTORY:  Mental status change, unknown cause;    TECHNIQUE:  Low dose axial CT images obtained throughout the head without the use of intravenous contrast.  Axial, sagittal and coronal reconstructions were performed.    COMPARISON:  MRI brain 05/13/2024, CT head 05/11/2024, and multiple prior examinations.    FINDINGS:  Intracranial compartment:    Generalized cerebral volume loss with compensatory enlargement of the ventricles, which are unchanged in size across multiple prior exams.  No evidence of hydrocephalus.  No midline shift or herniation.    Unchanged patchy and confluent supratentorial white matter hypoattenuation most consistent with chronic microvascular ischemic  change.  Focal encephalomalacia in the left cerebellar hemisphere unchanged 05/11/2024, possibly remote infarct. No parenchymal  hemorrhage, edema, mass effect or major vascular distribution infarct.    No extra-axial blood or fluid collections.    Skull/extracranial contents (limited evaluation):    No displaced calvarial fracture.    The mastoid air cells and visualized paranasal sinuses are essentially clear.  There are postoperative changes in the left globe.                                       X-Ray Chest AP Portable (Final result)  Result time 08/16/24 18:43:59      Final result by Giacomo Hernandez MD (08/16/24 18:43:59)                   Impression:      1. Small left pleural effusion.  Central hilar findings could reflect early edema.  Developing left lower lung zone consolidation not excluded.      Electronically signed by: Giacomo Hernandez MD  Date:    08/16/2024  Time:    18:43               Narrative:    EXAMINATION:  XR CHEST AP PORTABLE    CLINICAL HISTORY:  cough, altered;    TECHNIQUE:  Single frontal view of the chest was performed.    COMPARISON:  05/09/2024    FINDINGS:  The cardiomediastinal silhouette is not enlarged noting calcification of the aorta and magnification by technique..  There is obscuration of the left costophrenic angle suggesting effusion.  Chest wall recording device noted..  The trachea is midline.  The lungs are symmetrically expanded bilaterally with coarse interstitial attenuation in a perihilar distribution, accentuated by habitus..  There is no pneumothorax.  The osseous structures are remarkable for degenerative change..                                       Medications   lactated ringers bolus 1,000 mL (1,000 mLs Intravenous New Bag 8/16/24 1847)     Medical Decision Making  Quin Linn is a 89 y.o. female with a PMH of T2DM, HTN, HLD,  Alzheimer's, CKD 4, anemia, dCHF, pAfib on apixaban who presents to the ED via EMS for altered mental status. Ddx includes but not  limited to UTI, sepsis, CVA, ICH, hypoglycemia, seizure w/ post-ictal state  Glucose 139 on arrival.  Obtaining sepsis and ACS workup at this time given limited history. Covid and Flu negative. CXR with LLL pleural effusion vs consolidation. UA unable to be obtained at this time as patient not producing urine. Blood cultures pending.  EKG w LBBB stable from prior, no STEMI. Trop elevated. Repeat pending  Neuro exam difficult to assess as patient lethargic and not following verbal commands. CT head without evidence of acute hemorrhage or infarct.  No other electrolyte abnormalities. Cr bump to 1.4. Baseline seems to be around 1.0. Gave fluids.     Pending further infectious workup, unclear whether AMS 2/2 sepsis of unknown source vs ictal state at this time. Patient admitted to Medicine for further management of AMS and SPARKLE      Amount and/or Complexity of Data Reviewed  External Data Reviewed: labs, radiology, ECG and notes.  Labs: ordered.  Radiology: ordered.                                      Clinical Impression:  Final diagnoses:  [R41.82] Altered mental status          ED Disposition Condition    Observation Stable                Dina Duarte MD  Resident  08/16/24 9426

## 2024-08-16 NOTE — Clinical Note
Diagnosis: Altered mental status [780.97.ICD-9-CM]   Future Attending Provider: JUANY URIBE [[id number]]

## 2024-08-17 PROBLEM — J90 PLEURAL EFFUSION: Status: ACTIVE | Noted: 2024-08-17

## 2024-08-17 PROBLEM — Z79.01 CHRONIC ANTICOAGULATION: Status: ACTIVE | Noted: 2024-08-17

## 2024-08-17 PROBLEM — E43 SEVERE MALNUTRITION: Status: ACTIVE | Noted: 2024-08-17

## 2024-08-17 PROBLEM — R47.01 APHASIA: Status: ACTIVE | Noted: 2024-08-17

## 2024-08-17 PROBLEM — E43 SEVERE MALNUTRITION: Status: RESOLVED | Noted: 2024-08-17 | Resolved: 2024-08-17

## 2024-08-17 PROBLEM — E44.0 MODERATE MALNUTRITION: Status: ACTIVE | Noted: 2024-08-17

## 2024-08-17 PROBLEM — G93.41 METABOLIC ENCEPHALOPATHY: Status: ACTIVE | Noted: 2024-08-16

## 2024-08-17 PROBLEM — E44.1 MILD MALNUTRITION: Status: ACTIVE | Noted: 2024-08-17

## 2024-08-17 PROBLEM — N18.4 CHRONIC KIDNEY DISEASE, STAGE IV (SEVERE): Status: RESOLVED | Noted: 2022-01-10 | Resolved: 2024-08-17

## 2024-08-17 LAB
ALBUMIN SERPL BCP-MCNC: 2.5 G/DL (ref 3.5–5.2)
ALLENS TEST: ABNORMAL
ALP SERPL-CCNC: 51 U/L (ref 55–135)
ALT SERPL W/O P-5'-P-CCNC: 18 U/L (ref 10–44)
AMMONIA PLAS-SCNC: 35 UMOL/L (ref 10–50)
ANION GAP SERPL CALC-SCNC: 9 MMOL/L (ref 8–16)
ASCENDING AORTA: 3.57 CM
AST SERPL-CCNC: 20 U/L (ref 10–40)
AV INDEX (PROSTH): 0.92
AV MEAN GRADIENT: 2 MMHG
AV PEAK GRADIENT: 4 MMHG
AV VALVE AREA BY VELOCITY RATIO: 2.02 CM²
AV VALVE AREA: 2.22 CM²
AV VELOCITY RATIO: 0.84
BACTERIA #/AREA URNS AUTO: ABNORMAL /HPF
BASOPHILS # BLD AUTO: 0.02 K/UL (ref 0–0.2)
BASOPHILS NFR BLD: 0.2 % (ref 0–1.9)
BILIRUB SERPL-MCNC: 0.6 MG/DL (ref 0.1–1)
BILIRUB UR QL STRIP: NEGATIVE
BSA FOR ECHO PROCEDURE: 1.64 M2
BUN SERPL-MCNC: 34 MG/DL (ref 10–30)
CALCIUM SERPL-MCNC: 8.8 MG/DL (ref 8.7–10.5)
CHLORIDE SERPL-SCNC: 107 MMOL/L (ref 95–110)
CLARITY UR REFRACT.AUTO: ABNORMAL
CO2 SERPL-SCNC: 23 MMOL/L (ref 23–29)
COLOR UR AUTO: YELLOW
CREAT SERPL-MCNC: 1.1 MG/DL (ref 0.5–1.4)
CV ECHO LV RWT: 0.44 CM
DELSYS: ABNORMAL
DIFFERENTIAL METHOD BLD: ABNORMAL
DOP CALC AO PEAK VEL: 1.01 M/S
DOP CALC AO VTI: 19.86 CM
DOP CALC LVOT AREA: 2.4 CM2
DOP CALC LVOT DIAMETER: 1.75 CM
DOP CALC LVOT PEAK VEL: 0.85 M/S
DOP CALC LVOT STROKE VOLUME: 43.99 CM3
DOP CALCLVOT PEAK VEL VTI: 18.3 CM
E WAVE DECELERATION TIME: 269.65 MSEC
E/A RATIO: 0.97
E/E' RATIO: 11.45 M/S
ECHO LV POSTERIOR WALL: 0.95 CM (ref 0.6–1.1)
EJECTION FRACTION: 50 %
EOSINOPHIL # BLD AUTO: 0.1 K/UL (ref 0–0.5)
EOSINOPHIL NFR BLD: 1.5 % (ref 0–8)
ERYTHROCYTE [DISTWIDTH] IN BLOOD BY AUTOMATED COUNT: 12.7 % (ref 11.5–14.5)
EST. GFR  (NO RACE VARIABLE): 47.1 ML/MIN/1.73 M^2
ESTIMATED AVG GLUCOSE: 120 MG/DL (ref 68–131)
FRACTIONAL SHORTENING: 34 % (ref 28–44)
GLOBAL LONGITUIDAL STRAIN: 11.1 %
GLUCOSE SERPL-MCNC: 146 MG/DL (ref 70–110)
GLUCOSE UR QL STRIP: NEGATIVE
HBA1C MFR BLD: 5.8 % (ref 4–5.6)
HCO3 UR-SCNC: 24.2 MMOL/L (ref 24–28)
HCT VFR BLD AUTO: 35.7 % (ref 37–48.5)
HGB BLD-MCNC: 12 G/DL (ref 12–16)
HGB UR QL STRIP: NEGATIVE
HYALINE CASTS UR QL AUTO: 5 /LPF
IMM GRANULOCYTES # BLD AUTO: 0.02 K/UL (ref 0–0.04)
IMM GRANULOCYTES NFR BLD AUTO: 0.2 % (ref 0–0.5)
INTERVENTRICULAR SEPTUM: 0.89 CM (ref 0.6–1.1)
IVRT: 137.01 MSEC
KETONES UR QL STRIP: NEGATIVE
LA MAJOR: 5.99 CM
LA MINOR: 6.04 CM
LA WIDTH: 3.34 CM
LACTATE SERPL-SCNC: 2.4 MMOL/L (ref 0.5–2.2)
LEFT ATRIUM SIZE: 3.23 CM
LEFT ATRIUM VOLUME INDEX MOD: 24.2 ML/M2
LEFT ATRIUM VOLUME INDEX: 33.4 ML/M2
LEFT ATRIUM VOLUME MOD: 39.96 CM3
LEFT ATRIUM VOLUME: 55.16 CM3
LEFT INTERNAL DIMENSION IN SYSTOLE: 2.81 CM (ref 2.1–4)
LEFT VENTRICLE DIASTOLIC VOLUME INDEX: 49.87 ML/M2
LEFT VENTRICLE DIASTOLIC VOLUME: 82.29 ML
LEFT VENTRICLE MASS INDEX: 76 G/M2
LEFT VENTRICLE SYSTOLIC VOLUME INDEX: 18 ML/M2
LEFT VENTRICLE SYSTOLIC VOLUME: 29.78 ML
LEFT VENTRICULAR INTERNAL DIMENSION IN DIASTOLE: 4.28 CM (ref 3.5–6)
LEFT VENTRICULAR MASS: 126.08 G
LEUKOCYTE ESTERASE UR QL STRIP: ABNORMAL
LV LATERAL E/E' RATIO: 9 M/S
LV SEPTAL E/E' RATIO: 15.75 M/S
LYMPHOCYTES # BLD AUTO: 2.7 K/UL (ref 1–4.8)
LYMPHOCYTES NFR BLD: 30 % (ref 18–48)
MAGNESIUM SERPL-MCNC: 1.7 MG/DL (ref 1.6–2.6)
MCH RBC QN AUTO: 31.5 PG (ref 27–31)
MCHC RBC AUTO-ENTMCNC: 33.6 G/DL (ref 32–36)
MCV RBC AUTO: 94 FL (ref 82–98)
MICROSCOPIC COMMENT: ABNORMAL
MONOCYTES # BLD AUTO: 0.7 K/UL (ref 0.3–1)
MONOCYTES NFR BLD: 8 % (ref 4–15)
MV PEAK A VEL: 0.65 M/S
MV PEAK E VEL: 0.63 M/S
MV STENOSIS PRESSURE HALF TIME: 78.2 MS
MV VALVE AREA P 1/2 METHOD: 2.81 CM2
NEUTROPHILS # BLD AUTO: 5.3 K/UL (ref 1.8–7.7)
NEUTROPHILS NFR BLD: 60.1 % (ref 38–73)
NITRITE UR QL STRIP: NEGATIVE
NRBC BLD-RTO: 0 /100 WBC
OHS LV EJECTION FRACTION SIMPSONS BIPLANE MOD: 50 %
OHS QRS DURATION: 128 MS
OHS QRS DURATION: 140 MS
OHS QTC CALCULATION: 468 MS
OHS QTC CALCULATION: 494 MS
PCO2 BLDA: 41.3 MMHG (ref 35–45)
PH SMN: 7.38 [PH] (ref 7.35–7.45)
PH UR STRIP: 6 [PH] (ref 5–8)
PHOSPHATE SERPL-MCNC: 3.5 MG/DL (ref 2.7–4.5)
PISA TR MAX VEL: 2.35 M/S
PLATELET # BLD AUTO: 161 K/UL (ref 150–450)
PMV BLD AUTO: 10.5 FL (ref 9.2–12.9)
PO2 BLDA: 78 MMHG (ref 80–100)
POC BE: -1 MMOL/L
POC SATURATED O2: 95 % (ref 95–100)
POC TCO2: 25 MMOL/L (ref 23–27)
POCT GLUCOSE: 121 MG/DL (ref 70–110)
POCT GLUCOSE: 146 MG/DL (ref 70–110)
POCT GLUCOSE: 153 MG/DL (ref 70–110)
POTASSIUM SERPL-SCNC: 3.9 MMOL/L (ref 3.5–5.1)
PROT SERPL-MCNC: 5.4 G/DL (ref 6–8.4)
PROT UR QL STRIP: ABNORMAL
RA MAJOR: 5.03 CM
RA PRESSURE ESTIMATED: 3 MMHG
RA WIDTH: 3.57 CM
RBC # BLD AUTO: 3.81 M/UL (ref 4–5.4)
RBC #/AREA URNS AUTO: 3 /HPF (ref 0–4)
RIGHT VENTRICLE DIASTOLIC BASEL DIMENSION: 3 CM
RV TB RVSP: 5 MMHG
SAMPLE: ABNORMAL
SINUS: 2.68 CM
SITE: ABNORMAL
SODIUM SERPL-SCNC: 139 MMOL/L (ref 136–145)
SP GR UR STRIP: 1.02 (ref 1–1.03)
STJ: 2.51 CM
T4 FREE SERPL-MCNC: 1.16 NG/DL (ref 0.71–1.51)
TDI LATERAL: 0.07 M/S
TDI SEPTAL: 0.04 M/S
TDI: 0.06 M/S
TR MAX PG: 22 MMHG
TRICUSPID ANNULAR PLANE SYSTOLIC EXCURSION: 1.47 CM
TV REST PULMONARY ARTERY PRESSURE: 25 MMHG
URN SPEC COLLECT METH UR: ABNORMAL
VIT B12 SERPL-MCNC: 343 PG/ML (ref 210–950)
WBC # BLD AUTO: 8.87 K/UL (ref 3.9–12.7)
WBC #/AREA URNS AUTO: 28 /HPF (ref 0–5)
Z-SCORE OF LEFT VENTRICULAR DIMENSION IN END DIASTOLE: -0.79
Z-SCORE OF LEFT VENTRICULAR DIMENSION IN END SYSTOLE: -0.17

## 2024-08-17 PROCEDURE — 51798 US URINE CAPACITY MEASURE: CPT

## 2024-08-17 PROCEDURE — 84100 ASSAY OF PHOSPHORUS: CPT

## 2024-08-17 PROCEDURE — 36415 COLL VENOUS BLD VENIPUNCTURE: CPT

## 2024-08-17 PROCEDURE — 11000001 HC ACUTE MED/SURG PRIVATE ROOM

## 2024-08-17 PROCEDURE — 87186 SC STD MICRODIL/AGAR DIL: CPT | Performed by: STUDENT IN AN ORGANIZED HEALTH CARE EDUCATION/TRAINING PROGRAM

## 2024-08-17 PROCEDURE — 82140 ASSAY OF AMMONIA: CPT

## 2024-08-17 PROCEDURE — 83605 ASSAY OF LACTIC ACID: CPT

## 2024-08-17 PROCEDURE — 92610 EVALUATE SWALLOWING FUNCTION: CPT

## 2024-08-17 PROCEDURE — 80053 COMPREHEN METABOLIC PANEL: CPT

## 2024-08-17 PROCEDURE — 87086 URINE CULTURE/COLONY COUNT: CPT | Performed by: STUDENT IN AN ORGANIZED HEALTH CARE EDUCATION/TRAINING PROGRAM

## 2024-08-17 PROCEDURE — 51701 INSERT BLADDER CATHETER: CPT

## 2024-08-17 PROCEDURE — 36600 WITHDRAWAL OF ARTERIAL BLOOD: CPT

## 2024-08-17 PROCEDURE — 25000003 PHARM REV CODE 250

## 2024-08-17 PROCEDURE — 95700 EEG CONT REC W/VID EEG TECH: CPT

## 2024-08-17 PROCEDURE — 82803 BLOOD GASES ANY COMBINATION: CPT

## 2024-08-17 PROCEDURE — 80177 DRUG SCRN QUAN LEVETIRACETAM: CPT

## 2024-08-17 PROCEDURE — 87088 URINE BACTERIA CULTURE: CPT | Performed by: STUDENT IN AN ORGANIZED HEALTH CARE EDUCATION/TRAINING PROGRAM

## 2024-08-17 PROCEDURE — 36415 COLL VENOUS BLD VENIPUNCTURE: CPT | Mod: XB

## 2024-08-17 PROCEDURE — 83735 ASSAY OF MAGNESIUM: CPT

## 2024-08-17 PROCEDURE — 82607 VITAMIN B-12: CPT

## 2024-08-17 PROCEDURE — 85025 COMPLETE CBC W/AUTO DIFF WBC: CPT

## 2024-08-17 PROCEDURE — 99900035 HC TECH TIME PER 15 MIN (STAT)

## 2024-08-17 PROCEDURE — 63600175 PHARM REV CODE 636 W HCPCS

## 2024-08-17 PROCEDURE — 81001 URINALYSIS AUTO W/SCOPE: CPT | Performed by: STUDENT IN AN ORGANIZED HEALTH CARE EDUCATION/TRAINING PROGRAM

## 2024-08-17 PROCEDURE — 83036 HEMOGLOBIN GLYCOSYLATED A1C: CPT

## 2024-08-17 PROCEDURE — S5010 5% DEXTROSE AND 0.45% SALINE: HCPCS

## 2024-08-17 PROCEDURE — 95714 VEEG EA 12-26 HR UNMNTR: CPT

## 2024-08-17 RX ADMIN — DEXTROSE MONOHYDRATE 2 G: 5 INJECTION INTRAVENOUS at 04:08

## 2024-08-17 RX ADMIN — LEVETIRACETAM 250 MG: 250 TABLET, FILM COATED ORAL at 09:08

## 2024-08-17 RX ADMIN — APIXABAN 2.5 MG: 2.5 TABLET, FILM COATED ORAL at 12:08

## 2024-08-17 RX ADMIN — CARVEDILOL 3.12 MG: 3.12 TABLET, FILM COATED ORAL at 09:08

## 2024-08-17 RX ADMIN — LEVETIRACETAM 250 MG: 250 TABLET, FILM COATED ORAL at 12:08

## 2024-08-17 RX ADMIN — GABAPENTIN 300 MG: 300 CAPSULE ORAL at 09:08

## 2024-08-17 RX ADMIN — DEXTROSE AND SODIUM CHLORIDE: 5; 450 INJECTION, SOLUTION INTRAVENOUS at 12:08

## 2024-08-17 RX ADMIN — SENNOSIDES AND DOCUSATE SODIUM 1 TABLET: 50; 8.6 TABLET ORAL at 09:08

## 2024-08-17 RX ADMIN — APIXABAN 2.5 MG: 2.5 TABLET, FILM COATED ORAL at 09:08

## 2024-08-17 RX ADMIN — HYDRALAZINE HYDROCHLORIDE 50 MG: 25 TABLET ORAL at 06:08

## 2024-08-17 RX ADMIN — CARVEDILOL 3.12 MG: 3.12 TABLET, FILM COATED ORAL at 12:08

## 2024-08-17 RX ADMIN — HYDRALAZINE HYDROCHLORIDE 50 MG: 25 TABLET ORAL at 12:08

## 2024-08-17 RX ADMIN — AMLODIPINE BESYLATE 10 MG: 10 TABLET ORAL at 09:08

## 2024-08-17 RX ADMIN — GABAPENTIN 300 MG: 300 CAPSULE ORAL at 12:08

## 2024-08-17 RX ADMIN — FOLIC ACID 1 MG: 1 TABLET ORAL at 09:08

## 2024-08-17 RX ADMIN — POLYETHYLENE GLYCOL 3350 17 G: 17 POWDER, FOR SOLUTION ORAL at 09:08

## 2024-08-17 RX ADMIN — LEVOTHYROXINE SODIUM 75 MCG: 75 TABLET ORAL at 06:08

## 2024-08-17 RX ADMIN — Medication 1 TABLET: at 06:08

## 2024-08-17 RX ADMIN — ATORVASTATIN CALCIUM 80 MG: 40 TABLET, FILM COATED ORAL at 09:08

## 2024-08-17 NOTE — ASSESSMENT & PLAN NOTE
SPARKLE is likely due to pre-renal azotemia due to dehydration. Baseline creatinine is  1.0 . Most recent creatinine and eGFR are listed below.  Recent Labs     08/16/24  1840   CREATININE 1.4   EGFRNORACEVR 35.3*      Plan  - SPARKLE is stable  - Avoid nephrotoxins and renally dose meds for GFR listed above  - Monitor urine output, serial BMP, and adjust therapy as needed  - IV Fluids

## 2024-08-17 NOTE — ASSESSMENT & PLAN NOTE
CT head: No acute hemorrhage or CT evidence of major vascular distribution infarct. Chest Xray: small pleural effusion, central hilar finding may be early edema, developing lower lung zone consolidation. AB.5, PCO2 28.6, PO2 73. History of Alzheimer, has not been eating for past week.     -blood cultures f/u  - UA f/u  - KUB to examine for fecal impaction

## 2024-08-17 NOTE — PLAN OF CARE
Pt decreasing in level of alertness throughout shift. Unable to wake enough to safely give oral medications for midday and late afternoon medication despite attempts to rise. Pt briefly responsive to sternal rubs and vocal stimulation but then quickly falls back asleep. Team & speech therapy notified of change in mental status and inability to administer PO medications. PT NPO until alert enough for safe oral intake. EEG & Echo completed at bedside. Pt bladder scan resulted in 430cc, straight cath resulted in 500cc, urinalysis sent to lab and pt positive for UTI. IV abx administered per MAR. VSS. Bed locked & in lowest position, alarm on, call bell & personal belongings within reach, side rails up x2. Pt nonverbal throughout shift with no acute signs of distress noted at this time.    -Rupal Mcmanus    Problem: Adult Inpatient Plan of Care  Goal: Plan of Care Review  Outcome: Progressing  Goal: Optimal Comfort and Wellbeing  Outcome: Progressing  Goal: Readiness for Transition of Care  Outcome: Progressing     Problem: Acute Kidney Injury/Impairment  Goal: Fluid and Electrolyte Balance  Outcome: Progressing  Goal: Improved Oral Intake  Outcome: Progressing     Problem: Fall Injury Risk  Goal: Absence of Fall and Fall-Related Injury  Outcome: Progressing     Problem: Skin Injury Risk Increased  Goal: Skin Health and Integrity  Outcome: Progressing     Problem: Infection  Goal: Absence of Infection Signs and Symptoms  Outcome: Progressing

## 2024-08-17 NOTE — NURSING
Patient Transferred to NPU Room 950       Upon arrival to the floor, patient greeted and oriented to room. Complete head to toe assessment completed per protocol. VSS, see flowsheet for details. Neuro assessment completed. Primary team notified of patient's transfer to floor. All current and transfer orders reviewed/reconciled per protocol. All emergency equipment set up in patient's room. Fall/seizure precautions initiated per providers orders. 4 Eyes skin assessment performed, see below for details. Reviewed assessment and rounding frequency with patient and family. Questions were encouraged and addressed. Repositioned patient for comfort with bed locked in lowest position, side rails up x 250, bed alarm set, and call light within reach. Instructed patient to call staff for mobility/assistance, verbalized understanding. No acute signs of distress noted at this time.         Nurses Note -- 4 Eyes      8/17/2024   12:11 AM      Skin assessed during: Admit      [x] No Altered Skin Integrity Present    [x]Prevention Measures Documented      [] Yes- Altered Skin Integrity Present or Discovered   [] LDA Added if Not in Epic (Describe Wound)   [] New Altered Skin Integrity was Present on Admit and Documented in LDA   [] Wound Image Taken    Wound Care Consulted? No    Attending Nurse: Paul MCKEON    Second RN/Staff Member:   Derrell MCKEON    Patient has boggy area to sacrum with skin remaining intact. Multiple bruising to bilateral arms with skin remaining intact at time of admit assessment. Foam pad place to sacrum to prevent altered skin integrity.

## 2024-08-17 NOTE — NURSING
Report received from Kylah MCKEON in ED that patient will be admitted to unit for AMS. Report received that order for medication to be administered with LR noted completed at 1930 with report received that 18 gauge IV remaining patent and intact. Report received that will put in for transportation for patient to be transported to unit. Awaiting for patient transport.

## 2024-08-17 NOTE — H&P
"[facility] Neurosurgery [facility] Medicine  History & Physical    Patient Name: Quin Linn  MRN: [id number]  Patient Class: OP- Observation  Admission Date: 8/16/2024  Attending Physician: Alexandria Pearson MD   Primary Care Provider: Mary Ellen Nesbitt MD         Patient information was obtained from ER records.     Subjective:     Principal Problem:AMS (altered mental status)    Chief Complaint:   Chief Complaint   Patient presents with    Altered Mental Status     X 3 days. Nonverbal at baseline, decreased responses about 3 days ago. Per family "has these episodes that normally last about 2 days"         HPI: Ms. Linn is a 89 y.o. with a PMH of T2DM, HTN, HLD,  Alzheimer's, CKD 4, anemia, decompensated chronic heart failure, paroxysmal Afib on apixaban who presented to the ED via EMS for altered mental status. Per EMS, patient's family was concerned because she had not been talking or eating much the past 3 days and appeared more lethargic than usual. Per EMS, family states she has these periods where she appears altered for up to 2 days, likely 2/2 to her Alzheimer's, but this episode has lasted longer so they decided to call EMS. ED spoke with the patient's daughter who states her baseline is usually very talkative and interactive, knows who her family is. Denies any recent falls or syncopal episodes. She was started on keppra 2 months ago, but denies any other recent medication changes. She had a closed nondisplaced fracture of fourth cervical vertebra with routine healing and follow up after she fell out of a recliner in May 2024. She was seen by neurology 4/2024 for workup of syncope vs seizure. At that time workup negative but there was high suspicion for ictal periods so patient was started on Keppra 250 BID.  She is being admitted to inpatient for altered mental status work up and and an SPARKLE.    ED Course:  LR bolus  Trop .06  Urinalysis - awaiting results  Blood cultures - awaiting " results    CBC: unremarkable  Chemistry: K slightly elevated 5.3, Anion Gap 16  BUN 30, Cr 1.4 (baseline?)  Glucose 140    AB.5, PCO2 28.6, PO2 73 (metabolic alkalosis)    Imaging: chest xray: small pleural effusion, central hilar finding may be early edema, developing lower lung zone consolidation    CT head: No acute hemorrhage or CT evidence of major vascular distribution infarct.      Past Medical History:   Diagnosis Date    Acute hypoxemic respiratory failure 2023    Acute on chronic diastolic congestive heart failure 10/19/2023    Alzheimer's disease     Anemia     Anemia 2024    Arthritis     lumbar    Back pain     Cancer     colon    Cataract     Colon cancer     Diabetes mellitus type II     Glaucoma     Hyperlipidemia     Hypertension     Hyperthyroidism     Hypothyroidism following radioiodine therapy     Pacemaker     Pneumonia     Pneumonia due to other staphylococcus     Renal manifestation of secondary diabetes mellitus     Squamous cell carcinoma     Stroke     TIA    Type 2 diabetes mellitus with stage 3 chronic kidney disease and hypertension 10/12/2017    Type 2 diabetes with peripheral circulatory disorder, controlled        Past Surgical History:   Procedure Laterality Date    CATARACT EXTRACTION W/  INTRAOCULAR LENS IMPLANT Left 2017        CHOLECYSTECTOMY      COLON SURGERY      Colon CA    EYE SURGERY      cataract removal left side    IMPLANTATION OF LEADLESS PACEMAKER N/A 10/21/2022    Procedure: [id number];  Surgeon: JOSE Burgos MD;  Location: [facility] LAB;  Service: Cardiology;  Laterality: N/A;  EMELYN, JOHNNA, MDT, ANES, EH,     squamous cell ca of face         Review of patient's allergies indicates:   Allergen Reactions    Tramadol Rash and Edema     Developed facial rash and edema.    Metformin Diarrhea       No current facility-administered medications on file prior to encounter.     Current Outpatient Medications on File  Prior to Encounter   Medication Sig    amLODIPine (NORVASC) 10 MG tablet Take 1 tablet (10 mg total) by mouth once daily.    apixaban (ELIQUIS) 2.5 mg Tab Take 1 tablet (2.5 mg total) by mouth 2 (two) times daily.    atorvastatin (LIPITOR) 80 MG tablet TAKE 1 TABLET(80 MG) BY MOUTH EVERY DAY    calcium-vitamin D3 (CALCIUM 500 + D) 500 mg-5 mcg (200 unit) per tablet Take 1 tablet by mouth 2 (two) times daily with meals.    carvediloL (COREG) 3.125 MG tablet Take 1 tablet (3.125 mg total) by mouth 2 (two) times daily.    folic acid (FOLVITE) 1 MG tablet Take 1 tablet (1 mg total) by mouth once daily.    gabapentin (NEURONTIN) 300 MG capsule Take 1 capsule (300 mg total) by mouth 2 (two) times daily.    hydrALAZINE (APRESOLINE) 50 MG tablet Take 1 tablet (50 mg total) by mouth every 6 (six) hours.    HYDROcodone-acetaminophen (NORCO) 5-325 mg per tablet Take 1 tablet by mouth every 8 (eight) hours as needed for Pain.    levETIRAcetam (KEPPRA) 250 MG Tab Take 1 tablet (250 mg total) by mouth 2 (two) times daily.    levothyroxine (SYNTHROID) 75 MCG tablet Take 1 tablet (75 mcg total) by mouth before breakfast. Administer on an empty stomach at least 30 minutes before food or other medications.    polyethylene glycol (GLYCOLAX) 17 gram PwPk Take 17 g by mouth once daily.    QUEtiapine (SEROQUEL) 25 MG Tab Take 1 to 2 tablets by mouth every night at bedtime for sleep and agitation    scopolamine (TRANSDERM-SCOP) 1.3-1.5 mg (1 mg over 3 days) Place 1 patch onto the skin Every 3 (three) days.    senna-docusate 8.6-50 mg (PERICOLACE) 8.6-50 mg per tablet Take 1 tablet by mouth 2 (two) times daily as needed for Constipation.     Family History       Problem Relation (Age of Onset)    Alzheimer's disease Sister    Ankylosing spondylitis Daughter    Arthritis Mother    Asthma Son    Atrial fibrillation Daughter    Cancer Sister, Paternal Uncle    Diabetes Maternal Grandmother    Heart disease Mother, Daughter    Hyperlipidemia  Son    Hypertension Sister, Son    Kidney disease Father, Daughter    Lupus Daughter    No Known Problems Daughter    Supraventricular tachycardia Daughter          Tobacco Use    Smoking status: Never    Smokeless tobacco: Never    Tobacco comments:     smoked for about 4 years in her twenties (socially)   Substance and Sexual Activity    Alcohol use: No    Drug use: No    Sexual activity: Never     Review of Systems   Reason unable to perform ROS: Patient not responding to questions due to AMS.     Objective:     Vital Signs (Most Recent):  Temp: 97.7 °F (36.5 °C) (08/16/24 1932)  Pulse: 66 (08/16/24 1900)  Resp: 14 (08/16/24 1900)  BP: (!) 181/73 (08/16/24 2115)  SpO2: 97 % (08/16/24 1900) Vital Signs (24h Range):  Temp:  [97.7 °F (36.5 °C)-98.8 °F (37.1 °C)] 97.7 °F (36.5 °C)  Pulse:  [65-94] 66  Resp:  [14-18] 14  SpO2:  [96 %-98 %] 97 %  BP: (146-197)/(67-80) 181/73     Weight: 59 kg (130 lb)  Body mass index is 21.63 kg/m².     Physical Exam  Constitutional:       Appearance: She is ill-appearing.   HENT:      Mouth/Throat:      Mouth: Mucous membranes are dry.   Eyes:      Extraocular Movements: Extraocular movements intact.   Cardiovascular:      Comments: Low volume heart sounds  Pulmonary:      Effort: Pulmonary effort is normal.      Breath sounds: Normal breath sounds. No wheezing or rhonchi.   Abdominal:      Palpations: Abdomen is soft.      Comments: Decreased bowel sounds   Musculoskeletal:      Comments: Ecchymosis 2inch diameter, right forearm  Varying purpura left arm   Skin:     General: Skin is dry.      Comments: Flaky skin on arms   Neurological:      Mental Status: She is disoriented.      Comments: Looking around the room slowly, not responding to commands   Asked to move arms, is able to do so very slowly and with a tremor                Significant Labs: All pertinent labs within the past 24 hours have been reviewed.  Recent Lab Results  (Last 5 results in the past 24 hours)         08/16/24  1844   08/16/24  1840   08/16/24  1828   08/16/24  1827   08/16/24  1823        Influenza A, Molecular Negative               Influenza B, Molecular Negative               Albumin   3.2             ALP   62             Allens Test     N/A   N/A         ALT   21             Anion Gap   16             AST   28  Comment: *Result may be interfered by visible hemolysis             Baso #   0.04             Basophil %   0.4             BILIRUBIN TOTAL   0.9  Comment: For infants and newborns, interpretation of results should be based  on gestational age, weight and in agreement with clinical  observations.    Premature Infant recommended reference ranges:  Up to 24 hours.............<8.0 mg/dL  Up to 48 hours............<12.0 mg/dL  3-5 days..................<15.0 mg/dL  6-29 days.................<15.0 mg/dL               Site     Other   Other         BUN   30             Calcium   10.0             Chloride   106             CO2   17             Creatinine   1.4             Differential Method   Automated             eGFR   35.3             Eos #   0.0             Eos %   0.3             Flu A & B Source Nasal swab               Glucose   140             Gran # (ANC)   6.7             Gran %   67.5             Hematocrit   44.1             Hemoglobin   14.6             Immature Grans (Abs)   0.04  Comment: Mild elevation in immature granulocytes is non specific and   can be seen in a variety of conditions including stress response,   acute inflammation, trauma and pregnancy. Correlation with other   laboratory and clinical findings is essential.               Immature Granulocytes   0.4             Lymph #   2.4             Lymph %   23.9             Magnesium    1.8             MCH   30.8             MCHC   33.1             MCV   93             Mono #   0.7             Mono %   7.5             MPV   10.5             nRBC   0             Platelet Count   195             POC BE     2           POC HCO3      24.5           POC Lactate       1.37         POC PCO2     28.6           POC PH     7.540           POC PO2     73           POC SATURATED O2     96           POC TCO2     25           Potassium   5.3  Comment: *Result may be interfered by visible hemolysis             PROTEIN TOTAL   7.5  Comment: *Result may be interfered by visible hemolysis             RBC   4.74             RDW   12.6             Sample     VENOUS   VENOUS         SARS-CoV-2 RNA, Amplification, Qual         Negative  Comment: This test utilizes isothermal nucleic acid amplification technology   to   detect the SARS-CoV-2 RdRp nucleic acid segment. The analytical   sensitivity   (limit of detection) is 500 copies/swab.    A POSITIVE result is indicative of the presence of SARS-CoV-2 RNA;   clinical   correlation with patient history and other diagnostic information is   necessary to determine patient infection status.    A NEGATIVE result means that SARS-CoV-2 nucleic acids are not present   above   the limit of detection. A NEGATIVE result should be treated as   presumptive.   It does not rule out the possibility of COVID-19 and should not be   the sole   basis for treatment decisions.    If COVID-19 is strongly suspected based on clinical and exposure   history,   re-testing using an alternate molecular assay should be considered.    This test is Food and Drug Administration (FDA) approved. Performance   characteristics of this has been independently verified by [facility] Department of Pathology and Laboratory Medicine.         Sodium   139             Troponin I   0.060  Comment: The reference interval for Troponin I represents the 99th percentile   cutoff   for our facility and is consistent with 3rd generation assay   performance.               WBC   9.85                                    Significant Imaging: I have reviewed all pertinent imaging results/findings within the past 24 hours.  Assessment/Plan:     * AMS  (altered mental status)  CT head: No acute hemorrhage or CT evidence of major vascular distribution infarct. Chest Xray: small pleural effusion, central hilar finding may be early edema, developing lower lung zone consolidation. AB.5, PCO2 28.6, PO2 73. History of Alzheimer, has not been eating for past week.     -blood cultures f/u  - UA f/u  - KUB to examine for fecal impaction      SPARKLE (acute kidney injury)  SPARKLE is likely due to pre-renal azotemia due to dehydration. Baseline creatinine is  1.0 . Most recent creatinine and eGFR are listed below.  Recent Labs     24   CREATININE 1.4   EGFRNORACEVR 35.3*      Plan  - SPARKLE is stable  - Avoid nephrotoxins and renally dose meds for GFR listed above  - Monitor urine output, serial BMP, and adjust therapy as needed  - IV Fluids    Paroxysmal A-fib  Pacemaker  History of AV block  Hx of pAF along w/ 2nd degree AV block. Medtronic Micra AV PM placed in Oct 2022. Appears to have been last interrogated in 2024 which showed normal function & paced rhythm. Note pt's PM is MRI compatible, but requires EP lab assistance (device has to be set to MRI mode & then restored to original settings following scan), which is only available M-F.   Monitor electrolytes, replete PRN for goal K > 4 & Mg > 2        VTE Risk Mitigation (From admission, onward)           Ordered     apixaban tablet 2.5 mg  2 times daily         24     IP VTE HIGH RISK PATIENT  Once         24     Place sequential compression device  Until discontinued         24                  .         Apurva Boggs MD  Department of Hospital Medicine  [facility] - Neurosurgery ([facility])

## 2024-08-17 NOTE — PROCEDURES
24 hr. Video EEG Monitoring    Date/Time: 8/16/2024 5:20 PM    Performed by: Edwin Guaman MD  Authorized by: Kapil Moore MD      VIDEO ELECTROENCEPHALOGRAM  REPORT    DATE OF SERVICE: 8/17/24-8/18/24  EEG NUMBER: [id number]  REQUESTED BY:  Oscar  [place]  [facility] [place]OLOGY   Electroencephalographic (EEG) recording is with electrodes placed according to the International 10-20 placement system.  Thirty two (32) channels of digital signal (sampling rate of 512/sec) including T1 and T2 was simultaneously recorded from the scalp and may include  EKG, EMG, and/or eye monitors.  Recording band pass was 0.1 to 512 hz.  Digital video recording of the patient is simultaneously recorded with the EEG.  The patient is instructed report clinical symptoms which may occur during the recording session.  EEG and video recording is stored and archived in digital format.  Activation procedures which include photic stimulation, hyperventilation and instructing patients to perform simple task are done in selected patients.   The EEG is displayed on a monitor screen and can be reviewed using different montages.  Computer assisted analysis is employed to detect spike and electrographic seizure activity.   The entire record is submitted for computer analysis.  The entire recording is visually reviewed and the times identified by computer analysis as being spikes or seizures are reviewed again.  Compresses spectral analysis (CSA) is also performed on the activity recorded from each individual channel.  This is displayed as a power display of frequencies from 0 to 30 Hz over time.   The CSA is reviewed looking for asymmetries in power between homologous areas of the scalp and then compared with the original EEG recording.     STAT-Diagnostica software was also utilized in the review of this study.  This software suite analyzes the EEG recording in multiple domains.  Coherence and rhythmicity is computed to identify EEG sections  which may contain organized seizures.  Each channel undergoes analysis to detect presence of spike and sharp waves which have special and morphological characteristic of epileptic activity.  The routine EEG recording is converted from spacial into frequency domain.  This is then displayed comparing homologous areas to identify areas of significant asymmetry.  Algorithm to identify non-cortically generated artifact is used to separate eye movement, EMG and other artifact from the EEG.      ELECTROENCEPHALOGRAM:    RECORDING TIMES:  Start on 8/17/24 at 14:03  Stop on 8/18/24 at 07:00     A total of 16 hrs    of EEG recording was obtained.    The record shows a poor organization at rest, consisting of a 7 Hz posterior dominant rhythm with fair  reactivity. There is mild bilateral beta activity. There is continuous diffuse delta and theta range background slowing.     Drowsiness is characterized by attenuation of the background, vertex waves, and bilateral theta slowing. Stage II sleep is characterized by slowing, vertex waves, and symmetric sleep spindles. Slow wave and REM sleep are recorded.    Provocative maneuvers including hyperventilation and photic stimulation were performed.     EKG recording shows a sinus rhythm.    There is no push button or clinical event.    IMPRESSION:  Abnormal study due to moderate to severe  diffuse background slowing consistent with diffuse cerebral dysfunction and encephalopathy which may be on the basis of toxic, metabolic, or primary neuronal disorder.     Edwin Guaman MD

## 2024-08-17 NOTE — PLAN OF CARE
Bedside swallow assessment completed. Recommend soft and bite sized diet with thin liquids. Order requested from team. ST to f/u to ensure tolerance. Emma Lainez CCC-SLP   8/17/2024  12:31 PM

## 2024-08-17 NOTE — HPI
Ms. Linn is a 89 y.o. with a PMH of T2DM, HTN, HLD,  Alzheimer's, CKD 4, anemia, decompensated chronic heart failure, paroxysmal Afib on apixaban who presented to the ED via EMS for altered mental status. Per EMS, patient's family was concerned because she had not been talking or eating much the past 3 days and appeared more lethargic than usual. Per EMS, family states she has these periods where she appears altered for up to 2 days, likely 2/2 to her Alzheimer's, but this episode has lasted longer so they decided to call EMS. ED spoke with the patient's daughter who states her baseline is usually very talkative and interactive, knows who her family is. Denies any recent falls or syncopal episodes. She was started on keppra 2 months ago, but denies any other recent medication changes. She had a closed nondisplaced fracture of fourth cervical vertebra with routine healing and follow up after she fell out of a recliner in May 2024. She was seen by neurology 2024 for workup of syncope vs seizure. At that time workup negative but there was high suspicion for ictal periods so patient was started on Keppra 250 BID.  She is being admitted to inpatient for altered mental status work up and and an SPARKLE.    ED Course:  LR bolus  Trop .06  Urinalysis - awaiting results  Blood cultures - awaiting results    CBC: unremarkable  Chemistry: K slightly elevated 5.3, Anion Gap 16  BUN 30, Cr 1.4 (baseline?)  Glucose 140    AB.5, PCO2 28.6, PO2 73 (metabolic alkalosis)    Imaging: chest xray: small pleural effusion, central hilar finding may be early edema, developing lower lung zone consolidation    CT head: No acute hemorrhage or CT evidence of major vascular distribution infarct.

## 2024-08-17 NOTE — ED NOTES
Telemetry Verification   Patient placed on Telemetry Box  Verified with War Room  Box # [id number]   Rate 73   Rhythm Afib

## 2024-08-17 NOTE — NURSING
Nurses Note -- 4 Eyes      8/17/2024   7:02 PM      Skin assessed during: Daily Assessment      [x] No Altered Skin Integrity Present    [x]Prevention Measures Documented      [] Yes- Altered Skin Integrity Present or Discovered   [] LDA Added if Not in Epic (Describe Wound)   [] New Altered Skin Integrity was Present on Admit and Documented in LDA   [] Wound Image Taken    Wound Care Consulted? No    Attending Nurse:  Rupal Dotson RN/Staff Member:  Paul

## 2024-08-17 NOTE — ASSESSMENT & PLAN NOTE
Pacemaker  History of AV block  Hx of pAF along w/ 2nd degree AV block. Medtronic Micra AV PM placed in Oct 2022. Appears to have been last interrogated in Jan 2024 which showed normal function & paced rhythm. Note pt's PM is MRI compatible, but requires EP lab assistance (device has to be set to MRI mode & then restored to original settings following scan), which is only available M-F.   Monitor electrolytes, replete PRN for goal K > 4 & Mg > 2

## 2024-08-17 NOTE — SUBJECTIVE & OBJECTIVE
Past Medical History:   Diagnosis Date    Acute hypoxemic respiratory failure 9/2/2023    Acute on chronic diastolic congestive heart failure 10/19/2023    Alzheimer's disease     Anemia     Anemia 5/14/2024    Arthritis     lumbar    Back pain     Cancer     colon    Cataract     Colon cancer     Diabetes mellitus type II     Glaucoma     Hyperlipidemia     Hypertension     Hyperthyroidism     Hypothyroidism following radioiodine therapy     Pacemaker     Pneumonia     Pneumonia due to other staphylococcus     Renal manifestation of secondary diabetes mellitus     Squamous cell carcinoma     Stroke     TIA    Type 2 diabetes mellitus with stage 3 chronic kidney disease and hypertension 10/12/2017    Type 2 diabetes with peripheral circulatory disorder, controlled        Past Surgical History:   Procedure Laterality Date    CATARACT EXTRACTION W/  INTRAOCULAR LENS IMPLANT Left 09/13/2017        CHOLECYSTECTOMY      COLON SURGERY  2001    Colon CA    EYE SURGERY      cataract removal left side    IMPLANTATION OF LEADLESS PACEMAKER N/A 10/21/2022    Procedure: [id number];  Surgeon: JOSE Burgos MD;  Location: [facility] EP LAB;  Service: Cardiology;  Laterality: N/A;  SB, NOLANA, MDT, ANES, EH,     squamous cell ca of face         Review of patient's allergies indicates:   Allergen Reactions    Tramadol Rash and Edema     Developed facial rash and edema.    Metformin Diarrhea       No current facility-administered medications on file prior to encounter.     Current Outpatient Medications on File Prior to Encounter   Medication Sig    amLODIPine (NORVASC) 10 MG tablet Take 1 tablet (10 mg total) by mouth once daily.    apixaban (ELIQUIS) 2.5 mg Tab Take 1 tablet (2.5 mg total) by mouth 2 (two) times daily.    atorvastatin (LIPITOR) 80 MG tablet TAKE 1 TABLET(80 MG) BY MOUTH EVERY DAY    calcium-vitamin D3 (CALCIUM 500 + D) 500 mg-5 mcg (200 unit) per tablet Take 1 tablet by mouth 2  (two) times daily with meals.    carvediloL (COREG) 3.125 MG tablet Take 1 tablet (3.125 mg total) by mouth 2 (two) times daily.    folic acid (FOLVITE) 1 MG tablet Take 1 tablet (1 mg total) by mouth once daily.    gabapentin (NEURONTIN) 300 MG capsule Take 1 capsule (300 mg total) by mouth 2 (two) times daily.    hydrALAZINE (APRESOLINE) 50 MG tablet Take 1 tablet (50 mg total) by mouth every 6 (six) hours.    HYDROcodone-acetaminophen (NORCO) 5-325 mg per tablet Take 1 tablet by mouth every 8 (eight) hours as needed for Pain.    levETIRAcetam (KEPPRA) 250 MG Tab Take 1 tablet (250 mg total) by mouth 2 (two) times daily.    levothyroxine (SYNTHROID) 75 MCG tablet Take 1 tablet (75 mcg total) by mouth before breakfast. Administer on an empty stomach at least 30 minutes before food or other medications.    polyethylene glycol (GLYCOLAX) 17 gram PwPk Take 17 g by mouth once daily.    QUEtiapine (SEROQUEL) 25 MG Tab Take 1 to 2 tablets by mouth every night at bedtime for sleep and agitation    scopolamine (TRANSDERM-SCOP) 1.3-1.5 mg (1 mg over 3 days) Place 1 patch onto the skin Every 3 (three) days.    senna-docusate 8.6-50 mg (PERICOLACE) 8.6-50 mg per tablet Take 1 tablet by mouth 2 (two) times daily as needed for Constipation.     Family History       Problem Relation (Age of Onset)    Alzheimer's disease Sister    Ankylosing spondylitis Daughter    Arthritis Mother    Asthma Son    Atrial fibrillation Daughter    Cancer Sister, Paternal Uncle    Diabetes Maternal Grandmother    Heart disease Mother, Daughter    Hyperlipidemia Son    Hypertension Sister, Son    Kidney disease Father, Daughter    Lupus Daughter    No Known Problems Daughter    Supraventricular tachycardia Daughter          Tobacco Use    Smoking status: Never    Smokeless tobacco: Never    Tobacco comments:     smoked for about 4 years in her twenties (socially)   Substance and Sexual Activity    Alcohol use: No    Drug use: No    Sexual activity:  Never     Review of Systems   Reason unable to perform ROS: Patient not responding to questions due to AMS.     Objective:     Vital Signs (Most Recent):  Temp: 97.7 °F (36.5 °C) (08/16/24 1932)  Pulse: 66 (08/16/24 1900)  Resp: 14 (08/16/24 1900)  BP: (!) 181/73 (08/16/24 2115)  SpO2: 97 % (08/16/24 1900) Vital Signs (24h Range):  Temp:  [97.7 °F (36.5 °C)-98.8 °F (37.1 °C)] 97.7 °F (36.5 °C)  Pulse:  [65-94] 66  Resp:  [14-18] 14  SpO2:  [96 %-98 %] 97 %  BP: (146-197)/(67-80) 181/73     Weight: 59 kg (130 lb)  Body mass index is 21.63 kg/m².     Physical Exam  Constitutional:       Appearance: She is ill-appearing.   HENT:      Mouth/Throat:      Mouth: Mucous membranes are dry.   Eyes:      Extraocular Movements: Extraocular movements intact.   Cardiovascular:      Comments: Low volume heart sounds  Pulmonary:      Effort: Pulmonary effort is normal.      Breath sounds: Normal breath sounds. No wheezing or rhonchi.   Abdominal:      Palpations: Abdomen is soft.      Comments: Decreased bowel sounds   Musculoskeletal:      Comments: Ecchymosis 2inch diameter, right forearm  Varying purpura left arm   Skin:     General: Skin is dry.      Comments: Flaky skin on arms   Neurological:      Mental Status: She is disoriented.      Comments: Looking around the room slowly, not responding to commands   Asked to move arms, is able to do so very slowly and with a tremor                Significant Labs: All pertinent labs within the past 24 hours have been reviewed.  Recent Lab Results  (Last 5 results in the past 24 hours)        08/16/24  1844   08/16/24  1840   08/16/24  1828   08/16/24  1827 08/16/24  1823        Influenza A, Molecular Negative               Influenza B, Molecular Negative               Albumin   3.2             ALP   62             Allens Test     N/A   N/A         ALT   21             Anion Gap   16             AST   28  Comment: *Result may be interfered by visible hemolysis             Baso #    0.04             Basophil %   0.4             BILIRUBIN TOTAL   0.9  Comment: For infants and newborns, interpretation of results should be based  on gestational age, weight and in agreement with clinical  observations.    Premature Infant recommended reference ranges:  Up to 24 hours.............<8.0 mg/dL  Up to 48 hours............<12.0 mg/dL  3-5 days..................<15.0 mg/dL  6-29 days.................<15.0 mg/dL               Site     Other   Other         BUN   30             Calcium   10.0             Chloride   106             CO2   17             Creatinine   1.4             Differential Method   Automated             eGFR   35.3             Eos #   0.0             Eos %   0.3             Flu A & B Source Nasal swab               Glucose   140             Gran # (ANC)   6.7             Gran %   67.5             Hematocrit   44.1             Hemoglobin   14.6             Immature Grans (Abs)   0.04  Comment: Mild elevation in immature granulocytes is non specific and   can be seen in a variety of conditions including stress response,   acute inflammation, trauma and pregnancy. Correlation with other   laboratory and clinical findings is essential.               Immature Granulocytes   0.4             Lymph #   2.4             Lymph %   23.9             Magnesium    1.8             MCH   30.8             MCHC   33.1             MCV   93             Mono #   0.7             Mono %   7.5             MPV   10.5             nRBC   0             Platelet Count   195             POC BE     2           POC HCO3     24.5           POC Lactate       1.37         POC PCO2     28.6           POC PH     7.540           POC PO2     73           POC SATURATED O2     96           POC TCO2     25           Potassium   5.3  Comment: *Result may be interfered by visible hemolysis             PROTEIN TOTAL   7.5  Comment: *Result may be interfered by visible hemolysis             RBC   4.74             RDW   12.6              Sample     VENOUS   VENOUS         SARS-CoV-2 RNA, Amplification, Qual         Negative  Comment: This test utilizes isothermal nucleic acid amplification technology   to   detect the SARS-CoV-2 RdRp nucleic acid segment. The analytical   sensitivity   (limit of detection) is 500 copies/swab.    A POSITIVE result is indicative of the presence of SARS-CoV-2 RNA;   clinical   correlation with patient history and other diagnostic information is   necessary to determine patient infection status.    A NEGATIVE result means that SARS-CoV-2 nucleic acids are not present   above   the limit of detection. A NEGATIVE result should be treated as   presumptive.   It does not rule out the possibility of COVID-19 and should not be   the sole   basis for treatment decisions.    If COVID-19 is strongly suspected based on clinical and exposure   history,   re-testing using an alternate molecular assay should be considered.    This test is Food and Drug Administration (FDA) approved. Performance   characteristics of this has been independently verified by [facility] Department of Pathology and Laboratory Medicine.         Sodium   139             Troponin I   0.060  Comment: The reference interval for Troponin I represents the 99th percentile   cutoff   for our facility and is consistent with 3rd generation assay   performance.               WBC   9.85                                    Significant Imaging: I have reviewed all pertinent imaging results/findings within the past 24 hours.

## 2024-08-17 NOTE — PT/OT/SLP EVAL
Speech Language Pathology Evaluation  Bedside Swallow    Patient Name:  Quin Linn   MRN:  [id number]  Admitting Diagnosis: AMS (altered mental status)    Recommendations:                 General Recommendations:   diet check  Diet recommendations:  Soft & Bite Sized Diet - IDDSI Level 6, Thin liquids - IDDSI Level 0   Aspiration Precautions: 1 bite/sip at a time, Assistance with meals, Eliminate distractions, Feed only when awake/alert, Frequent oral care, HOB to 90 degrees, Meds whole 1 at a time, Monitor for s/s of aspiration, Remain upright 30 minutes post meal, Small bites/sips, and Standard aspiration precautions   General Precautions: Standard, aspiration, fall  Communication strategies:  provide increased time to answer    Assessment:     Quin Linn is a 89 y.o. female with an SLP diagnosis of Dysphagia.      History:     Past Medical History:   Diagnosis Date    Acute hypoxemic respiratory failure 9/2/2023    Acute on chronic diastolic congestive heart failure 10/19/2023    Alzheimer's disease     Anemia     Anemia 5/14/2024    Arthritis     lumbar    Back pain     Cancer     colon    Cataract     Colon cancer     Diabetes mellitus type II     Glaucoma     Hyperlipidemia     Hypertension     Hyperthyroidism     Hypothyroidism following radioiodine therapy     Pacemaker     Pneumonia     Pneumonia due to other staphylococcus     Renal manifestation of secondary diabetes mellitus     Squamous cell carcinoma     Stroke     TIA    Type 2 diabetes mellitus with stage 3 chronic kidney disease and hypertension 10/12/2017    Type 2 diabetes with peripheral circulatory disorder, controlled        Past Surgical History:   Procedure Laterality Date    CATARACT EXTRACTION W/  INTRAOCULAR LENS IMPLANT Left 09/13/2017        CHOLECYSTECTOMY      COLON SURGERY  2001    Colon CA    EYE SURGERY      cataract removal left side    IMPLANTATION OF LEADLESS PACEMAKER N/A 10/21/2022    Procedure:  [id number];  Surgeon: JOSE Burgos MD;  Location: [facility] LAB;  Service: Cardiology;  Laterality: N/A;  SB, JOHNNA, MDT, ANES, EH,     squamous cell ca of face         Social History: Patient lives with family.    Prior Intubation HX:  none this admission    Modified Barium Swallow: none on file    Chest X-Rays: 8/16- Small left pleural effusion. Central hilar findings could reflect early edema. Developing left lower lung zone consolidation not excluded.     Prior diet: unknown as pt unable to state.  5/14/24: SLP rec SBS/mildly thick liquids  9/16/23: SLP rec reg/thin      Subjective     Spoke with RN prior to entering pt's room . Pt seen bedside for session. Asleep upon arrival, but roused given gentle verbal and tactile stim. Remained mostly nonverbal during session, but did return greeting to ST and smiling appropriately.       Pain/Comfort:  Pain Rating 1: 0/10  Pain Rating Post-Intervention 1: 0/10    Respiratory Status: Room air    Objective:     Oral Musculature Evaluation  Oral Musculature: unable to assess due to poor participation/comprehension, other (see comments) (no obvious facial asymmetry noted)  Dentition: present and adequate  Secretion Management: adequate  Mucosal Quality: adequate  Volitional Cough: did not follow commands to elicit  Volitional Swallow: did not follow commands to elicit  Voice Prior to PO Intake: clear    Bedside Swallow Eval:   Consistencies Assessed:  Thin liquids via tsp, cup, straw  Puree pudding  Soft solids cracker softened in puree  Solids cracker      Oral Phase:   Prolonged mastication with regular solid, benefited from softer textures    Pharyngeal Phase:   no overt clinical signs/symptoms of aspiration  no overt clinical signs/symptoms of pharyngeal dysphagia    Compensatory Strategies  None    Treatment: Provided education to patient re: role of ST,  POC, swallow precautions, recommendations for soft and bite sized diet with  thin   liquids, and plan to f/u to ensure diet tolerance. She was unable to verbalize understanding. White board updated. RN and MD notified of results and recs. At end of session, pt remained bedside with call light and all needs in reach.       Goals:   Multidisciplinary Problems       SLP Goals          Problem: SLP    Goal Priority Disciplines Outcome   SLP Goal     SLP    Description: Goals expected to be met by 8/24  1. The patient will tolerate the least restrictive diet without overt s/sx airway threat.                        Plan:     Patient to be seen:  3 x/week   Plan of Care expires:  09/15/24  Plan of Care reviewed with:  patient   SLP Follow-Up:  Yes       Discharge recommendations:   (tbd)   Barriers to Discharge:  Level of Skilled Assistance Needed      Time Tracking:     SLP Treatment Date:   08/17/24  Speech Start Time:  0846  Speech Stop Time:  0858     Speech Total Time (min):  12 min    Billable Minutes: Eval Swallow and Oral Function 12 08/17/2024

## 2024-08-17 NOTE — PLAN OF CARE
Recommendations    1. Continue diabetic diet, texture per SLP.     2. Add Boost (or alternative ONS) if PO intake <50%.     3. RD to monitor and follow up.    Goals: Meet % EEN/EPN by RD follow up.  Nutrition Goal Status: new  Communication of RD Recs: other (comment) (POC)

## 2024-08-17 NOTE — CONSULTS
[facility] - Neurosurgery ([facility])  Adult Nutrition  Consult Note    SUMMARY     Recommendations    1. Continue diabetic diet, texture per SLP.     2. Add Boost (or alternative ONS) if PO intake <50%.     3. RD to monitor and follow up.    Goals: Meet % EEN/EPN by RD follow up.  Nutrition Goal Status: new  Communication of RD Recs: other (comment) (POC)    Assessment and Plan    Endocrine  Moderate malnutrition  Malnutrition Type:  Context: acute illness or injury  Level: moderate    Related to (etiology):   Inability to consume sufficient nutrients    Signs and Symptoms (as evidenced by):   NPO, decreased PO intake >5days       Malnutrition Characteristic Summary:  Energy Intake (Malnutrition): less than or equal to 50% for greater than or equal to 5 days      Interventions/Recommendations (treatment strategy):  Collab of nutrition care w/ other providers  Assistance w/ meals  ONS,if warranted     Nutrition Diagnosis Status:   New         Malnutrition Assessment  Malnutrition Context: acute illness or injury  Malnutrition Level: moderate          Energy Intake (Malnutrition): less than or equal to 50% for greater than or equal to 5 days                         Reason for Assessment    Reason For Assessment: consult (limited nutrition)  Diagnosis: other (see comments) (Metabolic encephalopathy)  Relevant Medical History: DM, HLD, hyperthyroidism, HTN, alzheimer's, cancer, stroke, CKD stage 3  Interdisciplinary Rounds: did not attend  General Information Comments: RD consulted due to pt limited nutrition. Pt's diet is currently NPO, cleared for soft & bite sized diet. Per cart, pt consumed 25% of her breakfast meal. Pt reported to have AMS, pt's daughter reports decreased PO intake for 5-6 days. Some wt loss noted, lost 13.3% BW in 7 months. No N/V/C/D reported.   Nutrition Discharge Planning: Pending medical course    Nutrition Risk Screen    Nutrition Risk Screen: difficulty  "chewing/swallowing    Nutrition/Diet History    Spiritual, Cultural Beliefs, Faith Practices, Values that Affect Care: no  Factors Affecting Nutritional Intake: chewing difficulties/inability to chew food, difficulty/impaired swallowing    Anthropometrics    Temp: 98 °F (36.7 °C)  Height Method: Stated  Height: 5' 5" (165.1 cm)  Height (inches): 65 in  Weight Method: Bed Scale  Weight: 59 kg (130 lb)  Weight (lb): 130 lb  Ideal Body Weight (IBW), Female: 125 lb  % Ideal Body Weight, Female (lb): 104 %  BMI (Calculated): 21.6  BMI Grade: 18.5-24.9 - normal       Lab/Procedures/Meds    Pertinent Labs Reviewed: reviewed  Pertinent Labs Comments: BUN: 34, eGRF: 47.1, Glucose: 146, A1C: 5.8  Pertinent Medications Reviewed: reviewed  Pertinent Medications Comments: Atorvastatin, calcium-Vit D3, folic acid, levothyroxine, polyethylene glycol, senna-docusate        Estimated/Assessed Needs    Weight Used For Calorie Calculations: 59 kg (130 lb 1.1 oz)  Energy Calorie Requirements (kcal): 1201  Energy Need Method: [place] (MSJ x 1.2 PAL)  Protein Requirements: 59-71 (1.0-1.2 g/kg ABW)  Weight Used For Protein Calculations: 59 kg (130 lb 1.1 oz)  Fluid Requirements (mL): Per MD     RDA Method (mL): 1201  CHO Requirement: 150      Nutrition Prescription Ordered    Current Diet Order: NPO    Evaluation of Received Nutrient/Fluid Intake    I/O: - 589 net I/O  Comments: LBM: 8/17  % Intake of Estimated Energy Needs: 0 - 25 %  % Meal Intake: NPO    Nutrition Risk    Level of Risk/Frequency of Follow-up:  (1x/week)       Monitor and Evaluation    Food and Nutrient Intake: energy intake, food and beverage intake  Food and Nutrient Adminstration: diet order  Knowledge/Beliefs/Attitudes: food and nutrition knowledge/skill  Physical Activity and Function: nutrition-related ADLs and IADLs  Anthropometric Measurements: weight, weight change, body mass index  Biochemical Data, Medical Tests and Procedures: lipid profile, " inflammatory profile, glucose/endocrine profile, gastrointestinal profile, electrolyte and renal panel  Nutrition-Focused Physical Findings: overall appearance       Nutrition Follow-Up    RD Follow-up?: Yes    Salvador Jorge, Registration Eligible, Provisional LDN

## 2024-08-17 NOTE — CARE UPDATE
I have reviewed the chart of Quin Linn who is hospitalized for the following:    Active Hospital Problems    Diagnosis    *Metabolic encephalopathy     Cultures pending  Sparkle on admission with hyperkalemia      Aphasia    Chronic anticoagulation     eliquis      Pleural effusion     Small left pleural effusion.   seen on imaging  History of HF      Chronic diastolic heart failure    History of CVA (cerebrovascular accident)    SPARKLE (acute kidney injury)    Constipation     Noted on imaging   Stool softeners scheduled      Paroxysmal A-fib    Hyperkalemia     Monitor with daily cmp      Acquired hypothyroidism    Wenckebach second degree AV block    Diabetes mellitus type II, controlled    Hypertension    Personal history of colon cancer, stage I        Ila Rey NP  Unit Based [facility]

## 2024-08-17 NOTE — ASSESSMENT & PLAN NOTE
Malnutrition Type:  Context: acute illness or injury  Level: moderate    Related to (etiology):   Inability to consume sufficient nutrients    Signs and Symptoms (as evidenced by):   NPO, decreased PO intake >5days       Malnutrition Characteristic Summary:  Energy Intake (Malnutrition): less than or equal to 50% for greater than or equal to 5 days      Interventions/Recommendations (treatment strategy):  Collab of nutrition care w/ other providers  Assistance w/ meals  ONS,if warranted     Nutrition Diagnosis Status:   New

## 2024-08-18 PROBLEM — A41.9 SEVERE SEPSIS: Status: ACTIVE | Noted: 2024-08-18

## 2024-08-18 PROBLEM — R65.20 SEVERE SEPSIS: Status: ACTIVE | Noted: 2024-08-18

## 2024-08-18 PROBLEM — G93.40 ENCEPHALOPATHY: Status: ACTIVE | Noted: 2024-08-16

## 2024-08-18 LAB
ALBUMIN SERPL BCP-MCNC: 2.5 G/DL (ref 3.5–5.2)
ALP SERPL-CCNC: 53 U/L (ref 55–135)
ALT SERPL W/O P-5'-P-CCNC: 40 U/L (ref 10–44)
ANION GAP SERPL CALC-SCNC: 9 MMOL/L (ref 8–16)
AST SERPL-CCNC: 53 U/L (ref 10–40)
BASOPHILS # BLD AUTO: 0.02 K/UL (ref 0–0.2)
BASOPHILS NFR BLD: 0.3 % (ref 0–1.9)
BILIRUB SERPL-MCNC: 0.4 MG/DL (ref 0.1–1)
BNP SERPL-MCNC: 500 PG/ML (ref 0–99)
BUN SERPL-MCNC: 38 MG/DL (ref 10–30)
CALCIUM SERPL-MCNC: 9 MG/DL (ref 8.7–10.5)
CHLORIDE SERPL-SCNC: 105 MMOL/L (ref 95–110)
CO2 SERPL-SCNC: 22 MMOL/L (ref 23–29)
CREAT SERPL-MCNC: 1.2 MG/DL (ref 0.5–1.4)
DIFFERENTIAL METHOD BLD: ABNORMAL
EOSINOPHIL # BLD AUTO: 0.2 K/UL (ref 0–0.5)
EOSINOPHIL NFR BLD: 3.1 % (ref 0–8)
ERYTHROCYTE [DISTWIDTH] IN BLOOD BY AUTOMATED COUNT: 12.7 % (ref 11.5–14.5)
EST. GFR  (NO RACE VARIABLE): 42.5 ML/MIN/1.73 M^2
GLUCOSE SERPL-MCNC: 125 MG/DL (ref 70–110)
HCT VFR BLD AUTO: 38.7 % (ref 37–48.5)
HGB BLD-MCNC: 13.1 G/DL (ref 12–16)
IMM GRANULOCYTES # BLD AUTO: 0.02 K/UL (ref 0–0.04)
IMM GRANULOCYTES NFR BLD AUTO: 0.3 % (ref 0–0.5)
LACTATE SERPL-SCNC: 1.5 MMOL/L (ref 0.5–2.2)
LYMPHOCYTES # BLD AUTO: 2 K/UL (ref 1–4.8)
LYMPHOCYTES NFR BLD: 28.4 % (ref 18–48)
MAGNESIUM SERPL-MCNC: 1.7 MG/DL (ref 1.6–2.6)
MCH RBC QN AUTO: 31.4 PG (ref 27–31)
MCHC RBC AUTO-ENTMCNC: 33.9 G/DL (ref 32–36)
MCV RBC AUTO: 93 FL (ref 82–98)
MONOCYTES # BLD AUTO: 0.5 K/UL (ref 0.3–1)
MONOCYTES NFR BLD: 6.6 % (ref 4–15)
NEUTROPHILS # BLD AUTO: 4.2 K/UL (ref 1.8–7.7)
NEUTROPHILS NFR BLD: 61.3 % (ref 38–73)
NRBC BLD-RTO: 0 /100 WBC
PHOSPHATE SERPL-MCNC: 3.9 MG/DL (ref 2.7–4.5)
PLATELET # BLD AUTO: 159 K/UL (ref 150–450)
PMV BLD AUTO: 10.8 FL (ref 9.2–12.9)
POCT GLUCOSE: 113 MG/DL (ref 70–110)
POCT GLUCOSE: 138 MG/DL (ref 70–110)
POCT GLUCOSE: 164 MG/DL (ref 70–110)
POTASSIUM SERPL-SCNC: 4.1 MMOL/L (ref 3.5–5.1)
PROCALCITONIN SERPL IA-MCNC: 0.07 NG/ML
PROT SERPL-MCNC: 5.6 G/DL (ref 6–8.4)
RBC # BLD AUTO: 4.17 M/UL (ref 4–5.4)
SODIUM SERPL-SCNC: 136 MMOL/L (ref 136–145)
WBC # BLD AUTO: 6.86 K/UL (ref 3.9–12.7)

## 2024-08-18 PROCEDURE — 11000001 HC ACUTE MED/SURG PRIVATE ROOM

## 2024-08-18 PROCEDURE — 36415 COLL VENOUS BLD VENIPUNCTURE: CPT | Performed by: STUDENT IN AN ORGANIZED HEALTH CARE EDUCATION/TRAINING PROGRAM

## 2024-08-18 PROCEDURE — 36415 COLL VENOUS BLD VENIPUNCTURE: CPT | Mod: XB

## 2024-08-18 PROCEDURE — 83735 ASSAY OF MAGNESIUM: CPT

## 2024-08-18 PROCEDURE — 51701 INSERT BLADDER CATHETER: CPT

## 2024-08-18 PROCEDURE — 94761 N-INVAS EAR/PLS OXIMETRY MLT: CPT

## 2024-08-18 PROCEDURE — 80053 COMPREHEN METABOLIC PANEL: CPT

## 2024-08-18 PROCEDURE — 63600175 PHARM REV CODE 636 W HCPCS: Performed by: STUDENT IN AN ORGANIZED HEALTH CARE EDUCATION/TRAINING PROGRAM

## 2024-08-18 PROCEDURE — 83880 ASSAY OF NATRIURETIC PEPTIDE: CPT

## 2024-08-18 PROCEDURE — 83605 ASSAY OF LACTIC ACID: CPT | Performed by: STUDENT IN AN ORGANIZED HEALTH CARE EDUCATION/TRAINING PROGRAM

## 2024-08-18 PROCEDURE — 27000221 HC OXYGEN, UP TO 24 HOURS

## 2024-08-18 PROCEDURE — 63600175 PHARM REV CODE 636 W HCPCS

## 2024-08-18 PROCEDURE — 85025 COMPLETE CBC W/AUTO DIFF WBC: CPT

## 2024-08-18 PROCEDURE — 25000003 PHARM REV CODE 250

## 2024-08-18 PROCEDURE — 95718 EEG PHYS/QHP 2-12 HR W/VEEG: CPT | Mod: ,,, | Performed by: PSYCHIATRY & NEUROLOGY

## 2024-08-18 PROCEDURE — 84100 ASSAY OF PHOSPHORUS: CPT

## 2024-08-18 PROCEDURE — 84145 PROCALCITONIN (PCT): CPT

## 2024-08-18 PROCEDURE — 25000003 PHARM REV CODE 250: Performed by: STUDENT IN AN ORGANIZED HEALTH CARE EDUCATION/TRAINING PROGRAM

## 2024-08-18 PROCEDURE — 51798 US URINE CAPACITY MEASURE: CPT

## 2024-08-18 RX ORDER — FUROSEMIDE 10 MG/ML
20 INJECTION INTRAMUSCULAR; INTRAVENOUS ONCE
Status: COMPLETED | OUTPATIENT
Start: 2024-08-18 | End: 2024-08-18

## 2024-08-18 RX ORDER — CYANOCOBALAMIN (VITAMIN B-12) 250 MCG
250 TABLET ORAL DAILY
Status: DISCONTINUED | OUTPATIENT
Start: 2024-08-18 | End: 2024-08-23 | Stop reason: HOSPADM

## 2024-08-18 RX ORDER — MAGNESIUM SULFATE HEPTAHYDRATE 40 MG/ML
2 INJECTION, SOLUTION INTRAVENOUS ONCE
Status: COMPLETED | OUTPATIENT
Start: 2024-08-18 | End: 2024-08-18

## 2024-08-18 RX ADMIN — Medication 1 TABLET: at 10:08

## 2024-08-18 RX ADMIN — Medication 1 TABLET: at 04:08

## 2024-08-18 RX ADMIN — SODIUM CHLORIDE, POTASSIUM CHLORIDE, SODIUM LACTATE AND CALCIUM CHLORIDE 250 ML: 600; 310; 30; 20 INJECTION, SOLUTION INTRAVENOUS at 02:08

## 2024-08-18 RX ADMIN — PIPERACILLIN SODIUM AND TAZOBACTAM SODIUM 4.5 G: 4; .5 INJECTION, POWDER, FOR SOLUTION INTRAVENOUS at 08:08

## 2024-08-18 RX ADMIN — SENNOSIDES AND DOCUSATE SODIUM 1 TABLET: 50; 8.6 TABLET ORAL at 10:08

## 2024-08-18 RX ADMIN — LEVOTHYROXINE SODIUM 75 MCG: 75 TABLET ORAL at 06:08

## 2024-08-18 RX ADMIN — ATORVASTATIN CALCIUM 80 MG: 40 TABLET, FILM COATED ORAL at 10:08

## 2024-08-18 RX ADMIN — FOLIC ACID 1 MG: 1 TABLET ORAL at 10:08

## 2024-08-18 RX ADMIN — GABAPENTIN 300 MG: 300 CAPSULE ORAL at 10:08

## 2024-08-18 RX ADMIN — FUROSEMIDE 20 MG: 10 INJECTION, SOLUTION INTRAMUSCULAR; INTRAVENOUS at 04:08

## 2024-08-18 RX ADMIN — POLYETHYLENE GLYCOL 3350 17 G: 17 POWDER, FOR SOLUTION ORAL at 10:08

## 2024-08-18 RX ADMIN — VANCOMYCIN HYDROCHLORIDE 1250 MG: 1.25 INJECTION, POWDER, LYOPHILIZED, FOR SOLUTION INTRAVENOUS at 01:08

## 2024-08-18 RX ADMIN — APIXABAN 2.5 MG: 2.5 TABLET, FILM COATED ORAL at 08:08

## 2024-08-18 RX ADMIN — CYANOCOBALAMIN TAB 250 MCG 250 MCG: 250 TAB at 04:08

## 2024-08-18 RX ADMIN — APIXABAN 2.5 MG: 2.5 TABLET, FILM COATED ORAL at 10:08

## 2024-08-18 RX ADMIN — GABAPENTIN 300 MG: 300 CAPSULE ORAL at 08:08

## 2024-08-18 RX ADMIN — PIPERACILLIN SODIUM AND TAZOBACTAM SODIUM 4.5 G: 4; .5 INJECTION, POWDER, FOR SOLUTION INTRAVENOUS at 12:08

## 2024-08-18 RX ADMIN — LEVETIRACETAM 250 MG: 250 TABLET, FILM COATED ORAL at 10:08

## 2024-08-18 RX ADMIN — MAGNESIUM SULFATE HEPTAHYDRATE 2 G: 40 INJECTION, SOLUTION INTRAVENOUS at 10:08

## 2024-08-18 NOTE — ASSESSMENT & PLAN NOTE
HFpEF  - Most recent TTE on 8/17/24 demonstrates: Lvef 55%, unable to measure DD d/t afib  - typically with normal diastolic function.    - EKG : alternating afib, 1st degree AV block, LBBB. stable  - Troponin peaked at 0.6, downtrending. No chest pain  - BNP pending, CXR w vascular congestion  - Fluid restriction at 1.5L cc with strict I/Os and daily weights   - unclear dry weight  - Maintain on telemetry and daily EKGs   - Up to date risk stratification : TSH, Lipids, HbA1c with optimization of risk factors is necessary  - Check Electrolytes, keep Mag >2 & K+ >4  - SCDs, TEDs, Nursing communication to elevated LE   - Ambulate as tolerated    Current home regimen includes coreg, amlodipine, and hydralazine  Previously on lasix 40mg PO qd, discontinued but has required equivalent isolated lasix IV doses during previous hospitalizations   - hypoxic after SPARKLE IVF resuscitation, on 2L NC   - giving 20mg IV lasix

## 2024-08-18 NOTE — CARE UPDATE
RAPID RESPONSE NURSE FOLLOW-UP NOTE       Followed up with patient for  chart check .  Pt opens eyes to voice, follows commands. BP still soft. Recommend hold PO hydralazine scheduled for midnight, 500cc LR bolus ordered to run over 2 hours. Will also repeat previously elevated lactic of 2.4 from 1653 with AM labs.   Reviewed plan of care with charge RNBay .   Team will continue to follow.  Please call Rapid Response LADY HERNANDEZ RN with any questions or concerns at [phone].

## 2024-08-18 NOTE — PROGRESS NOTES
Pharmacokinetic Initial Assessment: IV Vancomycin    Assessment/Plan:    -Initiate intravenous vancomycin with loading dose of 1250 mg once  -Will dose vancomycin based on levels due to renal function, CKD4  -Desired empiric serum trough concentration is 10 to 20 mcg/mL  -Will draw vancomycin random level on 08/19 at 0430 with AM labs.    Pharmacy will continue to follow and monitor vancomycin.      Please contact pharmacy at extension [phone] with any questions regarding this assessment.     Thank you for the consult,   Portia Bui       Patient brief summary:  Quin Linn is a 89 y.o. female initiated on antimicrobial therapy with IV Vancomycin for treatment of suspected sepsis    Drug Allergies:   Review of patient's allergies indicates:   Allergen Reactions    Tramadol Rash and Edema     Developed facial rash and edema.    Metformin Diarrhea       Actual Body Weight:   59 kg     Renal Function:   Estimated Creatinine Clearance: 26.9 mL/min (based on SCr of 1.2 mg/dL).,     Dialysis Method (if applicable):  N/A

## 2024-08-18 NOTE — CARE UPDATE
"RAPID RESPONSE NURSE CHART REVIEW        Chart Reviewed: 08/18/2024, 12:58 AM    MRN: [id number]  Bed: 950/950 A    Dx: Metabolic encephalopathy    Quin Linn has a past medical history of Acute hypoxemic respiratory failure, Acute on chronic diastolic congestive heart failure, Alzheimer's disease, Anemia, Anemia, Arthritis, Back pain, Cancer, Cataract, Colon cancer, Diabetes mellitus type II, Glaucoma, Hyperlipidemia, Hypertension, Hyperthyroidism, Hypothyroidism following radioiodine therapy, Pacemaker, Pneumonia, Pneumonia due to other staphylococcus, Renal manifestation of secondary diabetes mellitus, Squamous cell carcinoma, Stroke, Type 2 diabetes mellitus with stage 3 chronic kidney disease and hypertension, and Type 2 diabetes with peripheral circulatory disorder, controlled.    Last VS: BP (!) 95/52   Pulse (!) 52   Temp 97.9 °F (36.6 °C) (Axillary)   Resp 18   Ht 5' 5" (1.651 m)   Wt 59 kg (130 lb)   LMP  (LMP Unknown)   SpO2 99%   Breastfeeding No   BMI 21.63 kg/m²     24H Vital Sign Range:  Temp:  [96.5 °F (35.8 °C)-98 °F (36.7 °C)]   Pulse:  [52-71]   Resp:  [12-18]   BP: ()/(52-76)   SpO2:  [94 %-100 %]     Level of Consciousness (AVPU): responds to pain    Recent Labs     08/16/24  1840 08/17/24  0512   WBC 9.85 8.87   HGB 14.6 12.0   HCT 44.1 35.7*    161       Recent Labs     08/16/24  1840 08/17/24  0512    139   K 5.3* 3.9    107   CO2 17* 23   BUN 30 34*   CREATININE 1.4 1.1   * 146*   PHOS  --  3.5   MG 1.8 1.7        Recent Labs     08/16/24  1828 08/17/24  1740   PH 7.540* 7.375   PCO2 28.6* 41.3   PO2 73* 78*   HCO3 24.5 24.2   POCSATURATED 96 95   BE 2 -1        OXYGEN:             MEWS score: 5    Charge RNBay contacted for decreased LOC, hypotension reports will recheck VS, check on patient. No additional concerns verbalized at this time. Instructed to call [phone] for further concerns or assistance.    LADY Wise RN       "

## 2024-08-18 NOTE — ASSESSMENT & PLAN NOTE
Hx of pAF along w/ 2nd degree AV block. Medtronic Micra AV PM placed in Oct 2022. Appears to have been last interrogated in Jan 2024 which showed normal function & paced rhythm. Note pt's PM is MRI compatible, but requires EP lab assistance (device has to be set to MRI mode & then restored to original settings following scan), which is only available M-F.   Monitor electrolytes, replete PRN for goal K > 4 & Mg > 2  HR 50-70s, will consider interrogation if mentation stops improving. '  Continuing eliquis, holding metoprolol in setting of sepsis

## 2024-08-18 NOTE — ASSESSMENT & PLAN NOTE
Patient's FSGs are controlled on current medication regimen.  Last A1c reviewed-   Lab Results   Component Value Date    HGBA1C 5.8 (H) 08/17/2024     Most recent fingerstick glucose reviewed-   Recent Labs   Lab 08/17/24  1612 08/17/24  2059 08/18/24  0813 08/18/24  1122   POCTGLUCOSE 146* 121* 113* 138*     Current correctional scale  Low  Maintain anti-hyperglycemic dose as follows-   Antihyperglycemics (From admission, onward)      Start     Stop Route Frequency Ordered    08/17/24 0009  insulin aspart U-100 pen 0-5 Units         -- SubQ Before meals & nightly PRN 08/16/24 2310          Hold Oral hypoglycemics while patient is in the hospital.

## 2024-08-18 NOTE — NURSING
8/18 @0500 spoke with on-call physician for [facility] and informed that patient bladder scan at 0200 was >395, informed that patient received bolus and has not voided. States that am team will place Cath, notify if patient has signs of discomfort. Informed that patient pass Messy and had no problems with medication crush in apple sauce, states okay to administer medications crush in apple sauce.

## 2024-08-18 NOTE — PLAN OF CARE
Pt VS unstable at start of shift, see VS flowsheets. Team notified. Pt still retaining urine, pt straight cath resulted in 350 out with some drainage on bed, nursing orders to apply merida next time pt is retaining >300. Pt bladder scanned at 251 at end of shift. Warming blanket applied per rectal temp of 95.1F. BP monitored throughout shift. Pt became more responsive throughout shift, was able to complete oral intake of meds, ate applesauce and verbally asked for water. Pt offered food & water each time RN rounded to pt room. Meds administered per MAR. Pt is mostly nonverbal, occasionally says one word to communicate needs.     POC updated and reviewed with the patient's daughter at the bedside. Questions regarding POC were encouraged and addressed.  Tele maintained per order. Fall and safety precautions maintained, no signs of injury noted during shift. Patient repositioned with assistance in bed for comfort & Q2 turns. Upon exiting room, patient's bed locked in low position, side rails up x 3, bed alarm on, with call light within reach. Instructed patient to call staff for mobility, verbalized understanding. No acute signs of distress noted at this time.      -Rupal Mcmanus    Problem: Adult Inpatient Plan of Care  Goal: Plan of Care Review  Outcome: Progressing  Goal: Absence of Hospital-Acquired Illness or Injury  Outcome: Progressing  Goal: Optimal Comfort and Wellbeing  Outcome: Progressing  Goal: Readiness for Transition of Care  Outcome: Progressing     Problem: Acute Kidney Injury/Impairment  Goal: Improved Oral Intake  Outcome: Progressing     Problem: Diabetes Comorbidity  Goal: Blood Glucose Level Within Targeted Range  Outcome: Progressing     Problem: Fall Injury Risk  Goal: Absence of Fall and Fall-Related Injury  Outcome: Progressing     Problem: Skin Injury Risk Increased  Goal: Skin Health and Integrity  Outcome: Progressing     Problem: Infection  Goal: Absence of Infection Signs and  Symptoms  Outcome: Progressing

## 2024-08-18 NOTE — HOSPITAL COURSE
Admitted for workup of AMS. EEG negative. C/f UTI, urine studies pending. Hypothermic and hypotensive, abx broadened. Regarding keppra, patient recently prescribed for treatment of parkinsonian tremor, no improvement but progressive sedation since starting. Keppra level was low, seroquel and keppra held. UA showing 28 WBCs and many bacteria and urine culture showing E coli, susceptible to ceftriaxone and ciprofloxacin among other options. Pt was initially started on vancomycin and zosyn, vancomycin and zosyn continued in light of urine culture results. Pt also had urinary retention throughout her hospital stay, with bladder scans in showing up to 400cc's of urine retained in the bladder. She required multiple straight catheterizations and a merida catheter was placed on 8/20. Urology recommended that patient be discharged with merida catheter in place and will follow up outpatient for voiding trial and merida catheter removal. Pt discharged in stable condition with good mentation.

## 2024-08-18 NOTE — NURSING
Nurses Note -- 4 Eyes      8/18/2024   3:50 AM      Skin assessed during: Q Shift Change      [x] No Altered Skin Integrity Present    []Prevention Measures Documented      [] Yes- Altered Skin Integrity Present or Discovered   [] LDA Added if Not in Epic (Describe Wound)   [] New Altered Skin Integrity was Present on Admit and Documented in LDA   [] Wound Image Taken    Wound Care Consulted? No    Attending Nurse:  aPul Dotson RN/Staff Member:  Derrell MCKEON    Multiple bruising to bilateral arms with skin remaining intact at time of assessment. Sacrum has boggy skin area with skin remaining intact at time of assessment.

## 2024-08-18 NOTE — PROGRESS NOTES
[facility] - Neurosurgery ([facility])  [facility]  Progress Note    Patient Name: Quin Linn  MRN: [id number]  Patient Class: IP- Inpatient   Admission Date: 2024  Length of Stay: 1 days  Attending Physician: Alexandria Pearson MD  Primary Care Provider: Mary Ellen Nesbitt MD        Subjective:     Principal Problem:Encephalopathy        HPI:  Ms. Linn is a 89 y.o. with a PMH of T2DM, HTN, HLD,  Alzheimer's, CKD 4, anemia, decompensated chronic heart failure, paroxysmal Afib on apixaban who presented to the ED via EMS for altered mental status. Per EMS, patient's family was concerned because she had not been talking or eating much the past 3 days and appeared more lethargic than usual. Per EMS, family states she has these periods where she appears altered for up to 2 days, likely 2/2 to her Alzheimer's, but this episode has lasted longer so they decided to call EMS. ED spoke with the patient's daughter who states her baseline is usually very talkative and interactive, knows who her family is. Denies any recent falls or syncopal episodes. She was started on keppra 2 months ago, but denies any other recent medication changes. She had a closed nondisplaced fracture of fourth cervical vertebra with routine healing and follow up after she fell out of a recliner in May 2024. She was seen by neurology 2024 for workup of syncope vs seizure. At that time workup negative but there was high suspicion for ictal periods so patient was started on Keppra 250 BID.  She is being admitted to inpatient for altered mental status work up and and an SPARKLE.    ED Course:  LR bolus  Trop .06  Urinalysis - awaiting results  Blood cultures - awaiting results    CBC: unremarkable  Chemistry: K slightly elevated 5.3, Anion Gap 16  BUN 30, Cr 1.4 (baseline?)  Glucose 140    AB.5, PCO2 28.6, PO2 73 (metabolic alkalosis)    Imaging: chest xray: small pleural effusion, central hilar finding may be early edema, developing lower  lung zone consolidation    CT head: No acute hemorrhage or CT evidence of major vascular distribution infarct.      Overview/Hospital Course:  Admitted for workup of AMS. EEG negative. C/f UTI, urine studies pending. Hypothermic and hypotensive, abx broadened. Regarding keppra, patient recently prescribed for treatment of parkinsonian tremor, no improvement but progressive sedation since starting. Keppra level pending, seroquel and keppra held.     Interval History: V-EEG negative for epileptic activity. Discussed with daughter, Janae, who is MPOA, keppra recently prescribed for parkinsonian tremor, no improvement but progressive sedation since starting. Keppra level pending. Holding keppra and seroquel.     Briefly hyperactive this morning following cath attempts, improved without intervention. Seroquel held since admit, typically given for sundowning at home. Patient somnolent but no longer obtunded and able to maintain attention for longer periods, 10-20 seconds, still not fully conversant.     Relative hypotension, hypothermia, hypoxia req 2L NC this AM. LA downtrended to 1.Recent echo with normal EF, unable to measure diastolic function given Afib. CXR w minor vascular congestion. Held antihypertensives, fluids, broadened abx to Vanc/Zosyn. Significant urinary retention, urine studies pending.     Review of Systems   Reason unable to perform ROS: somnolent.     Objective:     Vital Signs (Most Recent):  Temp: 98 °F (36.7 °C) (08/18/24 1126)  Pulse: (!) 54 (08/18/24 1126)  Resp: 18 (08/18/24 1126)  BP: 123/66 (08/18/24 1126)  SpO2: 96 % (08/18/24 1126) Vital Signs (24h Range):  Temp:  [95.1 °F (35.1 °C)-98 °F (36.7 °C)] 98 °F (36.7 °C)  Pulse:  [52-79] 54  Resp:  [12-18] 18  SpO2:  [82 %-100 %] 96 %  BP: ()/(52-76) 123/66     Weight: 59 kg (130 lb)  Body mass index is 21.63 kg/m².    Intake/Output Summary (Last 24 hours) at 8/18/2024 1353  Last data filed at 8/18/2024 1000  Gross per 24 hour   Intake --    Output 349 ml   Net -349 ml         Physical Exam  Constitutional:       Appearance: She is ill-appearing.   HENT:      Mouth/Throat:      Mouth: Mucous membranes are dry.   Eyes:      Extraocular Movements: Extraocular movements intact.   Cardiovascular:      Comments: Low volume heart sounds  Pulmonary:      Effort: Pulmonary effort is normal.      Breath sounds: Normal breath sounds. No wheezing or rhonchi.   Abdominal:      Palpations: Abdomen is soft.      Comments: Decreased bowel sounds   Musculoskeletal:      Comments: Ecchymosis 2inch diameter, right forearm  Varying purpura left arm   Skin:     General: Skin is dry.      Comments: Flaky skin on arms   Neurological:      Mental Status: She is disoriented.      Comments: Answers questions with 1-2 word responses, somewhat appropriately. Oriented to person, month.              Significant Labs: All pertinent labs within the past 24 hours have been reviewed.    Significant Imaging: I have reviewed all pertinent imaging results/findings within the past 24 hours.    Assessment/Plan:      * Encephalopathy  Admitted for 5d h/o of lethargy, decreased appetite, no longer interactive. Recently started keppra for tx of parkinsonian tremor per pt's daughter/caretaker. Has become progressively slowed since then. CTH negative. ABG reassuring. UA suggestive of UTI, significant retention noted. EEG negative.      Labs:  Keppra level pending  CMP reviewed, wnl  UA c/f UTI   - developing urosepsis, abx broadened. Cultures pending.   Ammonia wnl  ABG respiratory alkolosis, no hyperventilation noted. No ASA use, will confirm with daughter  BCx pending  Low suspicion for substance use, utox deferred    Imaging:  CXR developing vasc congestion  CTH no acute abnormalities  EEG wnl    Plan:  - improving, holding keppra. Will consider MRI with EP to adjust PM if worsens.    - treating UTI, see sepsis   - monitoring for ongoing retention  - Vitamin B12, thiamine  - TSH  - Avoid  "sedating medications  - Monitor electrolytes, BGL  - Ucx, Bcx       Severe sepsis  This patient does have evidence of infective focus  My overall impression is sepsis.  Source: Urinary Tract  Antibiotics given-   Antibiotics (72h ago, onward)      Start     Stop Route Frequency Ordered    08/18/24 1145  piperacillin-tazobactam (ZOSYN) 4.5 g in D5W 100 mL IVPB (MB+)         -- IV Every 8 hours (non-standard times) 08/18/24 1031    08/18/24 1130  vancomycin - pharmacy to dose  (vancomycin IVPB (PEDS and ADULTS))        Placed in "And" Linked Group    -- IV pharmacy to manage frequency 08/18/24 1031          Latest lactate reviewed-  Recent Labs   Lab 08/16/24  1827 08/17/24  1653 08/18/24  0429   LACTATE  --    < > 1.5   POCLAC 1.37  --   --     < > = values in this interval not displayed.     Organ dysfunction indicated by Acute kidney injury, Acute heart failure, and Encephalopathy    Fluid challenge Received >1.5L in ED    Suspected UTI w retention, unclear reports of dysuria. Initially started rocephin  - 2/4 SIRS criteria - with lactic acid 2.6 now resolved  - Source likely urine  - Urine and blood cx pending  - CXR with no opacity, vascular congestion  - escalating rocephin to vanc/zosyn given worsening hypothermia, hypotension, hypoxia, avoiding cefepime due to neurotoxicity.    - procal pending  - holding antihypertensives    Chronic diastolic heart failure  HFpEF  - Most recent TTE on 8/17/24 demonstrates: Lvef 55%, unable to measure DD d/t afib  - typically with normal diastolic function.    - EKG : alternating afib, 1st degree AV block, LBBB. stable  - Troponin peaked at 0.6, downtrending. No chest pain  - BNP pending, CXR w vascular congestion  - Fluid restriction at 1.5L cc with strict I/Os and daily weights   - unclear dry weight  - Maintain on telemetry and daily EKGs   - Up to date risk stratification : TSH, Lipids, HbA1c with optimization of risk factors is necessary  - Check Electrolytes, keep Mag >2 " & K+ >4  - SCDs, TEDs, Nursing communication to elevated LE   - Ambulate as tolerated    Current home regimen includes coreg, amlodipine, and hydralazine  Previously on lasix 40mg PO qd, discontinued but has required equivalent isolated lasix IV doses during previous hospitalizations   - hypoxic after SPARKLE IVF resuscitation, on 2L NC   - giving 20mg IV lasix     History of CVA (cerebrovascular accident)  W/o residual deficits, continue ASA/statin  - on eliquis for afib      SPARKLE (acute kidney injury)  SPARKLE is likely due to pre-renal azotemia due to dehydration. Baseline creatinine is  1.0 . Most recent creatinine and eGFR are listed below.  Recent Labs     08/16/24  1840 08/17/24  0512 08/18/24  0429   CREATININE 1.4 1.1 1.2   EGFRNORACEVR 35.3* 47.1* 42.5*        Plan  - SPARKLE is stable  - Avoid nephrotoxins and renally dose meds for GFR listed above  - Monitor urine output, serial BMP, and adjust therapy as needed  - gentle IVF    Resolved, holding IVF due to c/f volume overload    Constipation  Mild rectal stool burden, last BM8/17  - on bowel regimen- senna, PEG      Paroxysmal A-fib  Hx of pAF along w/ 2nd degree AV block. Medtronic Micra AV PM placed in Oct 2022. Appears to have been last interrogated in Jan 2024 which showed normal function & paced rhythm. Note pt's PM is MRI compatible, but requires EP lab assistance (device has to be set to MRI mode & then restored to original settings following scan), which is only available M-F.   Monitor electrolytes, replete PRN for goal K > 4 & Mg > 2  HR 50-70s, will consider interrogation if mentation stops improving. '  Continuing eliquis, holding metoprolol in setting of sepsis      Hyperkalemia  Mild, to 5.3 on arrival, resolved          Wenckebach second degree AV block  S/p PM, paced rhythm on tele    Acquired hypothyroidism  Continue synthroid  TSH on admit 0.2, T4 wnl  - repeat pending    Diabetes mellitus type II, controlled  Patient's FSGs are controlled on  current medication regimen.  Last A1c reviewed-   Lab Results   Component Value Date    HGBA1C 5.8 (H) 08/17/2024     Most recent fingerstick glucose reviewed-   Recent Labs   Lab 08/17/24  1612 08/17/24  2059 08/18/24  0813 08/18/24  1122   POCTGLUCOSE 146* 121* 113* 138*     Current correctional scale  Low  Maintain anti-hyperglycemic dose as follows-   Antihyperglycemics (From admission, onward)      Start     Stop Route Frequency Ordered    08/17/24 0009  insulin aspart U-100 pen 0-5 Units         -- SubQ Before meals & nightly PRN 08/16/24 2310          Hold Oral hypoglycemics while patient is in the hospital.    Hypertension  Chronic, controlled. Latest blood pressure and vitals reviewed-     Temp:  [95.1 °F (35.1 °C)-98 °F (36.7 °C)]   Pulse:  [52-79]   Resp:  [12-18]   BP: ()/(52-76)   SpO2:  [82 %-100 %] .   Home meds for hypertension were reviewed and noted below.   Hypertension Medications               amLODIPine (NORVASC) 10 MG tablet Take 1 tablet (10 mg total) by mouth once daily.    carvediloL (COREG) 3.125 MG tablet Take 1 tablet (3.125 mg total) by mouth 2 (two) times daily.    hydrALAZINE (APRESOLINE) 50 MG tablet Take 1 tablet (50 mg total) by mouth every 6 (six) hours.            While in the hospital, will manage blood pressure as follows; Adjust home antihypertensive regimen as follows- holding home antihypertensives in setting of sepsis    Will utilize p.r.n. blood pressure medication only if patient's blood pressure greater than 180/110 and she develops symptoms such as worsening chest pain or shortness of breath.      VTE Risk Mitigation (From admission, onward)           Ordered     apixaban tablet 2.5 mg  2 times daily         08/16/24 2247     IP VTE HIGH RISK PATIENT  Once         08/16/24 2249     Place sequential compression device  Until discontinued         08/16/24 2249                    Discharge Planning   LILI:      Code Status: DNR   Is the patient medically ready for  discharge?:     Reason for patient still in hospital (select all that apply): Patient new problem                     Kapil Moore MD  Department of Hospital Medicine   [facility] - Neurosurgery ([facility])

## 2024-08-18 NOTE — ACP (ADVANCE CARE PLANNING)
ACP Note    In light of the patients advanced and life limiting illness,I engaged the patient's MPOA, Janae Linn (Daughter), in a conversation about the patient's preferences for care at the very end of life. Patient's advance directive reviewed. The patient wishes to have a natural, peaceful death.  Along those lines, the patient does not wish to have CPR or other invasive treatments performed when his heart and/or breathing stops. Has been DNR for previous hospitalizations. I communicated to the MPOA that a DNR order would be placed in her medical record to reflect this. I spent a total of 20 minutes engaging in this advance care planning discussion.     On discussion regarding code status, we had discussed in the event her heart stopped and she had a cardiac arrest or if she had stopped breathing, became hypoxemic requiring increasing oxygen requirements, or obtunded where she would be unable to protect her breathing and need to be placed on a ventilator, which she has previously discussed with family. Patient has indicated via ACP documentation that they would like to be DNR, understanding it means if something occurred and her heart would stop we would let her go peacefully without attempts at compressions or cardioversion and she is aware, confirmed DNR status.     Kapil Moore MD  [facility] Medicine

## 2024-08-18 NOTE — PROCEDURES
[facility] ELECTROENCEPHALOGRAM  REPORT    DATE OF SERVICE: 8/18/24  EEG NUMBER: [id number]  REQUESTED BY:  Oscar  [place]  [facility] [place]Y   Electroencephalographic (EEG) recording is with electrodes placed according to the International 10-20 placement system.  Thirty two (32) channels of digital signal (sampling rate of 512/sec) including T1 and T2 was simultaneously recorded from the scalp and may include  EKG, EMG, and/or eye monitors.  Recording band pass was 0.1 to 512 hz.  Digital video recording of the patient is simultaneously recorded with the EEG.  The patient is instructed report clinical symptoms which may occur during the recording session.  EEG and video recording is stored and archived in digital format.  Activation procedures which include photic stimulation, hyperventilation and instructing patients to perform simple task are done in selected patients.   The EEG is displayed on a monitor screen and can be reviewed using different montages.  Computer assisted analysis is employed to detect spike and electrographic seizure activity.   The entire record is submitted for computer analysis.  The entire recording is visually reviewed and the times identified by computer analysis as being spikes or seizures are reviewed again.  Compresses spectral analysis (CSA) is also performed on the activity recorded from each individual channel.  This is displayed as a power display of frequencies from 0 to 30 Hz over time.   The CSA is reviewed looking for asymmetries in power between homologous areas of the scalp and then compared with the original EEG recording.     Gridsum software was also utilized in the review of this study.  This software suite analyzes the EEG recording in multiple domains.  Coherence and rhythmicity is computed to identify EEG sections which may contain organized seizures.  Each channel undergoes analysis to detect presence of spike and sharp waves which have special  and morphological characteristic of epileptic activity.  The routine EEG recording is converted from spacial into frequency domain.  This is then displayed comparing homologous areas to identify areas of significant asymmetry.  Algorithm to identify non-cortically generated artifact is used to separate eye movement, EMG and other artifact from the EEG.      ELECTROENCEPHALOGRAM:    RECORDING TIMES:  Start on 8/18/24 at 07:00  Stop on 8/18/24 at 10:31    A total of 3 hrs  of EEG recording was obtained.    The record shows a poor organization at rest, consisting of a 7 Hz posterior dominant rhythm with fair  reactivity. There is mild bilateral beta activity. There is continuous diffuse delta and theta range background slowing.     Drowsiness is characterized by attenuation of the background, vertex waves, and bilateral theta slowing. Stage II sleep is characterized by slowing, vertex waves, and symmetric sleep spindles. Slow wave and REM sleep are recorded.    Provocative maneuvers including hyperventilation and photic stimulation were performed.     EKG recording shows a sinus rhythm.    There is no push button or clinical event.    IMPRESSION:  Abnormal study due to moderate to severe  diffuse background slowing consistent with diffuse cerebral dysfunction and encephalopathy which may be on the basis of toxic, metabolic, or primary neuronal disorder.     Edwin Guaman MD

## 2024-08-18 NOTE — ASSESSMENT & PLAN NOTE
"This patient does have evidence of infective focus  My overall impression is sepsis.  Source: Urinary Tract  Antibiotics given-   Antibiotics (72h ago, onward)      Start     Stop Route Frequency Ordered    08/18/24 1145  piperacillin-tazobactam (ZOSYN) 4.5 g in D5W 100 mL IVPB (MB+)         -- IV Every 8 hours (non-standard times) 08/18/24 1031    08/18/24 1130  vancomycin - pharmacy to dose  (vancomycin IVPB (PEDS and ADULTS))        Placed in "And" Linked Group    -- IV pharmacy to manage frequency 08/18/24 1031          Latest lactate reviewed-  Recent Labs   Lab 08/16/24  1827 08/17/24  1653 08/18/24  0429   LACTATE  --    < > 1.5   POCLAC 1.37  --   --     < > = values in this interval not displayed.     Organ dysfunction indicated by Acute kidney injury, Acute heart failure, and Encephalopathy    Fluid challenge Received >1.5L in ED    Suspected UTI w retention, unclear reports of dysuria. Initially started rocephin  - 2/4 SIRS criteria - with lactic acid 2.6 now resolved  - Source likely urine  - Urine and blood cx pending  - CXR with no opacity, vascular congestion  - escalating rocephin to vanc/zosyn given worsening hypothermia, hypotension, hypoxia, avoiding cefepime due to neurotoxicity.    - procal pending  - holding antihypertensives  "

## 2024-08-18 NOTE — ASSESSMENT & PLAN NOTE
SPARKLE is likely due to pre-renal azotemia due to dehydration. Baseline creatinine is  1.0 . Most recent creatinine and eGFR are listed below.  Recent Labs     08/16/24  1840 08/17/24  0512 08/18/24  0429   CREATININE 1.4 1.1 1.2   EGFRNORACEVR 35.3* 47.1* 42.5*        Plan  - SPARKLE is stable  - Avoid nephrotoxins and renally dose meds for GFR listed above  - Monitor urine output, serial BMP, and adjust therapy as needed  - gentle IVF    Resolved, holding IVF due to c/f volume overload

## 2024-08-18 NOTE — SUBJECTIVE & OBJECTIVE
Interval History: V-EEG negative for epileptic activity. Discussed with daughter, Janae, who is MPOA, keppra recently prescribed for parkinsonian tremor, no improvement but progressive sedation since starting. Keppra level pending. Holding keppra and seroquel.     Briefly hyperactive this morning following cath attempts, improved without intervention. Seroquel held since admit, typically given for sundowning at home. Patient somnolent but no longer obtunded and able to maintain attention for longer periods, 10-20 seconds, still not fully conversant.     Relative hypotension, hypothermia, hypoxia req 2L NC this AM. LA downtrended to 1.Recent echo with normal EF, unable to measure diastolic function given Afib. CXR w minor vascular congestion. Held antihypertensives, fluids, broadened abx to Vanc/Zosyn. Significant urinary retention, urine studies pending.     Review of Systems   Reason unable to perform ROS: somnolent.     Objective:     Vital Signs (Most Recent):  Temp: 98 °F (36.7 °C) (08/18/24 1126)  Pulse: (!) 54 (08/18/24 1126)  Resp: 18 (08/18/24 1126)  BP: 123/66 (08/18/24 1126)  SpO2: 96 % (08/18/24 1126) Vital Signs (24h Range):  Temp:  [95.1 °F (35.1 °C)-98 °F (36.7 °C)] 98 °F (36.7 °C)  Pulse:  [52-79] 54  Resp:  [12-18] 18  SpO2:  [82 %-100 %] 96 %  BP: ()/(52-76) 123/66     Weight: 59 kg (130 lb)  Body mass index is 21.63 kg/m².    Intake/Output Summary (Last 24 hours) at 8/18/2024 1353  Last data filed at 8/18/2024 1000  Gross per 24 hour   Intake --   Output 349 ml   Net -349 ml         Physical Exam  Constitutional:       Appearance: She is ill-appearing.   HENT:      Mouth/Throat:      Mouth: Mucous membranes are dry.   Eyes:      Extraocular Movements: Extraocular movements intact.   Cardiovascular:      Comments: Low volume heart sounds  Pulmonary:      Effort: Pulmonary effort is normal.      Breath sounds: Normal breath sounds. No wheezing or rhonchi.   Abdominal:      Palpations: Abdomen  is soft.      Comments: Decreased bowel sounds   Musculoskeletal:      Comments: Ecchymosis 2inch diameter, right forearm  Varying purpura left arm   Skin:     General: Skin is dry.      Comments: Flaky skin on arms   Neurological:      Mental Status: She is disoriented.      Comments: Answers questions with 1-2 word responses, somewhat appropriately. Oriented to person, month.              Significant Labs: All pertinent labs within the past 24 hours have been reviewed.    Significant Imaging: I have reviewed all pertinent imaging results/findings within the past 24 hours.

## 2024-08-18 NOTE — ASSESSMENT & PLAN NOTE
Admitted for 5d h/o of lethargy, decreased appetite, no longer interactive. Recently started keppra for tx of parkinsonian tremor per pt's daughter/caretaker. Has become progressively slowed since then. CTH negative. ABG reassuring. UA suggestive of UTI, significant retention noted. EEG negative.      Labs:  Keppra level pending  CMP reviewed, wnl  UA c/f UTI   - developing urosepsis, abx broadened. Cultures pending.   Ammonia wnl  ABG respiratory alkolosis, no hyperventilation noted. No ASA use, will confirm with daughter  BCx pending  Low suspicion for substance use, utox deferred    Imaging:  CXR developing vasc congestion  CTH no acute abnormalities  EEG wnl    Plan:  - improving, holding keppra. Will consider MRI with EP to adjust PM if worsens.    - treating UTI, see sepsis   - monitoring for ongoing retention  - Vitamin B12, thiamine  - TSH  - Avoid sedating medications  - Monitor electrolytes, BGL  - Ucx, Bcx

## 2024-08-18 NOTE — NURSING
Nurses Note -- 4 Eyes      8/18/2024   9:33 AM      Skin assessed during: Daily Assessment      [x] No Altered Skin Integrity Present    [x]Prevention Measures Documented      [] Yes- Altered Skin Integrity Present or Discovered   [] LDA Added if Not in Epic (Describe Wound)   [] New Altered Skin Integrity was Present on Admit and Documented in LDA   [] Wound Image Taken    Wound Care Consulted? No    Attending Nurse:  Rupal Dotson RN/Staff Member:  Anisha

## 2024-08-18 NOTE — ASSESSMENT & PLAN NOTE
Chronic, controlled. Latest blood pressure and vitals reviewed-     Temp:  [95.1 °F (35.1 °C)-98 °F (36.7 °C)]   Pulse:  [52-79]   Resp:  [12-18]   BP: ()/(52-76)   SpO2:  [82 %-100 %] .   Home meds for hypertension were reviewed and noted below.   Hypertension Medications               amLODIPine (NORVASC) 10 MG tablet Take 1 tablet (10 mg total) by mouth once daily.    carvediloL (COREG) 3.125 MG tablet Take 1 tablet (3.125 mg total) by mouth 2 (two) times daily.    hydrALAZINE (APRESOLINE) 50 MG tablet Take 1 tablet (50 mg total) by mouth every 6 (six) hours.            While in the hospital, will manage blood pressure as follows; Adjust home antihypertensive regimen as follows- holding home antihypertensives in setting of sepsis    Will utilize p.r.n. blood pressure medication only if patient's blood pressure greater than 180/110 and she develops symptoms such as worsening chest pain or shortness of breath.

## 2024-08-19 PROBLEM — T68.XXXA HYPOTHERMIA: Status: ACTIVE | Noted: 2024-08-19

## 2024-08-19 PROBLEM — E87.3 METABOLIC ALKALOSIS: Status: ACTIVE | Noted: 2024-08-19

## 2024-08-19 LAB
ALBUMIN SERPL BCP-MCNC: 2.4 G/DL (ref 3.5–5.2)
ALP SERPL-CCNC: 55 U/L (ref 55–135)
ALT SERPL W/O P-5'-P-CCNC: 47 U/L (ref 10–44)
ANION GAP SERPL CALC-SCNC: 9 MMOL/L (ref 8–16)
AST SERPL-CCNC: 48 U/L (ref 10–40)
BACTERIA UR CULT: ABNORMAL
BASOPHILS # BLD AUTO: 0.01 K/UL (ref 0–0.2)
BASOPHILS NFR BLD: 0.1 % (ref 0–1.9)
BILIRUB SERPL-MCNC: 0.5 MG/DL (ref 0.1–1)
BUN SERPL-MCNC: 37 MG/DL (ref 10–30)
CALCIUM SERPL-MCNC: 8.8 MG/DL (ref 8.7–10.5)
CHLORIDE SERPL-SCNC: 103 MMOL/L (ref 95–110)
CO2 SERPL-SCNC: 23 MMOL/L (ref 23–29)
CREAT SERPL-MCNC: 1.3 MG/DL (ref 0.5–1.4)
DIFFERENTIAL METHOD BLD: ABNORMAL
EOSINOPHIL # BLD AUTO: 0.2 K/UL (ref 0–0.5)
EOSINOPHIL NFR BLD: 2.9 % (ref 0–8)
ERYTHROCYTE [DISTWIDTH] IN BLOOD BY AUTOMATED COUNT: 12.5 % (ref 11.5–14.5)
EST. GFR  (NO RACE VARIABLE): 38.6 ML/MIN/1.73 M^2
GLUCOSE SERPL-MCNC: 97 MG/DL (ref 70–110)
HCT VFR BLD AUTO: 34 % (ref 37–48.5)
HGB BLD-MCNC: 11.2 G/DL (ref 12–16)
IMM GRANULOCYTES # BLD AUTO: 0.01 K/UL (ref 0–0.04)
IMM GRANULOCYTES NFR BLD AUTO: 0.1 % (ref 0–0.5)
LEVETIRACETAM SERPL-MCNC: 1.4 UG/ML (ref 3–60)
LYMPHOCYTES # BLD AUTO: 1.6 K/UL (ref 1–4.8)
LYMPHOCYTES NFR BLD: 22.4 % (ref 18–48)
MAGNESIUM SERPL-MCNC: 2.1 MG/DL (ref 1.6–2.6)
MCH RBC QN AUTO: 30.6 PG (ref 27–31)
MCHC RBC AUTO-ENTMCNC: 32.9 G/DL (ref 32–36)
MCV RBC AUTO: 93 FL (ref 82–98)
MONOCYTES # BLD AUTO: 0.5 K/UL (ref 0.3–1)
MONOCYTES NFR BLD: 6.6 % (ref 4–15)
NEUTROPHILS # BLD AUTO: 4.9 K/UL (ref 1.8–7.7)
NEUTROPHILS NFR BLD: 67.9 % (ref 38–73)
NRBC BLD-RTO: 0 /100 WBC
PHOSPHATE SERPL-MCNC: 3.8 MG/DL (ref 2.7–4.5)
PLATELET # BLD AUTO: 167 K/UL (ref 150–450)
PMV BLD AUTO: 10.6 FL (ref 9.2–12.9)
POCT GLUCOSE: 112 MG/DL (ref 70–110)
POCT GLUCOSE: 117 MG/DL (ref 70–110)
POCT GLUCOSE: 119 MG/DL (ref 70–110)
POCT GLUCOSE: 124 MG/DL (ref 70–110)
POCT GLUCOSE: 99 MG/DL (ref 70–110)
POTASSIUM SERPL-SCNC: 3.6 MMOL/L (ref 3.5–5.1)
PROT SERPL-MCNC: 5.1 G/DL (ref 6–8.4)
RBC # BLD AUTO: 3.66 M/UL (ref 4–5.4)
SODIUM SERPL-SCNC: 135 MMOL/L (ref 136–145)
TSH SERPL DL<=0.005 MIU/L-ACNC: 3.19 UIU/ML (ref 0.4–4)
VANCOMYCIN SERPL-MCNC: 16 UG/ML
WBC # BLD AUTO: 7.23 K/UL (ref 3.9–12.7)

## 2024-08-19 PROCEDURE — 63600175 PHARM REV CODE 636 W HCPCS

## 2024-08-19 PROCEDURE — 25000003 PHARM REV CODE 250

## 2024-08-19 PROCEDURE — 80202 ASSAY OF VANCOMYCIN: CPT | Performed by: STUDENT IN AN ORGANIZED HEALTH CARE EDUCATION/TRAINING PROGRAM

## 2024-08-19 PROCEDURE — 84443 ASSAY THYROID STIM HORMONE: CPT

## 2024-08-19 PROCEDURE — 80053 COMPREHEN METABOLIC PANEL: CPT

## 2024-08-19 PROCEDURE — 85025 COMPLETE CBC W/AUTO DIFF WBC: CPT

## 2024-08-19 PROCEDURE — 97535 SELF CARE MNGMENT TRAINING: CPT

## 2024-08-19 PROCEDURE — 83735 ASSAY OF MAGNESIUM: CPT

## 2024-08-19 PROCEDURE — 84100 ASSAY OF PHOSPHORUS: CPT

## 2024-08-19 PROCEDURE — 92526 ORAL FUNCTION THERAPY: CPT

## 2024-08-19 PROCEDURE — 36415 COLL VENOUS BLD VENIPUNCTURE: CPT | Performed by: STUDENT IN AN ORGANIZED HEALTH CARE EDUCATION/TRAINING PROGRAM

## 2024-08-19 PROCEDURE — 11000001 HC ACUTE MED/SURG PRIVATE ROOM

## 2024-08-19 RX ADMIN — LEVOTHYROXINE SODIUM 75 MCG: 75 TABLET ORAL at 06:08

## 2024-08-19 RX ADMIN — SENNOSIDES AND DOCUSATE SODIUM 1 TABLET: 50; 8.6 TABLET ORAL at 09:08

## 2024-08-19 RX ADMIN — CYANOCOBALAMIN TAB 250 MCG 250 MCG: 250 TAB at 09:08

## 2024-08-19 RX ADMIN — POLYETHYLENE GLYCOL 3350 17 G: 17 POWDER, FOR SOLUTION ORAL at 09:08

## 2024-08-19 RX ADMIN — GABAPENTIN 300 MG: 300 CAPSULE ORAL at 09:08

## 2024-08-19 RX ADMIN — APIXABAN 2.5 MG: 2.5 TABLET, FILM COATED ORAL at 09:08

## 2024-08-19 RX ADMIN — ACETAMINOPHEN 650 MG: 325 TABLET ORAL at 09:08

## 2024-08-19 RX ADMIN — Medication 1 TABLET: at 05:08

## 2024-08-19 RX ADMIN — FOLIC ACID 1 MG: 1 TABLET ORAL at 09:08

## 2024-08-19 RX ADMIN — ATORVASTATIN CALCIUM 80 MG: 40 TABLET, FILM COATED ORAL at 09:08

## 2024-08-19 RX ADMIN — PIPERACILLIN SODIUM AND TAZOBACTAM SODIUM 4.5 G: 4; .5 INJECTION, POWDER, FOR SOLUTION INTRAVENOUS at 09:08

## 2024-08-19 RX ADMIN — PIPERACILLIN SODIUM AND TAZOBACTAM SODIUM 4.5 G: 4; .5 INJECTION, POWDER, FOR SOLUTION INTRAVENOUS at 06:08

## 2024-08-19 RX ADMIN — Medication 1 TABLET: at 08:08

## 2024-08-19 RX ADMIN — PIPERACILLIN SODIUM AND TAZOBACTAM SODIUM 4.5 G: 4; .5 INJECTION, POWDER, FOR SOLUTION INTRAVENOUS at 02:08

## 2024-08-19 NOTE — SUBJECTIVE & OBJECTIVE
Interval History: Pt mentation improved some today - she appears more alert and awake but still only responds to questions with one or two words.     Review of Systems   Unable to perform ROS: Mental status change     Objective:     Vital Signs (Most Recent):  Temp: 97.4 °F (36.3 °C) (08/19/24 0823)  Pulse: 66 (08/19/24 0823)  Resp: 18 (08/19/24 0823)  BP: 137/63 (08/19/24 0823)  SpO2: 95 % (08/19/24 0823) Vital Signs (24h Range):  Temp:  [95 °F (35 °C)-98.2 °F (36.8 °C)] 97.4 °F (36.3 °C)  Pulse:  [52-75] 66  Resp:  [12-18] 18  SpO2:  [92 %-100 %] 95 %  BP: ()/(58-66) 137/63     Weight: 59 kg (130 lb)  Body mass index is 21.63 kg/m².    Intake/Output Summary (Last 24 hours) at 8/19/2024 0829  Last data filed at 8/19/2024 0440  Gross per 24 hour   Intake 855.71 ml   Output 650 ml   Net 205.71 ml         Physical Exam  Constitutional:       Appearance: She is ill-appearing.   HENT:      Mouth/Throat:      Mouth: Mucous membranes are dry.   Eyes:      Extraocular Movements: Extraocular movements intact.   Cardiovascular:      Comments: Low volume heart sounds  Pulmonary:      Effort: Pulmonary effort is normal.      Breath sounds: Normal breath sounds. No wheezing or rhonchi.   Abdominal:      Palpations: Abdomen is soft.      Comments: Decreased bowel sounds   Musculoskeletal:      Comments: Ecchymosis 2inch diameter, right forearm  Varying purpura left arm   Skin:     General: Skin is dry.      Comments: Flaky skin on arms   Neurological:      Mental Status: She is disoriented.      Comments: Answers questions with 1-2 word responses, somewhat appropriately. Oriented to person, month.              Significant Labs: All pertinent labs within the past 24 hours have been reviewed.    Significant Imaging: I have reviewed all pertinent imaging results/findings within the past 24 hours.

## 2024-08-19 NOTE — NURSING
Nurses Note -- 4 Eyes      8/19/2024   5:21 AM      Skin assessed during: Q Shift Change      [x] No Altered Skin Integrity Present    []Prevention Measures Documented      [] Yes- Altered Skin Integrity Present or Discovered   [] LDA Added if Not in Epic (Describe Wound)   [] New Altered Skin Integrity was Present on Admit and Documented in LDA   [] Wound Image Taken    Wound Care Consulted? No    Attending Nurse:  Paul Dotson RN/Staff Member:  Derrell MCKEON  Bilateral arm has multiple bruising with skin remaining intact.

## 2024-08-19 NOTE — PROGRESS NOTES
[facility] - Neurosurgery ([facility])  [facility] Medicine  Progress Note    Patient Name: Quin Linn  MRN: [id number]  Patient Class: IP- Inpatient   Admission Date: 2024  Length of Stay: 2 days  Attending Physician: Alexandria Pearson MD  Primary Care Provider: Mary Ellen Nesbitt MD        Subjective:     Principal Problem:Metabolic encephalopathy        HPI:  Ms. Linn is a 89 y.o. with a PMH of T2DM, HTN, HLD,  Alzheimer's, CKD 4, anemia, decompensated chronic heart failure, paroxysmal Afib on apixaban who presented to the ED via EMS for altered mental status. Per EMS, patient's family was concerned because she had not been talking or eating much the past 3 days and appeared more lethargic than usual. Per EMS, family states she has these periods where she appears altered for up to 2 days, likely 2/2 to her Alzheimer's, but this episode has lasted longer so they decided to call EMS. ED spoke with the patient's daughter who states her baseline is usually very talkative and interactive, knows who her family is. Denies any recent falls or syncopal episodes. She was started on keppra 2 months ago, but denies any other recent medication changes. She had a closed nondisplaced fracture of fourth cervical vertebra with routine healing and follow up after she fell out of a recliner in May 2024. She was seen by neurology 2024 for workup of syncope vs seizure. At that time workup negative but there was high suspicion for ictal periods so patient was started on Keppra 250 BID.  She is being admitted to inpatient for altered mental status work up and and an SPARKLE.    ED Course:  LR bolus  Trop .06  Urinalysis - awaiting results  Blood cultures - awaiting results    CBC: unremarkable  Chemistry: K slightly elevated 5.3, Anion Gap 16  BUN 30, Cr 1.4 (baseline?)  Glucose 140    AB.5, PCO2 28.6, PO2 73 (metabolic alkalosis)    Imaging: chest xray: small pleural effusion, central hilar finding may be early edema, developing  lower lung zone consolidation    CT head: No acute hemorrhage or CT evidence of major vascular distribution infarct.      Overview/Hospital Course:  Admitted for workup of AMS. EEG negative. C/f UTI, urine studies pending. Hypothermic and hypotensive, abx broadened. Regarding keppra, patient recently prescribed for treatment of parkinsonian tremor, no improvement but progressive sedation since starting. Keppra level pending, seroquel and keppra held. UA showing 28 WBCs and many bacteria and urine culture showing gram negative rods; identification and susceptibility pending. Pt was initially started on vancomycin and zosyn, vancomycin discontinued and zosyn monotherapy continued in light of urine culture results.     Interval History: Pt mentation improved some today - she appears more alert and awake but still only responds to questions with one or two words.     Review of Systems   Unable to perform ROS: Mental status change     Objective:     Vital Signs (Most Recent):  Temp: 97.4 °F (36.3 °C) (08/19/24 0823)  Pulse: 66 (08/19/24 0823)  Resp: 18 (08/19/24 0823)  BP: 137/63 (08/19/24 0823)  SpO2: 95 % (08/19/24 0823) Vital Signs (24h Range):  Temp:  [95 °F (35 °C)-98.2 °F (36.8 °C)] 97.4 °F (36.3 °C)  Pulse:  [52-75] 66  Resp:  [12-18] 18  SpO2:  [92 %-100 %] 95 %  BP: ()/(58-66) 137/63     Weight: 59 kg (130 lb)  Body mass index is 21.63 kg/m².    Intake/Output Summary (Last 24 hours) at 8/19/2024 0894  Last data filed at 8/19/2024 0440  Gross per 24 hour   Intake 855.71 ml   Output 650 ml   Net 205.71 ml         Physical Exam  Constitutional:       Appearance: She is ill-appearing.   HENT:      Mouth/Throat:      Mouth: Mucous membranes are dry.   Eyes:      Extraocular Movements: Extraocular movements intact.   Cardiovascular:      Comments: Low volume heart sounds  Pulmonary:      Effort: Pulmonary effort is normal.      Breath sounds: Normal breath sounds. No wheezing or rhonchi.   Abdominal:       Palpations: Abdomen is soft.      Comments: Decreased bowel sounds   Musculoskeletal:      Comments: Ecchymosis 2inch diameter, right forearm  Varying purpura left arm   Skin:     General: Skin is dry.      Comments: Flaky skin on arms   Neurological:      Mental Status: She is disoriented.      Comments: Answers questions with 1-2 word responses, somewhat appropriately. Oriented to person, month.              Significant Labs: All pertinent labs within the past 24 hours have been reviewed.    Significant Imaging: I have reviewed all pertinent imaging results/findings within the past 24 hours.    Assessment/Plan:      * Metabolic encephalopathy  Admitted for 5d h/o of lethargy, decreased appetite, no longer interactive. Recently started keppra for tx of parkinsonian tremor per pt's daughter/caretaker. Has become progressively slowed since then. CTH negative. ABG reassuring. UA suggestive of UTI - species and sensitivities pending. Significant retention noted. EEG negative.      Labs:  Keppra level low - 1.4  CMP reviewed, wnl  UA c/f UTI   - developing urosepsis, abx broadened. Cultures positive for gram negative rods, identification and susceptibility pending.   Ammonia wnl  ABG respiratory alkolosis, no hyperventilation noted. No ASA use, will confirm with daughter  BCx pending  Low suspicion for substance use, utox deferred    Imaging:  CXR developing vasc congestion  CTH no acute abnormalities  EEG wnl    Plan:  - improving, holding keppra. Will not proceed with MRI at this time as V-EEG was negative.    - treating UTI, see sepsis   - monitoring for ongoing retention  - Vitamin B12, thiamine  - TSH normal at 3.194  - Avoid sedating medications  - Monitor electrolytes, BGL  - Ucx, Bcx       Hypothermia  Pt has had a few episodes of hypothermia with temp of 95 degrees Farenheit.    - Pako lewis       Severe sepsis  This patient does have evidence of infective focus  My overall impression is sepsis.  Source:  Urinary Tract  Antibiotics given-   Antibiotics (72h ago, onward)      Start     Stop Route Frequency Ordered    08/18/24 1145  piperacillin-tazobactam (ZOSYN) 4.5 g in D5W 100 mL IVPB (MB+)         -- IV Every 8 hours (non-standard times) 08/18/24 1031          Latest lactate reviewed-  Recent Labs   Lab 08/16/24  1827 08/17/24  1653 08/18/24  0429   LACTATE  --    < > 1.5   POCLAC 1.37  --   --     < > = values in this interval not displayed.       Organ dysfunction indicated by Acute kidney injury, Acute heart failure, and Encephalopathy    Fluid challenge Received >1.5L in ED    Suspected UTI w retention, unclear reports of dysuria. Initially started rocephin  - 2/4 SIRS criteria - with lactic acid 2.6 now resolved  - Source likely urine  - Urine cx growing gram negative rods, identification and susceptibility pending; blood cx pending  - CXR with no opacity, vascular congestion  - Discontinuing vancomycin, continuing zosyn. Avoiding cefepime due to neurotoxicity.    - procal pending  - holding antihypertensives    Chronic diastolic heart failure  HFpEF  - Most recent TTE on 8/17/24 demonstrates: Lvef 55%, unable to measure DD d/t afib  - typically with normal diastolic function.    - EKG : alternating afib, 1st degree AV block, LBBB. stable  - Troponin peaked at 0.6, downtrending. No chest pain  - BNP pending, CXR w vascular congestion  - Fluid restriction at 1.5L cc with strict I/Os and daily weights   - unclear dry weight  - Maintain on telemetry and daily EKGs   - Up to date risk stratification : TSH, Lipids, HbA1c with optimization of risk factors is necessary  - Check Electrolytes, keep Mag >2 & K+ >4  - SCDs, TEDs, Nursing communication to elevated LE   - Ambulate as tolerated    Current home regimen includes coreg, amlodipine, and hydralazine  Previously on lasix 40mg PO qd, discontinued but has required equivalent isolated lasix IV doses during previous hospitalizations   - hypoxic after SPARKLE IVF  resuscitation, on 2L NC   - giving 20mg IV lasix     History of CVA (cerebrovascular accident)  W/o residual deficits, continue ASA/statin  - on eliquis for afib      SPARKLE (acute kidney injury)  SPARKLE is likely due to pre-renal azotemia due to dehydration. Baseline creatinine is  1.0 . Most recent creatinine and eGFR are listed below.  Recent Labs     08/16/24  1840 08/17/24  0512 08/18/24  0429   CREATININE 1.4 1.1 1.2   EGFRNORACEVR 35.3* 47.1* 42.5*        Plan  - SPARKLE is stable  - Avoid nephrotoxins and renally dose meds for GFR listed above  - Monitor urine output, serial BMP, and adjust therapy as needed  - gentle IVF    Resolved, holding IVF due to c/f volume overload    Constipation  Mild rectal stool burden, last BM8/17  - on bowel regimen- senna, PEG      Paroxysmal A-fib  Hx of pAF along w/ 2nd degree AV block. Medtronic Micra AV PM placed in Oct 2022. Appears to have been last interrogated in Jan 2024 which showed normal function & paced rhythm. Note pt's PM is MRI compatible, but requires EP lab assistance (device has to be set to MRI mode & then restored to original settings following scan), which is only available M-F.   Monitor electrolytes, replete PRN for goal K > 4 & Mg > 2  HR 50-70s, will consider interrogation if mentation stops improving. '  Continuing eliquis, holding metoprolol in setting of sepsis      Hyperkalemia  Mild, to 5.3 on arrival, resolved          Wenckebach second degree AV block  S/p PM, paced rhythm on tele    Acquired hypothyroidism  Continue synthroid  TSH on admit 0.2, T4 wnl  - repeat pending    Diabetes mellitus type II, controlled  Patient's FSGs are controlled on current medication regimen.  Last A1c reviewed-   Lab Results   Component Value Date    HGBA1C 5.8 (H) 08/17/2024     Most recent fingerstick glucose reviewed-   Recent Labs   Lab 08/17/24  1612 08/17/24  2059 08/18/24  0813 08/18/24  1122   POCTGLUCOSE 146* 121* 113* 138*     Current correctional scale   Low  Maintain anti-hyperglycemic dose as follows-   Antihyperglycemics (From admission, onward)      Start     Stop Route Frequency Ordered    08/17/24 0009  insulin aspart U-100 pen 0-5 Units         -- SubQ Before meals & nightly PRN 08/16/24 2310          Hold Oral hypoglycemics while patient is in the hospital.    Hypertension  Chronic, controlled. Latest blood pressure and vitals reviewed-     Temp:  [95.1 °F (35.1 °C)-98 °F (36.7 °C)]   Pulse:  [52-79]   Resp:  [12-18]   BP: ()/(52-76)   SpO2:  [82 %-100 %] .   Home meds for hypertension were reviewed and noted below.   Hypertension Medications               amLODIPine (NORVASC) 10 MG tablet Take 1 tablet (10 mg total) by mouth once daily.    carvediloL (COREG) 3.125 MG tablet Take 1 tablet (3.125 mg total) by mouth 2 (two) times daily.    hydrALAZINE (APRESOLINE) 50 MG tablet Take 1 tablet (50 mg total) by mouth every 6 (six) hours.            While in the hospital, will manage blood pressure as follows; Adjust home antihypertensive regimen as follows- holding home antihypertensives in setting of sepsis    Will utilize p.r.n. blood pressure medication only if patient's blood pressure greater than 180/110 and she develops symptoms such as worsening chest pain or shortness of breath.      VTE Risk Mitigation (From admission, onward)           Ordered     apixaban tablet 2.5 mg  2 times daily         08/16/24 2247     IP VTE HIGH RISK PATIENT  Once         08/16/24 2249     Place sequential compression device  Until discontinued         08/16/24 2249                    Discharge Planning   LILI:      Code Status: DNR   Is the patient medically ready for discharge?:     Reason for patient still in hospital (select all that apply): Treatment                     Denita Mcgregor DO  Department of Hospital Medicine   [facility] - Neurosurgery ([facility])

## 2024-08-19 NOTE — PLAN OF CARE
Problem: Adult Inpatient Plan of Care  Goal: Plan of Care Review  Outcome: Progressing  Goal: Patient-Specific Goal (Individualized)  Outcome: Progressing  Goal: Absence of Hospital-Acquired Illness or Injury  Outcome: Progressing  Goal: Optimal Comfort and Wellbeing  Outcome: Progressing  Goal: Readiness for Transition of Care  Outcome: Progressing   POC and meds reviewed with patient.Patient is alert but still confused.Still NPO and antibiotics in progress.Will continue to monitor.

## 2024-08-19 NOTE — PROGRESS NOTES
Pharmacokinetic Assessment Follow Up: IV Vancomycin    Therapy with vancomycin complete and/or consult discontinued by provider. Pharmacy will sign off, please re-consult as needed.    Candy Travis, GrahamD

## 2024-08-19 NOTE — NURSING
Patient had urine out put unable to record measurement no Perwick in place. Order for Merida if patient unable to void, no merida put in place at this time. Patient has warming blanket in place to maintain body temperature. Perwick in place with urine output recorded at 200ml. Bladder scan post residual >101.

## 2024-08-19 NOTE — PT/OT/SLP PROGRESS
"Speech Language Pathology Treatment    Patient Name:  Quin Linn   MRN:  [id number]  Admitting Diagnosis: Metabolic encephalopathy    Recommendations:                 General Recommendations:   ongoing swallowing assessment/monitor diet tolerance  Diet recommendations:  Minced & Moist Diet - IDDSI Level 5, Liquid Diet Level: Mildly thick/Nectar thick liquids - IDDSI Level 2   Aspiration Precautions: 1 bite/sip at a time, Alternating bites/sips, Assistance with meals and Assistance with thickening liquids, Avoid talking while eating, Eliminate distractions, Feed only when awake/alert, Frequent oral care, HOB to 90 degrees, Meds crushed in puree, Monitor for s/s of aspiration, Remain upright 30 minutes post meal, Small bites/sips, and Strict aspiration precautions   General Precautions: Standard, aspiration, fall  Communication strategies:  go to room if call light pushed    Assessment:     Quin Linn is a 89 y.o. female with an SLP diagnosis of Dysphagia.  She also presents with slow and slurred speech and altered mental status.     Subjective     "I don't like it too much." Pt stated regarding applesauce.     Pain/Comfort:  Pain Rating 1: 0/10    Respiratory Status: Room air    Objective:     Has the patient been evaluated by SLP for swallowing?   Yes  Keep patient NPO? No   Current Respiratory Status:        Pt seen for ongoing swallowing assessment.  Pt noted to have continued NPO status.  Pt awake/alert, but noted to have some difficulty managing oral secretions upon entry. Oral suctioning provided, and secretion management was improved remainder of session. Pt pleasantly confused and inconsistently demonstrating comprehension of questions and commands.  Speech pattern noted to be slowed and slurred.  Pt accepted the following PO trials: thin water via 1/2 tsp x 1, full tsp x 1, cup sip x 4, straw sip x 4; nectar thick liquid via cup x 2 and straw x 3; puree 1/2 tsp x 1 and full tsp x 1; soft solid trials x " 2.  Pt exhibited throat clear x1 and delayed cough x 1 following cup sip of thin water, as well delayed cough following 2 out of 4 straw sips of thin water.  Education was provided to pt regarding role of SLP, purpose of swallowing assessment, concerns for aspiration, recommendations for minced and moist diet with nectar thick liquids at this time, aspiration precautions, and SLP treatment plan and POC.  Pt's full understanding likely limited due to AMS. Team and nurse notified of impression and recommendations via Secure Chat.     Goals:   Multidisciplinary Problems       SLP Goals          Problem: SLP    Goal Priority Disciplines Outcome   SLP Goal     SLP    Description: Goals expected to be met by 8/24  1. The patient will tolerate the least restrictive diet without overt s/sx airway threat.                        Plan:     Patient to be seen:  4 x/week   Plan of Care expires:  09/15/24  Plan of Care reviewed with:  patient   SLP Follow-Up:  Yes       Discharge recommendations:   (tbd)     Time Tracking:     SLP Treatment Date:   08/19/24  Speech Start Time:  1304  Speech Stop Time:  1328     Speech Total Time (min):  24 min    Billable Minutes: Treatment Swallowing Dysfunction 14 and Self Care/Home Management Training 10    08/19/2024

## 2024-08-19 NOTE — ASSESSMENT & PLAN NOTE
This patient does have evidence of infective focus  My overall impression is sepsis.  Source: Urinary Tract  Antibiotics given-   Antibiotics (72h ago, onward)      Start     Stop Route Frequency Ordered    08/18/24 1145  piperacillin-tazobactam (ZOSYN) 4.5 g in D5W 100 mL IVPB (MB+)         -- IV Every 8 hours (non-standard times) 08/18/24 1031          Latest lactate reviewed-  Recent Labs   Lab 08/16/24  1827 08/17/24  1653 08/18/24  0429   LACTATE  --    < > 1.5   POCLAC 1.37  --   --     < > = values in this interval not displayed.       Organ dysfunction indicated by Acute kidney injury, Acute heart failure, and Encephalopathy    Fluid challenge Received >1.5L in ED    Suspected UTI w retention, unclear reports of dysuria. Initially started rocephin  - 2/4 SIRS criteria - with lactic acid 2.6 now resolved  - Source likely urine  - Urine cx growing gram negative rods, identification and susceptibility pending; blood cx pending  - CXR with no opacity, vascular congestion  - Discontinuing vancomycin, continuing zosyn. Avoiding cefepime due to neurotoxicity.    - procal pending  - holding antihypertensives

## 2024-08-19 NOTE — ASSESSMENT & PLAN NOTE
Admitted for 5d h/o of lethargy, decreased appetite, no longer interactive. Recently started keppra for tx of parkinsonian tremor per pt's daughter/caretaker. Has become progressively slowed since then. CTH negative. ABG reassuring. UA suggestive of UTI - species and sensitivities pending. Significant retention noted. EEG negative.      Labs:  Keppra level low - 1.4  CMP reviewed, wnl  UA c/f UTI   - developing urosepsis, abx broadened. Cultures positive for gram negative rods, identification and susceptibility pending.   Ammonia wnl  ABG respiratory alkolosis, no hyperventilation noted. No ASA use, will confirm with daughter  BCx pending  Low suspicion for substance use, utox deferred    Imaging:  CXR developing vasc congestion  CTH no acute abnormalities  EEG wnl    Plan:  - improving, holding keppra. Will not proceed with MRI at this time as V-EEG was negative.    - treating UTI, see sepsis   - monitoring for ongoing retention  - Vitamin B12, thiamine  - TSH normal at 3.194  - Avoid sedating medications  - Monitor electrolytes, BGL  - Ucx, Bcx

## 2024-08-19 NOTE — PLAN OF CARE
[facility] (Hospital)  Initial Discharge Assessment       Primary Care Provider: Mary Ellen Nesbitt MD    Admission Diagnosis: Altered mental status [R41.82]  Chest pain [R07.9]    Admission Date: 8/16/2024  Expected Discharge Date:     Transition of Care Barriers: (P) None    Payor: [facility] MCARE / Plan: [employer] MEDICARE ADVANTAGE / Product Type: Medicare Advantage /     Extended Emergency Contact Information  Primary Emergency Contact: Janae Linn  Address: [address] [place] [address] shannon           [place] [address] [facility]  Home Phone: [phone]  Mobile Phone: [phone]  Relation: Daughter  Preferred language: English  Secondary Emergency Contact: Evan VALDOVINOS   [place]  Mobile Phone: [phone]  Relation: Relative    Discharge Plan A: (P) Home with family, Home Health  Discharge Plan B: (P) Skilled Nursing Facility      [id number] [place] - [address] [facility] [address] [place]  [facility] [place] [address]  Phone: [phone] Fax: [phone]      Initial Assessment (most recent)       Adult Discharge Assessment - 08/19/24 1429          Discharge Assessment    Assessment Type Discharge Planning Assessment (P)      Confirmed/corrected address, phone number and insurance Yes (P)      Confirmed Demographics Correct on Facesheet (P)      Source of Information family (P)      When was your last doctors appointment? 06/24/24 (P)      Communicated LILI with patient/caregiver Date not available/Unable to determine (P)      Reason For Admission metabolic encephalopathy (P)      People in Home child(jose), adult (P)      Do you expect to return to your current living situation? Yes (P)      Do you have help at home or someone to help you manage your care at home? Yes (P)      Who are your caregiver(s) and their phone number(s)? ben Linn [phone] (P)      Prior to hospitilization  cognitive status: Unable to Assess (P)      Current cognitive status: Unable to Assess (P)      Walking or Climbing Stairs Difficulty yes (P)      Walking or Climbing Stairs ambulation difficulty, requires equipment (P)      Mobility Management WC, has RW but does not use (P)      Dressing/Bathing Difficulty yes (P)      Dressing/Bathing bathing difficulty, requires equipment (P)      Dressing/Bathing Management bath bench (P)      Home Accessibility wheelchair accessible (P)      Home Layout Able to live on 1st floor (P)      Equipment Currently Used at Home wheelchair;bath bench (P)      Readmission within 30 days? No (P)      Patient currently being followed by outpatient case management? No (P)      Do you currently have service(s) that help you manage your care at home? Yes (P)      How Many hours does patient receive services 6 (P)      Name and Contact number of agency [facility]  (P)      Is the pt/caregiver preference to resume services with current agency Yes (P)      Do you take prescription medications? Yes (P)      Do you have prescription coverage? Yes (P)      Coverage [facility] (P)      Do you have any problems affording any of your prescribed medications? No (P)      Is the patient taking medications as prescribed? yes (P)      Who is going to help you get home at discharge? jayy Cardoso (P)      How do you get to doctors appointments? family or friend will provide (P)      Are you on dialysis? No (P)      Do you take coumadin? No (P)      Discharge Plan A Home with family;Home Health (P)      Discharge Plan B Skilled Nursing Facility (P)      DME Needed Upon Discharge  none (P)      Discharge Plan discussed with: Adult children (P)      Transition of Care Barriers None (P)         Physical Activity    On average, how many days per week do you engage in moderate to strenuous exercise (like a brisk walk)? Patient unable to answer (P)      On average, how many minutes do you engage in exercise at  this level? Patient unable to answer (P)         Financial Resource Strain    How hard is it for you to pay for the very basics like food, housing, medical care, and heating? Patient unable to answer (P)         Housing Stability    In the last 12 months, was there a time when you were not able to pay the mortgage or rent on time? Patient unable to answer (P)      At any time in the past 12 months, were you homeless or living in a shelter (including now)? Patient unable to answer (P)         Transportation Needs    Has the lack of transportation kept you from medical appointments, meetings, work or from getting things needed for daily living? Patient unable to answer (P)         Food Insecurity    Within the past 12 months, you worried that your food would run out before you got the money to buy more. Patient unable to answer (P)      Within the past 12 months, the food you bought just didn't last and you didn't have money to get more. Patient unable to answer (P)         Stress    Do you feel stress - tense, restless, nervous, or anxious, or unable to sleep at night because your mind is troubled all the time - these days? Patient unable to answer (P)         Social Isolation    How often do you feel lonely or isolated from those around you?  Patient unable to answer (P)         Alcohol Use    Q1: How often do you have a drink containing alcohol? Patient unable to answer (P)      Q2: How many drinks containing alcohol do you have on a typical day when you are drinking? Patient unable to answer (P)      Q3: How often do you have six or more drinks on one occasion? Patient unable to answer (P)         Utilities    In the past 12 months has the electric, gas, oil, or water company threatened to shut off services in your home? Patient unable to answer (P)         Health Literacy    How often do you need to have someone help you when you read instructions, pamphlets, or other written material from your doctor or  pharmacy? Patient unable to respond (P)         OTHER    Name(s) of People in Home daughter Janae Linn (P)                    Patient resides in a [facility] with 1 step.  She lives with her daughter Janae.  Patient has HH with [facility] and also has private caregivers and assistance from dtr.  Patient uses a WC and bath bench at home and also has a RW, but does not use it.  Patient is not on HD or Coumadin.  Patient will return home once medically cleared.  She can resume services with [facility].  Daughter will provide support and transportation.      Discharge Plan A and Plan B have been determined by review of patient's clinical status, future medical and therapeutic needs, and coverage/benefits for post-acute care in coordination with multidisciplinary team members.    Ameena Villagran, LIZETH  [facility]  [phone]

## 2024-08-19 NOTE — CARE UPDATE
I have reviewed the chart of Quin Linn who is hospitalized for the following:    Active Hospital Problems    Diagnosis    *Metabolic encephalopathy     Cultures pending  Sparkle on admission with hyperkalemia      Metabolic alkalosis    Severe sepsis    Aphasia    Chronic anticoagulation     eliquis      Pleural effusion     Small left pleural effusion.   seen on imaging  History of HF      Moderate malnutrition    Chronic diastolic heart failure    UTI (urinary tract infection)     On zosyn       History of CVA (cerebrovascular accident)    SPARKLE (acute kidney injury)    DNR (do not resuscitate)    Acute hypoxemic respiratory failure    Constipation     Noted on imaging   Stool softeners scheduled      Paroxysmal A-fib    Hyperkalemia     Monitor with daily cmp      Acquired hypothyroidism    Wenckebach second degree AV block    Diabetes mellitus type II, controlled    Hypertension    Personal history of colon cancer, stage I        Ila Rey NP  Unit Based [facility]

## 2024-08-20 PROBLEM — R33.8 ACUTE URINARY RETENTION: Status: ACTIVE | Noted: 2024-08-20

## 2024-08-20 LAB
ALBUMIN SERPL BCP-MCNC: 2.6 G/DL (ref 3.5–5.2)
ALP SERPL-CCNC: 69 U/L (ref 55–135)
ALT SERPL W/O P-5'-P-CCNC: 53 U/L (ref 10–44)
ANION GAP SERPL CALC-SCNC: 10 MMOL/L (ref 8–16)
AST SERPL-CCNC: 46 U/L (ref 10–40)
BASOPHILS # BLD AUTO: 0.03 K/UL (ref 0–0.2)
BASOPHILS NFR BLD: 0.4 % (ref 0–1.9)
BILIRUB SERPL-MCNC: 0.9 MG/DL (ref 0.1–1)
BUN SERPL-MCNC: 28 MG/DL (ref 10–30)
CALCIUM SERPL-MCNC: 9.1 MG/DL (ref 8.7–10.5)
CHLORIDE SERPL-SCNC: 101 MMOL/L (ref 95–110)
CO2 SERPL-SCNC: 27 MMOL/L (ref 23–29)
CREAT SERPL-MCNC: 1.3 MG/DL (ref 0.5–1.4)
DIFFERENTIAL METHOD BLD: ABNORMAL
EOSINOPHIL # BLD AUTO: 0.3 K/UL (ref 0–0.5)
EOSINOPHIL NFR BLD: 3.5 % (ref 0–8)
ERYTHROCYTE [DISTWIDTH] IN BLOOD BY AUTOMATED COUNT: 12.2 % (ref 11.5–14.5)
EST. GFR  (NO RACE VARIABLE): 38.6 ML/MIN/1.73 M^2
GLUCOSE SERPL-MCNC: 98 MG/DL (ref 70–110)
HCT VFR BLD AUTO: 36.9 % (ref 37–48.5)
HGB BLD-MCNC: 12.6 G/DL (ref 12–16)
IMM GRANULOCYTES # BLD AUTO: 0.03 K/UL (ref 0–0.04)
IMM GRANULOCYTES NFR BLD AUTO: 0.4 % (ref 0–0.5)
LYMPHOCYTES # BLD AUTO: 1.7 K/UL (ref 1–4.8)
LYMPHOCYTES NFR BLD: 21 % (ref 18–48)
MAGNESIUM SERPL-MCNC: 1.9 MG/DL (ref 1.6–2.6)
MCH RBC QN AUTO: 31.2 PG (ref 27–31)
MCHC RBC AUTO-ENTMCNC: 34.1 G/DL (ref 32–36)
MCV RBC AUTO: 91 FL (ref 82–98)
MONOCYTES # BLD AUTO: 0.7 K/UL (ref 0.3–1)
MONOCYTES NFR BLD: 7.8 % (ref 4–15)
NEUTROPHILS # BLD AUTO: 5.6 K/UL (ref 1.8–7.7)
NEUTROPHILS NFR BLD: 66.9 % (ref 38–73)
NRBC BLD-RTO: 0 /100 WBC
PHOSPHATE SERPL-MCNC: 3.1 MG/DL (ref 2.7–4.5)
PLATELET # BLD AUTO: 179 K/UL (ref 150–450)
PMV BLD AUTO: 11 FL (ref 9.2–12.9)
POCT GLUCOSE: 111 MG/DL (ref 70–110)
POCT GLUCOSE: 159 MG/DL (ref 70–110)
POTASSIUM SERPL-SCNC: 3.5 MMOL/L (ref 3.5–5.1)
PROT SERPL-MCNC: 5.8 G/DL (ref 6–8.4)
RBC # BLD AUTO: 4.04 M/UL (ref 4–5.4)
SODIUM SERPL-SCNC: 138 MMOL/L (ref 136–145)
WBC # BLD AUTO: 8.29 K/UL (ref 3.9–12.7)

## 2024-08-20 PROCEDURE — 92526 ORAL FUNCTION THERAPY: CPT

## 2024-08-20 PROCEDURE — 97535 SELF CARE MNGMENT TRAINING: CPT

## 2024-08-20 PROCEDURE — 36415 COLL VENOUS BLD VENIPUNCTURE: CPT

## 2024-08-20 PROCEDURE — 25000003 PHARM REV CODE 250

## 2024-08-20 PROCEDURE — 25000003 PHARM REV CODE 250: Performed by: STUDENT IN AN ORGANIZED HEALTH CARE EDUCATION/TRAINING PROGRAM

## 2024-08-20 PROCEDURE — 85025 COMPLETE CBC W/AUTO DIFF WBC: CPT

## 2024-08-20 PROCEDURE — 84100 ASSAY OF PHOSPHORUS: CPT

## 2024-08-20 PROCEDURE — 63600175 PHARM REV CODE 636 W HCPCS: Performed by: STUDENT IN AN ORGANIZED HEALTH CARE EDUCATION/TRAINING PROGRAM

## 2024-08-20 PROCEDURE — 83735 ASSAY OF MAGNESIUM: CPT

## 2024-08-20 PROCEDURE — 94761 N-INVAS EAR/PLS OXIMETRY MLT: CPT

## 2024-08-20 PROCEDURE — 11000001 HC ACUTE MED/SURG PRIVATE ROOM

## 2024-08-20 PROCEDURE — 63600175 PHARM REV CODE 636 W HCPCS

## 2024-08-20 PROCEDURE — 80053 COMPREHEN METABOLIC PANEL: CPT

## 2024-08-20 RX ORDER — POTASSIUM CHLORIDE 20 MEQ/1
40 TABLET, EXTENDED RELEASE ORAL ONCE
Status: COMPLETED | OUTPATIENT
Start: 2024-08-20 | End: 2024-08-20

## 2024-08-20 RX ORDER — CARVEDILOL 3.12 MG/1
3.12 TABLET ORAL 2 TIMES DAILY
Status: DISCONTINUED | OUTPATIENT
Start: 2024-08-20 | End: 2024-08-23 | Stop reason: HOSPADM

## 2024-08-20 RX ORDER — CARVEDILOL 3.12 MG/1
3.12 TABLET ORAL 2 TIMES DAILY
Qty: 180 TABLET | Refills: 3 | Status: SHIPPED | OUTPATIENT
Start: 2024-08-20 | End: 2025-08-20

## 2024-08-20 RX ADMIN — POTASSIUM CHLORIDE 40 MEQ: 1500 TABLET, EXTENDED RELEASE ORAL at 08:08

## 2024-08-20 RX ADMIN — CYANOCOBALAMIN TAB 250 MCG 250 MCG: 250 TAB at 08:08

## 2024-08-20 RX ADMIN — ATORVASTATIN CALCIUM 80 MG: 40 TABLET, FILM COATED ORAL at 08:08

## 2024-08-20 RX ADMIN — CEFTRIAXONE SODIUM 1 G: 1 INJECTION, POWDER, FOR SOLUTION INTRAMUSCULAR; INTRAVENOUS at 01:08

## 2024-08-20 RX ADMIN — Medication 1 TABLET: at 05:08

## 2024-08-20 RX ADMIN — APIXABAN 2.5 MG: 2.5 TABLET, FILM COATED ORAL at 09:08

## 2024-08-20 RX ADMIN — CARVEDILOL 3.12 MG: 3.12 TABLET, FILM COATED ORAL at 09:08

## 2024-08-20 RX ADMIN — APIXABAN 2.5 MG: 2.5 TABLET, FILM COATED ORAL at 08:08

## 2024-08-20 RX ADMIN — GABAPENTIN 300 MG: 300 CAPSULE ORAL at 08:08

## 2024-08-20 RX ADMIN — Medication 1 TABLET: at 08:08

## 2024-08-20 RX ADMIN — GABAPENTIN 300 MG: 300 CAPSULE ORAL at 09:08

## 2024-08-20 RX ADMIN — PIPERACILLIN SODIUM AND TAZOBACTAM SODIUM 4.5 G: 4; .5 INJECTION, POWDER, FOR SOLUTION INTRAVENOUS at 06:08

## 2024-08-20 RX ADMIN — CARVEDILOL 3.12 MG: 3.12 TABLET, FILM COATED ORAL at 01:08

## 2024-08-20 RX ADMIN — FOLIC ACID 1 MG: 1 TABLET ORAL at 08:08

## 2024-08-20 RX ADMIN — LEVOTHYROXINE SODIUM 75 MCG: 75 TABLET ORAL at 06:08

## 2024-08-20 RX ADMIN — ACETAMINOPHEN 650 MG: 325 TABLET ORAL at 10:08

## 2024-08-20 NOTE — ASSESSMENT & PLAN NOTE
Pt was found to be retaining urine on bladder scans and has had to have straight catheter placed multiple times, merida catheter placed on 8/20.    - Per urology's recommendations, patient can be discharged with merida catheter in place and should follow up with outpatient urology LESLIE for voiding trial and removal of catheter.

## 2024-08-20 NOTE — PLAN OF CARE
HERMAN received phone call from Wilmer at [facility].  He stated that they have been following this patient for several weeks and that the family has decided to go home with hospice when patient is cleared.  They will provide all needed equipment.  Patient lives with her daughter Janae and HERMAN will confirm with her the hospice choice.  Patient still NPO and is being evaluated by ST. Ameena Villagran LMSW  [facility]  [phone]

## 2024-08-20 NOTE — ASSESSMENT & PLAN NOTE
This patient does have evidence of infective focus  My overall impression is sepsis.  Source: Urinary Tract  Antibiotics given-   Antibiotics (72h ago, onward)      Start     Stop Route Frequency Ordered    08/20/24 1400  cefTRIAXone (Rocephin) 1 g in D5W 100 mL IVPB (MB+)         -- IV Every 24 hours (non-standard times) 08/20/24 1044          Latest lactate reviewed-  Recent Labs   Lab 08/18/24  0429   LACTATE 1.5       Organ dysfunction indicated by Acute kidney injury, Acute heart failure, and Encephalopathy    Fluid challenge Received >1.5L in ED    Suspected UTI w retention, unclear reports of dysuria. Initially started rocephin  - 2/4 SIRS criteria - with lactic acid 2.6 now resolved  - Source likely urine  - Urine cx growing gram negative rods, identification and susceptibility pending; blood cx pending  - CXR with no opacity, vascular congestion  - Discontinuing vancomycin, continuing zosyn. Avoiding cefepime due to neurotoxicity.   - Continuing zosyn today and will switch to Keflex for dc tomorrow.

## 2024-08-20 NOTE — PT/OT/SLP PROGRESS
"Speech Language Pathology Treatment    Patient Name:  Quin Linn   MRN:  [id number]  Admitting Diagnosis: Metabolic encephalopathy    Recommendations:                 General Recommendations:   ongoing swallowing assessment/monitoring diet tolerance  Diet recommendations:  Minced & Moist Diet - IDDSI Level 5, Liquid Diet Level: Mildly thick/Nectar thick liquids - IDDSI Level 2   Aspiration Precautions: 1 bite/sip at a time, Alternating bites/sips, Assistance with meals and Assistance with thickening liquids, Eliminate distractions, Feed only when awake/alert, Frequent oral care, HOB to 90 degrees, Meds crushed in puree, Monitor for s/s of aspiration, and Small bites/sips   General Precautions: Standard, aspiration, fall  Communication strategies:  go to room if call light pushed    Assessment:     Quin Linn is a 89 y.o. female with an SLP diagnosis of Dysphagia.  She also presents with altered mental status.     Subjective     "They think I have cancer." Pt displaying confusion and stated this randomly.  Pt also exhibiting some hallucinations t/o session.     Pain/Comfort:  Pain Rating 1: 0/10    Respiratory Status: Room air    Objective:     Has the patient been evaluated by SLP for swallowing?   Yes  Keep patient NPO? No   Current Respiratory Status:        Pt seen for follow up for ongoing swallowing assessment. Pt awake/alert and able to participate in PO trials, but did display confusion and some periods of hallucinations (I.e., reaching up at ceiling, turning to speak to image of person on computer screen).  Pt positioned upright and accepted the following PO trials: thin water via tsp x 2, cup sip x 2, straw sips x 5; nectar thick liquids via straw x 5; puree 1/2 tsp x 1 and full tsp x 6; 1/5 cracker x 2.  Pt exhibited delayed cough after final straw sip of thin water and one spontaneous dry cough.  Pt tolerated nectar thick liquids and other trials.  SLP continues to recommend a minced and moist diet " with nectar thick liquids.  Assistance with feeding and meds crushed in puree needed.  Education provided to pt regarding ongoing swallowing assessment, diet recommendations, aspiration precautions, and SLP treatment plan and POC.  Pt's full understanding likely decreased 2/2 AMS.     Goals:   Multidisciplinary Problems       SLP Goals          Problem: SLP    Goal Priority Disciplines Outcome   SLP Goal     SLP    Description: Goals expected to be met by 8/24  1. The patient will tolerate the least restrictive diet without overt s/sx airway threat.                        Plan:     Patient to be seen:  4 x/week   Plan of Care expires:  09/15/24  Plan of Care reviewed with:  patient   SLP Follow-Up:  Yes       Discharge recommendations:   (tbd)     Time Tracking:     SLP Treatment Date:   08/20/24  Speech Start Time:  0936  Speech Stop Time:  0952     Speech Total Time (min):  16 min    Billable Minutes: Treatment Swallowing Dysfunction 8 and Self Care/Home Management Training 8    08/20/2024

## 2024-08-20 NOTE — ASSESSMENT & PLAN NOTE
Chronic, controlled. Latest blood pressure and vitals reviewed-     Temp:  [97.1 °F (36.2 °C)-98.2 °F (36.8 °C)]   Pulse:  [59-91]   Resp:  [16-18]   BP: (129-169)/(61-89)   SpO2:  [94 %-97 %] .   Home meds for hypertension were reviewed and noted below.   Hypertension Medications               amLODIPine (NORVASC) 10 MG tablet Take 1 tablet (10 mg total) by mouth once daily.    carvediloL (COREG) 3.125 MG tablet Take 1 tablet (3.125 mg total) by mouth 2 (two) times daily.    hydrALAZINE (APRESOLINE) 50 MG tablet Take 1 tablet (50 mg total) by mouth every 6 (six) hours.            While in the hospital, will manage blood pressure as follows; Adjust home antihypertensive regimen as follows- holding home antihypertensives in setting of sepsis    Will utilize p.r.n. blood pressure medication only if patient's blood pressure greater than 180/110 and she develops symptoms such as worsening chest pain or shortness of breath.    - Pt hypertensive today, restarted home coreg

## 2024-08-20 NOTE — SUBJECTIVE & OBJECTIVE
Interval History: Pt mentation improving today, answers questions with more words. Denies any pain.     Review of Systems   Constitutional: Negative.    HENT: Negative.     Eyes: Negative.    Respiratory: Negative.     Cardiovascular: Negative.    Gastrointestinal: Negative.    Endocrine: Negative.    Genitourinary: Negative.    Musculoskeletal: Negative.    Skin: Negative.    Allergic/Immunologic: Negative.    Neurological: Negative.    Hematological: Negative.    Psychiatric/Behavioral: Negative.       Objective:     Vital Signs (Most Recent):  Temp: 97.6 °F (36.4 °C) (08/20/24 0820)  Pulse: 71 (08/20/24 0820)  Resp: 18 (08/20/24 0820)  BP: 129/64 (08/20/24 0820)  SpO2: 97 % (08/20/24 0820) Vital Signs (24h Range):  Temp:  [97.5 °F (36.4 °C)-98.2 °F (36.8 °C)] 97.6 °F (36.4 °C)  Pulse:  [56-91] 71  Resp:  [16-18] 18  SpO2:  [94 %-97 %] 97 %  BP: (129-169)/(61-89) 129/64     Weight: 59 kg (130 lb)  Body mass index is 21.63 kg/m².    Intake/Output Summary (Last 24 hours) at 8/20/2024 0855  Last data filed at 8/20/2024 0629  Gross per 24 hour   Intake --   Output 800 ml   Net -800 ml         Physical Exam  Constitutional:       Appearance: She is ill-appearing.   HENT:      Mouth/Throat:      Mouth: Mucous membranes are dry.   Eyes:      Extraocular Movements: Extraocular movements intact.   Cardiovascular:      Comments: Low volume heart sounds  Pulmonary:      Effort: Pulmonary effort is normal.      Breath sounds: Normal breath sounds. No wheezing or rhonchi.   Abdominal:      Palpations: Abdomen is soft.      Comments: Decreased bowel sounds   Musculoskeletal:      Comments: Ecchymosis 2inch diameter, right forearm  Varying purpura left arm   Skin:     General: Skin is dry.      Comments: Flaky skin on arms   Neurological:      Mental Status: She is disoriented.      Comments: Mentation improving -answers questions with more words, somewhat appropriately. Oriented to person, month.              Significant Labs:  All pertinent labs within the past 24 hours have been reviewed.    Significant Imaging: I have reviewed all pertinent imaging results/findings within the past 24 hours.

## 2024-08-20 NOTE — CARE UPDATE
I have reviewed the chart of Quin Linn who is hospitalized for the following:    Active Hospital Problems    Diagnosis    *Metabolic encephalopathy     Cultures pending  Sparkle on admission with hyperkalemia      Acute urinary retention    Metabolic alkalosis    Hypothermia    Severe sepsis    Aphasia    Chronic anticoagulation     eliquis      Pleural effusion     Small left pleural effusion.   seen on imaging  History of HF      Moderate malnutrition    Chronic diastolic heart failure    UTI (urinary tract infection)     On zosyn       History of CVA (cerebrovascular accident)    SPARKLE (acute kidney injury)    DNR (do not resuscitate)    Acute hypoxemic respiratory failure    Constipation     Noted on imaging   Stool softeners scheduled      Paroxysmal A-fib    Hyperkalemia     Monitor with daily cmp      Acquired hypothyroidism    Wenckebach second degree AV block    Diabetes mellitus type II, controlled    Hypertension    Personal history of colon cancer, stage I        Ila Rey NP  Unit Based [facility]

## 2024-08-20 NOTE — PROGRESS NOTES
[facility] Puckett - Neurosurgery ([facility]  [facility] Medicine  Progress Note    Patient Name: Quin Linn  MRN: [id number]  Patient Class: IP- Inpatient   Admission Date: 2024  Length of Stay: 3 days  Attending Physician: Alexandria Pearson MD  Primary Care Provider: Mary Ellen Nesbitt MD        Subjective:     Principal Problem:Metabolic encephalopathy        HPI:  Ms. Linn is a 89 y.o. with a PMH of T2DM, HTN, HLD,  Alzheimer's, CKD 4, anemia, decompensated chronic heart failure, paroxysmal Afib on apixaban who presented to the ED via EMS for altered mental status. Per EMS, patient's family was concerned because she had not been talking or eating much the past 3 days and appeared more lethargic than usual. Per EMS, family states she has these periods where she appears altered for up to 2 days, likely 2/2 to her Alzheimer's, but this episode has lasted longer so they decided to call EMS. ED spoke with the patient's daughter who states her baseline is usually very talkative and interactive, knows who her family is. Denies any recent falls or syncopal episodes. She was started on keppra 2 months ago, but denies any other recent medication changes. She had a closed nondisplaced fracture of fourth cervical vertebra with routine healing and follow up after she fell out of a recliner in May 2024. She was seen by neurology 2024 for workup of syncope vs seizure. At that time workup negative but there was high suspicion for ictal periods so patient was started on Keppra 250 BID.  She is being admitted to inpatient for altered mental status work up and and an SPARKLE.    ED Course:  LR bolus  Trop .06  Urinalysis - awaiting results  Blood cultures - awaiting results    CBC: unremarkable  Chemistry: K slightly elevated 5.3, Anion Gap 16  BUN 30, Cr 1.4 (baseline?)  Glucose 140    AB.5, PCO2 28.6, PO2 73 (metabolic alkalosis)    Imaging: chest xray: small pleural effusion, central hilar finding may be early edema, developing  lower lung zone consolidation    CT head: No acute hemorrhage or CT evidence of major vascular distribution infarct.      Overview/Hospital Course:  Admitted for workup of AMS. EEG negative. C/f UTI, urine studies pending. Hypothermic and hypotensive, abx broadened. Regarding keppra, patient recently prescribed for treatment of parkinsonian tremor, no improvement but progressive sedation since starting. Keppra level was low, seroquel and keppra held. UA showing 28 WBCs and many bacteria and urine culture showing E coli, susceptible to ceftriaxone and ciprofloxacin among other options. Pt was initially started on vancomycin and zosyn, vancomycin and zosyn continued in light of urine culture results. Pt also had urinary retention throughout her hospital stay, with bladder scans in showing up to 400cc's of urine retained in the bladder. She required multiple straight catheterizations and a merida catheter was placed on 8/20. Urology recommended that patient be discharged with merida catheter in place and should follow up outpatient for voiding trial. Pt will discharge tomorrow.     Interval History: Pt mentation improving today, answers questions with more words. Denies any pain.     Review of Systems   Constitutional: Negative.    HENT: Negative.     Eyes: Negative.    Respiratory: Negative.     Cardiovascular: Negative.    Gastrointestinal: Negative.    Endocrine: Negative.    Genitourinary: Negative.    Musculoskeletal: Negative.    Skin: Negative.    Allergic/Immunologic: Negative.    Neurological: Negative.    Hematological: Negative.    Psychiatric/Behavioral: Negative.       Objective:     Vital Signs (Most Recent):  Temp: 97.6 °F (36.4 °C) (08/20/24 0820)  Pulse: 71 (08/20/24 0820)  Resp: 18 (08/20/24 0820)  BP: 129/64 (08/20/24 0820)  SpO2: 97 % (08/20/24 0820) Vital Signs (24h Range):  Temp:  [97.5 °F (36.4 °C)-98.2 °F (36.8 °C)] 97.6 °F (36.4 °C)  Pulse:  [56-91] 71  Resp:  [16-18] 18  SpO2:  [94 %-97 %] 97  %  BP: (129-169)/(61-89) 129/64     Weight: 59 kg (130 lb)  Body mass index is 21.63 kg/m².    Intake/Output Summary (Last 24 hours) at 8/20/2024 0855  Last data filed at 8/20/2024 0629  Gross per 24 hour   Intake --   Output 800 ml   Net -800 ml         Physical Exam  Constitutional:       Appearance: She is ill-appearing.   HENT:      Mouth/Throat:      Mouth: Mucous membranes are dry.   Eyes:      Extraocular Movements: Extraocular movements intact.   Cardiovascular:      Comments: Low volume heart sounds  Pulmonary:      Effort: Pulmonary effort is normal.      Breath sounds: Normal breath sounds. No wheezing or rhonchi.   Abdominal:      Palpations: Abdomen is soft.      Comments: Decreased bowel sounds   Musculoskeletal:      Comments: Ecchymosis 2inch diameter, right forearm  Varying purpura left arm   Skin:     General: Skin is dry.      Comments: Flaky skin on arms   Neurological:      Mental Status: She is disoriented.      Comments: Mentation improving -answers questions with more words, somewhat appropriately. Oriented to person, month.              Significant Labs: All pertinent labs within the past 24 hours have been reviewed.    Significant Imaging: I have reviewed all pertinent imaging results/findings within the past 24 hours.    Assessment/Plan:      * Metabolic encephalopathy  Admitted for 5d h/o of lethargy, decreased appetite, no longer interactive. Recently started keppra for tx of parkinsonian tremor per pt's daughter/caretaker. Has become progressively slowed since then. CTH negative. ABG reassuring. UA suggestive of UTI - species and sensitivities pending. Significant retention noted. EEG negative.      Labs:  Keppra level low - 1.4  CMP reviewed, wnl  UA c/f UTI   - developing urosepsis, abx broadened. Cultures positive for gram negative rods, identification and susceptibility pending.   Ammonia wnl  ABG respiratory alkolosis, no hyperventilation noted. No ASA use, will confirm with  daughter  BCx pending  Low suspicion for substance use, utox deferred    Imaging:  CXR developing vasc congestion  CTH no acute abnormalities  EEG wnl    Plan:  - improving, holding keppra. Will not proceed with MRI at this time as V-EEG was negative.    - treating UTI, see sepsis   - monitoring for ongoing retention  - Vitamin B12, thiamine  - TSH normal at 3.194  - Avoid sedating medications  - Monitor electrolytes, BGL  - Ucx, Bcx       Acute urinary retention  Pt was found to be retaining urine on bladder scans and has had to have straight catheter placed multiple times, merida catheter placed on 8/20.    - Per urology's recommendations, patient can be discharged with merida catheter in place and should follow up with outpatient urology LESLIE for voiding trial and removal of catheter.       Hypothermia  Pt has had a few episodes of hypothermia with temp of 95 degrees Farenheit.    - Pako lewis       Severe sepsis  This patient does have evidence of infective focus  My overall impression is sepsis.  Source: Urinary Tract  Antibiotics given-   Antibiotics (72h ago, onward)      Start     Stop Route Frequency Ordered    08/20/24 1400  cefTRIAXone (Rocephin) 1 g in D5W 100 mL IVPB (MB+)         -- IV Every 24 hours (non-standard times) 08/20/24 1044          Latest lactate reviewed-  Recent Labs   Lab 08/18/24  0429   LACTATE 1.5       Organ dysfunction indicated by Acute kidney injury, Acute heart failure, and Encephalopathy    Fluid challenge Received >1.5L in ED    Suspected UTI w retention, unclear reports of dysuria. Initially started rocephin  - 2/4 SIRS criteria - with lactic acid 2.6 now resolved  - Source likely urine  - Urine cx growing gram negative rods, identification and susceptibility pending; blood cx pending  - CXR with no opacity, vascular congestion  - Discontinuing vancomycin, continuing zosyn. Avoiding cefepime due to neurotoxicity.   - Continuing zosyn today and will switch to Keflex for dc  tomorrow.     Chronic diastolic heart failure  HFpEF  - Most recent TTE on 8/17/24 demonstrates: Lvef 55%, unable to measure DD d/t afib  - typically with normal diastolic function.    - EKG : alternating afib, 1st degree AV block, LBBB. stable  - Troponin peaked at 0.6, downtrending. No chest pain  - BNP pending, CXR w vascular congestion  - Fluid restriction at 1.5L cc with strict I/Os and daily weights   - unclear dry weight  - Maintain on telemetry and daily EKGs   - Up to date risk stratification : TSH, Lipids, HbA1c with optimization of risk factors is necessary  - Check Electrolytes, keep Mag >2 & K+ >4  - SCDs, TEDs, Nursing communication to elevated LE   - Ambulate as tolerated    Current home regimen includes coreg, amlodipine, and hydralazine  Previously on lasix 40mg PO qd, discontinued but has required equivalent isolated lasix IV doses during previous hospitalizations   - hypoxic after SPARKLE IVF resuscitation, on 2L NC   - giving 20mg IV lasix     History of CVA (cerebrovascular accident)  W/o residual deficits, continue ASA/statin  - on eliquis for afib      SPARKLE (acute kidney injury)  SPARKLE is likely due to pre-renal azotemia due to dehydration. Baseline creatinine is  1.0 . Most recent creatinine and eGFR are listed below.  Recent Labs     08/16/24  1840 08/17/24  0512 08/18/24  0429   CREATININE 1.4 1.1 1.2   EGFRNORACEVR 35.3* 47.1* 42.5*        Plan  - SPARKLE is stable  - Avoid nephrotoxins and renally dose meds for GFR listed above  - Monitor urine output, serial BMP, and adjust therapy as needed  - gentle IVF    Resolved, holding IVF due to c/f volume overload    Constipation  Mild rectal stool burden, last BM8/17  - on bowel regimen- senna, PEG      Paroxysmal A-fib  Hx of pAF along w/ 2nd degree AV block. Medtronic Micra AV PM placed in Oct 2022. Appears to have been last interrogated in Jan 2024 which showed normal function & paced rhythm. Note pt's PM is MRI compatible, but requires EP lab  assistance (device has to be set to MRI mode & then restored to original settings following scan), which is only available M-F.   Monitor electrolytes, replete PRN for goal K > 4 & Mg > 2  HR 50-70s, will consider interrogation if mentation stops improving. '  Continuing eliquis, holding metoprolol in setting of sepsis      Hyperkalemia  Mild, to 5.3 on arrival, resolved          Wenckebach second degree AV block  S/p PM, paced rhythm on tele    Acquired hypothyroidism  Continue synthroid  TSH on admit 0.2, T4 wnl  - repeat pending    Diabetes mellitus type II, controlled  Patient's FSGs are controlled on current medication regimen.  Last A1c reviewed-   Lab Results   Component Value Date    HGBA1C 5.8 (H) 08/17/2024     Most recent fingerstick glucose reviewed-   Recent Labs   Lab 08/17/24  1612 08/17/24  2059 08/18/24  0813 08/18/24  1122   POCTGLUCOSE 146* 121* 113* 138*     Current correctional scale  Low  Maintain anti-hyperglycemic dose as follows-   Antihyperglycemics (From admission, onward)      Start     Stop Route Frequency Ordered    08/17/24 0009  insulin aspart U-100 pen 0-5 Units         -- SubQ Before meals & nightly PRN 08/16/24 2310          Hold Oral hypoglycemics while patient is in the hospital.    Hypertension  Chronic, controlled. Latest blood pressure and vitals reviewed-     Temp:  [97.1 °F (36.2 °C)-98.2 °F (36.8 °C)]   Pulse:  [59-91]   Resp:  [16-18]   BP: (129-169)/(61-89)   SpO2:  [94 %-97 %] .   Home meds for hypertension were reviewed and noted below.   Hypertension Medications               amLODIPine (NORVASC) 10 MG tablet Take 1 tablet (10 mg total) by mouth once daily.    carvediloL (COREG) 3.125 MG tablet Take 1 tablet (3.125 mg total) by mouth 2 (two) times daily.    hydrALAZINE (APRESOLINE) 50 MG tablet Take 1 tablet (50 mg total) by mouth every 6 (six) hours.            While in the hospital, will manage blood pressure as follows; Adjust home antihypertensive regimen as  follows- holding home antihypertensives in setting of sepsis    Will utilize p.r.n. blood pressure medication only if patient's blood pressure greater than 180/110 and she develops symptoms such as worsening chest pain or shortness of breath.    - Pt hypertensive today, restarted home coreg      VTE Risk Mitigation (From admission, onward)           Ordered     apixaban tablet 2.5 mg  2 times daily         08/16/24 2247     IP VTE HIGH RISK PATIENT  Once         08/16/24 2249     Place sequential compression device  Until discontinued         08/16/24 2249                    Discharge Planning   LILI: 8/21/2024     Code Status: DNR   Is the patient medically ready for discharge?:     Reason for patient still in hospital (select all that apply): Pending disposition  Discharge Plan A: Home with family, Home Health          Denita Mcgregor DO  Department of Hospital Medicine   [facility] - Neurosurgery ([facility])

## 2024-08-21 LAB
ALBUMIN SERPL BCP-MCNC: 2.8 G/DL (ref 3.5–5.2)
ALP SERPL-CCNC: 78 U/L (ref 55–135)
ALT SERPL W/O P-5'-P-CCNC: 43 U/L (ref 10–44)
ANION GAP SERPL CALC-SCNC: 10 MMOL/L (ref 8–16)
AST SERPL-CCNC: 30 U/L (ref 10–40)
BACTERIA BLD CULT: NORMAL
BACTERIA BLD CULT: NORMAL
BASOPHILS # BLD AUTO: 0.03 K/UL (ref 0–0.2)
BASOPHILS NFR BLD: 0.3 % (ref 0–1.9)
BILIRUB SERPL-MCNC: 0.5 MG/DL (ref 0.1–1)
BUN SERPL-MCNC: 22 MG/DL (ref 10–30)
CALCIUM SERPL-MCNC: 9.6 MG/DL (ref 8.7–10.5)
CHLORIDE SERPL-SCNC: 105 MMOL/L (ref 95–110)
CO2 SERPL-SCNC: 26 MMOL/L (ref 23–29)
CREAT SERPL-MCNC: 1.2 MG/DL (ref 0.5–1.4)
DIFFERENTIAL METHOD BLD: NORMAL
EOSINOPHIL # BLD AUTO: 0.3 K/UL (ref 0–0.5)
EOSINOPHIL NFR BLD: 3.1 % (ref 0–8)
ERYTHROCYTE [DISTWIDTH] IN BLOOD BY AUTOMATED COUNT: 12.5 % (ref 11.5–14.5)
EST. GFR  (NO RACE VARIABLE): 42.5 ML/MIN/1.73 M^2
GLUCOSE SERPL-MCNC: 117 MG/DL (ref 70–110)
HCT VFR BLD AUTO: 42.3 % (ref 37–48.5)
HGB BLD-MCNC: 14.3 G/DL (ref 12–16)
IMM GRANULOCYTES # BLD AUTO: 0.03 K/UL (ref 0–0.04)
IMM GRANULOCYTES NFR BLD AUTO: 0.3 % (ref 0–0.5)
LYMPHOCYTES # BLD AUTO: 2.1 K/UL (ref 1–4.8)
LYMPHOCYTES NFR BLD: 22.3 % (ref 18–48)
MAGNESIUM SERPL-MCNC: 1.9 MG/DL (ref 1.6–2.6)
MCH RBC QN AUTO: 30.8 PG (ref 27–31)
MCHC RBC AUTO-ENTMCNC: 33.8 G/DL (ref 32–36)
MCV RBC AUTO: 91 FL (ref 82–98)
MONOCYTES # BLD AUTO: 0.6 K/UL (ref 0.3–1)
MONOCYTES NFR BLD: 6.8 % (ref 4–15)
NEUTROPHILS # BLD AUTO: 6.3 K/UL (ref 1.8–7.7)
NEUTROPHILS NFR BLD: 67.2 % (ref 38–73)
NRBC BLD-RTO: 0 /100 WBC
PHOSPHATE SERPL-MCNC: 2.7 MG/DL (ref 2.7–4.5)
PLATELET # BLD AUTO: 194 K/UL (ref 150–450)
PMV BLD AUTO: 10.6 FL (ref 9.2–12.9)
POCT GLUCOSE: 129 MG/DL (ref 70–110)
POCT GLUCOSE: 183 MG/DL (ref 70–110)
POTASSIUM SERPL-SCNC: 4 MMOL/L (ref 3.5–5.1)
PROT SERPL-MCNC: 6.4 G/DL (ref 6–8.4)
RBC # BLD AUTO: 4.64 M/UL (ref 4–5.4)
SODIUM SERPL-SCNC: 141 MMOL/L (ref 136–145)
T3 SERPL-MCNC: 42 NG/DL (ref 60–180)
T4 FREE SERPL-MCNC: 1.16 NG/DL (ref 0.71–1.51)
WBC # BLD AUTO: 9.41 K/UL (ref 3.9–12.7)

## 2024-08-21 PROCEDURE — 84100 ASSAY OF PHOSPHORUS: CPT

## 2024-08-21 PROCEDURE — 84439 ASSAY OF FREE THYROXINE: CPT | Performed by: STUDENT IN AN ORGANIZED HEALTH CARE EDUCATION/TRAINING PROGRAM

## 2024-08-21 PROCEDURE — 92526 ORAL FUNCTION THERAPY: CPT

## 2024-08-21 PROCEDURE — 80053 COMPREHEN METABOLIC PANEL: CPT

## 2024-08-21 PROCEDURE — 85025 COMPLETE CBC W/AUTO DIFF WBC: CPT

## 2024-08-21 PROCEDURE — 36415 COLL VENOUS BLD VENIPUNCTURE: CPT | Performed by: STUDENT IN AN ORGANIZED HEALTH CARE EDUCATION/TRAINING PROGRAM

## 2024-08-21 PROCEDURE — 63600175 PHARM REV CODE 636 W HCPCS

## 2024-08-21 PROCEDURE — 97535 SELF CARE MNGMENT TRAINING: CPT

## 2024-08-21 PROCEDURE — 83735 ASSAY OF MAGNESIUM: CPT

## 2024-08-21 PROCEDURE — 36415 COLL VENOUS BLD VENIPUNCTURE: CPT

## 2024-08-21 PROCEDURE — 11000001 HC ACUTE MED/SURG PRIVATE ROOM

## 2024-08-21 PROCEDURE — 84480 ASSAY TRIIODOTHYRONINE (T3): CPT | Performed by: STUDENT IN AN ORGANIZED HEALTH CARE EDUCATION/TRAINING PROGRAM

## 2024-08-21 PROCEDURE — 25000003 PHARM REV CODE 250

## 2024-08-21 RX ADMIN — FOLIC ACID 1 MG: 1 TABLET ORAL at 08:08

## 2024-08-21 RX ADMIN — APIXABAN 2.5 MG: 2.5 TABLET, FILM COATED ORAL at 10:08

## 2024-08-21 RX ADMIN — LEVOTHYROXINE SODIUM 75 MCG: 75 TABLET ORAL at 05:08

## 2024-08-21 RX ADMIN — SENNOSIDES AND DOCUSATE SODIUM 1 TABLET: 50; 8.6 TABLET ORAL at 08:08

## 2024-08-21 RX ADMIN — CARVEDILOL 3.12 MG: 3.12 TABLET, FILM COATED ORAL at 08:08

## 2024-08-21 RX ADMIN — POLYETHYLENE GLYCOL 3350 17 G: 17 POWDER, FOR SOLUTION ORAL at 08:08

## 2024-08-21 RX ADMIN — ACETAMINOPHEN 650 MG: 325 TABLET ORAL at 10:08

## 2024-08-21 RX ADMIN — APIXABAN 2.5 MG: 2.5 TABLET, FILM COATED ORAL at 08:08

## 2024-08-21 RX ADMIN — ATORVASTATIN CALCIUM 80 MG: 40 TABLET, FILM COATED ORAL at 08:08

## 2024-08-21 RX ADMIN — CYANOCOBALAMIN TAB 250 MCG 250 MCG: 250 TAB at 08:08

## 2024-08-21 RX ADMIN — GABAPENTIN 300 MG: 300 CAPSULE ORAL at 08:08

## 2024-08-21 RX ADMIN — CEFTRIAXONE SODIUM 1 G: 1 INJECTION, POWDER, FOR SOLUTION INTRAMUSCULAR; INTRAVENOUS at 11:08

## 2024-08-21 RX ADMIN — ACETAMINOPHEN 650 MG: 325 TABLET ORAL at 05:08

## 2024-08-21 RX ADMIN — Medication 1 TABLET: at 04:08

## 2024-08-21 RX ADMIN — CARVEDILOL 3.12 MG: 3.12 TABLET, FILM COATED ORAL at 10:08

## 2024-08-21 RX ADMIN — GABAPENTIN 300 MG: 300 CAPSULE ORAL at 10:08

## 2024-08-21 RX ADMIN — Medication 1 TABLET: at 08:08

## 2024-08-21 NOTE — ASSESSMENT & PLAN NOTE
Pt has had a few episodes of hypothermia with temp of 95 degrees Farenheit.    - Pako hugger prn

## 2024-08-21 NOTE — PROGRESS NOTES
..Nurses Note -- 4 Eyes      8/21/2024   5:30 AM      Skin assessed during: Q Shift Change      [] No Altered Skin Integrity Present    []Prevention Measures Documented      [x] Yes- Altered Skin Integrity Present or Discovered   [x] LDA Added if Not in Epic (Describe Wound)   [] New Altered Skin Integrity was Present on Admit and Documented in LDA   [] Wound Image Taken    Wound Care Consulted? No    Attending Nurse:  Nichole Dotson RN/Staff Member:  MARY Castellon

## 2024-08-21 NOTE — PT/OT/SLP PROGRESS
"Speech Language Pathology Treatment    Patient Name:  Quin Linn   MRN:  [id number]  Admitting Diagnosis: Metabolic encephalopathy    Recommendations:                 General Recommendations:  Dysphagia therapy  Diet recommendations:  Minced & Moist Diet - IDDSI Level 5, Liquid Diet Level: Mildly thick/Nectar thick liquids - IDDSI Level 2   Aspiration Precautions: 1 bite/sip at a time, Alternating bites/sips, Assistance with meals and Assistance with thickening liquids, Avoid talking while eating, Eliminate distractions, Feed only when awake/alert, Frequent oral care, HOB to 90 degrees, Meds crushed in puree, Monitor for s/s of aspiration, Remain upright 30 minutes post meal, Small bites/sips, and Strict aspiration precautions   General Precautions: Standard, aspiration, fall, nectar thick  Communication strategies:  provide increased time to answer and go to room if call light pushed    Assessment:     Quin Linn is a 89 y.o. female with an SLP diagnosis of Dysphagia.  She also presenting with cognitive-communicative deficits, but unsure of baseline (h/o CVA).     Subjective     "Where am I?"     Pain/Comfort:  Pain Rating 1: 0/10    Respiratory Status: Room air    Objective:     Has the patient been evaluated by SLP for swallowing?   Yes  Keep patient NPO? No   Current Respiratory Status:        Pt seen for ongoing swallowing assessment.  Pt asleep upon entry and requiring repeated stimuli to rouse.  Pt noted to lean to the right.  SLP repositioned pt to midline and placed a pillow on right side for support, though pt continued to slow begin to lean to the right. MARTY improved and pt accepted an ice chip x 1. No swallow was elicited so a 1/2 tsp thin water was given to elicit swallow. This resulting in anterior loss of what appeared to have been melted ice chip pooled in right buccal cavity and overt coughing/choking. Pt required a prolonged rest period to recover from coughing/choking before she was able to " resume PO intake.  Pt accepted trials of nectar thick liquid via tsp, cup, and straw without s/s of aspiration.  Pt's lunch meal was present and pt expressed interest in eating lunch. SLP heated up lunch plate and assisted pt with eating.  Pt initiated self feeding, but needed some assistance at times due to difficulty maintaining food on utensil and reaching mouth with straw or utensil.  Pt ultimately consumed over 75% of minced and moist lunch meal and approx 6oz of nectar thick liquid via straw. Pt tolerated minced and moist textures and nectar thick liquids without s/s of aspiration.  Education was provided to pt regarding role of SLP, ongoing swallowing assessment, aspiration, overt s/s of aspiration, continued recommendations for nectar thick liquids due to improved tolerance, aspiration precautions, orientation, and SLP treatment plan and POC.  Pt will benefit from continued reinforcement as she does continue to display cognitive deficits.     Goals:   Multidisciplinary Problems       SLP Goals          Problem: SLP    Goal Priority Disciplines Outcome   SLP Goal     SLP    Description: Goals expected to be met by 8/24  1. The patient will tolerate the least restrictive diet without overt s/sx airway threat.                        Plan:     Patient to be seen:  4 x/week   Plan of Care expires:  09/15/24  Plan of Care reviewed with:  patient   SLP Follow-Up:  Yes       Discharge recommendations:  Low Intensity Therapy     Time Tracking:     SLP Treatment Date:   08/21/24  Speech Start Time:  1309  Speech Stop Time:  1347     Speech Total Time (min):  38 min    Billable Minutes: Treatment Swallowing Dysfunction 15 and Self Care/Home Management Training 23    08/21/2024

## 2024-08-21 NOTE — ASSESSMENT & PLAN NOTE
"This patient does have evidence of infective focus  My overall impression is sepsis.  Source: Urinary Tract  Antibiotics given-   Antibiotics (72h ago, onward)      None          Latest lactate reviewed-  No results for input(s): "LACTATE", "POCLAC" in the last 72 hours.    Organ dysfunction indicated by Acute kidney injury, Acute heart failure, and Encephalopathy    Fluid challenge Received >1.5L in ED    Suspected UTI w retention, unclear reports of dysuria. Initially started rocephin  - 2/4 SIRS criteria - with lactic acid 2.6 now resolved  - Source likely urine  - Urine cx growing gram negative rods, identification and susceptibility pending; blood cx pending  - CXR with no opacity, vascular congestion  - All abx discontinued, therapy complete.  "

## 2024-08-21 NOTE — PLAN OF CARE
Addison Puckett - Neurosurgery ([facility])    HOME HEALTH ORDERS  FACE TO FACE ENCOUNTER    Patient Name: Quin Linn  YOB: 1932    PCP: Mary Ellen Nesbitt MD   PCP Address: [address] / [place] [address]  PCP Phone Number: [phone]  PCP Fax: [phone]    Discharging Team(s): [facility] 3    Encounter Date: 08/21/2024    Admit to Home Health    Diagnoses:  Active Hospital Problems    Diagnosis  POA    *Metabolic encephalopathy [G93.41]  Yes     Cultures pending  Sparkle on admission with hyperkalemia      Acute urinary retention [R33.8]  Yes    Metabolic alkalosis [E87.3]  Yes    Hypothermia [T68.XXXA]  No    Severe sepsis [A41.9, R65.20]  Yes    Aphasia [R47.01]  Yes    Chronic anticoagulation [Z79.01]  Not Applicable     eliquis      Pleural effusion [J90]  Yes     Small left pleural effusion.   seen on imaging  History of HF      Moderate malnutrition [E44.0]  Yes    Chronic diastolic heart failure [I50.32]  Yes    UTI (urinary tract infection) [N39.0]  Yes     On zosyn       History of CVA (cerebrovascular accident) [Z86.73]  Not Applicable    SPARKLE (acute kidney injury) [N17.9]  Yes    DNR (do not resuscitate) [Z66]  Yes    Acute hypoxemic respiratory failure [J96.01]  Yes    Constipation [K59.00]  Yes     Noted on imaging   Stool softeners scheduled      Paroxysmal A-fib [I48.0]  Yes    Hyperkalemia [E87.5]  Yes     Monitor with daily cmp      Acquired hypothyroidism [E03.9]  Yes    Wenckebach second degree AV block [I44.1]  Yes    Diabetes mellitus type II, controlled [E11.9]  Yes    Hypertension [I10]  Yes    Personal history of colon cancer, stage I [Z85.038]  Yes      Resolved Hospital Problems    Diagnosis Date Resolved POA    Severe malnutrition [E43] 08/17/2024 Yes    Chronic kidney disease, stage IV (severe) [N18.4] 08/17/2024 Yes       Future Appointments   Date Time Provider Department Center   9/9/2024  2:20 PM COORDINATED DEVICE CHECK [facility]   9/9/2024   2:45 PM EKG, APPT [facility] EKG [facility]   9/9/2024  3:20 PM JOSE Burgos MD [facility] ARRHYTH [facility]   9/26/2024  9:30 AM Mary Ellen Nesbitt MD [facility] IM [facility] PCW           I have seen and examined this patient face to face today. My clinical findings that support the need for the home health skilled services and home bound status are the following:  Medical restrictions requiring assistance of another human to leave home due to  Newly placed merida catheter.  Mental confusion making it unsafe for patient to leave home alone due to  Dementia.    Allergies:  Review of patient's allergies indicates:   Allergen Reactions    Tramadol Rash and Edema     Developed facial rash and edema.    Metformin Diarrhea       Diet: pureed diet nectar thick    Activities: activity as tolerated    Nursing:   SN to complete comprehensive assessment including routine vital signs. Instruct on disease process and s/s of complications to report to MD. Review/verify medication list sent home with the patient at time of discharge  and instruct patient/caregiver as needed. Frequency may be adjusted depending on start of care date.    Notify MD if SBP > 160 or < 90; DBP > 90 or < 50; HR > 120 or < 50; Temp > 101;       CONSULTS:    Physical Therapy to evaluate and treat. Evaluate for home safety and equipment needs; Establish/upgrade home exercise program. Perform / instruct on therapeutic exercises, gait training, transfer training, and Range of Motion.  Occupational Therapy to evaluate and treat. Evaluate home environment for safety and equipment needs. Perform/Instruct on transfers, ADL training, ROM, and therapeutic exercises.  Speech Therapy  to evaluate and treat for  Swallowing.  Aide to provide assistance with personal care, ADLs, and vital signs.    MISCELLANEOUS CARE:  Merida Care: Instruct patient/caregiver to empty Merida bag.  Change Merida every month.    WOUND CARE ORDERS  no      Medications: Review discharge medications with  patient and family and provide education.      Current Discharge Medication List        START taking these medications    Details   carvediloL (COREG) 3.125 MG tablet Take 1 tablet (3.125 mg total) by mouth 2 (two) times daily.  Qty: 180 tablet, Refills: 3    Comments: .           CONTINUE these medications which have NOT CHANGED    Details   apixaban (ELIQUIS) 2.5 mg Tab Take 1 tablet (2.5 mg total) by mouth 2 (two) times daily.  Qty: 60 tablet, Refills: 5      atorvastatin (LIPITOR) 80 MG tablet TAKE 1 TABLET(80 MG) BY MOUTH EVERY DAY  Qty: 90 tablet, Refills: 3      levothyroxine (SYNTHROID) 75 MCG tablet Take 1 tablet (75 mcg total) by mouth before breakfast. Administer on an empty stomach at least 30 minutes before food or other medications.  Qty: 90 tablet, Refills: 3      QUEtiapine (SEROQUEL) 25 MG Tab Take 1 to 2 tablets by mouth every night at bedtime for sleep and agitation  Qty: 60 tablet, Refills: 5      calcium-vitamin D3 (CALCIUM 500 + D) 500 mg-5 mcg (200 unit) per tablet Take 1 tablet by mouth 2 (two) times daily with meals.  Qty: 60 tablet, Refills: 11      folic acid (FOLVITE) 1 MG tablet Take 1 tablet (1 mg total) by mouth once daily.  Qty: 30 tablet, Refills: 11      HYDROcodone-acetaminophen (NORCO) 5-325 mg per tablet Take 1 tablet by mouth every 8 (eight) hours as needed for Pain.  Qty: 90 tablet, Refills: 0    Comments: Quantity prescribed more than 7 day supply? Yes, quantity medically necessary  Associated Diagnoses: Other chronic pain      polyethylene glycol (GLYCOLAX) 17 gram PwPk Take 17 g by mouth once daily.      senna-docusate 8.6-50 mg (PERICOLACE) 8.6-50 mg per tablet Take 1 tablet by mouth 2 (two) times daily as needed for Constipation.           STOP taking these medications       levETIRAcetam (KEPPRA) 250 MG Tab Comments:   Reason for Stopping:         gabapentin (NEURONTIN) 300 MG capsule Comments:   Reason for Stopping:               I certify that this patient is confined  to her home and needs physical therapy and occupational therapy.

## 2024-08-21 NOTE — PROGRESS NOTES
Addison Puckett - Neurosurgery ([facility])  [facility] Medicine  Progress Note    Patient Name: Quin Linn  MRN: [id number]  Patient Class: IP- Inpatient   Admission Date: 2024  Length of Stay: 4 days  Attending Physician: Alexandria Pearson MD  Primary Care Provider: Mary Ellen Nesbitt MD        Subjective:     Principal Problem:Metabolic encephalopathy        HPI:  Ms. Linn is a 89 y.o. with a PMH of T2DM, HTN, HLD,  Alzheimer's, CKD 4, anemia, decompensated chronic heart failure, paroxysmal Afib on apixaban who presented to the ED via EMS for altered mental status. Per EMS, patient's family was concerned because she had not been talking or eating much the past 3 days and appeared more lethargic than usual. Per EMS, family states she has these periods where she appears altered for up to 2 days, likely 2/2 to her Alzheimer's, but this episode has lasted longer so they decided to call EMS. ED spoke with the patient's daughter who states her baseline is usually very talkative and interactive, knows who her family is. Denies any recent falls or syncopal episodes. She was started on keppra 2 months ago, but denies any other recent medication changes. She had a closed nondisplaced fracture of fourth cervical vertebra with routine healing and follow up after she fell out of a recliner in May 2024. She was seen by neurology 2024 for workup of syncope vs seizure. At that time workup negative but there was high suspicion for ictal periods so patient was started on Keppra 250 BID.  She is being admitted to inpatient for altered mental status work up and and an SPARKLE.    ED Course:  LR bolus  Trop .06  Urinalysis - awaiting results  Blood cultures - awaiting results    CBC: unremarkable  Chemistry: K slightly elevated 5.3, Anion Gap 16  BUN 30, Cr 1.4 (baseline?)  Glucose 140    AB.5, PCO2 28.6, PO2 73 (metabolic alkalosis)    Imaging: chest xray: small pleural effusion, central hilar finding may be early edema, developing  lower lung zone consolidation    CT head: No acute hemorrhage or CT evidence of major vascular distribution infarct.      Overview/Hospital Course:  Admitted for workup of AMS. EEG negative. C/f UTI, urine studies pending. Hypothermic and hypotensive, abx broadened. Regarding keppra, patient recently prescribed for treatment of parkinsonian tremor, no improvement but progressive sedation since starting. Keppra level was low, seroquel and keppra held. UA showing 28 WBCs and many bacteria and urine culture showing E coli, susceptible to ceftriaxone and ciprofloxacin among other options. Pt was initially started on vancomycin and zosyn, vancomycin and zosyn continued in light of urine culture results. Pt also had urinary retention throughout her hospital stay, with bladder scans in showing up to 400cc's of urine retained in the bladder. She required multiple straight catheterizations and a merida catheter was placed on 8/20. Urology recommended that patient be discharged with merida catheter in place and should follow up outpatient for voiding trial. Pt will discharge tomorrow.     Interval History: Pt doing well today, mentation improved. Home health ready for patient's discharge tomorrow.    Review of Systems   Constitutional: Negative.    HENT: Negative.     Eyes: Negative.    Respiratory: Negative.     Cardiovascular: Negative.    Gastrointestinal: Negative.    Endocrine: Negative.    Genitourinary: Negative.    Musculoskeletal: Negative.    Skin: Negative.    Allergic/Immunologic: Negative.    Neurological: Negative.    Hematological: Negative.    Psychiatric/Behavioral: Negative.       Objective:     Vital Signs (Most Recent):  Temp: 96.8 °F (36 °C) (08/21/24 1037)  Pulse: 68 (08/21/24 1037)  Resp: 18 (08/21/24 1037)  BP: (!) 117/59 (08/21/24 1037)  SpO2: 95 % (08/21/24 1037) Vital Signs (24h Range):  Temp:  [96.3 °F (35.7 °C)-98.1 °F (36.7 °C)] 96.8 °F (36 °C)  Pulse:  [66-91] 68  Resp:  [16-18] 18  SpO2:  [92  %-96 %] 95 %  BP: (117-174)/(55-95) 117/59     Weight: 59 kg (130 lb)  Body mass index is 21.63 kg/m².    Intake/Output Summary (Last 24 hours) at 8/21/2024 1108  Last data filed at 8/21/2024 0617  Gross per 24 hour   Intake 220 ml   Output 1300 ml   Net -1080 ml         Physical Exam  Constitutional:       Appearance: She is ill-appearing.   HENT:      Mouth/Throat:      Mouth: Mucous membranes are dry.   Eyes:      Extraocular Movements: Extraocular movements intact.   Cardiovascular:      Comments: Low volume heart sounds  Pulmonary:      Effort: Pulmonary effort is normal.      Breath sounds: Normal breath sounds. No wheezing or rhonchi.   Abdominal:      Palpations: Abdomen is soft.      Comments: Decreased bowel sounds   Musculoskeletal:      Comments: Ecchymosis 2inch diameter, right forearm  Varying purpura left arm   Skin:     General: Skin is dry.      Comments: Flaky skin on arms   Neurological:      Mental Status: She is disoriented.      Comments: Mentation improving -answers questions with more words, somewhat appropriately. Oriented to person, month.              Significant Labs: All pertinent labs within the past 24 hours have been reviewed.    Significant Imaging: I have reviewed all pertinent imaging results/findings within the past 24 hours.    Assessment/Plan:      * Metabolic encephalopathy  Admitted for 5d h/o of lethargy, decreased appetite, no longer interactive. Recently started keppra for tx of parkinsonian tremor per pt's daughter/caretaker. Has become progressively slowed since then. CTH negative. ABG reassuring. UA suggestive of UTI - species and sensitivities pending. Significant retention noted. EEG negative.      Labs:  Keppra level low - 1.4  CMP reviewed, wnl  UA c/f UTI   - developing urosepsis, abx broadened. Cultures positive for gram negative rods, identification and susceptibility pending.   Ammonia wnl  ABG respiratory alkolosis, no hyperventilation noted. No ASA use, will  "confirm with daughter  BCx pending  Low suspicion for substance use, utox deferred    Imaging:  CXR developing vasc congestion  CTH no acute abnormalities  EEG wnl    Plan:  - improving, holding keppra. Will not proceed with MRI at this time as V-EEG was negative.    - treating UTI, see sepsis   - monitoring for ongoing retention  - Vitamin B12, thiamine  - TSH normal at 3.194  - Avoid sedating medications  - Monitor electrolytes, BGL  - Ucx, Bcx       Acute urinary retention  Pt was found to be retaining urine on bladder scans and has had to have straight catheter placed multiple times, merida catheter placed on 8/20.    - Per urology's recommendations, patient can be discharged with merida catheter in place and should follow up with outpatient urology LESLIE for voiding trial and removal of catheter.       Hypothermia  Pt has had a few episodes of hypothermia with temp of 95 degrees Farenheit.    - Pako lewis prn      Severe sepsis  This patient does have evidence of infective focus  My overall impression is sepsis.  Source: Urinary Tract  Antibiotics given-   Antibiotics (72h ago, onward)      None          Latest lactate reviewed-  No results for input(s): "LACTATE", "POCLAC" in the last 72 hours.    Organ dysfunction indicated by Acute kidney injury, Acute heart failure, and Encephalopathy    Fluid challenge Received >1.5L in ED    Suspected UTI w retention, unclear reports of dysuria. Initially started rocephin  - 2/4 SIRS criteria - with lactic acid 2.6 now resolved  - Source likely urine  - Urine cx growing gram negative rods, identification and susceptibility pending; blood cx pending  - CXR with no opacity, vascular congestion  - All abx discontinued, therapy complete.    Chronic diastolic heart failure  HFpEF  - Most recent TTE on 8/17/24 demonstrates: Lvef 55%, unable to measure DD d/t afib  - typically with normal diastolic function.    - EKG : alternating afib, 1st degree AV block, LBBB. stable  - Troponin " peaked at 0.6, downtrending. No chest pain  - BNP pending, CXR w vascular congestion  - Fluid restriction at 1.5L cc with strict I/Os and daily weights   - unclear dry weight  - Maintain on telemetry and daily EKGs   - Up to date risk stratification : TSH, Lipids, HbA1c with optimization of risk factors is necessary  - Check Electrolytes, keep Mag >2 & K+ >4  - SCDs, TEDs, Nursing communication to elevated LE   - Ambulate as tolerated    Current home regimen includes coreg, amlodipine, and hydralazine  Previously on lasix 40mg PO qd, discontinued but has required equivalent isolated lasix IV doses during previous hospitalizations   - hypoxic after SPARKLE IVF resuscitation, on 2L NC   - giving 20mg IV lasix     History of CVA (cerebrovascular accident)  W/o residual deficits, continue ASA/statin  - on eliquis for afib      SPARKLE (acute kidney injury)  SPARKLE is likely due to pre-renal azotemia due to dehydration. Baseline creatinine is  1.0 . Most recent creatinine and eGFR are listed below.  Recent Labs     08/16/24  1840 08/17/24  0512 08/18/24  0429   CREATININE 1.4 1.1 1.2   EGFRNORACEVR 35.3* 47.1* 42.5*        Plan  - SPARKLE is stable  - Avoid nephrotoxins and renally dose meds for GFR listed above  - Monitor urine output, serial BMP, and adjust therapy as needed  - gentle IVF    Resolved, holding IVF due to c/f volume overload    Constipation  Mild rectal stool burden, last BM8/17  - on bowel regimen- senna, PEG      Paroxysmal A-fib  Hx of pAF along w/ 2nd degree AV block. Medtronic Micra AV PM placed in Oct 2022. Appears to have been last interrogated in Jan 2024 which showed normal function & paced rhythm. Note pt's PM is MRI compatible, but requires EP lab assistance (device has to be set to MRI mode & then restored to original settings following scan), which is only available M-F.   Monitor electrolytes, replete PRN for goal K > 4 & Mg > 2  HR 50-70s, will consider interrogation if mentation stops improving.  '  Continuing eliquis, holding metoprolol in setting of sepsis      Hyperkalemia  Mild, to 5.3 on arrival, resolved          Wenckebach second degree AV block  S/p PM, paced rhythm on tele    Acquired hypothyroidism  Continue synthroid  TSH on admit 0.2, T4 wnl      Diabetes mellitus type II, controlled  Patient's FSGs are controlled on current medication regimen.  Last A1c reviewed-   Lab Results   Component Value Date    HGBA1C 5.8 (H) 08/17/2024     Most recent fingerstick glucose reviewed-   Recent Labs   Lab 08/17/24  1612 08/17/24  2059 08/18/24  0813 08/18/24  1122   POCTGLUCOSE 146* 121* 113* 138*     Current correctional scale  Low  Maintain anti-hyperglycemic dose as follows-   Antihyperglycemics (From admission, onward)      Start     Stop Route Frequency Ordered    08/17/24 0009  insulin aspart U-100 pen 0-5 Units         -- SubQ Before meals & nightly PRN 08/16/24 2310          Hold Oral hypoglycemics while patient is in the hospital.    Hypertension  Chronic, controlled. Latest blood pressure and vitals reviewed-     Temp:  [96.6 °F (35.9 °C)-98.1 °F (36.7 °C)]   Pulse:  [59-82]   Resp:  [18]   BP: (112-174)/(51-85)   SpO2:  [92 %-96 %] .   Home meds for hypertension were reviewed and noted below.   Hypertension Medications               amLODIPine (NORVASC) 10 MG tablet Take 1 tablet (10 mg total) by mouth once daily.    carvediloL (COREG) 3.125 MG tablet Take 1 tablet (3.125 mg total) by mouth 2 (two) times daily.    hydrALAZINE (APRESOLINE) 50 MG tablet Take 1 tablet (50 mg total) by mouth every 6 (six) hours.            While in the hospital, will manage blood pressure as follows; Adjust home antihypertensive regimen as follows- holding home antihypertensives in setting of sepsis    Will utilize p.r.n. blood pressure medication only if patient's blood pressure greater than 180/110 and she develops symptoms such as worsening chest pain or shortness of breath.    - Continue home coreg      VTE Risk  Mitigation (From admission, onward)           Ordered     apixaban tablet 2.5 mg  2 times daily         08/16/24 2247     IP VTE HIGH RISK PATIENT  Once         08/16/24 2249     Place sequential compression device  Until discontinued         08/16/24 2249                    Discharge Planning   LILI: 8/22/2024     Code Status: DNR   Is the patient medically ready for discharge?:     Reason for patient still in hospital (select all that apply): Pending disposition  Discharge Plan A: Home Health              Denita Mcgregor DO  Department of Hospital Medicine   [facility] - Neurosurgery ([facility])

## 2024-08-21 NOTE — ASSESSMENT & PLAN NOTE
Chronic, controlled. Latest blood pressure and vitals reviewed-     Temp:  [96.6 °F (35.9 °C)-98.1 °F (36.7 °C)]   Pulse:  [59-82]   Resp:  [18]   BP: (112-174)/(51-85)   SpO2:  [92 %-96 %] .   Home meds for hypertension were reviewed and noted below.   Hypertension Medications               amLODIPine (NORVASC) 10 MG tablet Take 1 tablet (10 mg total) by mouth once daily.    carvediloL (COREG) 3.125 MG tablet Take 1 tablet (3.125 mg total) by mouth 2 (two) times daily.    hydrALAZINE (APRESOLINE) 50 MG tablet Take 1 tablet (50 mg total) by mouth every 6 (six) hours.            While in the hospital, will manage blood pressure as follows; Adjust home antihypertensive regimen as follows- holding home antihypertensives in setting of sepsis    Will utilize p.r.n. blood pressure medication only if patient's blood pressure greater than 180/110 and she develops symptoms such as worsening chest pain or shortness of breath.    - Continue home coreg

## 2024-08-21 NOTE — PLAN OF CARE
CHW met with patient/family at bedside. Patient experience rounding completed and reviewed the following.     Do you know your discharge plan? Yes or No,    If yes, what is the plan? (Home, Home Health, Rehab, SNF, LTAC, or Other)     Hospice/HH    Have you discussed your needs and preferences with your SW/CM? Yes or No     Yes    If you are discharging home, do you have help at home? Yes or No  Hospice/HH    Do you think you will need help additional at home at discharge? Yes or No  No    Do you currently have difficulty keeping up with bills, affording medicine or buying food? Yes or No    No    Assigned SW/CM notified of any patient/family needs or concerns. Appropriate resources provided to address patient's needs.    Mayela Chavarria CHW  Case Management  [phone]

## 2024-08-21 NOTE — SUBJECTIVE & OBJECTIVE
Interval History: Pt doing well today, mentation improved. Home health ready for patient's discharge tomorrow.    Review of Systems   Constitutional: Negative.    HENT: Negative.     Eyes: Negative.    Respiratory: Negative.     Cardiovascular: Negative.    Gastrointestinal: Negative.    Endocrine: Negative.    Genitourinary: Negative.    Musculoskeletal: Negative.    Skin: Negative.    Allergic/Immunologic: Negative.    Neurological: Negative.    Hematological: Negative.    Psychiatric/Behavioral: Negative.       Objective:     Vital Signs (Most Recent):  Temp: 96.8 °F (36 °C) (08/21/24 1037)  Pulse: 68 (08/21/24 1037)  Resp: 18 (08/21/24 1037)  BP: (!) 117/59 (08/21/24 1037)  SpO2: 95 % (08/21/24 1037) Vital Signs (24h Range):  Temp:  [96.3 °F (35.7 °C)-98.1 °F (36.7 °C)] 96.8 °F (36 °C)  Pulse:  [66-91] 68  Resp:  [16-18] 18  SpO2:  [92 %-96 %] 95 %  BP: (117-174)/(55-95) 117/59     Weight: 59 kg (130 lb)  Body mass index is 21.63 kg/m².    Intake/Output Summary (Last 24 hours) at 8/21/2024 1108  Last data filed at 8/21/2024 0617  Gross per 24 hour   Intake 220 ml   Output 1300 ml   Net -1080 ml         Physical Exam  Constitutional:       Appearance: She is ill-appearing.   HENT:      Mouth/Throat:      Mouth: Mucous membranes are dry.   Eyes:      Extraocular Movements: Extraocular movements intact.   Cardiovascular:      Comments: Low volume heart sounds  Pulmonary:      Effort: Pulmonary effort is normal.      Breath sounds: Normal breath sounds. No wheezing or rhonchi.   Abdominal:      Palpations: Abdomen is soft.      Comments: Decreased bowel sounds   Musculoskeletal:      Comments: Ecchymosis 2inch diameter, right forearm  Varying purpura left arm   Skin:     General: Skin is dry.      Comments: Flaky skin on arms   Neurological:      Mental Status: She is disoriented.      Comments: Mentation improving -answers questions with more words, somewhat appropriately. Oriented to person, month.               Significant Labs: All pertinent labs within the past 24 hours have been reviewed.    Significant Imaging: I have reviewed all pertinent imaging results/findings within the past 24 hours.

## 2024-08-21 NOTE — PLAN OF CARE
08/21/24 1451   Post-Acute Status   Post-Acute Authorization Home Health;Other   Home Health Status Set-up Complete/Auth obtained   Other Status Community Services  (home pallative care)   Discharge Plan   Discharge Plan A Home Health     Patient will discharge home with .  Patient current with [facility] and they will resume services.  Family also requesting home pallative services and [facility] contracts with [facility].  Referral put in and that will be added to the home services.  Patient not able to discharge home today due to caregivers not being available till tomorrow.      SW will assist with coordinating discharge home tomorrow.      Ameena Villagran LMSW  [facility]  [phone]

## 2024-08-22 VITALS
HEART RATE: 66 BPM | SYSTOLIC BLOOD PRESSURE: 178 MMHG | TEMPERATURE: 98 F | RESPIRATION RATE: 16 BRPM | WEIGHT: 130 LBS | HEIGHT: 65 IN | BODY MASS INDEX: 21.66 KG/M2 | DIASTOLIC BLOOD PRESSURE: 80 MMHG | OXYGEN SATURATION: 98 %

## 2024-08-22 LAB
ALBUMIN SERPL BCP-MCNC: 2.4 G/DL (ref 3.5–5.2)
ALP SERPL-CCNC: 62 U/L (ref 55–135)
ALT SERPL W/O P-5'-P-CCNC: 26 U/L (ref 10–44)
ANION GAP SERPL CALC-SCNC: 8 MMOL/L (ref 8–16)
AST SERPL-CCNC: 16 U/L (ref 10–40)
BASOPHILS # BLD AUTO: 0.02 K/UL (ref 0–0.2)
BASOPHILS NFR BLD: 0.2 % (ref 0–1.9)
BILIRUB SERPL-MCNC: 0.2 MG/DL (ref 0.1–1)
BUN SERPL-MCNC: 28 MG/DL (ref 10–30)
CALCIUM SERPL-MCNC: 9 MG/DL (ref 8.7–10.5)
CHLORIDE SERPL-SCNC: 107 MMOL/L (ref 95–110)
CO2 SERPL-SCNC: 25 MMOL/L (ref 23–29)
CREAT SERPL-MCNC: 1.4 MG/DL (ref 0.5–1.4)
DIFFERENTIAL METHOD BLD: ABNORMAL
EOSINOPHIL # BLD AUTO: 0.2 K/UL (ref 0–0.5)
EOSINOPHIL NFR BLD: 2.7 % (ref 0–8)
ERYTHROCYTE [DISTWIDTH] IN BLOOD BY AUTOMATED COUNT: 12.8 % (ref 11.5–14.5)
EST. GFR  (NO RACE VARIABLE): 35.3 ML/MIN/1.73 M^2
GLUCOSE SERPL-MCNC: 197 MG/DL (ref 70–110)
HCT VFR BLD AUTO: 34.3 % (ref 37–48.5)
HGB BLD-MCNC: 11.7 G/DL (ref 12–16)
IMM GRANULOCYTES # BLD AUTO: 0.03 K/UL (ref 0–0.04)
IMM GRANULOCYTES NFR BLD AUTO: 0.4 % (ref 0–0.5)
LYMPHOCYTES # BLD AUTO: 2.5 K/UL (ref 1–4.8)
LYMPHOCYTES NFR BLD: 30 % (ref 18–48)
MAGNESIUM SERPL-MCNC: 1.9 MG/DL (ref 1.6–2.6)
MCH RBC QN AUTO: 31.9 PG (ref 27–31)
MCHC RBC AUTO-ENTMCNC: 34.1 G/DL (ref 32–36)
MCV RBC AUTO: 94 FL (ref 82–98)
MONOCYTES # BLD AUTO: 0.5 K/UL (ref 0.3–1)
MONOCYTES NFR BLD: 6.5 % (ref 4–15)
NEUTROPHILS # BLD AUTO: 5 K/UL (ref 1.8–7.7)
NEUTROPHILS NFR BLD: 60.2 % (ref 38–73)
NRBC BLD-RTO: 0 /100 WBC
PHOSPHATE SERPL-MCNC: 3.3 MG/DL (ref 2.7–4.5)
PLATELET # BLD AUTO: 181 K/UL (ref 150–450)
PMV BLD AUTO: 10.7 FL (ref 9.2–12.9)
POCT GLUCOSE: 140 MG/DL (ref 70–110)
POCT GLUCOSE: 173 MG/DL (ref 70–110)
POCT GLUCOSE: 211 MG/DL (ref 70–110)
POTASSIUM SERPL-SCNC: 4 MMOL/L (ref 3.5–5.1)
PROT SERPL-MCNC: 5.4 G/DL (ref 6–8.4)
RBC # BLD AUTO: 3.67 M/UL (ref 4–5.4)
SODIUM SERPL-SCNC: 140 MMOL/L (ref 136–145)
WBC # BLD AUTO: 8.37 K/UL (ref 3.9–12.7)

## 2024-08-22 PROCEDURE — 25000003 PHARM REV CODE 250

## 2024-08-22 PROCEDURE — 85025 COMPLETE CBC W/AUTO DIFF WBC: CPT

## 2024-08-22 PROCEDURE — 80053 COMPREHEN METABOLIC PANEL: CPT

## 2024-08-22 PROCEDURE — 36415 COLL VENOUS BLD VENIPUNCTURE: CPT

## 2024-08-22 PROCEDURE — 84100 ASSAY OF PHOSPHORUS: CPT

## 2024-08-22 PROCEDURE — 83735 ASSAY OF MAGNESIUM: CPT

## 2024-08-22 RX ADMIN — POLYETHYLENE GLYCOL 3350 17 G: 17 POWDER, FOR SOLUTION ORAL at 08:08

## 2024-08-22 RX ADMIN — Medication 1 TABLET: at 08:08

## 2024-08-22 RX ADMIN — SENNOSIDES AND DOCUSATE SODIUM 1 TABLET: 50; 8.6 TABLET ORAL at 08:08

## 2024-08-22 RX ADMIN — FOLIC ACID 1 MG: 1 TABLET ORAL at 08:08

## 2024-08-22 RX ADMIN — Medication 1 TABLET: at 05:08

## 2024-08-22 RX ADMIN — CARVEDILOL 3.12 MG: 3.12 TABLET, FILM COATED ORAL at 08:08

## 2024-08-22 RX ADMIN — GABAPENTIN 300 MG: 300 CAPSULE ORAL at 08:08

## 2024-08-22 RX ADMIN — APIXABAN 2.5 MG: 2.5 TABLET, FILM COATED ORAL at 08:08

## 2024-08-22 RX ADMIN — ATORVASTATIN CALCIUM 80 MG: 40 TABLET, FILM COATED ORAL at 08:08

## 2024-08-22 RX ADMIN — LEVOTHYROXINE SODIUM 75 MCG: 75 TABLET ORAL at 05:08

## 2024-08-22 RX ADMIN — CYANOCOBALAMIN TAB 250 MCG 250 MCG: 250 TAB at 08:08

## 2024-08-22 NOTE — ASSESSMENT & PLAN NOTE
Pt has had a few episodes of hypothermia with temp of 95 degrees Farenheit. Hypothermia resolved at time of discharge.     - Pako hugger prn

## 2024-08-22 NOTE — DISCHARGE INSTRUCTIONS
- Follow up with urology outpatient for bladder voiding trial and merida catheter removal.   - Start taking carvedilol 3.125 mg two time daily.

## 2024-08-22 NOTE — PT/OT/SLP PROGRESS
Speech Language Pathology      Quin Linn  MRN: [id number]    Patient not seen today secondary to  (pt discharging today. Recommend continuing minced and moist diet with nectar thick liquids. Cont SLP services if able.).

## 2024-08-22 NOTE — DISCHARGE SUMMARY
Addison Puckett - Neurosurgery ([facility])  [facility] Medicine  Discharge Summary      Patient Name: Quin Linn  MRN: [id number]  APOLLO: [id number]  Patient Class: IP- Inpatient  Admission Date: 8/16/2024  Hospital Length of Stay: 5 days  Discharge Date and Time:  08/22/2024 2:02 PM  Attending Physician: Rush Nava MD   Discharging Provider: Denita Mcgregor DO  Primary Care Provider: Mary Ellen Nesbitt MD  [facility] Medicine Team: [facility] Denita Mcgregor DO  Primary Care Team: [facility] 3    HPI:   Ms. Linn is a 89 y.o. with a PMH of T2DM, HTN, HLD,  Alzheimer's, CKD 4, anemia, decompensated chronic heart failure, paroxysmal Afib on apixaban who presented to the ED via EMS for altered mental status. Per EMS, patient's family was concerned because she had not been talking or eating much the past 3 days and appeared more lethargic than usual. Per EMS, family states she has these periods where she appears altered for up to 2 days, likely 2/2 to her Alzheimer's, but this episode has lasted longer so they decided to call EMS. ED spoke with the patient's daughter who states her baseline is usually very talkative and interactive, knows who her family is. Denies any recent falls or syncopal episodes. She was started on keppra 2 months ago, but denies any other recent medication changes. She had a closed nondisplaced fracture of fourth cervical vertebra with routine healing and follow up after she fell out of a recliner in May 2024. She was seen by neurology 4/2024 for workup of syncope vs seizure. At that time workup negative but there was high suspicion for ictal periods so patient was started on Keppra 250 BID.  She is being admitted to inpatient for altered mental status work up and and an SPARKLE.    ED Course:  LR bolus  Trop .06  Urinalysis - awaiting results  Blood cultures - awaiting results    CBC: unremarkable  Chemistry: K slightly elevated 5.3, Anion Gap 16  BUN 30, Cr 1.4 (baseline?)  Glucose 140    ABG:  7.5, PCO2 28.6, PO2 73 (metabolic alkalosis)    Imaging: chest xray: small pleural effusion, central hilar finding may be early edema, developing lower lung zone consolidation    CT head: No acute hemorrhage or CT evidence of major vascular distribution infarct.      * No surgery found *      Hospital Course:   Admitted for sepsis due to urinary tract infection complicated by metabolic encephalopathy.  Initially presented with altered mental status. EEG negative.  Urinalysis consistent with UTI. Hypothermic and hypotensive, abx broadened. Regarding keppra, patient recently prescribed for treatment of parkinsonian tremor, no improvement but progressive sedation since starting. Keppra level was low, seroquel and keppra held. Urine culture showing E coli, susceptible to ceftriaxone but resistant fluoroquinolones and Bactrim. Pt was initially started on vancomycin and zosyn.  Transitioned to ceftriaxone complete 5 days of therapy.  Pt also had urinary retention throughout her hospital stay, with bladder scans in showing up to 400cc's of urine retained in the bladder. She required multiple straight catheterizations and a merida catheter was placed on 8/20. Urology recommended that patient be discharged with merida catheter in place and will follow up outpatient for voiding trial and merida catheter removal. Pt discharged in stable condition with good mentation.      Goals of Care Treatment Preferences:  Code Status: DNR    Living Will: Yes              SDOH Screening:  The patient was unable to be screened for utility difficulties, food insecurity, transport difficulties, housing insecurity, and interpersonal safety, so no concerns could be identified this admission.     Consults:   Consults (From admission, onward)          Status Ordering Provider     Inpatient consult to Registered Dietitian/Nutritionist  Once        Provider:  (Not yet assigned)    REGINALD Leahy              Final Active Diagnoses:    Diagnosis  Date Noted POA    PRINCIPAL PROBLEM:  Metabolic encephalopathy [G93.41] 08/16/2024 Yes    Acute urinary retention [R33.8] 08/20/2024 Yes    Metabolic alkalosis [E87.3] 08/19/2024 Yes    Hypothermia [T68.XXXA] 08/19/2024 No    Severe sepsis [A41.9, R65.20] 08/18/2024 Yes    Aphasia [R47.01] 08/17/2024 Yes    Chronic anticoagulation [Z79.01] 08/17/2024 Not Applicable    Pleural effusion [J90] 08/17/2024 Yes    Moderate malnutrition [E44.0] 08/17/2024 Yes    Chronic diastolic heart failure [I50.32] 12/09/2023 Yes    UTI (urinary tract infection) [N39.0] 12/09/2023 Yes    History of CVA (cerebrovascular accident) [Z86.73] 10/19/2023 Not Applicable    SPARKLE (acute kidney injury) [N17.9] 09/10/2023 Yes    DNR (do not resuscitate) [Z66] 09/05/2023 Yes    Acute hypoxemic respiratory failure [J96.01] 09/02/2023 Yes    Constipation [K59.00] 08/30/2023 Yes    Paroxysmal A-fib [I48.0] 08/27/2023 Yes    Hyperkalemia [E87.5] 07/17/2023 Yes    Acquired hypothyroidism [E03.9] 10/02/2022 Yes    Wenckebach second degree AV block [I44.1] 10/02/2022 Yes    Diabetes mellitus type II, controlled [E11.9] 01/11/2018 Yes    Hypertension [I10] 04/03/2013 Yes    Personal history of colon cancer, stage I [Z85.038] 09/26/2012 Yes      Problems Resolved During this Admission:    Diagnosis Date Noted Date Resolved POA    Severe malnutrition [E43] 08/17/2024 08/17/2024 Yes    Chronic kidney disease, stage IV (severe) [N18.4] 01/10/2022 08/17/2024 Yes       Discharged Condition: stable    Disposition: Home-Health Care [facility]    Follow Up:   Follow-up Information       [place].    Specialty: Home Health Services  Contact information:  [address]  [address]  [facility] [address]  [phone]               Mary Ellen Nesbitt MD Follow up on 8/26/2024.    Specialty: Internal Medicine  Why: F/U appointment 3:00 pm  Contact information:  [address]  [facility] [address]  [phone]                            Patient Instructions:      Ambulatory referral/consult to Urology   Standing Status: Future   Referral Priority: Routine Referral Type: Consultation   Referral Reason: Specialty Services Required   Requested Specialty: Urology   Number of Visits Requested: 1     Ambulatory referral/consult to HOME Palliative Care   Standing Status: Future   Referral Priority: Routine Referral Type: Consultation   Requested Specialty: Hospice and Palliative Medicine   Number of Visits Requested: 1       Significant Diagnostic Studies: All significant diagnostic studies from this admission reviewed prior to discharge.      Pending Diagnostic Studies:       None           Medications:  Reconciled Home Medications:      Medication List        START taking these medications      carvediloL 3.125 MG tablet  Commonly known as: COREG  Take 1 tablet (3.125 mg total) by mouth 2 (two) times daily.            CONTINUE taking these medications      atorvastatin 80 MG tablet  Commonly known as: LIPITOR  TAKE 1 TABLET(80 MG) BY MOUTH EVERY DAY     calcium-vitamin D3 500 mg-5 mcg (200 unit) per tablet  Commonly known as: CALCIUM 500 + D  Take 1 tablet by mouth 2 (two) times daily with meals.     ELIQUIS 2.5 mg Tab  Generic drug: apixaban  Take 1 tablet (2.5 mg total) by mouth 2 (two) times daily.     folic acid 1 MG tablet  Commonly known as: FOLVITE  Take 1 tablet (1 mg total) by mouth once daily.     HYDROcodone-acetaminophen 5-325 mg per tablet  Commonly known as: NORCO  Take 1 tablet by mouth every 8 (eight) hours as needed for Pain.     levothyroxine 75 MCG tablet  Commonly known as: SYNTHROID  Take 1 tablet (75 mcg total) by mouth before breakfast. Administer on an empty stomach at least 30 minutes before food or other medications.     polyethylene glycol 17 gram Pwpk  Commonly known as: GLYCOLAX  Take 17 g by mouth once daily.     QUEtiapine 25 MG Tab  Commonly known as: SEROQUEL  Take 1 to 2 tablets by mouth every night at bedtime for  sleep and agitation     senna-docusate 8.6-50 mg 8.6-50 mg per tablet  Commonly known as: PERICOLACE  Take 1 tablet by mouth 2 (two) times daily as needed for Constipation.            STOP taking these medications      levETIRAcetam 250 MG Tab  Commonly known as: KEPPRA              Indwelling Lines/Drains at time of discharge:   Lines/Drains/Airways       Drain  Duration                  Urethral Catheter 08/20/24 0914 16 Fr. 2 days                    Time spent on the discharge of patient: 60 minutes         Denita Mcgregor DO  Department of Hospital Medicine  [facility] - Neurosurgery ([facility])

## 2024-08-23 NOTE — PROGRESS NOTES
Pt was discharge in care of Daughter & son-in-law. PIV was removed along with scd & tele box. Pt was transported by wheelchair safely. No sign of distress noted or reported. Discharge teaching was completed and packet given to daughter.

## 2024-08-26 ENCOUNTER — PATIENT OUTREACH (OUTPATIENT)
Dept: ADMINISTRATIVE | Facility: CLINIC | Age: 89
End: 2024-08-26
Payer: MEDICARE

## 2024-08-26 ENCOUNTER — OFFICE VISIT (OUTPATIENT)
Dept: INTERNAL MEDICINE | Facility: CLINIC | Age: 89
End: 2024-08-26
Payer: MEDICARE

## 2024-08-26 VITALS
BODY MASS INDEX: 21.63 KG/M2 | DIASTOLIC BLOOD PRESSURE: 54 MMHG | HEART RATE: 71 BPM | SYSTOLIC BLOOD PRESSURE: 104 MMHG | HEIGHT: 65 IN | OXYGEN SATURATION: 96 %

## 2024-08-26 DIAGNOSIS — R33.9 URINARY RETENTION: ICD-10-CM

## 2024-08-26 DIAGNOSIS — F02.80 ALZHEIMER'S DISEASE: Primary | ICD-10-CM

## 2024-08-26 DIAGNOSIS — G30.9 ALZHEIMER'S DISEASE: Primary | ICD-10-CM

## 2024-08-26 DIAGNOSIS — R53.81 DEBILITY: ICD-10-CM

## 2024-08-26 DIAGNOSIS — E11.59 HYPERTENSION ASSOCIATED WITH DIABETES: ICD-10-CM

## 2024-08-26 DIAGNOSIS — I15.2 HYPERTENSION ASSOCIATED WITH DIABETES: ICD-10-CM

## 2024-08-26 PROCEDURE — 1159F MED LIST DOCD IN RCRD: CPT | Mod: CPTII,S$GLB,, | Performed by: INTERNAL MEDICINE

## 2024-08-26 PROCEDURE — 1157F ADVNC CARE PLAN IN RCRD: CPT | Mod: CPTII,S$GLB,, | Performed by: INTERNAL MEDICINE

## 2024-08-26 PROCEDURE — 3288F FALL RISK ASSESSMENT DOCD: CPT | Mod: CPTII,S$GLB,, | Performed by: INTERNAL MEDICINE

## 2024-08-26 PROCEDURE — G2211 COMPLEX E/M VISIT ADD ON: HCPCS | Mod: S$GLB,,, | Performed by: INTERNAL MEDICINE

## 2024-08-26 PROCEDURE — 99999 PR PBB SHADOW E&M-EST. PATIENT-LVL III: CPT | Mod: PBBFAC,,, | Performed by: INTERNAL MEDICINE

## 2024-08-26 PROCEDURE — 1126F AMNT PAIN NOTED NONE PRSNT: CPT | Mod: CPTII,S$GLB,, | Performed by: INTERNAL MEDICINE

## 2024-08-26 PROCEDURE — 99215 OFFICE O/P EST HI 40 MIN: CPT | Mod: S$GLB,,, | Performed by: INTERNAL MEDICINE

## 2024-08-26 PROCEDURE — 1111F DSCHRG MED/CURRENT MED MERGE: CPT | Mod: CPTII,S$GLB,, | Performed by: INTERNAL MEDICINE

## 2024-08-26 PROCEDURE — 1100F PTFALLS ASSESS-DOCD GE2>/YR: CPT | Mod: CPTII,S$GLB,, | Performed by: INTERNAL MEDICINE

## 2024-08-26 RX ORDER — BLOOD SUGAR DIAGNOSTIC
STRIP MISCELLANEOUS 4 TIMES DAILY
COMMUNITY
Start: 2024-08-23

## 2024-08-26 NOTE — PROGRESS NOTES
Subjective     Patient ID: Quin Linn is a 89 y.o. female.    Chief Complaint: Hospital Follow Up    HPI  Daughter, Janae, and son in law present    Admitted 8/16-22 with metabolic encephalopathy due to e coli UTI.   She was unresponsive for a day and a half.    Keppra was stopped.    Ur retention detected and cath remains.     She is verbalizing less.  She is due to start  home PT again.      She feeds herself breakfast.  Due to tremor, needs help with eating dinner.      She has been in hospital bed since cervical fracture this spring.      She had dementia and cognitive status has been declining over last year.  She has been hospitalized 5 times since December.    Hospice rep did come to her home, but was never started. Daughter is apprehensive, as pt expressed desire to live as long as possible, in past.    Recent labs: Alb 2.4     GFR 35     Hgb 11.7.    Hosp 12/23 resp failure             12/29 Covid.              1/30 - syncope and uti              5/24- cervical fracture.    She has aid during day.    Janae and her  care for her at night.      Review of Systems   Constitutional:  Negative for fever and unexpected weight change.   HENT:  Negative for nasal congestion and postnasal drip.    Eyes:  Negative for pain, discharge and visual disturbance.   Respiratory:  Negative for cough, chest tightness, shortness of breath and wheezing.    Cardiovascular:  Negative for chest pain and leg swelling.   Gastrointestinal:  Negative for abdominal pain, constipation, diarrhea and nausea.   Genitourinary:  Negative for difficulty urinating, dysuria and hematuria.   Integumentary:  Negative for rash.   Neurological:  Negative for headaches.   Psychiatric/Behavioral:  Negative for dysphoric mood and sleep disturbance. The patient is not nervous/anxious.           Objective     Physical Exam  Constitutional:       General: She is not in acute distress.     Appearance: She is well-developed.   HENT:      Head:  Normocephalic and atraumatic.      Right Ear: External ear normal.      Left Ear: External ear normal.      Nose: Nose normal.   Eyes:      General: No scleral icterus.        Right eye: No discharge.         Left eye: No discharge.      Conjunctiva/sclera: Conjunctivae normal.      Pupils: Pupils are equal, round, and reactive to light.   Neck:      Thyroid: No thyromegaly.      Vascular: No JVD.   Cardiovascular:      Rate and Rhythm: Normal rate and regular rhythm.      Heart sounds: Normal heart sounds. No murmur heard.     No gallop.   Pulmonary:      Effort: Pulmonary effort is normal. No respiratory distress.      Breath sounds: Normal breath sounds. No wheezing or rales.   Abdominal:      General: Bowel sounds are normal. There is no distension.      Palpations: Abdomen is soft. There is no mass.      Tenderness: There is no abdominal tenderness. There is no guarding or rebound.   Musculoskeletal:         General: No tenderness. Normal range of motion.      Cervical back: Normal range of motion and neck supple.   Lymphadenopathy:      Cervical: No cervical adenopathy.   Skin:     General: Skin is warm and dry.      Findings: No rash.   Neurological:      Mental Status: She is alert and oriented to person, place, and time.      Cranial Nerves: No cranial nerve deficit.      Coordination: Coordination normal.   Psychiatric:         Mood and Affect: Mood normal.      Comments: Alert.  No spontaneous speech.              Assessment and Plan     1. Alzheimer's disease    2. Debility    3. Urinary retention    4. Hypertension associated with diabetes        We discussed Hospice at length. Janae will consider.  She does qualify: advanced dementia, malnutrition.   We also discussed Palliative Care -    F/u Urology.  Hopefully bladder cath can be removed.     Keep Sept appt.    More than 40 min spent with pt, including documentation, discussion of end of life care.  No follow-ups on file.

## 2024-08-27 ENCOUNTER — PATIENT MESSAGE (OUTPATIENT)
Dept: ADMINISTRATIVE | Facility: CLINIC | Age: 89
End: 2024-08-27
Payer: MEDICARE

## 2024-08-27 NOTE — PROGRESS NOTES
C3 nurse spoke with Quin Linn's daughter Janae for a TCC post hospital discharge follow up call. The patient had a scheduled HOSFU appointment with Mary Ellen Nesbitt MD on 08/26/24.

## 2024-09-05 ENCOUNTER — OFFICE VISIT (OUTPATIENT)
Dept: UROLOGY | Facility: CLINIC | Age: 89
End: 2024-09-05
Payer: MEDICARE

## 2024-09-05 VITALS
HEART RATE: 64 BPM | HEIGHT: 65 IN | SYSTOLIC BLOOD PRESSURE: 134 MMHG | DIASTOLIC BLOOD PRESSURE: 64 MMHG | WEIGHT: 130.06 LBS | BODY MASS INDEX: 21.67 KG/M2

## 2024-09-05 DIAGNOSIS — R33.8 ACUTE URINARY RETENTION: ICD-10-CM

## 2024-09-05 DIAGNOSIS — N30.00 ACUTE CYSTITIS WITHOUT HEMATURIA: Primary | ICD-10-CM

## 2024-09-05 DIAGNOSIS — N17.9 AKI (ACUTE KIDNEY INJURY): ICD-10-CM

## 2024-09-05 PROCEDURE — 99204 OFFICE O/P NEW MOD 45 MIN: CPT | Mod: S$GLB,,,

## 2024-09-05 PROCEDURE — 1111F DSCHRG MED/CURRENT MED MERGE: CPT | Mod: CPTII,S$GLB,,

## 2024-09-05 PROCEDURE — 99999 PR PBB SHADOW E&M-EST. PATIENT-LVL IV: CPT | Mod: PBBFAC,,,

## 2024-09-05 PROCEDURE — 1157F ADVNC CARE PLAN IN RCRD: CPT | Mod: CPTII,S$GLB,,

## 2024-09-05 PROCEDURE — 3288F FALL RISK ASSESSMENT DOCD: CPT | Mod: CPTII,S$GLB,,

## 2024-09-05 PROCEDURE — 1159F MED LIST DOCD IN RCRD: CPT | Mod: CPTII,S$GLB,,

## 2024-09-05 PROCEDURE — 1101F PT FALLS ASSESS-DOCD LE1/YR: CPT | Mod: CPTII,S$GLB,,

## 2024-09-05 PROCEDURE — 1125F AMNT PAIN NOTED PAIN PRSNT: CPT | Mod: CPTII,S$GLB,,

## 2024-09-05 PROCEDURE — 1160F RVW MEDS BY RX/DR IN RCRD: CPT | Mod: CPTII,S$GLB,,

## 2024-09-05 NOTE — PATIENT INSTRUCTIONS
UTI precautions:  Wipe front to back   Avoid constipation.  Drink at least 1 liter of water daily  Void every 3-4 hrs  Expel urine from vagina post void  Void soon after urge arises  No dryer sheets or harsh detergents with the undergarments (no dyes or scents)  No bubble baths  Avoid hot tub use  Avoid tight fitting clothes and panty hose      Avoid Bladder Irritants: Tea, coffee, caffeine, alcohol, artificial sweeteners, citrus, spicy foods, acidic foods,chocolate, tomato-based foods, smoking.    D-Mannose 2 grams- helps to block bacteria from attaching to the bladder wall. I gram in the AM, 1 gram in the PM. [employer] or [employer].     Probiotic- [employer] Ultra 25 Billion CFU Probiotic Complex, Multi Strain.  The Multi Strain is specifically the one that is important as the greater variety of strains is better.      Estrace apply a pea sized amount to urethra 3 x weekly at night   Estrogen depletion at any age has direct implications on genitourinary health and lower urinary tract function. Vaginal estrogen therapy is safe and extremely efficacious in treating these symptoms and lowering the risk of UTIs.Vaginal estrogen helps improve vaginal dryness, dyspareunia, and vaginal burning.

## 2024-09-05 NOTE — PROGRESS NOTES
"CHIEF COMPLAINT:  Merida catheter removal         HISTORY OF PRESENTING ILLINESS:  Quin Linn is a 89 y.o. female new to [facility]. She is a referral from Dr. Moore for urinary retention and SPARKLE. Merida catheter placed inpatient 8/20/2024 due to PVR >400 ml. Per note, "urology recommended that the patient be dc'd with merida catheter in place and will follow up outpatient for VT and merida catheter removal." No imaging of  system obtained during admission.     Presents today with son in law and care taker. She ambulates to urinate on the toilet at home or uses an adult brief. No issues with constipation. Pt drinks 48 oz iced tea/day. Has a good appetite.     2 positive urine cultures this year:   8/17/2024 e. Coli >100,000 cfu/ml   1/26/2024 k. Pneumoniae >100,000 cfu/ml       8/22/2024  BUN 28/ Cr 1.4, BUN improved from 8/19/2024 (37).     PMHx listed below.         REVIEW OF SYSTEMS:  Review of Systems   Constitutional:  Negative for chills and fever.   HENT:  Negative for congestion and sore throat.    Eyes:  Negative for blurred vision.   Respiratory:  Negative for cough and shortness of breath.    Cardiovascular:  Negative for chest pain and palpitations.   Gastrointestinal:  Negative for nausea and vomiting.   Genitourinary:  Negative for flank pain and hematuria.        Merida catheter in place    Neurological:  Positive for loss of consciousness and weakness. Negative for dizziness and headaches.   Psychiatric/Behavioral:  Positive for memory loss.          PATIENT HISTORY:    Past Medical History:   Diagnosis Date    Acute hypoxemic respiratory failure 9/2/2023    Acute on chronic diastolic congestive heart failure 10/19/2023    Alzheimer's disease     Anemia     Anemia 5/14/2024    Arthritis     lumbar    Back pain     Cancer     colon    Cataract     Colon cancer     Diabetes mellitus type II     Glaucoma     Hyperlipidemia     Hypertension     Hyperthyroidism     Hypothyroidism following " radioiodine therapy     Pacemaker     Pneumonia     Pneumonia due to other staphylococcus     Renal manifestation of secondary diabetes mellitus     Squamous cell carcinoma     Stroke     TIA    Type 2 diabetes mellitus with stage 3 chronic kidney disease and hypertension 10/12/2017    Type 2 diabetes with peripheral circulatory disorder, controlled        Past Surgical History:   Procedure Laterality Date    CATARACT EXTRACTION W/  INTRAOCULAR LENS IMPLANT Left 09/13/2017        CHOLECYSTECTOMY      COLON SURGERY  2001    Colon CA    EYE SURGERY      cataract removal left side    IMPLANTATION OF LEADLESS PACEMAKER N/A 10/21/2022    Procedure: [id number];  Surgeon: JOSE Burgos MD;  Location: [facility] EP LAB;  Service: Cardiology;  Laterality: N/A;  EMELYN, JOHNNA, MDT, ANES, EH,     squamous cell ca of face         Family History   Problem Relation Name Age of Onset    Heart disease Mother      Arthritis Mother      Kidney disease Father      Alzheimer's disease Sister      Hypertension Sister      Cancer Sister          ovarian    No Known Problems Daughter Khalida     Ankylosing spondylitis Daughter Elizabeth     Lupus Daughter Elizabeth     Kidney disease Daughter Janae     Heart disease Daughter Janae     Atrial fibrillation Daughter Janae     Supraventricular tachycardia Daughter Janae     Hypertension Son Mayito     Hyperlipidemia Son Mayito     Asthma Son Mayito     Cancer Paternal Uncle      Diabetes Maternal Grandmother      Amblyopia Neg Hx      Blindness Neg Hx      Cataracts Neg Hx      Glaucoma Neg Hx      Macular degeneration Neg Hx      Retinal detachment Neg Hx      Strabismus Neg Hx      Stroke Neg Hx      Thyroid disease Neg Hx         Social History     Socioeconomic History    Marital status:     Number of children: 4   Occupational History    Occupation: retired     Employer: RETIRED   Tobacco Use    Smoking status: Never    Smokeless tobacco: Never    Tobacco  comments:     smoked for about 4 years in her twenties (socially)   Substance and Sexual Activity    Alcohol use: No    Drug use: No    Sexual activity: Never   Social History Narrative    Daughter - Janae Franklin - knitting, crocheting, sewing, reading     Social Determinants of Health     Financial Resource Strain: Patient Unable To Answer (8/19/2024)    Overall Financial Resource Strain (CARDIA)     Difficulty of Paying Living Expenses: Patient unable to answer   Food Insecurity: Patient Unable To Answer (8/19/2024)    Hunger Vital Sign     Worried About Running Out of Food in the Last Year: Patient unable to answer     Ran Out of Food in the Last Year: Patient unable to answer   Transportation Needs: Patient Unable To Answer (8/19/2024)    TRANSPORTATION NEEDS     Transportation : Patient unable to answer   Physical Activity: Patient Unable To Answer (8/19/2024)    Exercise Vital Sign     Days of Exercise per Week: Patient unable to answer     Minutes of Exercise per Session: Patient unable to answer   Recent Concern: Physical Activity - Inactive (8/17/2024)    Exercise Vital Sign     Days of Exercise per Week: 0 days     Minutes of Exercise per Session: 0 min   Stress: Patient Unable To Answer (8/19/2024)    [employer] - Occupational Stress Questionnaire     Feeling of Stress : Patient unable to answer   Housing Stability: Patient Unable To Answer (8/19/2024)    Housing Stability Vital Sign     Unable to Pay for Housing in the Last Year: Patient unable to answer     Homeless in the Last Year: Patient unable to answer       Allergies:  Tramadol and Metformin    Medications:    Current Outpatient Medications:     ACCU-CHEK GUIDE TEST STRIPS Strp, 4 (four) times daily., Disp: , Rfl:     apixaban (ELIQUIS) 2.5 mg Tab, Take 1 tablet (2.5 mg total) by mouth 2 (two) times daily., Disp: 60 tablet, Rfl: 5    atorvastatin (LIPITOR) 80 MG tablet, TAKE 1 TABLET(80 MG) BY MOUTH EVERY DAY,  Disp: 90 tablet, Rfl: 3    calcium-vitamin D3 (CALCIUM 500 + D) 500 mg-5 mcg (200 unit) per tablet, Take 1 tablet by mouth 2 (two) times daily with meals. (Patient not taking: Reported on 8/26/2024), Disp: 60 tablet, Rfl: 11    carvediloL (COREG) 3.125 MG tablet, Take 1 tablet (3.125 mg total) by mouth 2 (two) times daily., Disp: 180 tablet, Rfl: 3    folic acid (FOLVITE) 1 MG tablet, Take 1 tablet (1 mg total) by mouth once daily. (Patient not taking: Reported on 8/26/2024), Disp: 30 tablet, Rfl: 11    HYDROcodone-acetaminophen (NORCO) 5-325 mg per tablet, Take 1 tablet by mouth every 8 (eight) hours as needed for Pain., Disp: 90 tablet, Rfl: 0    levothyroxine (SYNTHROID) 75 MCG tablet, Take 1 tablet (75 mcg total) by mouth before breakfast. Administer on an empty stomach at least 30 minutes before food or other medications., Disp: 90 tablet, Rfl: 3    polyethylene glycol (GLYCOLAX) 17 gram PwPk, Take 17 g by mouth once daily. (Patient taking differently: Take 17 g by mouth as needed.), Disp: , Rfl:     QUEtiapine (SEROQUEL) 25 MG Tab, Take 1 to 2 tablets by mouth every night at bedtime for sleep and agitation, Disp: 60 tablet, Rfl: 5    senna-docusate 8.6-50 mg (PERICOLACE) 8.6-50 mg per tablet, Take 1 tablet by mouth 2 (two) times daily as needed for Constipation., Disp: , Rfl:     PHYSICAL EXAMINATION:  Physical Exam  HENT:      Head: Normocephalic and atraumatic.      Right Ear: External ear normal.      Left Ear: External ear normal.      Nose: Nose normal.      Mouth/Throat:      Mouth: Mucous membranes are moist.   Pulmonary:      Effort: Pulmonary effort is normal. No respiratory distress.   Skin:     General: Skin is warm and dry.   Neurological:      Mental Status: She is alert.   Psychiatric:         Mood and Affect: Affect is flat.         Behavior: Behavior is withdrawn.         Cognition and Memory: Memory is impaired.         Lab Results   Component Value Date    CREATININE 1.4 08/22/2024     EGFRNORACEVR 35.3 (A) 08/22/2024           IMPRESSION:  Encounter Diagnoses   Name Primary?    Acute cystitis without hematuria Yes    Acute urinary retention     SPARKLE (acute kidney injury)          Assessment:       1. Acute cystitis without hematuria    2. Acute urinary retention    3. SPARKLE (acute kidney injury)        Plan:   - Discussed VT vs merida catheter removal. Recommended merida catheter removal, rationale provided. Instructed caregivers to have patient drink iced tea. Will call at 1300 to check on urination status. If unable to urinate, will contact  to inquire about merida catheter placement - pt already has orders for monthly cath changes through [facility].   - If pt fails VT today, family is interested in 2nd VT attempt, would use sterile water to fill bladder next visit. Would not recommend SUDS/cysto due to patient's age and and cognitive status.  - UTI precautions and recommendations provided.   - Discussed renal US, will defer for now due to mobility status.    RTC 1-2 weeks for cath UA and cath PVR    I spent 30 minutes with the patient of which more than half was spent in direct consultation with the patient in regards to our treatment and plan.  We addressed the chief complaint as well as other office findings during the visit. Reviewed the possible contributory factors.

## 2024-09-06 ENCOUNTER — TELEPHONE (OUTPATIENT)
Dept: UROLOGY | Facility: CLINIC | Age: 89
End: 2024-09-06
Payer: MEDICARE

## 2024-09-06 NOTE — TELEPHONE ENCOUNTER
Spoke with Buster. Mrs. Linn has been urinating following merida catheter removal in clinic. Informed to keep upcoming apt 9/20/2024 with me to obtain UA and PVR. Verbalized understanding.   ----- Message from Sima Burrows LPN sent at 9/5/2024  4:22 PM CDT -----  Regarding: FW: pt advice  Contact: buster     ----- Message -----  From: Alfredo Nicole MA  Sent: 9/5/2024   4:17 PM CDT  To: [facility] Staff  Subject: pt advice                                        Type:  Needs Medical Advice    Who Called: Buster patient daughter call stated that her  catheter have been remove  she did urinate a little bit at 2pm and want to know if she is not urinating any more can patient wait for  home health in the morning .      Would the patient rather a call back or a response via [facility]?  Call back   Best Call Back Number: buster   Additional Information:

## 2024-09-09 ENCOUNTER — HOSPITAL ENCOUNTER (OUTPATIENT)
Dept: CARDIOLOGY | Facility: CLINIC | Age: 89
Discharge: HOME | End: 2024-09-09
Attending: STUDENT IN AN ORGANIZED HEALTH CARE EDUCATION/TRAINING PROGRAM
Payer: MEDICARE

## 2024-09-09 ENCOUNTER — OFFICE VISIT (OUTPATIENT)
Dept: ELECTROPHYSIOLOGY | Facility: CLINIC | Age: 89
End: 2024-09-09
Payer: MEDICARE

## 2024-09-09 ENCOUNTER — CLINICAL SUPPORT (OUTPATIENT)
Dept: CARDIOLOGY | Facility: HOSPITAL | Age: 89
End: 2024-09-09
Attending: STUDENT IN AN ORGANIZED HEALTH CARE EDUCATION/TRAINING PROGRAM
Payer: MEDICARE

## 2024-09-09 VITALS
WEIGHT: 130 LBS | HEART RATE: 60 BPM | BODY MASS INDEX: 21.66 KG/M2 | SYSTOLIC BLOOD PRESSURE: 154 MMHG | DIASTOLIC BLOOD PRESSURE: 60 MMHG | HEIGHT: 65 IN

## 2024-09-09 DIAGNOSIS — I44.1 SECOND DEGREE AV BLOCK, MOBITZ TYPE II: ICD-10-CM

## 2024-09-09 DIAGNOSIS — N18.4 CKD (CHRONIC KIDNEY DISEASE), STAGE IV: ICD-10-CM

## 2024-09-09 DIAGNOSIS — K55.1 SUPERIOR MESENTERIC ARTERY STENOSIS: ICD-10-CM

## 2024-09-09 DIAGNOSIS — Z95.0 PRESENCE OF CARDIAC PACEMAKER: ICD-10-CM

## 2024-09-09 DIAGNOSIS — E03.9 ACQUIRED HYPOTHYROIDISM: ICD-10-CM

## 2024-09-09 DIAGNOSIS — I48.0 PAROXYSMAL ATRIAL FIBRILLATION: ICD-10-CM

## 2024-09-09 DIAGNOSIS — I44.1 SECOND DEGREE AV BLOCK, MOBITZ TYPE I: ICD-10-CM

## 2024-09-09 DIAGNOSIS — M15.9 OSTEOARTHRITIS OF MULTIPLE JOINTS, UNSPECIFIED OSTEOARTHRITIS TYPE: ICD-10-CM

## 2024-09-09 DIAGNOSIS — I48.0 PAROXYSMAL A-FIB: ICD-10-CM

## 2024-09-09 DIAGNOSIS — E11.42 TYPE 2 DIABETES MELLITUS WITH DIABETIC POLYNEUROPATHY, WITHOUT LONG-TERM CURRENT USE OF INSULIN: ICD-10-CM

## 2024-09-09 DIAGNOSIS — I77.819 AORTIC ECTASIA: ICD-10-CM

## 2024-09-09 DIAGNOSIS — Z86.73 HISTORY OF CVA (CEREBROVASCULAR ACCIDENT): ICD-10-CM

## 2024-09-09 DIAGNOSIS — E04.2 MULTINODULAR GOITER: ICD-10-CM

## 2024-09-09 DIAGNOSIS — I44.1 WENCKEBACH SECOND DEGREE AV BLOCK: ICD-10-CM

## 2024-09-09 DIAGNOSIS — E78.5 HYPERLIPIDEMIA LDL GOAL <70: ICD-10-CM

## 2024-09-09 DIAGNOSIS — E89.0 HYPOTHYROIDISM FOLLOWING RADIOIODINE THERAPY: ICD-10-CM

## 2024-09-09 DIAGNOSIS — I70.0 AORTIC ATHEROSCLEROSIS: ICD-10-CM

## 2024-09-09 DIAGNOSIS — I44.2 COMPLETE HEART BLOCK: Primary | ICD-10-CM

## 2024-09-09 DIAGNOSIS — M48.061 SPINAL STENOSIS OF LUMBAR REGION WITH RADICULOPATHY: ICD-10-CM

## 2024-09-09 DIAGNOSIS — E11.42 POLYNEUROPATHY DUE TO TYPE 2 DIABETES MELLITUS: ICD-10-CM

## 2024-09-09 DIAGNOSIS — M54.50 CHRONIC BILATERAL LOW BACK PAIN WITHOUT SCIATICA: ICD-10-CM

## 2024-09-09 DIAGNOSIS — I10 ESSENTIAL HYPERTENSION: ICD-10-CM

## 2024-09-09 DIAGNOSIS — M54.16 SPINAL STENOSIS OF LUMBAR REGION WITH RADICULOPATHY: ICD-10-CM

## 2024-09-09 DIAGNOSIS — G30.1 LATE ONSET ALZHEIMER'S DISEASE WITH BEHAVIORAL DISTURBANCE: ICD-10-CM

## 2024-09-09 DIAGNOSIS — F02.818 LATE ONSET ALZHEIMER'S DISEASE WITH BEHAVIORAL DISTURBANCE: ICD-10-CM

## 2024-09-09 DIAGNOSIS — K31.84 GASTROPARESIS: ICD-10-CM

## 2024-09-09 DIAGNOSIS — G89.29 CHRONIC BILATERAL LOW BACK PAIN WITHOUT SCIATICA: ICD-10-CM

## 2024-09-09 DIAGNOSIS — Z95.0 PRESENCE OF LEADLESS CARDIAC PACEMAKER: ICD-10-CM

## 2024-09-09 PROCEDURE — 1159F MED LIST DOCD IN RCRD: CPT | Mod: CPTII,S$GLB,, | Performed by: STUDENT IN AN ORGANIZED HEALTH CARE EDUCATION/TRAINING PROGRAM

## 2024-09-09 PROCEDURE — 3288F FALL RISK ASSESSMENT DOCD: CPT | Mod: CPTII,S$GLB,, | Performed by: STUDENT IN AN ORGANIZED HEALTH CARE EDUCATION/TRAINING PROGRAM

## 2024-09-09 PROCEDURE — 93010 ELECTROCARDIOGRAM REPORT: CPT | Mod: S$GLB,,, | Performed by: INTERNAL MEDICINE

## 2024-09-09 PROCEDURE — 93005 ELECTROCARDIOGRAM TRACING: CPT | Mod: S$GLB,,, | Performed by: STUDENT IN AN ORGANIZED HEALTH CARE EDUCATION/TRAINING PROGRAM

## 2024-09-09 PROCEDURE — 1126F AMNT PAIN NOTED NONE PRSNT: CPT | Mod: CPTII,S$GLB,, | Performed by: STUDENT IN AN ORGANIZED HEALTH CARE EDUCATION/TRAINING PROGRAM

## 2024-09-09 PROCEDURE — 99999 PR PBB SHADOW E&M-EST. PATIENT-LVL III: CPT | Mod: PBBFAC,,, | Performed by: STUDENT IN AN ORGANIZED HEALTH CARE EDUCATION/TRAINING PROGRAM

## 2024-09-09 PROCEDURE — 1111F DSCHRG MED/CURRENT MED MERGE: CPT | Mod: CPTII,S$GLB,, | Performed by: STUDENT IN AN ORGANIZED HEALTH CARE EDUCATION/TRAINING PROGRAM

## 2024-09-09 PROCEDURE — 1157F ADVNC CARE PLAN IN RCRD: CPT | Mod: CPTII,S$GLB,, | Performed by: STUDENT IN AN ORGANIZED HEALTH CARE EDUCATION/TRAINING PROGRAM

## 2024-09-09 PROCEDURE — 1100F PTFALLS ASSESS-DOCD GE2>/YR: CPT | Mod: CPTII,S$GLB,, | Performed by: STUDENT IN AN ORGANIZED HEALTH CARE EDUCATION/TRAINING PROGRAM

## 2024-09-09 PROCEDURE — 99214 OFFICE O/P EST MOD 30 MIN: CPT | Mod: S$GLB,,, | Performed by: STUDENT IN AN ORGANIZED HEALTH CARE EDUCATION/TRAINING PROGRAM

## 2024-09-09 RX ORDER — PEN NEEDLE, DIABETIC, SAFETY 30 GX3/16"
NEEDLE, DISPOSABLE MISCELLANEOUS
COMMUNITY
Start: 2024-05-15

## 2024-09-09 NOTE — PROGRESS NOTES
Electrophysiology Clinic Note    Reason for follow-up patient visit: Ongoing evaluation and routine device surveillance of a Medtronic Micra AV leadless pacemaker on 10/21/2022, implanted in the setting of second degree AV block, Mobitz type II and periods of complete heart block..     PRESENTING HISTORY:     History of Present Illness:  Ms. Quin Linn is a chandler 89-year-old woman who returns to clinic today for ongoing evaluation and routine device surveillance of a Medtronic Micra AV leadless pacemaker, implanted on 10/21/2022 in the setting of second degree AV block, Mobitz type II, and periods of complete heart block. She has a past medical history significant for Alzheimer's dementia, hypertension, hyperlipidemia, type II diabetes mellitus, hypothyroidism, osteoarthritis, CKD stage IV, a prior CVA, and glaucoma and was previously admitted in the setting of a prolonged COVID admission with periods of second degree AV block, Mobitz type I. She was discharged to a SNF, but unfortunately continued to have periods of symptomatic bradycardia, with reported rates in the 20s. She was subsequently admitted with periods of second degree AV block, Mobitz type II, in addition to periods of complete heart block. As she would be unable to comply with arm restrictions, she underwent successful implantation of a Medtronic Micra AV leadless pacemaker on 10/21/2022.     She returns to clinic today with her adult son and her care-giver at home. In discussion with Ms. Linn today, she tells me that she is feeling overall quite well. She denies any episodes of dizziness, lightheadedness, syncope/presyncope, palpitations, chest pain or chest discomfort, nausea or vomiting, orthopnea, or PND. Her son reports that she has recovered well following her Micra implantation. She has been able to resumed ambulation with her rolling walker for short distances, and her wheelchair for farther distances. She reports baseline mild shortness  of breath and dyspnea with exertion that her son reports has remained stable. She cannot climb a flight of stairs secondary to osteoarthritis and postural instability. Her son tells me that recently she was admitted with altered mental status, and was treated with oral antibiotics for a urinary tract infection. He reports that she has returned to her baseline level of cognitive function.     Review of Systems:  Review of Systems   Constitutional:  Negative for activity change.   HENT:  Positive for hearing loss. Negative for nasal congestion, nosebleeds, postnasal drip, rhinorrhea, sinus pressure/congestion, sneezing and sore throat.    Respiratory:  Positive for shortness of breath. Negative for apnea, cough, chest tightness and wheezing.    Cardiovascular:  Negative for chest pain, palpitations and claudication.   Gastrointestinal:  Negative for abdominal distention, abdominal pain, blood in stool, change in bowel habit, constipation, diarrhea, nausea and vomiting.   Genitourinary:  Positive for bladder incontinence. Negative for dysuria and hematuria.   Musculoskeletal:  Positive for arthralgias, back pain and gait problem.   Neurological:  Positive for memory loss. Negative for dizziness, seizures, syncope, weakness, light-headedness, headaches and coordination difficulties.        PAST HISTORY:     Past Medical History:   Diagnosis Date    Acute hypoxemic respiratory failure 9/2/2023    Acute on chronic diastolic congestive heart failure 10/19/2023    Alzheimer's disease     Anemia     Anemia 5/14/2024    Arthritis     lumbar    Back pain     Cancer     colon    Cataract     Colon cancer     Diabetes mellitus type II     Glaucoma     Hyperlipidemia     Hypertension     Hyperthyroidism     Hypothyroidism following radioiodine therapy     Pacemaker     Pneumonia     Pneumonia due to other staphylococcus     Renal manifestation of secondary diabetes mellitus     Squamous cell carcinoma     Stroke     TIA    Type 2  diabetes mellitus with stage 3 chronic kidney disease and hypertension 10/12/2017    Type 2 diabetes with peripheral circulatory disorder, controlled        Past Surgical History:   Procedure Laterality Date    CATARACT EXTRACTION W/  INTRAOCULAR LENS IMPLANT Left 09/13/2017        CHOLECYSTECTOMY      COLON SURGERY  2001    Colon CA    EYE SURGERY      cataract removal left side    IMPLANTATION OF LEADLESS PACEMAKER N/A 10/21/2022    Procedure: [id number];  Surgeon: JOSE Burgos MD;  Location: [facility];  Service: Cardiology;  Laterality: N/A;  EMELYN, MD JOHNNAT, ANES, EH,     squamous cell ca of face         Family History:  Family History   Problem Relation Name Age of Onset    Heart disease Mother      Arthritis Mother      Kidney disease Father      Alzheimer's disease Sister      Hypertension Sister      Cancer Sister          ovarian    No Known Problems Daughter Khalida     Ankylosing spondylitis Daughter Elizabeth     Lupus Daughter Elizabeht     Kidney disease Daughter Janae     Heart disease Daughter Janae     Atrial fibrillation Daughter Janae     Supraventricular tachycardia Daughter Janae     Hypertension Son Mayito     Hyperlipidemia Son Mayito     Asthma Son Mayito     Cancer Paternal Uncle      Diabetes Maternal Grandmother      Amblyopia Neg Hx      Blindness Neg Hx      Cataracts Neg Hx      Glaucoma Neg Hx      Macular degeneration Neg Hx      Retinal detachment Neg Hx      Strabismus Neg Hx      Stroke Neg Hx      Thyroid disease Neg Hx         Social History:  She  reports that she has never smoked. She has never used smokeless tobacco. She reports that she does not drink alcohol and does not use drugs.      MEDICATIONS & ALLERGIES:     Review of patient's allergies indicates:   Allergen Reactions    Tramadol Rash and Edema     Developed facial rash and edema.    Metformin Diarrhea       Current Outpatient Medications on File Prior to Visit   Medication Sig  "Dispense Refill    ACCU-CHEK GUIDE TEST STRIPS Strp 4 (four) times daily.      apixaban (ELIQUIS) 2.5 mg Tab Take 1 tablet (2.5 mg total) by mouth 2 (two) times daily. 60 tablet 5    atorvastatin (LIPITOR) 80 MG tablet TAKE 1 TABLET(80 MG) BY MOUTH EVERY DAY 90 tablet 3    calcium-vitamin D3 (CALCIUM 500 + D) 500 mg-5 mcg (200 unit) per tablet Take 1 tablet by mouth 2 (two) times daily with meals. (Patient not taking: Reported on 8/26/2024) 60 tablet 11    carvediloL (COREG) 3.125 MG tablet Take 1 tablet (3.125 mg total) by mouth 2 (two) times daily. 180 tablet 3    folic acid (FOLVITE) 1 MG tablet Take 1 tablet (1 mg total) by mouth once daily. (Patient not taking: Reported on 8/26/2024) 30 tablet 11    HYDROcodone-acetaminophen (NORCO) 5-325 mg per tablet Take 1 tablet by mouth every 8 (eight) hours as needed for Pain. 90 tablet 0    levothyroxine (SYNTHROID) 75 MCG tablet Take 1 tablet (75 mcg total) by mouth before breakfast. Administer on an empty stomach at least 30 minutes before food or other medications. 90 tablet 3    polyethylene glycol (GLYCOLAX) 17 gram PwPk Take 17 g by mouth once daily. (Patient taking differently: Take 17 g by mouth as needed.)      QUEtiapine (SEROQUEL) 25 MG Tab Take 1 to 2 tablets by mouth every night at bedtime for sleep and agitation 60 tablet 5    senna-docusate 8.6-50 mg (PERICOLACE) 8.6-50 mg per tablet Take 1 tablet by mouth 2 (two) times daily as needed for Constipation.       No current facility-administered medications on file prior to visit.        OBJECTIVE:     Vital Signs:  BP (!) 154/60   Pulse 60   Ht 5' 5" (1.651 m)   Wt 59 kg (130 lb)   LMP  (LMP Unknown)   BMI 21.63 kg/m²     Physical Exam:  Physical Exam  Constitutional:       General: She is not in acute distress.     Appearance: Normal appearance. She is not ill-appearing or diaphoretic.      Comments: Well-appearing elderly woman presenting with a rolling walker.    HENT:      Head: Normocephalic and " atraumatic.      Nose: Nose normal.      Mouth/Throat:      Mouth: Mucous membranes are moist.      Pharynx: Oropharynx is clear.   Eyes:      Pupils: Pupils are equal, round, and reactive to light.   Cardiovascular:      Rate and Rhythm: Normal rate and regular rhythm.      Pulses: Normal pulses.      Heart sounds: Normal heart sounds. No murmur heard.     No friction rub. No gallop.   Pulmonary:      Effort: Pulmonary effort is normal. No respiratory distress.      Breath sounds: Normal breath sounds. No wheezing, rhonchi or rales.   Chest:      Chest wall: No tenderness.   Abdominal:      General: There is no distension.      Palpations: Abdomen is soft.      Tenderness: There is no abdominal tenderness.   Musculoskeletal:         General: No swelling or tenderness.      Cervical back: Normal range of motion.      Right lower leg: No edema.      Left lower leg: No edema.   Skin:     General: Skin is warm and dry.      Findings: No erythema, lesion or rash.   Neurological:      General: No focal deficit present.      Mental Status: She is alert and oriented to person, place, and time. Mental status is at baseline.      Motor: No weakness.      Gait: Gait normal.   Psychiatric:         Mood and Affect: Mood normal.         Behavior: Behavior normal.        Laboratory Data:  Lab Results   Component Value Date    WBC 8.37 08/22/2024    HGB 11.7 (L) 08/22/2024    HCT 34.3 (L) 08/22/2024    MCV 94 08/22/2024     08/22/2024     Lab Results   Component Value Date     (H) 08/22/2024     08/22/2024    K 4.0 08/22/2024     08/22/2024    CO2 25 08/22/2024    BUN 28 08/22/2024    CREATININE 1.4 08/22/2024    CALCIUM 9.0 08/22/2024    MG 1.9 08/22/2024     Lab Results   Component Value Date    INR 1.0 07/30/2024    INR 1.1 10/21/2022    INR 1.1 09/25/2022       Pertinent Cardiac Data:  Personal interpretation of today's ECG: Sinus rhythm with complete heart block with atrial-sensed, ventricular-paced  rhythm, rate of 60 bpm, paced  ms, QT/QTc 492 ms, nonspecific STT changes, LVH.     Resting 2D Transthoracic Echocardiogram - 9/27/2022:  The left ventricle is normal in size with normal systolic function. The estimated ejection fraction is 60%.  Normal right ventricular size with normal right ventricular systolic function.  Indeterminate left ventricular diastolic function.  Biatrial enlargement.  The ascending aorta is mildly dilated.  Mild tricuspid regurgitation.  There is mild aortic valve stenosis. Aortic valve area is 1.70 cm2; peak velocity is 1.88 m/s; mean gradient is 7 mmHg.  The estimated PA systolic pressure is 31 mmHg.  Normal central venous pressure (3 mmHg).    Resting 2D Transthoracic Echocardiogram - 9/2/2023:    Left Ventricle: The left ventricle is mildly dilated. Increased wall thickness. Normal wall motion. There is normal systolic function with a visually estimated ejection fraction of 55 - 60%. Grade II diastolic dysfunction.    Right Ventricle: Normal right ventricular cavity size. Right ventricle wall motion  is normal. Systolic function is normal.    Left Atrium: Left atrium is severely dilated.    Right Atrium: Right atrium is dilated.    Mitral Valve: There is mild regurgitation.    IVC/SVC: Patient is ventilated, cannot use IVC diameter to estimate right atrial pressure.    Pericardium: There is a small circumferential effusion. Prominent echogenic material is adjacent or adherent to the visceral pericardium which carries a broad differential including but not limited to pericardial fat, fibrous material and malignancy. Clinical correlation is advised. No convincing findings to suggest tamponade. Left pleural effusion.    Aorta: Aortic root is normal in size measuring 3.34 cm. Ascending aorta is mildly dilated measuring 3.81 cm.    Resting 2D Transthoracic Echocardiogram - 9/5/2023:    Left Ventricle: The left ventricle is normal in size. Normal wall thickness. Normal wall  motion. There is normal diastolic function.    Left Atrium: Left atrium is moderately dilated.    Right Ventricle: Normal right ventricular cavity size. Wall thickness is normal. Right ventricle wall motion  is normal. Systolic function is normal.    Right Atrium: Right atrium is mildly dilated.    Aortic Valve: There is mild aortic valve sclerosis.    Pulmonary Artery: No pulmonary hypertension. The estimated pulmonary artery systolic pressure is 39 mmHg.    IVC/SVC: Elevated venous pressure at 15 mmHg.    Pericardium: There is a small circumferential effusion with small-moderate posteriorly and apical- laterally. No indication of cardiac tamponade. Evidence includes no respiratory transvalvular variation. Bilateral pleural effusions.    Resting 2D Transthoracic Echocardiogram - 1/29/2024:    Left Ventricle: The left ventricle is normal in size. Normal wall thickness. Regional wall motion abnormalities present. See diagram for wall motion findings. There is normal systolic function with a visually estimated ejection fraction of 55 - 60%. Ejection fraction by visual approximation is 58%. There is normal diastolic function.    Right Ventricle: Normal right ventricular cavity size. Wall thickness is normal. Right ventricle wall motion  is normal. Systolic function is normal.    Aortic Valve: There is moderate aortic valve sclerosis. There is moderate annular calcification present.    Mitral Valve: There is mild bileaflet sclerosis. There is mild regurgitation.    Pulmonary Artery: The estimated pulmonary artery systolic pressure is 35 mmHg.    IVC/SVC: Intermediate venous pressure at 8 mmHg.    Resting 2D Transthoracic Echocardiogram - 9/9/2024:    Left Ventricle: The left ventricle is normal in size. Ventricular mass is normal. Normal wall thickness. There is concentric remodeling. Normal wall motion. There is low normal systolic function with a visually estimated ejection fraction of 50 - 55%. Ejection fraction by  visual approximation is 50%. Biplane (2D) method of discs ejection fraction is 50%. Global longitudinal strain is -11.1%. Unable to assess diastolic function due to atrial fibrillation.    Right Ventricle: Normal right ventricular cavity size. Wall thickness is normal. Systolic function is normal.    Aortic Valve: There is mild aortic valve sclerosis. There is moderate annular calcification present. Mildly restricted motion.    Mitral Valve: There is mild regurgitation.    Tricuspid Valve: There is moderate regurgitation.    Pulmonic Valve: There is mild regurgitation.    Pulmonary Artery: The estimated pulmonary artery systolic pressure is 25 mmHg.    IVC/SVC: Normal venous pressure at 3 mmHg.    Personal interpretation of today's Device Interrogation: Personal review of her device interrogation report (Medtronic Micra AV leadless pacemaker, implanted 10/21/2022) reveals adequate battery longevity with an estimated >8 years of battery life remaining. She has underlying sinus rhythm with first degree AV block and periods of complete heart block is presently AS-. She is predominately AM- 81.9% with  16.3%. Her electrode was interrogated with acceptable and stable parameters. There are no concerning VHR episodes noted, with no recorded arrhythmias.       ASSESSMENT & PLAN:   Ms. Quin Linn is a chandler 89-year-old woman who returns to clinic today for ongoing evaluation and routine device surveillance of a Medtronic Micra AV leadless pacemaker, implanted on 10/21/2022 in the setting of second degree AV block, Mobitz type II, and periods of complete heart block. She has a past medical history significant for Alzheimer's dementia, hypertension, hyperlipidemia, type II diabetes mellitus, hypothyroidism, osteoarthritis, CKD stage IV, a prior CVA, and glaucoma and was previously admitted in the setting of a prolonged COVID admission with periods of second degree AV block, Mobitz type I. She was discharged to a  SNF, but unfortunately continued to have periods of symptomatic bradycardia, with reported rates in the 20s. She was subsequently admitted with periods of second degree AV block, Mobitz type II, in addition to periods of complete heart block. As she would be unable to comply with arm restrictions, she underwent successful implantation of a Medtronic Micra AV leadless pacemaker on 10/21/2022.     - She has a normally functioning Micra AV leadless pacemaker on device interrogation today. She is presently in sinus rhythm with complete heart block and is presently AS-. She has developed increased periods of complete heart block, and is now rarely intrinsically conducting, with predominate AM- 81.9% with  16.3%. Her electrode was interrogated with acceptable and stable parameters. There are no concerning VHR episodes noted, with no recorded ventricular arrhythmias. Her paced QRSd remains wide at 170ms, with preserved systolic function noted on her most recent echocardiogram. There is no present indication for device upgrade.   - She has a history of a prior event of paroxysmal atrial fibrillation noted during an admission with a urinary tract infection, and remains on uninterrupted oral anticoagulation with apixaban 2.5mg po BID with no adverse bleeding events reported.    - She will continue to send remote transmissions, with her next in-office device interrogation in six months.   - She will continue to follow with her PCP and general cardiology for ongoing management of her comorbid cardiac conditions.      This patient will return to clinic with me in six months with continued remote transmissions in the interim. All questions and concerns were addressed at this encounter. Total time spent in this patient encounter: 31 minutes.     Signing Physician:       KORINA Burgos MD  Electrophysiology Attending

## 2024-09-10 LAB
OHS CV DC REMOTE DEVICE TYPE: NORMAL
OHS CV RV PACING PERCENT: 98.8 %
OHS QRS DURATION: 170 MS
OHS QTC CALCULATION: 492 MS

## 2024-09-11 PROBLEM — I44.2 COMPLETE HEART BLOCK: Status: ACTIVE | Noted: 2024-09-11

## 2024-09-16 ENCOUNTER — DOCUMENT SCAN (OUTPATIENT)
Dept: HOME HEALTH SERVICES | Facility: HOSPITAL | Age: 89
End: 2024-09-16
Payer: MEDICARE

## 2024-09-17 ENCOUNTER — DOCUMENT SCAN (OUTPATIENT)
Dept: HOME HEALTH SERVICES | Facility: HOSPITAL | Age: 89
End: 2024-09-17

## 2024-09-17 ENCOUNTER — DOCUMENT SCAN (OUTPATIENT)
Dept: HOME HEALTH SERVICES | Facility: HOSPITAL | Age: 89
End: 2024-09-17
Payer: MEDICARE

## 2024-09-17 ENCOUNTER — TELEPHONE (OUTPATIENT)
Dept: UROLOGY | Facility: CLINIC | Age: 89
End: 2024-09-17
Payer: MEDICARE

## 2024-09-17 NOTE — PROGRESS NOTES
Spoke to daughter she states that her mother is urinating without difficulty both in comode and diaper at night time.  Appointmetn changed to virtual visit.  M estefania lpn

## 2024-09-26 ENCOUNTER — OFFICE VISIT (OUTPATIENT)
Dept: INTERNAL MEDICINE | Facility: CLINIC | Age: 89
End: 2024-09-26
Payer: MEDICARE

## 2024-09-26 DIAGNOSIS — N18.4 CHRONIC KIDNEY DISEASE, STAGE IV (SEVERE): ICD-10-CM

## 2024-09-26 DIAGNOSIS — G30.1 LATE ONSET ALZHEIMER'S DISEASE WITH BEHAVIORAL DISTURBANCE: Primary | ICD-10-CM

## 2024-09-26 DIAGNOSIS — E11.42 TYPE 2 DIABETES MELLITUS WITH DIABETIC POLYNEUROPATHY, WITHOUT LONG-TERM CURRENT USE OF INSULIN: ICD-10-CM

## 2024-09-26 DIAGNOSIS — F02.818 LATE ONSET ALZHEIMER'S DISEASE WITH BEHAVIORAL DISTURBANCE: Primary | ICD-10-CM

## 2024-09-26 PROCEDURE — 1159F MED LIST DOCD IN RCRD: CPT | Mod: CPTII,95,, | Performed by: INTERNAL MEDICINE

## 2024-09-26 PROCEDURE — 1157F ADVNC CARE PLAN IN RCRD: CPT | Mod: CPTII,95,, | Performed by: INTERNAL MEDICINE

## 2024-09-26 PROCEDURE — 1160F RVW MEDS BY RX/DR IN RCRD: CPT | Mod: CPTII,95,, | Performed by: INTERNAL MEDICINE

## 2024-09-26 PROCEDURE — 99213 OFFICE O/P EST LOW 20 MIN: CPT | Mod: 95,,, | Performed by: INTERNAL MEDICINE

## 2024-09-26 PROCEDURE — G2211 COMPLEX E/M VISIT ADD ON: HCPCS | Mod: 95,,, | Performed by: INTERNAL MEDICINE

## 2024-09-26 RX ORDER — QUETIAPINE FUMARATE 50 MG/1
75 TABLET, FILM COATED ORAL NIGHTLY
Qty: 135 TABLET | Refills: 3 | Status: SHIPPED | OUTPATIENT
Start: 2024-09-26 | End: 2025-09-26

## 2024-09-26 NOTE — PROGRESS NOTES
The patient location is: [facility]  The chief complaint leading to consultation is: general f/u    Visit type: audiovisual    Face to Face time with patient: 16  20 minutes of total time spent on the encounter, which includes face to face time and non-face to face time preparing to see the patient (eg, review of tests), Obtaining and/or reviewing separately obtained history, Documenting clinical information in the electronic or other health record, Independently interpreting results (not separately reported) and communicating results to the patient/family/caregiver, or Care coordination (not separately reported).         Each patient to whom he or she provides medical services by telemedicine is:  (1) informed of the relationship between the physician and patient and the respective role of any other health care provider with respect to management of the patient; and (2) notified that he or she may decline to receive medical services by telemedicine and may withdraw from such care at any time.    Notes:  Subjective     Patient ID: Quin Linn is a 89 y.o. female.    Chief Complaint: No chief complaint on file.    HPI   Pt is doing better - eating independently.    More engaged, speaking more.    Some sundowning - she has a hosp bed, but can arise on far end of side rail.       She is taking Seroquel - 25-50- mg.    Urinary catheter has been removed and she is urinating normally.    DM has been controlled.         Review of Systems   Constitutional:  Negative for activity change and unexpected weight change.   HENT:  Positive for hearing loss and rhinorrhea. Negative for trouble swallowing.    Eyes:  Negative for discharge and visual disturbance.   Respiratory:  Negative for chest tightness and wheezing.    Cardiovascular:  Negative for chest pain and palpitations.   Gastrointestinal:  Negative for blood in stool, constipation, diarrhea and vomiting.   Endocrine: Negative for polydipsia and polyuria.   Genitourinary:   Negative for difficulty urinating, dysuria, hematuria and menstrual problem.   Musculoskeletal:  Negative for arthralgias, joint swelling and neck pain.   Neurological:  Positive for weakness. Negative for headaches.   Psychiatric/Behavioral:  Positive for confusion. Negative for dysphoric mood.           Objective     Physical Exam  Constitutional:       Appearance: Normal appearance. She is not ill-appearing or diaphoretic.   Neurological:      Mental Status: She is alert.   Psychiatric:         Mood and Affect: Mood normal.         Behavior: Behavior normal.            Assessment and Plan     1. Late onset Alzheimer's disease with behavioral disturbance    2. Chronic kidney disease, stage IV (severe)  -     CBC Without Differential; Future; Expected date: 09/26/2024    3. Type 2 diabetes mellitus with diabetic polyneuropathy, without long-term current use of insulin  -     Hemoglobin A1C; Future; Expected date: 09/26/2024  -     Basic Metabolic Panel; Future; Expected date: 09/26/2024    Other orders  -     QUEtiapine (SEROQUEL) 50 MG tablet; Take 1.5 tablets (75 mg total) by mouth every evening.  Dispense: 135 tablet; Refill: 3          Incr seroquel to 75 mg q hs, as helpful, and well tolerated in past.     Flu and covid vax  Follow up in about 4 months (around 1/26/2025) for labs before appt.

## 2024-10-03 ENCOUNTER — PATIENT MESSAGE (OUTPATIENT)
Dept: INTERNAL MEDICINE | Facility: CLINIC | Age: 89
End: 2024-10-03
Payer: MEDICARE

## 2024-10-03 DIAGNOSIS — G89.29 OTHER CHRONIC PAIN: ICD-10-CM

## 2024-10-03 RX ORDER — HYDROCODONE BITARTRATE AND ACETAMINOPHEN 5; 325 MG/1; MG/1
1 TABLET ORAL EVERY 8 HOURS PRN
Qty: 90 TABLET | Refills: 0 | Status: SHIPPED | OUTPATIENT
Start: 2024-10-03

## 2024-10-03 NOTE — TELEPHONE ENCOUNTER
Care Due:                  Date            Visit Type   Department     Provider  --------------------------------------------------------------------------------                                ESTABLISHED                              PATIENT -    [facility] INTERNAL  Last Visit: 09-      VIRTUAL      MEDICINE       SHEILA SCHAEFFER                              EP -                              PRIMARY      [facility] INTERNAL  Next Visit: 01-      CARE ([facility])   MEDICINE       SHEILA SCHAEFFER                                                            Last  Test          Frequency    Reason                     Performed    Due Date  --------------------------------------------------------------------------------    Lipid Panel.  12 months..  atorvastatin.............  02- 02-    [facility] Embedded Care Due Messages. Reference number: [id number].   10/03/2024 10:47:42 AM CDT

## 2024-10-31 ENCOUNTER — PATIENT MESSAGE (OUTPATIENT)
Dept: NEUROLOGY | Facility: CLINIC | Age: 89
End: 2024-10-31
Payer: MEDICARE

## 2024-10-31 ENCOUNTER — TELEPHONE (OUTPATIENT)
Dept: HOME HEALTH SERVICES | Facility: CLINIC | Age: 89
End: 2024-10-31
Payer: MEDICARE

## 2024-10-31 ENCOUNTER — PATIENT MESSAGE (OUTPATIENT)
Dept: INTERNAL MEDICINE | Facility: CLINIC | Age: 89
End: 2024-10-31
Payer: MEDICARE

## 2024-10-31 DIAGNOSIS — E11.42 TYPE 2 DIABETES MELLITUS WITH DIABETIC POLYNEUROPATHY, WITHOUT LONG-TERM CURRENT USE OF INSULIN: ICD-10-CM

## 2024-10-31 DIAGNOSIS — E89.0 HYPOTHYROIDISM FOLLOWING RADIOIODINE THERAPY: Primary | Chronic | ICD-10-CM

## 2024-10-31 DIAGNOSIS — I10 PRIMARY HYPERTENSION: ICD-10-CM

## 2024-10-31 DIAGNOSIS — Z85.038 PERSONAL HISTORY OF COLON CANCER, STAGE I: ICD-10-CM

## 2024-10-31 DIAGNOSIS — G30.1 LATE ONSET ALZHEIMER'S DISEASE WITH BEHAVIORAL DISTURBANCE: Primary | ICD-10-CM

## 2024-10-31 DIAGNOSIS — F02.818 LATE ONSET ALZHEIMER'S DISEASE WITH BEHAVIORAL DISTURBANCE: Primary | ICD-10-CM

## 2024-10-31 NOTE — TELEPHONE ENCOUNTER
Refill Routing Note   Medication(s) are not appropriate for processing by [facility] for the following reason(s):        No active prescription written by provider    [facility] action(s):  Defer               Appointments  past 12m or future 3m with PCP    Date Provider   Last Visit   9/26/2024 Mary Ellen Nesbitt MD   Next Visit   1/27/2025 Mary Ellen Nesbitt MD   ED visits in past 90 days: 0        Note composed:5:12 PM 10/31/2024

## 2024-10-31 NOTE — TELEPHONE ENCOUNTER
No care due was identified.  [facility] Embedded Care Due Messages. Reference number: [id number].   10/31/2024 4:34:21 PM CDT

## 2024-11-04 RX ORDER — BLOOD SUGAR DIAGNOSTIC
STRIP MISCELLANEOUS
Qty: 400 STRIP | Refills: 3 | Status: SHIPPED | OUTPATIENT
Start: 2024-11-04

## 2024-11-19 ENCOUNTER — PATIENT MESSAGE (OUTPATIENT)
Dept: HOME HEALTH SERVICES | Facility: CLINIC | Age: 89
End: 2024-11-19
Payer: MEDICARE

## 2024-11-20 ENCOUNTER — CARE AT HOME (OUTPATIENT)
Dept: HOME HEALTH SERVICES | Facility: CLINIC | Age: 89
End: 2024-11-20
Payer: MEDICARE

## 2024-11-20 VITALS
RESPIRATION RATE: 16 BRPM | DIASTOLIC BLOOD PRESSURE: 62 MMHG | HEART RATE: 61 BPM | SYSTOLIC BLOOD PRESSURE: 124 MMHG | OXYGEN SATURATION: 97 %

## 2024-11-20 DIAGNOSIS — I10 PRIMARY HYPERTENSION: ICD-10-CM

## 2024-11-20 DIAGNOSIS — G30.1 LATE ONSET ALZHEIMER'S DISEASE WITH BEHAVIORAL DISTURBANCE: Chronic | ICD-10-CM

## 2024-11-20 DIAGNOSIS — F02.818 LATE ONSET ALZHEIMER'S DISEASE WITH BEHAVIORAL DISTURBANCE: Chronic | ICD-10-CM

## 2024-11-20 DIAGNOSIS — E89.0 HYPOTHYROIDISM FOLLOWING RADIOIODINE THERAPY: Chronic | ICD-10-CM

## 2024-11-20 DIAGNOSIS — Z23 FLU VACCINE NEED: Primary | ICD-10-CM

## 2024-11-20 DIAGNOSIS — R53.81 DEBILITY: ICD-10-CM

## 2024-11-20 DIAGNOSIS — Z23 ENCOUNTER FOR PREVNAR PNEUMOCOCCAL VACCINATION: ICD-10-CM

## 2024-11-20 DIAGNOSIS — Z85.038 PERSONAL HISTORY OF COLON CANCER, STAGE I: ICD-10-CM

## 2024-11-20 PROCEDURE — 1159F MED LIST DOCD IN RCRD: CPT | Mod: CPTII,S$GLB,, | Performed by: NURSE PRACTITIONER

## 2024-11-20 PROCEDURE — 90677 PCV20 VACCINE IM: CPT | Mod: S$GLB,,, | Performed by: NURSE PRACTITIONER

## 2024-11-20 PROCEDURE — 1157F ADVNC CARE PLAN IN RCRD: CPT | Mod: CPTII,S$GLB,, | Performed by: NURSE PRACTITIONER

## 2024-11-20 PROCEDURE — 1126F AMNT PAIN NOTED NONE PRSNT: CPT | Mod: CPTII,S$GLB,, | Performed by: NURSE PRACTITIONER

## 2024-11-20 PROCEDURE — 99348 HOME/RES VST EST LOW MDM 30: CPT | Mod: S$GLB,,, | Performed by: NURSE PRACTITIONER

## 2024-11-20 PROCEDURE — 1160F RVW MEDS BY RX/DR IN RCRD: CPT | Mod: CPTII,S$GLB,, | Performed by: NURSE PRACTITIONER

## 2024-11-20 PROCEDURE — 90653 IIV ADJUVANT VACCINE IM: CPT | Mod: S$GLB,,, | Performed by: NURSE PRACTITIONER

## 2024-11-20 PROCEDURE — G0009 ADMIN PNEUMOCOCCAL VACCINE: HCPCS | Mod: S$GLB,,, | Performed by: NURSE PRACTITIONER

## 2024-11-20 PROCEDURE — G0008 ADMIN INFLUENZA VIRUS VAC: HCPCS | Mod: S$GLB,,, | Performed by: NURSE PRACTITIONER

## 2024-11-20 NOTE — PROGRESS NOTES
[facility]  Medical Chronic Care Home Visit    Encounter Provider: Tianna Yoo   PCP: Mary Ellen Nesbitt MD  Consult Requested By: Tianna Yoo    HISTORY OF PRESENT ILLNESS      Patient ID: Quin Linn is a 89 y.o. female is being seen in the home due to physical debility that presents a taxing effort to leave the home, to mitigate high risk of hospital readmission and/or due to the limited availability of reliable or safe options for transportation to the point of access to the provider. Prior to treatment on this visit the chart was reviewed and patient verbal consent was obtained.    Chronic medical conditions synopsis:        DECISION MAKING TODAY       Assessment & Plan:  1. Flu vaccine need  Assessment & Plan:  Need for flu vaccine due to current flu season, high risk for flu transmission.    Family denies any allergies, reactions to previous vaccines  Denies neurologic reactions to vaccines  No egg allergies  No recent illness or fever in past 48 hrs, Not a transplant patient  Verbal consent obtained from patient to administer flu vaccine  Vaccine given to right arm after consent obtained  Pt tolerated well  Patient assessed for 15 minutes following administration of Flu vaccine.      Orders:  -     influenza (adjuvanted) (Fluad) 45 mcg/0.5 mL IM vaccine (> or = 64 yo) 0.5 mL    2. Hypothyroidism following radioiodine therapy  Assessment & Plan:  Continue current regimen    Orders:  -     Ambulatory referral/consult to [facility]    3. Primary hypertension  Assessment & Plan:  Chronic and stable  BP at goal today  Continue current plan    Orders:  -     Ambulatory referral/consult to [facility]    4. Personal history of colon cancer, stage I  -     Ambulatory referral/consult to [facility]    5. Encounter for Prevnar pneumococcal vaccination  -     pneumoc 20-mercedes conj-dip cr(PF) (PREVNAR-20 (PF)) injection Syrg 0.5 mL    6. Late  onset Alzheimer's disease with behavioral disturbance  Assessment & Plan:  Chronic  Mild, caregiver reports pt is at her baseline  Seroquel at bedtime is effective  Continue support care      7. Debility  Assessment & Plan:  Chronic  Recently in rehab  Daughter has declined hospice  Supportive care in place             ENVIRONMENT OF CARE      Family and/or Caregiver present at visit?  Yes  Name of Caregiver: Pd caregiver, daughter Janae  History provided by: caregiver    4Ms for Medical Decision-Making in Older Adults    Last Completed EAWV: 3/20/2023    MOBILITY:  Get Up and Go:      9/8/2020    12:00 PM 3/7/2019     2:36 PM 2/15/2018     7:49 AM 2/20/2017    11:05 AM   Get Up and Go   Trial 1 -- 0 seconds 0 seconds 0 seconds     Activities of Daily Living:      3/20/2023     3:27 PM   Activities of Daily Living   Ambulation Assistance Required   Ambulation Assistance Walker (standard)   Dressing Independent   Transfers Independent   Toileting Incontinent of bladder;Incontinent of bowel   Toileting Assistance Wears Briefs   Feeding Independent   Cleaning home/Chores Assistance Required   Telephone use Assistance Required   Shopping Assistance Required   Paying bills Assistance Required   Taking meds Assistance Required   If required, who assists the patient with ADLs? daughter     Whisper Test:      9/8/2020    12:01 PM   Whisper Test   Whisper Test N/A- Hearing Impairment     Disability Status:      3/20/2023     3:29 PM   Disability Status   Are you deaf or do you have serious difficulty hearing? N   Are you blind or do you have serious difficulty seeing, even when wearing glasses? N   Because of a physical, mental, or emotional condition, do you have serious difficulty concentrating, remembering, or making decisions? Y   Do you have serious difficulty walking or climbing stairs? N   Do you have difficulty dressing or bathing? Y   Because of a physical, mental, or emotional condition, do you have difficulty  doing errands alone such as visiting a doctor's office or shopping? Y     Nutrition Screening:      3/20/2023     3:27 PM   Nutrition Screening   Has food intake declined over the past three months due to loss of appetite, digestive problems, chewing or swallowing difficulties? No decrease in food intake   Involuntary weight loss during the last 3 months? No weight loss   Mobility? Able to get out of bed/chair, but does not go out   Has the patient suffered psychological stress or acute disease in the past three months? Yes   Neuropsychological problems? Severe dementia or depression   Body Mass Index (BMI)?  BMI 23 or greater   Screening Score 9   Interpretation At risk of malnutrition    Screening Score: 0-7 Malnourished, 8-11 At Risk, 12-14 Normal  Fall Risk:      2024     3:20 PM 2024     8:40 AM 2024     3:00 PM   Fall Risk Assessment - Outpatient   Mobility Status Wheelchair Bound Ambulatory Ambulatory w/ assistance   Number of falls 2+ 0 1 with injury   Identified as fall risk True False True   Wrist band applied   True           MENTATION:   Depression Patient Health Questionnaire:      2024     2:38 PM   Depression Patient Health Questionnaire   Over the last two weeks how often have you been bothered by little interest or pleasure in doing things Not at all   Over the last two weeks how often have you been bothered by feeling down, depressed or hopeless Not at all   PHQ-2 Total Score 0     Has Dementia Dx: Yes  Has Anxiety Dx: No    Cognitive Function Screenin/20/2017    11:11 AM   Cognitive Function Screening   Mini-Cog 3 Minute Recall --     Cognitive Function Screening Total - Less than 4 = Abnormal,  Greater than or equal to 4 = Normal        MEDICATIONS:  High Risk Medications:  Total Active Medications: 2  HYDROcodone-acetaminophen - 5-325 mg  QUEtiapine - 50 MG    WHAT MATTERS MOST:  Advance Care Planning   ACP Status:   Patient has had an ACP conversation  Living Will:  Yes  Power of : Yes  [place]: No                     Is patient hospice appropriate: Yes  (If needed, use PPS <30 or FAST score >7)  Was referral to hospice placed: No    Impression upon entering the home:  Physical Dwelling: single family home   Appearance of home environment: cleaniness: clean  Functional Status: moderate assistance  Mobility: ambulatory with person assist  Nutritional access: adequate intake and access  Home Health: No, and will be referred today   DME/Supplies: wheelchair     Diagnostic tests reviewed/disposition:   Does patient have a PCP at [facility]? Yes   Repatriation plan with PCP? [facility] at [facility] reason: mobility   Does patient have an ostomy (ileostomy, colostomy, suprapubic catheter, nephrostomy tube, tracheostomy, PEG tube, pleurex catheter, cholecystostomy, etc)? No  Were BPAs reviewed? Yes    Social History     Socioeconomic History    Marital status:     Number of children: 4   Occupational History    Occupation: retired     Employer: RETIRED   Tobacco Use    Smoking status: Never    Smokeless tobacco: Never    Tobacco comments:     smoked for about 4 years in her twenties (socially)   Substance and Sexual Activity    Alcohol use: No    Drug use: No    Sexual activity: Never   Social History Narrative    Daughter - Janae Franklin - knitting, crocheting, sewing, reading     Social Drivers of Health     Financial Resource Strain: Patient Unable To Answer (8/19/2024)    Overall Financial Resource Strain (CARDIA)     Difficulty of Paying Living Expenses: Patient unable to answer   Food Insecurity: Patient Unable To Answer (8/19/2024)    Hunger Vital Sign     Worried About Running Out of Food in the Last Year: Patient unable to answer     Ran Out of Food in the Last Year: Patient unable to answer   Transportation Needs: Patient Unable To Answer (8/19/2024)    TRANSPORTATION NEEDS     Transportation : Patient unable to answer   Physical Activity: Patient Unable To Answer  (8/19/2024)    Exercise Vital Sign     Days of Exercise per Week: Patient unable to answer     Minutes of Exercise per Session: Patient unable to answer   Recent Concern: Physical Activity - Inactive (8/17/2024)    Exercise Vital Sign     Days of Exercise per Week: 0 days     Minutes of Exercise per Session: 0 min   Stress: Patient Unable To Answer (8/19/2024)    [employer] - Occupational Stress Questionnaire     Feeling of Stress : Patient unable to answer   Housing Stability: Patient Unable To Answer (8/19/2024)    Housing Stability Vital Sign     Unable to Pay for Housing in the Last Year: Patient unable to answer     Homeless in the Last Year: Patient unable to answer         OBJECTIVE:     Vital Signs:  Vitals:    11/20/24 1300   BP: 124/62   Pulse: 61   Resp: 16       Review of Systems   Constitutional:  Negative for activity change, fatigue and fever.   HENT:  Positive for hearing loss.    Eyes: Negative.    Respiratory:  Negative for chest tightness.    Cardiovascular: Negative.  Negative for leg swelling.   Gastrointestinal:  Positive for constipation.   Endocrine: Negative.    Genitourinary: Negative.    Musculoskeletal:  Positive for gait problem.   Skin: Negative.    Allergic/Immunologic: Negative.    Hematological: Negative.    Psychiatric/Behavioral:  Positive for confusion and sleep disturbance. Negative for agitation.    All other systems reviewed and are negative.      Physical Exam:  Physical Exam  Vitals reviewed.   Constitutional:       General: She is not in acute distress.     Appearance: She is well-developed.   HENT:      Head: Normocephalic and atraumatic.      Nose: Nose normal.      Mouth/Throat:      Mouth: Mucous membranes are dry.   Eyes:      Pupils: Pupils are equal, round, and reactive to light.   Cardiovascular:      Rate and Rhythm: Normal rate and regular rhythm.      Heart sounds: Normal heart sounds.   Pulmonary:      Effort: Pulmonary effort is  "normal.      Breath sounds: Normal breath sounds.   Abdominal:      General: Bowel sounds are normal.      Palpations: Abdomen is soft.   Musculoskeletal:      Cervical back: Normal range of motion and neck supple.   Skin:     General: Skin is warm and dry.   Neurological:      Mental Status: She is alert. Mental status is at baseline. She is disoriented.      Cranial Nerves: No cranial nerve deficit.   Psychiatric:         Behavior: Behavior normal.         INSTRUCTIONS FOR PATIENT:     Scheduled Follow-up, Appts Reviewed with Modifications if Needed: Yes  Future Appointments   Date Time Provider Department Center   12/12/2024  3:20 PM Nehemias Mcgee MD [facility] NEURO8 [place] [facility]   1/27/2025 10:00 AM LAB, APPOINTMENT [facility] INTMED [facility] LAB IM [facility]   1/27/2025 10:30 AM Mary Ellen Nesbitt MD [facility]         Current Outpatient Medications:     apixaban (ELIQUIS) 2.5 mg Tab, Take 1 tablet (2.5 mg total) by mouth 2 (two) times daily., Disp: 60 tablet, Rfl: 5    atorvastatin (LIPITOR) 80 MG tablet, TAKE 1 TABLET(80 MG) BY MOUTH EVERY DAY, Disp: 90 tablet, Rfl: 3    BD AUTOSHIELD DUO PEN NEEDLE 30 gauge x 3/16" Ndle, , Disp: , Rfl:     blood sugar diagnostic (ACCU-CHEK GUIDE TEST STRIPS) Str, TEST BLOOD SUGAR FOUR TIMES DAILY OR AS DIRECTED, Disp: 400 strip, Rfl: 3    calcium-vitamin D3 (CALCIUM 500 + D) 500 mg-5 mcg (200 unit) per tablet, Take 1 tablet by mouth 2 (two) times daily with meals., Disp: 60 tablet, Rfl: 11    carvediloL (COREG) 3.125 MG tablet, Take 1 tablet (3.125 mg total) by mouth 2 (two) times daily., Disp: 180 tablet, Rfl: 3    folic acid (FOLVITE) 1 MG tablet, Take 1 tablet (1 mg total) by mouth once daily., Disp: 30 tablet, Rfl: 11    HYDROcodone-acetaminophen (NORCO) 5-325 mg per tablet, Take 1 tablet by mouth every 8 (eight) hours as needed for Pain., Disp: 90 tablet, Rfl: 0    levothyroxine (SYNTHROID) 75 MCG tablet, Take 1 tablet (75 mcg total) by mouth before breakfast. " Administer on an empty stomach at least 30 minutes before food or other medications., Disp: 90 tablet, Rfl: 3    polyethylene glycol (GLYCOLAX) 17 gram PwPk, Take 17 g by mouth once daily. (Patient taking differently: Take 17 g by mouth as needed.), Disp: , Rfl:     QUEtiapine (SEROQUEL) 50 MG tablet, Take 1.5 tablets (75 mg total) by mouth every evening., Disp: 135 tablet, Rfl: 3    senna-docusate 8.6-50 mg (PERICOLACE) 8.6-50 mg per tablet, Take 1 tablet by mouth 2 (two) times daily as needed for Constipation., Disp: , Rfl:     Medication Reconciliation:  Were medications changed during this appointment? No  Were medications in the home? Yes  Is the patient taking the medications as directed? Yes  Does the patient/caregiver understand the medications and changes? Yes  Does updated med list accurately reflects meds patient is currently taking? Yes    Signature: Tianna Yoo NP

## 2024-11-20 NOTE — ASSESSMENT & PLAN NOTE
Need for flu vaccine due to current flu season, high risk for flu transmission.    Family denies any allergies, reactions to previous vaccines  Denies neurologic reactions to vaccines  No egg allergies  No recent illness or fever in past 48 hrs, Not a transplant patient  Verbal consent obtained from patient to administer flu vaccine  Vaccine given to right arm after consent obtained  Pt tolerated well  Patient assessed for 15 minutes following administration of Flu vaccine.

## 2024-11-26 PROBLEM — A41.9 SEVERE SEPSIS: Status: RESOLVED | Noted: 2024-08-18 | Resolved: 2024-11-26

## 2024-11-26 PROBLEM — R65.20 SEVERE SEPSIS: Status: RESOLVED | Noted: 2024-08-18 | Resolved: 2024-11-26

## 2024-11-26 PROCEDURE — G0180 MD CERTIFICATION HHA PATIENT: HCPCS | Mod: ,,, | Performed by: INTERNAL MEDICINE

## 2024-11-26 NOTE — ASSESSMENT & PLAN NOTE
Chronic  Mild, caregiver reports pt is at her baseline  Seroquel at bedtime is effective  Continue support care

## 2024-12-10 ENCOUNTER — NURSE TRIAGE (OUTPATIENT)
Dept: ADMINISTRATIVE | Facility: CLINIC | Age: 89
End: 2024-12-10
Payer: MEDICARE

## 2024-12-11 NOTE — TELEPHONE ENCOUNTER
[facility] pt. O2 stats 91%. At 19:37.  Error encounter. Daughter Janae used pt's pulse ox for own reading. Janae triaged.       Reason for Disposition   Caller has cancelled the call before the first contact    Protocols used: No Contact or Duplicate Contact Call-A-AH

## 2024-12-12 ENCOUNTER — OFFICE VISIT (OUTPATIENT)
Dept: NEUROLOGY | Facility: CLINIC | Age: 89
End: 2024-12-12
Payer: MEDICARE

## 2024-12-12 VITALS — WEIGHT: 130 LBS | BODY MASS INDEX: 21.66 KG/M2 | HEIGHT: 65 IN

## 2024-12-12 DIAGNOSIS — G30.1 LATE ONSET ALZHEIMER'S DISEASE WITH BEHAVIORAL DISTURBANCE: ICD-10-CM

## 2024-12-12 DIAGNOSIS — F02.818 LATE ONSET ALZHEIMER'S DISEASE WITH BEHAVIORAL DISTURBANCE: ICD-10-CM

## 2024-12-12 DIAGNOSIS — R29.90 EPISODE OF TRANSIENT NEUROLOGIC SYMPTOMS: Primary | ICD-10-CM

## 2024-12-12 DIAGNOSIS — G89.29 CHRONIC BILATERAL LOW BACK PAIN WITHOUT SCIATICA: ICD-10-CM

## 2024-12-12 DIAGNOSIS — K31.84 GASTROPARESIS: Chronic | ICD-10-CM

## 2024-12-12 DIAGNOSIS — M54.50 CHRONIC BILATERAL LOW BACK PAIN WITHOUT SCIATICA: ICD-10-CM

## 2024-12-12 PROCEDURE — 1126F AMNT PAIN NOTED NONE PRSNT: CPT | Mod: CPTII,95,, | Performed by: PSYCHIATRY & NEUROLOGY

## 2024-12-12 PROCEDURE — 3288F FALL RISK ASSESSMENT DOCD: CPT | Mod: CPTII,95,, | Performed by: PSYCHIATRY & NEUROLOGY

## 2024-12-12 PROCEDURE — 1159F MED LIST DOCD IN RCRD: CPT | Mod: CPTII,95,, | Performed by: PSYCHIATRY & NEUROLOGY

## 2024-12-12 PROCEDURE — 1157F ADVNC CARE PLAN IN RCRD: CPT | Mod: CPTII,95,, | Performed by: PSYCHIATRY & NEUROLOGY

## 2024-12-12 PROCEDURE — G2211 COMPLEX E/M VISIT ADD ON: HCPCS | Mod: 95,,, | Performed by: PSYCHIATRY & NEUROLOGY

## 2024-12-12 PROCEDURE — 99214 OFFICE O/P EST MOD 30 MIN: CPT | Mod: 95,,, | Performed by: PSYCHIATRY & NEUROLOGY

## 2024-12-12 PROCEDURE — 1100F PTFALLS ASSESS-DOCD GE2>/YR: CPT | Mod: CPTII,95,, | Performed by: PSYCHIATRY & NEUROLOGY

## 2024-12-12 RX ORDER — GABAPENTIN 300 MG/1
300 CAPSULE ORAL 2 TIMES DAILY
COMMUNITY

## 2024-12-12 RX ORDER — AMLODIPINE BESYLATE 10 MG/1
5 TABLET ORAL DAILY
COMMUNITY

## 2024-12-12 NOTE — PROGRESS NOTES
Neurology Telemedicine Note     Name: Quin Linn  MRN: [id number]   CSN: [id number]      Date: 12/12/2024    The patient location is: Home  The chief complaint leading to consultation is: f/u dementia  Visit type: Virtual visit with synchronous audio and video    TOTAL TIME SPENT: 21 min    Each patient to whom I provide medical services by telemedicine is:  (1) informed of the relationship between the physician and patient and the respective role of any other health care provider with respect to management of the patient; and (2) notified that they may decline to receive medical services by telemedicine and may withdraw from such care at any time.    History of Present Illness (HPI): daughter Janae dykes    Patient presents today for follow-up after a syncope episode.    SYNCOPE HISTORY:  She experienced an episode of diminished consciousness 1.5 days ago lasting 15-20 minutes, occurring after using the bathroom. She had a previous episode in the summer requiring emergency room evaluation, with recovery taking several days. The previous episode involved EEG monitoring and medication adjustments. Her underlying Alzheimer's disease affects her recovery time from these episodes.    MEDICATIONS:  Keppra and Hydralazine have been discontinued.     SLEEP:  She reports difficulty sleeping, though her mother indicates she is sleeping adequately. They request to restart trazodone for sleep.    NEUROLOGICAL STATUS:  She demonstrates language dysfunction and is reluctant to speak, preferring to communicate through hand gestures.    GI:  She reports firm stools and has started a stool softener 3 times weekly.       Nonmotor/Premotor ROS: as per HPI, and all other systems are negative    Past Medical History: The patient  has a past medical history of Acute hypoxemic respiratory failure (9/2/2023), Acute on chronic diastolic congestive heart failure (10/19/2023), Alzheimer's disease, Anemia, Anemia (5/14/2024), Arthritis,  "Back pain, Cancer, Cataract, Colon cancer, Diabetes mellitus type II, Glaucoma, Hyperlipidemia, Hypertension, Hyperthyroidism, Hypothyroidism following radioiodine therapy, Pacemaker, Pneumonia, Pneumonia due to other staphylococcus, Renal manifestation of secondary diabetes mellitus, Squamous cell carcinoma, Stroke, Type 2 diabetes mellitus with stage 3 chronic kidney disease and hypertension (10/12/2017), and Type 2 diabetes with peripheral circulatory disorder, controlled.    Social History: The patient  reports that she has never smoked. She has never used smokeless tobacco. She reports that she does not drink alcohol and does not use drugs.    Family History: Their family history includes Alzheimer's disease in her sister; Ankylosing spondylitis in her daughter; Arthritis in her mother; Asthma in her son; Atrial fibrillation in her daughter; Cancer in her paternal uncle and sister; Diabetes in her maternal grandmother; Heart disease in her daughter and mother; Hyperlipidemia in her son; Hypertension in her sister and son; Kidney disease in her daughter and father; Lupus in her daughter; No Known Problems in her daughter; Supraventricular tachycardia in her daughter.    Allergies: Tramadol and Metformin     Meds:   Current Outpatient Medications on File Prior to Visit   Medication Sig Dispense Refill    amLODIPine (NORVASC) 10 MG tablet Take 5 mg by mouth once daily.      apixaban (ELIQUIS) 2.5 mg Tab Take 1 tablet (2.5 mg total) by mouth 2 (two) times daily. 60 tablet 5    atorvastatin (LIPITOR) 80 MG tablet TAKE 1 TABLET(80 MG) BY MOUTH EVERY DAY 90 tablet 3    BD AUTOSHIELD DUO PEN NEEDLE 30 gauge x 3/16" Ndle       blood sugar diagnostic (ACCU-CHEK GUIDE TEST STRIPS) Strp TEST BLOOD SUGAR FOUR TIMES DAILY OR AS DIRECTED 400 strip 3    calcium-vitamin D3 (CALCIUM 500 + D) 500 mg-5 mcg (200 unit) per tablet Take 1 tablet by mouth 2 (two) times daily with meals. 60 tablet 11    carvediloL (COREG) 3.125 MG " "tablet Take 1 tablet (3.125 mg total) by mouth 2 (two) times daily. 180 tablet 3    gabapentin (NEURONTIN) 300 MG capsule Take 300 mg by mouth 2 (two) times daily.      HYDROcodone-acetaminophen (NORCO) 5-325 mg per tablet Take 1 tablet by mouth every 8 (eight) hours as needed for Pain. 90 tablet 0    levothyroxine (SYNTHROID) 75 MCG tablet Take 1 tablet (75 mcg total) by mouth before breakfast. Administer on an empty stomach at least 30 minutes before food or other medications. 90 tablet 3    polyethylene glycol (GLYCOLAX) 17 gram PwPk Take 17 g by mouth once daily.      QUEtiapine (SEROQUEL) 50 MG tablet Take 1.5 tablets (75 mg total) by mouth every evening. 135 tablet 3    senna-docusate 8.6-50 mg (PERICOLACE) 8.6-50 mg per tablet Take 1 tablet by mouth 2 (two) times daily as needed for Constipation.      folic acid (FOLVITE) 1 MG tablet Take 1 tablet (1 mg total) by mouth once daily. (Patient not taking: Reported on 12/12/2024) 30 tablet 11     No current facility-administered medications on file prior to visit.       Exam:  Vital Signs deferred with home visit  Is alert, but disoriented, paucity of thought and speech, says "I love you!"    Medical Record Review:  - Lab Results:  No visits with results within 3 Month(s) from this visit.   Latest known visit with results is:   Hospital Outpatient Visit on 09/09/2024   Component Date Value Ref Range Status    QRS Duration 09/09/2024 170  ms Final    [facility] QTC Calculation 09/09/2024 492  ms Final       Diagnoses:                                                                               1) Alzheimer's disease +/- possible LB pathology with fluctuations in awareness.  Progressive.  2) Enhanced physiologic tremor.  Stable and unchanged.  3) Lumbar spinal stenosis - contributing to back pain and gait.    - Assessed recent episode lasting 15-20 minutes, likely due to cognitive fluctuations common in dementia rather than seizure  - Noted patient's return to baseline " after previous prolonged episode, supporting current management approach  - Confirmed appropriateness of medication reduction from previous regimen  - Evaluated sleep issues and considered restarting trazodone  - Explained that language dysfunction, including reduced speech production, is a common progression in Alzheimer's disease.  - Recommend continuing verbal communication to maintain language abilities.  - Continued current medication regimen without changes.  - Refilled all current medications with 90-day supply and 3 refills.  - Restarted trazodone for sleep (dosage and frequency to be determined by Dr. Jerry).  - Patient to continue use of stool softener 3 times per week for constipation management.  - Patient to ensure adequate hydration.    FOLLOW-UP:  - Follow up in approximately 1 year for annual check-up.  - Contact Dr. Mcgee if unable to reach Dr. Nesbitt or if there are any concerns about treatment choices.         This note was generated with the assistance of ambient listening technology. Verbal consent was obtained by the patient and accompanying visitor(s) for the recording of patient appointment to facilitate this note. I attest to having reviewed and edited the generated note for accuracy, though some syntax or spelling errors may persist. Please contact the author of this note for any clarification.    This is a patient with a serious and complex neurologic diagnosis whose overall, ongoing care is being managed and monitored by me and our Neurology clinic.   As such, since 2024,  is the appropriate add-on code to accompany the other E/M billing for this visit.              Nehemias Mcgee MD, MPH  Division of Movement and Memory Disorders  [facility]  [phone]

## 2025-01-07 ENCOUNTER — PATIENT MESSAGE (OUTPATIENT)
Dept: INTERNAL MEDICINE | Facility: CLINIC | Age: OVER 89
End: 2025-01-07
Payer: MEDICARE

## 2025-01-07 DIAGNOSIS — G89.29 OTHER CHRONIC PAIN: ICD-10-CM

## 2025-01-07 RX ORDER — HYDROCODONE BITARTRATE AND ACETAMINOPHEN 5; 325 MG/1; MG/1
1 TABLET ORAL EVERY 8 HOURS PRN
Qty: 90 TABLET | Refills: 0 | Status: SHIPPED | OUTPATIENT
Start: 2025-01-07

## 2025-01-07 NOTE — TELEPHONE ENCOUNTER
Care Due:                  Date            Visit Type   Department     Provider  --------------------------------------------------------------------------------                                ESTABLISHED                              PATIENT -    [facility] INTERNAL  Last Visit: 09-      VIRTUAL      MEDICINE       SHEILA SCHAEFFER                              EP -                              PRIMARY      [facility] INTERNAL  Next Visit: 01-      CARE ([facility])   MEDICINE       SHEILA SCHAEFFER                                                            Last  Test          Frequency    Reason                     Performed    Due Date  --------------------------------------------------------------------------------    Lipid Panel.  12 months..  atorvastatin.............  02- 02-    [facility] Embedded Care Due Messages. Reference number: [id number].   1/07/2025 1:29:33 PM CST

## 2025-01-15 ENCOUNTER — EXTERNAL HOME HEALTH (OUTPATIENT)
Dept: HOME HEALTH SERVICES | Facility: HOSPITAL | Age: OVER 89
End: 2025-01-15
Payer: MEDICARE

## 2025-03-11 ENCOUNTER — PATIENT MESSAGE (OUTPATIENT)
Dept: INTERNAL MEDICINE | Facility: CLINIC | Age: OVER 89
End: 2025-03-11
Payer: MEDICARE

## 2025-03-11 ENCOUNTER — DOCUMENT SCAN (OUTPATIENT)
Dept: HOME HEALTH SERVICES | Facility: HOSPITAL | Age: OVER 89
End: 2025-03-11
Payer: MEDICARE

## 2025-03-11 DIAGNOSIS — G89.29 OTHER CHRONIC PAIN: ICD-10-CM

## 2025-03-11 RX ORDER — HYDROCODONE BITARTRATE AND ACETAMINOPHEN 5; 325 MG/1; MG/1
1 TABLET ORAL EVERY 8 HOURS PRN
Qty: 90 TABLET | Refills: 0 | Status: SHIPPED | OUTPATIENT
Start: 2025-03-11

## 2025-03-11 NOTE — TELEPHONE ENCOUNTER
Care Due:                  Date            Visit Type   Department     Provider  --------------------------------------------------------------------------------                                ESTABLISHED                              PATIENT -    [facility] INTERNAL  Last Visit: 09-      VIRTUAL      MEDICINE       SHEILA SCHAEFFER                              EP -                              PRIMARY      [facility] INTERNAL  Next Visit: 03-      CARE ([facility])   MEDICINE       SHEILA SCHAEFFER                                                            Last  Test          Frequency    Reason                     Performed    Due Date  --------------------------------------------------------------------------------    Lipid Panel.  12 months..  atorvastatin.............  02- 02-    [facility] Embedded Care Due Messages. Reference number: [id number].   3/11/2025 10:04:50 AM CDT

## 2025-03-12 DIAGNOSIS — G89.29 OTHER CHRONIC PAIN: ICD-10-CM

## 2025-03-12 RX ORDER — HYDROCODONE BITARTRATE AND ACETAMINOPHEN 5; 325 MG/1; MG/1
1 TABLET ORAL EVERY 8 HOURS PRN
Qty: 90 TABLET | Refills: 0 | Status: CANCELLED | OUTPATIENT
Start: 2025-03-12

## 2025-03-12 NOTE — TELEPHONE ENCOUNTER
No care due was identified.  [facility] Embedded Care Due Messages. Reference number: [id number].   3/12/2025 11:32:55 AM CDT

## 2025-03-12 NOTE — TELEPHONE ENCOUNTER
Duplicate; refill has been sent    3/11 refill encounter pending with provider, 3/12 telephone encounter received also sent to provider

## 2025-03-12 NOTE — TELEPHONE ENCOUNTER
----- Message from Omkar sent at 3/11/2025  4:15 PM CDT -----  Contact: [phone]  Requesting an RX refill or new RX.Is this a refill or new RX: refillRX name and strength (copy/paste from chart):  HYDROcodone-acetaminophen (NORCO) 5-325 mg per tabletIs this a 30 day or 90 day RX: 90Pharmacy name and phone # (copy/paste from chart): [id number] - [place], [place] - [id number] [place] [address] [id number]: [phone] Fax: [phone] The doctors have asked that we provide their patients with the following 2 reminders -- prescription refills can take up to 72 hours, and a friendly reminder that in the future you can use your [facility] account to request refills:

## 2025-03-17 ENCOUNTER — TELEPHONE (OUTPATIENT)
Dept: INTERNAL MEDICINE | Facility: CLINIC | Age: OVER 89
End: 2025-03-17
Payer: MEDICARE

## 2025-03-17 NOTE — TELEPHONE ENCOUNTER
----- Message from Mary Lou sent at 3/17/2025 11:12 AM CDT -----  Contact: [phone] daughter  1MEDICALADVICE Patient is calling for Medical Advice regarding:Rescheduling lab appt Patient wants a call back or thru [facility]:Call backComments:Pt daughter would like a call back from nurse regarding rescheduling lab appt.Please advise patient replies from provider may take up to 48 hours.

## 2025-03-19 RX ORDER — LEVOTHYROXINE SODIUM 75 UG/1
TABLET ORAL
Qty: 90 TABLET | Refills: 1 | Status: SHIPPED | OUTPATIENT
Start: 2025-03-19

## 2025-03-19 NOTE — TELEPHONE ENCOUNTER
Refill Routing Note   Medication(s) are not appropriate for processing by [facility] for the following reason(s):        Required labs outdated    [facility] action(s):  Defer  Approve             Appointments  past 12m or future 3m with PCP    Date Provider   Last Visit   9/26/2024 Mary Ellen Nesbitt MD   Next Visit   5/19/2025 Mary Ellen Nesbitt MD   ED visits in past 90 days: 0        Note composed:6:14 PM 03/19/2025

## 2025-03-19 NOTE — TELEPHONE ENCOUNTER
No care due was identified.  [facility] Embedded Care Due Messages. Reference number: [id number].   3/19/2025 3:44:46 AM CDT

## 2025-03-20 RX ORDER — ATORVASTATIN CALCIUM 80 MG/1
80 TABLET, FILM COATED ORAL
Qty: 90 TABLET | Refills: 1 | Status: SHIPPED | OUTPATIENT
Start: 2025-03-20

## 2025-05-16 ENCOUNTER — TELEPHONE (OUTPATIENT)
Dept: INTERNAL MEDICINE | Facility: CLINIC | Age: OVER 89
End: 2025-05-16
Payer: MEDICARE

## 2025-05-19 ENCOUNTER — HOSPITAL ENCOUNTER (OUTPATIENT)
Dept: RADIOLOGY | Facility: HOSPITAL | Age: OVER 89
Discharge: HOME | End: 2025-05-19
Attending: INTERNAL MEDICINE
Payer: MEDICARE

## 2025-05-19 ENCOUNTER — OFFICE VISIT (OUTPATIENT)
Dept: INTERNAL MEDICINE | Facility: CLINIC | Age: OVER 89
End: 2025-05-19
Payer: MEDICARE

## 2025-05-19 VITALS
BODY MASS INDEX: 21.67 KG/M2 | SYSTOLIC BLOOD PRESSURE: 134 MMHG | HEIGHT: 65 IN | DIASTOLIC BLOOD PRESSURE: 84 MMHG | OXYGEN SATURATION: 98 % | HEART RATE: 64 BPM | WEIGHT: 130.06 LBS

## 2025-05-19 DIAGNOSIS — E78.5 HYPERLIPIDEMIA LDL GOAL <70: ICD-10-CM

## 2025-05-19 DIAGNOSIS — D64.9 ANEMIA, UNSPECIFIED TYPE: ICD-10-CM

## 2025-05-19 DIAGNOSIS — Z11.1 SCREENING-PULMONARY TB: ICD-10-CM

## 2025-05-19 DIAGNOSIS — I44.2 COMPLETE HEART BLOCK: ICD-10-CM

## 2025-05-19 DIAGNOSIS — F02.818 LATE ONSET ALZHEIMER'S DISEASE WITH BEHAVIORAL DISTURBANCE: Primary | ICD-10-CM

## 2025-05-19 DIAGNOSIS — R53.81 DEBILITY: ICD-10-CM

## 2025-05-19 DIAGNOSIS — E11.42 TYPE 2 DIABETES MELLITUS WITH DIABETIC POLYNEUROPATHY, WITHOUT LONG-TERM CURRENT USE OF INSULIN: ICD-10-CM

## 2025-05-19 DIAGNOSIS — G30.1 LATE ONSET ALZHEIMER'S DISEASE WITH BEHAVIORAL DISTURBANCE: Primary | ICD-10-CM

## 2025-05-19 DIAGNOSIS — I50.32 CHRONIC DIASTOLIC HEART FAILURE: ICD-10-CM

## 2025-05-19 PROBLEM — S12.401D CLOSED NONDISPLACED FRACTURE OF FIFTH CERVICAL VERTEBRA WITH ROUTINE HEALING: Status: RESOLVED | Noted: 2024-06-24 | Resolved: 2025-05-19

## 2025-05-19 PROBLEM — F11.20 OPIOID DEPENDENCE, DAILY USE: Status: RESOLVED | Noted: 2024-06-24 | Resolved: 2025-05-19

## 2025-05-19 PROBLEM — N17.9 AKI (ACUTE KIDNEY INJURY): Status: RESOLVED | Noted: 2023-09-10 | Resolved: 2025-05-19

## 2025-05-19 PROBLEM — N18.4 CKD (CHRONIC KIDNEY DISEASE), STAGE IV: Status: RESOLVED | Noted: 2023-07-16 | Resolved: 2025-05-19

## 2025-05-19 PROBLEM — J96.01 ACUTE HYPOXEMIC RESPIRATORY FAILURE: Status: RESOLVED | Noted: 2023-09-02 | Resolved: 2025-05-19

## 2025-05-19 PROCEDURE — 1126F AMNT PAIN NOTED NONE PRSNT: CPT | Mod: CPTII,S$GLB,, | Performed by: INTERNAL MEDICINE

## 2025-05-19 PROCEDURE — 1157F ADVNC CARE PLAN IN RCRD: CPT | Mod: CPTII,S$GLB,, | Performed by: INTERNAL MEDICINE

## 2025-05-19 PROCEDURE — 1160F RVW MEDS BY RX/DR IN RCRD: CPT | Mod: CPTII,S$GLB,, | Performed by: INTERNAL MEDICINE

## 2025-05-19 PROCEDURE — G2211 COMPLEX E/M VISIT ADD ON: HCPCS | Mod: S$GLB,,, | Performed by: INTERNAL MEDICINE

## 2025-05-19 PROCEDURE — 99999 PR PBB SHADOW E&M-EST. PATIENT-LVL V: CPT | Mod: PBBFAC,,, | Performed by: INTERNAL MEDICINE

## 2025-05-19 PROCEDURE — 3288F FALL RISK ASSESSMENT DOCD: CPT | Mod: CPTII,S$GLB,, | Performed by: INTERNAL MEDICINE

## 2025-05-19 PROCEDURE — 1159F MED LIST DOCD IN RCRD: CPT | Mod: CPTII,S$GLB,, | Performed by: INTERNAL MEDICINE

## 2025-05-19 PROCEDURE — 1101F PT FALLS ASSESS-DOCD LE1/YR: CPT | Mod: CPTII,S$GLB,, | Performed by: INTERNAL MEDICINE

## 2025-05-19 PROCEDURE — 71046 X-RAY EXAM CHEST 2 VIEWS: CPT | Mod: 26,,, | Performed by: RADIOLOGY

## 2025-05-19 PROCEDURE — 99214 OFFICE O/P EST MOD 30 MIN: CPT | Mod: S$GLB,,, | Performed by: INTERNAL MEDICINE

## 2025-05-19 PROCEDURE — 71046 X-RAY EXAM CHEST 2 VIEWS: CPT | Mod: TC

## 2025-05-19 NOTE — TELEPHONE ENCOUNTER
Refill Routing Note   Medication(s) are not appropriate for processing by [facility] for the following reason(s):        Outside of protocol    [facility] action(s):  Route               Appointments  past 12m or future 3m with PCP    Date Provider   Last Visit   5/19/2025 Mary Ellen Nesbitt MD   Next Visit   Visit date not found Mary Ellen Nesbitt MD   ED visits in past 90 days: 0        Note composed:1:33 PM 05/19/2025

## 2025-05-19 NOTE — PROGRESS NOTES
R  Subjective     Patient ID: Quin Linn is a 89 y.o. female.    Chief Complaint: Follow-up    HPI   Caretaker, her daughter,  Janae, is in ICU.  She has been in poor health since January.  Daughter had been unable to care for pt before recent admission.    Pt  has an aid 5 days a week.   She lives in her home with daughter and son-in-law.  Son-in-law is unreliable so daughter (Khalida) and son (Mayito) visiting from out of town are striving to arrange more dependable care for pt.  They are open to exploring nursing homes.  Janae has health care and financial power of .    She has progressive Alzheimer's Disease.     She requires help in all activities - even eating.    Incontinent.     Hosp bed .     Unable to stand.  Sitting in w/c today.      Agitated at times - spitting out meds.    A fib, on eliquis.  Hyperlipidemia, - on atorvastatin.  Htn - amlodipine, carvedilol    Hypothyroidism.  Seroquel only for agitation at night, to help sleep.    DM has been controlled.  Anemia by most recent labs, last August.    She has h/o lumbar OA with chronic pain, but has appeared comfortable.    Khalida does not think she is taking hydrocodone.       She voices no complaints.    Past Medical History:   Diagnosis Date    Acute hypoxemic respiratory failure 9/2/2023    Acute on chronic diastolic congestive heart failure 10/19/2023    Alzheimer's disease     Anemia     Anemia 5/14/2024    Arthritis     lumbar    Back pain     Cancer     colon    Cataract     Colon cancer     Diabetes mellitus type II     Glaucoma     Hyperlipidemia     Hypertension     Hyperthyroidism     Hypothyroidism following radioiodine therapy     Pacemaker     Pneumonia     Pneumonia due to other staphylococcus     Renal manifestation of secondary diabetes mellitus     Squamous cell carcinoma     Stroke     TIA    Type 2 diabetes mellitus with stage 3 chronic kidney disease and hypertension 10/12/2017    Type 2 diabetes with peripheral circulatory  disorder, controlled       Past Surgical History:   Procedure Laterality Date    CATARACT EXTRACTION W/  INTRAOCULAR LENS IMPLANT Left 09/13/2017        CHOLECYSTECTOMY      COLON SURGERY  2001    Colon CA    EYE SURGERY      cataract removal left side    IMPLANTATION OF LEADLESS PACEMAKER N/A 10/21/2022    Procedure: [id number];  Surgeon: JOSE Burgos MD;  Location: [facility];  Service: Cardiology;  Laterality: N/A;  SB, MICRA, MDT, ANES, EH,     squamous cell ca of face        Review of Systems   Constitutional:  Negative for fever and unexpected weight change.   HENT:  Negative for nasal congestion and postnasal drip.    Eyes:  Negative for pain, discharge and visual disturbance.   Respiratory:  Negative for cough, chest tightness, shortness of breath and wheezing.    Cardiovascular:  Negative for chest pain and leg swelling.   Gastrointestinal:  Negative for abdominal pain, constipation, diarrhea and nausea.   Genitourinary:  Negative for difficulty urinating, dysuria and hematuria.   Integumentary:  Negative for rash.   Neurological:  Negative for headaches.   Psychiatric/Behavioral:  Negative for dysphoric mood and sleep disturbance. The patient is not nervous/anxious.           Objective     Physical Exam  Constitutional:       General: She is not in acute distress.     Appearance: She is well-developed. She is not ill-appearing, toxic-appearing or diaphoretic.   Eyes:      General: No scleral icterus.  Neck:      Thyroid: No thyromegaly.      Vascular: No JVD.   Cardiovascular:      Rate and Rhythm: Normal rate and regular rhythm.      Heart sounds: Normal heart sounds.   Pulmonary:      Effort: Pulmonary effort is normal. No respiratory distress.      Breath sounds: Normal breath sounds. No stridor. No wheezing, rhonchi or rales.   Abdominal:      General: There is no distension.      Palpations: Abdomen is soft. There is no mass.      Tenderness: There is no  abdominal tenderness. There is no guarding or rebound.      Hernia: No hernia is present.   Musculoskeletal:      Right lower leg: No edema.      Left lower leg: No edema.   Neurological:      Mental Status: She is alert and oriented to person, place, and time.      Cranial Nerves: No cranial nerve deficit.   Psychiatric:         Mood and Affect: Mood normal.         Behavior: Behavior normal.      Comments: Alert but no spontaneous speech.            Assessment and Plan     1. Late onset Alzheimer's disease with behavioral disturbance  -     Cancel: Ambulatory referral/consult to Outpatient Case Management  -     Ambulatory referral/consult to Outpatient Case Management    2. Type 2 diabetes mellitus with diabetic polyneuropathy, without long-term current use of insulin  -     Hemoglobin A1C; Future; Expected date: 05/19/2025    3. Hyperlipidemia LDL goal <70  -     Comprehensive Metabolic Panel; Future; Expected date: 05/19/2025  -     Lipid Panel; Future; Expected date: 05/19/2025    4. Anemia, unspecified type  -     CBC Without Differential; Future; Expected date: 05/19/2025    5. Screening-pulmonary TB  -     Quantiferon Gold TB; Future; Expected date: 05/19/2025  -     X-Ray Chest PA And Lateral; Future; Expected date: 05/19/2025    6. Debility  -     Cancel: Ambulatory referral/consult to Outpatient Case Management             Do not refill hydrocodone, gabapentin since taking neither and comfortable without.    Will complete NH forms as requested.  Case Management consulted for assistance.    No follow-ups on file.

## 2025-05-20 ENCOUNTER — TELEPHONE (OUTPATIENT)
Dept: INTERNAL MEDICINE | Facility: CLINIC | Age: OVER 89
End: 2025-05-20
Payer: MEDICARE

## 2025-05-20 NOTE — TELEPHONE ENCOUNTER
----- Message from Nel sent at 5/20/2025 12:19 PM CDT -----  Contact: Khalida Garcia   .1MEDICALADVICE Patient is calling for Medical Advice regarding:patients daughter called because she needs referral for nursing home. Asked for a call back today because things are happening fast. How long has patient had these symptoms:Pharmacy name and phone#:Patient wants a call back or thru [facility]:Khalida Garcia [phone]:patient going to  [facility] Please advise patient replies from provider may take up to 48 hours.

## 2025-05-22 ENCOUNTER — TELEPHONE (OUTPATIENT)
Dept: INTERNAL MEDICINE | Facility: CLINIC | Age: OVER 89
End: 2025-05-22
Payer: MEDICARE

## 2025-05-22 NOTE — TELEPHONE ENCOUNTER
Pls fax last 3 progress notes, list of meds  And cxray report and  signed sheet in [facility].  I should sign med list.    PPD tomorrow at 11 am - [facility] schedule nurse visit  She will return Monday to have it read.    COuld you pend orders for me?

## 2025-05-23 ENCOUNTER — CLINICAL SUPPORT (OUTPATIENT)
Dept: INTERNAL MEDICINE | Facility: CLINIC | Age: OVER 89
End: 2025-05-23
Payer: MEDICARE

## 2025-05-23 ENCOUNTER — RESULTS FOLLOW-UP (OUTPATIENT)
Dept: INTERNAL MEDICINE | Facility: CLINIC | Age: OVER 89
End: 2025-05-23

## 2025-05-23 DIAGNOSIS — Z11.1 SCREENING-PULMONARY TB: Primary | ICD-10-CM

## 2025-05-23 RX ORDER — AMLODIPINE BESYLATE 10 MG/1
10 TABLET ORAL
Qty: 90 TABLET | Refills: 3 | Status: SHIPPED | OUTPATIENT
Start: 2025-05-23

## 2025-05-23 NOTE — TELEPHONE ENCOUNTER
Refill Routing Note   Medication(s) are not appropriate for processing by [facility] for the following reason(s):        No active prescription written by provider    [facility] action(s):  Defer        Medication Therapy Plan: Discontinued by: Rosita Peterson, PharmD on 8/19/2024      Appointments  past 12m or future 3m with PCP    Date Provider   Last Visit   5/19/2025 Mary Ellen Nesbitt MD   Next Visit   Visit date not found Mary Ellen Nesbitt MD   ED visits in past 90 days: 0        Note composed:2:50 PM 05/23/2025

## 2025-05-23 NOTE — PROGRESS NOTES
Pt here for PPD skin test, 2 pt identifiers used. Tuberculin skin test applied to right ventral forearm. Pt will come with daughter Monday to have test read.

## 2025-05-23 NOTE — TELEPHONE ENCOUNTER
No care due was identified.  [facility] Embedded Care Due Messages. Reference number: [id number].   5/23/2025 11:59:38 AM CDT

## 2025-05-27 ENCOUNTER — TELEPHONE (OUTPATIENT)
Dept: INTERNAL MEDICINE | Facility: CLINIC | Age: OVER 89
End: 2025-05-27
Payer: MEDICARE

## 2025-05-27 NOTE — TELEPHONE ENCOUNTER
----- Message from Med Assistant Martin sent at 5/27/2025 10:40 AM CDT -----  Regarding: FW: RX Change - Callback Request  Contact: Pt Daughter [phone]  Please see message below  ----- Message -----  From: Fracisco Morejon  Sent: 5/27/2025  10:18 AM CDT  To: Delicia MACEDO Staff  Subject: RX Change - Callback Request                     .1MEDICALADVICE Patient is calling for Medical Advice regarding: Patient daughter called to request a callback to resolve some issues regarding QUEtiapine (SEROQUEL) 50 MG tablet. She said she needs this changed to PRN because patient is being admitted to a nursing facility. Please call patient daughter, Khalida, at number provided to discuss options. She provided the fax number for this location just in case:[facility] [place] Fax: [phone] long has patient had these symptoms:Pharmacy name and phone#:Patient wants a call back or [facility]: CallComments:Please advise patient replies from provider may take up to 48 hours.

## 2025-05-28 RX ORDER — QUETIAPINE FUMARATE 50 MG/1
TABLET, FILM COATED ORAL
Qty: 135 TABLET | Refills: 3 | Status: SHIPPED | OUTPATIENT
Start: 2025-05-28

## 2025-06-04 ENCOUNTER — OUTPATIENT CASE MANAGEMENT (OUTPATIENT)
Dept: ADMINISTRATIVE | Facility: OTHER | Age: OVER 89
End: 2025-06-04
Payer: MEDICARE

## 2025-09-02 ENCOUNTER — PATIENT MESSAGE (OUTPATIENT)
Facility: CLINIC | Age: OVER 89
End: 2025-09-02
Payer: MEDICARE